# Patient Record
Sex: MALE | Race: BLACK OR AFRICAN AMERICAN | NOT HISPANIC OR LATINO | Employment: UNEMPLOYED | URBAN - METROPOLITAN AREA
[De-identification: names, ages, dates, MRNs, and addresses within clinical notes are randomized per-mention and may not be internally consistent; named-entity substitution may affect disease eponyms.]

---

## 2017-12-20 ENCOUNTER — RESOLUTE PROFESSIONAL BILLING HOSPITAL PROF FEE (OUTPATIENT)
Dept: HOSPITALIST | Facility: MEDICAL CENTER | Age: 57
End: 2017-12-20
Payer: COMMERCIAL

## 2017-12-20 ENCOUNTER — APPOINTMENT (OUTPATIENT)
Dept: RADIOLOGY | Facility: MEDICAL CENTER | Age: 57
DRG: 637 | End: 2017-12-20
Attending: EMERGENCY MEDICINE
Payer: COMMERCIAL

## 2017-12-20 ENCOUNTER — HOSPITAL ENCOUNTER (INPATIENT)
Facility: MEDICAL CENTER | Age: 57
LOS: 4 days | DRG: 637 | End: 2017-12-24
Attending: EMERGENCY MEDICINE | Admitting: HOSPITALIST
Payer: COMMERCIAL

## 2017-12-20 DIAGNOSIS — G54.6 PHANTOM LIMB PAIN (HCC): ICD-10-CM

## 2017-12-20 DIAGNOSIS — N28.9 RENAL INSUFFICIENCY: ICD-10-CM

## 2017-12-20 DIAGNOSIS — E11.40 TYPE 2 DIABETES MELLITUS WITH DIABETIC NEUROPATHY, WITH LONG-TERM CURRENT USE OF INSULIN (HCC): ICD-10-CM

## 2017-12-20 DIAGNOSIS — R73.9 HYPERGLYCEMIA: ICD-10-CM

## 2017-12-20 DIAGNOSIS — J18.9 PNEUMONIA OF RIGHT LUNG DUE TO INFECTIOUS ORGANISM, UNSPECIFIED PART OF LUNG: ICD-10-CM

## 2017-12-20 DIAGNOSIS — E87.1 HYPONATREMIA: ICD-10-CM

## 2017-12-20 DIAGNOSIS — J10.1 INFLUENZA A: ICD-10-CM

## 2017-12-20 DIAGNOSIS — Z79.4 TYPE 2 DIABETES MELLITUS WITH DIABETIC NEUROPATHY, WITH LONG-TERM CURRENT USE OF INSULIN (HCC): ICD-10-CM

## 2017-12-20 DIAGNOSIS — Z86.39 HISTORY OF DIABETES MELLITUS, TYPE II: ICD-10-CM

## 2017-12-20 DIAGNOSIS — E11.10 DIABETIC KETOACIDOSIS WITHOUT COMA ASSOCIATED WITH TYPE 2 DIABETES MELLITUS (HCC): ICD-10-CM

## 2017-12-20 LAB
ALBUMIN SERPL BCP-MCNC: 3.6 G/DL (ref 3.2–4.9)
ALBUMIN/GLOB SERPL: 0.9 G/DL
ALP SERPL-CCNC: 91 U/L (ref 30–99)
ALT SERPL-CCNC: 10 U/L (ref 2–50)
ANION GAP SERPL CALC-SCNC: 11 MMOL/L (ref 0–11.9)
ANION GAP SERPL CALC-SCNC: 18 MMOL/L (ref 0–11.9)
ANION GAP SERPL CALC-SCNC: 30 MMOL/L (ref 0–11.9)
ANION GAP SERPL CALC-SCNC: 8 MMOL/L (ref 0–11.9)
APPEARANCE UR: CLEAR
APTT PPP: 26.4 SEC (ref 24.7–36)
AST SERPL-CCNC: 13 U/L (ref 12–45)
B-OH-BUTYR SERPL-MCNC: >8 MMOL/L (ref 0.02–0.27)
BACTERIA #/AREA URNS HPF: NEGATIVE /HPF
BASOPHILS # BLD AUTO: 0.2 % (ref 0–1.8)
BASOPHILS # BLD: 0.02 K/UL (ref 0–0.12)
BILIRUB SERPL-MCNC: 0.5 MG/DL (ref 0.1–1.5)
BILIRUB UR QL STRIP.AUTO: NEGATIVE
BNP SERPL-MCNC: 23 PG/ML (ref 0–100)
BUN SERPL-MCNC: 31 MG/DL (ref 8–22)
BUN SERPL-MCNC: 33 MG/DL (ref 8–22)
BUN SERPL-MCNC: 39 MG/DL (ref 8–22)
BUN SERPL-MCNC: 46 MG/DL (ref 8–22)
CALCIUM SERPL-MCNC: 7.6 MG/DL (ref 8.5–10.5)
CALCIUM SERPL-MCNC: 8 MG/DL (ref 8.5–10.5)
CALCIUM SERPL-MCNC: 8.1 MG/DL (ref 8.5–10.5)
CALCIUM SERPL-MCNC: 9 MG/DL (ref 8.5–10.5)
CHLORIDE SERPL-SCNC: 100 MMOL/L (ref 96–112)
CHLORIDE SERPL-SCNC: 83 MMOL/L (ref 96–112)
CHLORIDE SERPL-SCNC: 93 MMOL/L (ref 96–112)
CHLORIDE SERPL-SCNC: 98 MMOL/L (ref 96–112)
CO2 SERPL-SCNC: 11 MMOL/L (ref 20–33)
CO2 SERPL-SCNC: 15 MMOL/L (ref 20–33)
CO2 SERPL-SCNC: 20 MMOL/L (ref 20–33)
CO2 SERPL-SCNC: 20 MMOL/L (ref 20–33)
COLOR UR: YELLOW
CREAT SERPL-MCNC: 1.1 MG/DL (ref 0.5–1.4)
CREAT SERPL-MCNC: 1.26 MG/DL (ref 0.5–1.4)
CREAT SERPL-MCNC: 1.45 MG/DL (ref 0.5–1.4)
CREAT SERPL-MCNC: 1.7 MG/DL (ref 0.5–1.4)
EOSINOPHIL # BLD AUTO: 0 K/UL (ref 0–0.51)
EOSINOPHIL NFR BLD: 0 % (ref 0–6.9)
EPI CELLS #/AREA URNS HPF: NEGATIVE /HPF
ERYTHROCYTE [DISTWIDTH] IN BLOOD BY AUTOMATED COUNT: 40.4 FL (ref 35.9–50)
FLUAV RNA SPEC QL NAA+PROBE: POSITIVE
FLUBV RNA SPEC QL NAA+PROBE: NEGATIVE
GFR SERPL CREATININE-BSD FRML MDRD: 42 ML/MIN/1.73 M 2
GFR SERPL CREATININE-BSD FRML MDRD: 50 ML/MIN/1.73 M 2
GFR SERPL CREATININE-BSD FRML MDRD: 59 ML/MIN/1.73 M 2
GFR SERPL CREATININE-BSD FRML MDRD: >60 ML/MIN/1.73 M 2
GLOBULIN SER CALC-MCNC: 3.9 G/DL (ref 1.9–3.5)
GLUCOSE BLD-MCNC: 102 MG/DL (ref 65–99)
GLUCOSE BLD-MCNC: 107 MG/DL (ref 65–99)
GLUCOSE BLD-MCNC: 141 MG/DL (ref 65–99)
GLUCOSE BLD-MCNC: 176 MG/DL (ref 65–99)
GLUCOSE BLD-MCNC: 273 MG/DL (ref 65–99)
GLUCOSE BLD-MCNC: 298 MG/DL (ref 65–99)
GLUCOSE BLD-MCNC: 351 MG/DL (ref 65–99)
GLUCOSE BLD-MCNC: 419 MG/DL (ref 65–99)
GLUCOSE BLD-MCNC: 458 MG/DL (ref 65–99)
GLUCOSE BLD-MCNC: 473 MG/DL (ref 65–99)
GLUCOSE BLD-MCNC: 557 MG/DL (ref 65–99)
GLUCOSE BLD-MCNC: 86 MG/DL (ref 65–99)
GLUCOSE BLD-MCNC: 95 MG/DL (ref 65–99)
GLUCOSE SERPL-MCNC: 107 MG/DL (ref 65–99)
GLUCOSE SERPL-MCNC: 153 MG/DL (ref 65–99)
GLUCOSE SERPL-MCNC: 261 MG/DL (ref 65–99)
GLUCOSE SERPL-MCNC: 540 MG/DL (ref 65–99)
GLUCOSE UR STRIP.AUTO-MCNC: >=1000 MG/DL
HCT VFR BLD AUTO: 44.7 % (ref 42–52)
HGB BLD-MCNC: 14.4 G/DL (ref 14–18)
HYALINE CASTS #/AREA URNS LPF: ABNORMAL /LPF
IMM GRANULOCYTES # BLD AUTO: 0.07 K/UL (ref 0–0.11)
IMM GRANULOCYTES NFR BLD AUTO: 0.6 % (ref 0–0.9)
INR PPP: 1.07 (ref 0.87–1.13)
KETONES UR STRIP.AUTO-MCNC: 80 MG/DL
LACTATE BLD-SCNC: 2.4 MMOL/L (ref 0.5–2)
LEUKOCYTE ESTERASE UR QL STRIP.AUTO: NEGATIVE
LIPASE SERPL-CCNC: 7 U/L (ref 11–82)
LYMPHOCYTES # BLD AUTO: 0.87 K/UL (ref 1–4.8)
LYMPHOCYTES NFR BLD: 7.3 % (ref 22–41)
MAGNESIUM SERPL-MCNC: 2.1 MG/DL (ref 1.5–2.5)
MCH RBC QN AUTO: 26.5 PG (ref 27–33)
MCHC RBC AUTO-ENTMCNC: 32.2 G/DL (ref 33.7–35.3)
MCV RBC AUTO: 82.2 FL (ref 81.4–97.8)
MICRO URNS: ABNORMAL
MONOCYTES # BLD AUTO: 1.2 K/UL (ref 0–0.85)
MONOCYTES NFR BLD AUTO: 10.1 % (ref 0–13.4)
NEUTROPHILS # BLD AUTO: 9.68 K/UL (ref 1.82–7.42)
NEUTROPHILS NFR BLD: 81.8 % (ref 44–72)
NITRITE UR QL STRIP.AUTO: NEGATIVE
NRBC # BLD AUTO: 0 K/UL
NRBC BLD-RTO: 0 /100 WBC
PH UR STRIP.AUTO: 5 [PH]
PHOSPHATE SERPL-MCNC: 5 MG/DL (ref 2.5–4.5)
PLATELET # BLD AUTO: 382 K/UL (ref 164–446)
PMV BLD AUTO: 10.5 FL (ref 9–12.9)
POTASSIUM SERPL-SCNC: 3.7 MMOL/L (ref 3.6–5.5)
POTASSIUM SERPL-SCNC: 4.5 MMOL/L (ref 3.6–5.5)
POTASSIUM SERPL-SCNC: 4.7 MMOL/L (ref 3.6–5.5)
POTASSIUM SERPL-SCNC: 5.6 MMOL/L (ref 3.6–5.5)
PROCALCITONIN SERPL-MCNC: 4.56 NG/ML
PROT SERPL-MCNC: 7.5 G/DL (ref 6–8.2)
PROT UR QL STRIP: 30 MG/DL
PROTHROMBIN TIME: 13.6 SEC (ref 12–14.6)
RBC # BLD AUTO: 5.44 M/UL (ref 4.7–6.1)
RBC # URNS HPF: ABNORMAL /HPF
RBC UR QL AUTO: ABNORMAL
SODIUM SERPL-SCNC: 124 MMOL/L (ref 135–145)
SODIUM SERPL-SCNC: 126 MMOL/L (ref 135–145)
SODIUM SERPL-SCNC: 128 MMOL/L (ref 135–145)
SODIUM SERPL-SCNC: 129 MMOL/L (ref 135–145)
SP GR UR STRIP.AUTO: 1.02
TROPONIN I SERPL-MCNC: <0.01 NG/ML (ref 0–0.04)
UROBILINOGEN UR STRIP.AUTO-MCNC: 0.2 MG/DL
WBC # BLD AUTO: 11.8 K/UL (ref 4.8–10.8)
WBC #/AREA URNS HPF: ABNORMAL /HPF

## 2017-12-20 PROCEDURE — 82010 KETONE BODYS QUAN: CPT

## 2017-12-20 PROCEDURE — A9270 NON-COVERED ITEM OR SERVICE: HCPCS | Performed by: HOSPITALIST

## 2017-12-20 PROCEDURE — 71010 DX-CHEST-LIMITED (1 VIEW): CPT

## 2017-12-20 PROCEDURE — 83880 ASSAY OF NATRIURETIC PEPTIDE: CPT

## 2017-12-20 PROCEDURE — 80048 BASIC METABOLIC PNL TOTAL CA: CPT

## 2017-12-20 PROCEDURE — 93005 ELECTROCARDIOGRAM TRACING: CPT | Performed by: EMERGENCY MEDICINE

## 2017-12-20 PROCEDURE — 83690 ASSAY OF LIPASE: CPT

## 2017-12-20 PROCEDURE — 84100 ASSAY OF PHOSPHORUS: CPT

## 2017-12-20 PROCEDURE — 96361 HYDRATE IV INFUSION ADD-ON: CPT

## 2017-12-20 PROCEDURE — 83735 ASSAY OF MAGNESIUM: CPT

## 2017-12-20 PROCEDURE — 99291 CRITICAL CARE FIRST HOUR: CPT

## 2017-12-20 PROCEDURE — 93005 ELECTROCARDIOGRAM TRACING: CPT

## 2017-12-20 PROCEDURE — 700111 HCHG RX REV CODE 636 W/ 250 OVERRIDE (IP): Performed by: EMERGENCY MEDICINE

## 2017-12-20 PROCEDURE — 96365 THER/PROPH/DIAG IV INF INIT: CPT

## 2017-12-20 PROCEDURE — 700105 HCHG RX REV CODE 258: Performed by: EMERGENCY MEDICINE

## 2017-12-20 PROCEDURE — 770022 HCHG ROOM/CARE - ICU (200)

## 2017-12-20 PROCEDURE — 700111 HCHG RX REV CODE 636 W/ 250 OVERRIDE (IP): Performed by: HOSPITALIST

## 2017-12-20 PROCEDURE — 700102 HCHG RX REV CODE 250 W/ 637 OVERRIDE(OP): Performed by: HOSPITALIST

## 2017-12-20 PROCEDURE — 700105 HCHG RX REV CODE 258: Performed by: HOSPITALIST

## 2017-12-20 PROCEDURE — 96366 THER/PROPH/DIAG IV INF ADDON: CPT

## 2017-12-20 PROCEDURE — 82962 GLUCOSE BLOOD TEST: CPT | Mod: 91

## 2017-12-20 PROCEDURE — 85730 THROMBOPLASTIN TIME PARTIAL: CPT

## 2017-12-20 PROCEDURE — 80053 COMPREHEN METABOLIC PANEL: CPT

## 2017-12-20 PROCEDURE — 85025 COMPLETE CBC W/AUTO DIFF WBC: CPT

## 2017-12-20 PROCEDURE — 85610 PROTHROMBIN TIME: CPT

## 2017-12-20 PROCEDURE — 99291 CRITICAL CARE FIRST HOUR: CPT | Performed by: HOSPITALIST

## 2017-12-20 PROCEDURE — 81001 URINALYSIS AUTO W/SCOPE: CPT

## 2017-12-20 PROCEDURE — 700111 HCHG RX REV CODE 636 W/ 250 OVERRIDE (IP)

## 2017-12-20 PROCEDURE — 84484 ASSAY OF TROPONIN QUANT: CPT

## 2017-12-20 PROCEDURE — 36415 COLL VENOUS BLD VENIPUNCTURE: CPT

## 2017-12-20 PROCEDURE — 96375 TX/PRO/DX INJ NEW DRUG ADDON: CPT

## 2017-12-20 PROCEDURE — 84145 PROCALCITONIN (PCT): CPT

## 2017-12-20 PROCEDURE — 87502 INFLUENZA DNA AMP PROBE: CPT

## 2017-12-20 PROCEDURE — 87040 BLOOD CULTURE FOR BACTERIA: CPT

## 2017-12-20 PROCEDURE — 83605 ASSAY OF LACTIC ACID: CPT

## 2017-12-20 RX ORDER — MAGNESIUM SULFATE HEPTAHYDRATE 40 MG/ML
4 INJECTION, SOLUTION INTRAVENOUS EVERY 4 HOURS PRN
Status: DISCONTINUED | OUTPATIENT
Start: 2017-12-20 | End: 2017-12-20

## 2017-12-20 RX ORDER — ACETAMINOPHEN 325 MG/1
650 TABLET ORAL EVERY 6 HOURS PRN
Status: DISCONTINUED | OUTPATIENT
Start: 2017-12-20 | End: 2017-12-24 | Stop reason: HOSPADM

## 2017-12-20 RX ORDER — SODIUM CHLORIDE 9 MG/ML
INJECTION, SOLUTION INTRAVENOUS CONTINUOUS
Status: DISCONTINUED | OUTPATIENT
Start: 2017-12-20 | End: 2017-12-21

## 2017-12-20 RX ORDER — SODIUM CHLORIDE 9 MG/ML
30 INJECTION, SOLUTION INTRAVENOUS
Status: DISCONTINUED | OUTPATIENT
Start: 2017-12-20 | End: 2017-12-20

## 2017-12-20 RX ORDER — BISACODYL 10 MG
10 SUPPOSITORY, RECTAL RECTAL
Status: DISCONTINUED | OUTPATIENT
Start: 2017-12-20 | End: 2017-12-24 | Stop reason: HOSPADM

## 2017-12-20 RX ORDER — ONDANSETRON 4 MG/1
4 TABLET, ORALLY DISINTEGRATING ORAL EVERY 4 HOURS PRN
Status: DISCONTINUED | OUTPATIENT
Start: 2017-12-20 | End: 2017-12-24 | Stop reason: HOSPADM

## 2017-12-20 RX ORDER — POLYETHYLENE GLYCOL 3350 17 G/17G
1 POWDER, FOR SOLUTION ORAL
Status: DISCONTINUED | OUTPATIENT
Start: 2017-12-20 | End: 2017-12-24 | Stop reason: HOSPADM

## 2017-12-20 RX ORDER — SODIUM CHLORIDE 9 MG/ML
2000 INJECTION, SOLUTION INTRAVENOUS ONCE
Status: DISCONTINUED | OUTPATIENT
Start: 2017-12-20 | End: 2017-12-20

## 2017-12-20 RX ORDER — POTASSIUM CHLORIDE 7.45 MG/ML
10 INJECTION INTRAVENOUS ONCE
Status: COMPLETED | OUTPATIENT
Start: 2017-12-20 | End: 2017-12-20

## 2017-12-20 RX ORDER — FAMOTIDINE 20 MG/1
20 TABLET, FILM COATED ORAL 2 TIMES DAILY
Status: DISCONTINUED | OUTPATIENT
Start: 2017-12-20 | End: 2017-12-24 | Stop reason: HOSPADM

## 2017-12-20 RX ORDER — MORPHINE SULFATE 4 MG/ML
2-4 INJECTION, SOLUTION INTRAMUSCULAR; INTRAVENOUS EVERY 4 HOURS PRN
Status: DISCONTINUED | OUTPATIENT
Start: 2017-12-20 | End: 2017-12-24 | Stop reason: HOSPADM

## 2017-12-20 RX ORDER — MAGNESIUM SULFATE HEPTAHYDRATE 40 MG/ML
2 INJECTION, SOLUTION INTRAVENOUS
Status: DISCONTINUED | OUTPATIENT
Start: 2017-12-20 | End: 2017-12-20

## 2017-12-20 RX ORDER — SODIUM CHLORIDE 9 MG/ML
INJECTION, SOLUTION INTRAVENOUS CONTINUOUS
Status: DISCONTINUED | OUTPATIENT
Start: 2017-12-20 | End: 2017-12-20

## 2017-12-20 RX ORDER — OXYCODONE HYDROCHLORIDE 5 MG/1
5 TABLET ORAL EVERY 4 HOURS PRN
Status: DISCONTINUED | OUTPATIENT
Start: 2017-12-20 | End: 2017-12-24 | Stop reason: HOSPADM

## 2017-12-20 RX ORDER — PROMETHAZINE HYDROCHLORIDE 25 MG/1
12.5-25 SUPPOSITORY RECTAL EVERY 4 HOURS PRN
Status: DISCONTINUED | OUTPATIENT
Start: 2017-12-20 | End: 2017-12-24 | Stop reason: HOSPADM

## 2017-12-20 RX ORDER — DEXTROSE MONOHYDRATE 25 G/50ML
25 INJECTION, SOLUTION INTRAVENOUS
Status: DISCONTINUED | OUTPATIENT
Start: 2017-12-20 | End: 2017-12-21

## 2017-12-20 RX ORDER — OSELTAMIVIR PHOSPHATE 75 MG/1
75 CAPSULE ORAL EVERY 12 HOURS
Status: DISCONTINUED | OUTPATIENT
Start: 2017-12-20 | End: 2017-12-24 | Stop reason: HOSPADM

## 2017-12-20 RX ORDER — HYDRALAZINE HYDROCHLORIDE 25 MG/1
25 TABLET, FILM COATED ORAL EVERY 8 HOURS
Status: DISCONTINUED | OUTPATIENT
Start: 2017-12-20 | End: 2017-12-24 | Stop reason: HOSPADM

## 2017-12-20 RX ORDER — ONDANSETRON 2 MG/ML
4 INJECTION INTRAMUSCULAR; INTRAVENOUS EVERY 4 HOURS PRN
Status: DISCONTINUED | OUTPATIENT
Start: 2017-12-20 | End: 2017-12-24 | Stop reason: HOSPADM

## 2017-12-20 RX ORDER — CEFTRIAXONE 2 G/1
2 INJECTION, POWDER, FOR SOLUTION INTRAMUSCULAR; INTRAVENOUS ONCE
Status: DISCONTINUED | OUTPATIENT
Start: 2017-12-20 | End: 2017-12-20

## 2017-12-20 RX ORDER — LABETALOL HYDROCHLORIDE 5 MG/ML
10 INJECTION, SOLUTION INTRAVENOUS EVERY 4 HOURS PRN
Status: DISCONTINUED | OUTPATIENT
Start: 2017-12-20 | End: 2017-12-24 | Stop reason: HOSPADM

## 2017-12-20 RX ORDER — HYDRALAZINE HYDROCHLORIDE 50 MG/1
50 TABLET, FILM COATED ORAL 2 TIMES DAILY
Status: DISCONTINUED | OUTPATIENT
Start: 2017-12-20 | End: 2017-12-20

## 2017-12-20 RX ORDER — CITALOPRAM 20 MG/1
20 TABLET ORAL DAILY
Status: DISCONTINUED | OUTPATIENT
Start: 2017-12-20 | End: 2017-12-24 | Stop reason: HOSPADM

## 2017-12-20 RX ORDER — INSULIN GLARGINE 100 [IU]/ML
10 INJECTION, SOLUTION SUBCUTANEOUS EVERY EVENING
Status: DISCONTINUED | OUTPATIENT
Start: 2017-12-20 | End: 2017-12-21

## 2017-12-20 RX ORDER — TEMAZEPAM 15 MG/1
15 CAPSULE ORAL
Status: DISCONTINUED | OUTPATIENT
Start: 2017-12-20 | End: 2017-12-24 | Stop reason: HOSPADM

## 2017-12-20 RX ORDER — SODIUM CHLORIDE 9 MG/ML
500 INJECTION, SOLUTION INTRAVENOUS
Status: DISCONTINUED | OUTPATIENT
Start: 2017-12-20 | End: 2017-12-21

## 2017-12-20 RX ORDER — DEXTROSE AND SODIUM CHLORIDE 5; .9 G/100ML; G/100ML
INJECTION, SOLUTION INTRAVENOUS CONTINUOUS
Status: DISCONTINUED | OUTPATIENT
Start: 2017-12-20 | End: 2017-12-20

## 2017-12-20 RX ORDER — SODIUM CHLORIDE 9 MG/ML
1000 INJECTION, SOLUTION INTRAVENOUS ONCE
Status: COMPLETED | OUTPATIENT
Start: 2017-12-20 | End: 2017-12-20

## 2017-12-20 RX ORDER — DEXTROSE AND SODIUM CHLORIDE 10; .45 G/100ML; G/100ML
INJECTION, SOLUTION INTRAVENOUS CONTINUOUS
Status: DISPENSED | OUTPATIENT
Start: 2017-12-20 | End: 2017-12-20

## 2017-12-20 RX ORDER — MIRTAZAPINE 30 MG/1
45 TABLET, FILM COATED ORAL NIGHTLY
Status: DISCONTINUED | OUTPATIENT
Start: 2017-12-20 | End: 2017-12-24 | Stop reason: HOSPADM

## 2017-12-20 RX ORDER — AMLODIPINE BESYLATE 5 MG/1
10 TABLET ORAL DAILY
Status: DISCONTINUED | OUTPATIENT
Start: 2017-12-20 | End: 2017-12-24 | Stop reason: HOSPADM

## 2017-12-20 RX ORDER — AMITRIPTYLINE HYDROCHLORIDE 50 MG/1
50 TABLET, FILM COATED ORAL NIGHTLY
Status: DISCONTINUED | OUTPATIENT
Start: 2017-12-20 | End: 2017-12-24 | Stop reason: HOSPADM

## 2017-12-20 RX ORDER — DEXTROSE AND SODIUM CHLORIDE 5; .9 G/100ML; G/100ML
INJECTION, SOLUTION INTRAVENOUS
Status: DISCONTINUED
Start: 2017-12-20 | End: 2017-12-20

## 2017-12-20 RX ORDER — FINASTERIDE 5 MG/1
5 TABLET, FILM COATED ORAL DAILY
Status: DISCONTINUED | OUTPATIENT
Start: 2017-12-20 | End: 2017-12-24 | Stop reason: HOSPADM

## 2017-12-20 RX ORDER — AMOXICILLIN 250 MG
2 CAPSULE ORAL 2 TIMES DAILY
Status: DISCONTINUED | OUTPATIENT
Start: 2017-12-20 | End: 2017-12-24 | Stop reason: HOSPADM

## 2017-12-20 RX ORDER — OXYCODONE HYDROCHLORIDE 10 MG/1
10 TABLET ORAL EVERY 4 HOURS PRN
Status: DISCONTINUED | OUTPATIENT
Start: 2017-12-20 | End: 2017-12-24 | Stop reason: HOSPADM

## 2017-12-20 RX ORDER — PROMETHAZINE HYDROCHLORIDE 25 MG/1
12.5-25 TABLET ORAL EVERY 4 HOURS PRN
Status: DISCONTINUED | OUTPATIENT
Start: 2017-12-20 | End: 2017-12-24 | Stop reason: HOSPADM

## 2017-12-20 RX ORDER — TAMSULOSIN HYDROCHLORIDE 0.4 MG/1
0.4 CAPSULE ORAL
Status: DISCONTINUED | OUTPATIENT
Start: 2017-12-20 | End: 2017-12-24 | Stop reason: HOSPADM

## 2017-12-20 RX ORDER — LABETALOL HYDROCHLORIDE 5 MG/ML
10 INJECTION, SOLUTION INTRAVENOUS EVERY 4 HOURS PRN
Status: DISCONTINUED | OUTPATIENT
Start: 2017-12-20 | End: 2017-12-20

## 2017-12-20 RX ADMIN — AMITRIPTYLINE HYDROCHLORIDE 50 MG: 50 TABLET, FILM COATED ORAL at 20:20

## 2017-12-20 RX ADMIN — MIRTAZAPINE 45 MG: 30 TABLET, FILM COATED ORAL at 20:20

## 2017-12-20 RX ADMIN — MORPHINE SULFATE 2 MG: 4 INJECTION INTRAVENOUS at 20:24

## 2017-12-20 RX ADMIN — INSULIN HUMAN 5 UNITS: 100 INJECTION, SOLUTION PARENTERAL at 09:43

## 2017-12-20 RX ADMIN — FAMOTIDINE 20 MG: 20 TABLET, FILM COATED ORAL at 20:20

## 2017-12-20 RX ADMIN — POTASSIUM CHLORIDE 10 MEQ: 7.46 INJECTION, SOLUTION INTRAVENOUS at 16:47

## 2017-12-20 RX ADMIN — HYDRALAZINE HYDROCHLORIDE 25 MG: 25 TABLET, FILM COATED ORAL at 14:45

## 2017-12-20 RX ADMIN — OSELTAMIVIR PHOSPHATE 75 MG: 75 CAPSULE ORAL at 09:42

## 2017-12-20 RX ADMIN — OXYCODONE HYDROCHLORIDE 10 MG: 10 TABLET ORAL at 18:29

## 2017-12-20 RX ADMIN — SODIUM CHLORIDE 1000 ML: 9 INJECTION, SOLUTION INTRAVENOUS at 09:00

## 2017-12-20 RX ADMIN — DEXTROSE AND SODIUM CHLORIDE: 10; .45 INJECTION, SOLUTION INTRAVENOUS at 19:34

## 2017-12-20 RX ADMIN — SODIUM CHLORIDE 100 ML: 9 INJECTION, SOLUTION INTRAVENOUS at 09:47

## 2017-12-20 RX ADMIN — INSULIN GLARGINE 10 UNITS: 100 INJECTION, SOLUTION SUBCUTANEOUS at 21:53

## 2017-12-20 RX ADMIN — TEMAZEPAM 15 MG: 15 CAPSULE ORAL at 22:35

## 2017-12-20 RX ADMIN — SODIUM CHLORIDE 11 UNITS/HR: 9 INJECTION, SOLUTION INTRAVENOUS at 17:04

## 2017-12-20 RX ADMIN — DEXTROSE AND SODIUM CHLORIDE: 5; 900 INJECTION, SOLUTION INTRAVENOUS at 16:18

## 2017-12-20 RX ADMIN — CEFTRIAXONE 2 G: 2 INJECTION, POWDER, FOR SOLUTION INTRAMUSCULAR; INTRAVENOUS at 08:03

## 2017-12-20 RX ADMIN — SODIUM CHLORIDE 1000 ML: 9 INJECTION, SOLUTION INTRAVENOUS at 07:37

## 2017-12-20 RX ADMIN — HYDRALAZINE HYDROCHLORIDE 25 MG: 25 TABLET, FILM COATED ORAL at 20:20

## 2017-12-20 RX ADMIN — SODIUM CHLORIDE 9 UNITS/HR: 9 INJECTION, SOLUTION INTRAVENOUS at 09:43

## 2017-12-20 RX ADMIN — OSELTAMIVIR PHOSPHATE 75 MG: 75 CAPSULE ORAL at 20:20

## 2017-12-20 RX ADMIN — POTASSIUM CHLORIDE 10 MEQ: 7.46 INJECTION, SOLUTION INTRAVENOUS at 20:04

## 2017-12-20 RX ADMIN — SODIUM CHLORIDE: 9 INJECTION, SOLUTION INTRAVENOUS at 13:06

## 2017-12-20 RX ADMIN — SODIUM CHLORIDE: 9 INJECTION, SOLUTION INTRAVENOUS at 23:52

## 2017-12-20 ASSESSMENT — PATIENT HEALTH QUESTIONNAIRE - PHQ9
5. POOR APPETITE OR OVEREATING: NEARLY EVERY DAY
2. FEELING DOWN, DEPRESSED, IRRITABLE, OR HOPELESS: NEARLY EVERY DAY
9. THOUGHTS THAT YOU WOULD BE BETTER OFF DEAD, OR OF HURTING YOURSELF: NOT AT ALL
6. FEELING BAD ABOUT YOURSELF - OR THAT YOU ARE A FAILURE OR HAVE LET YOURSELF OR YOUR FAMILY DOWN: NEARLY EVERY DAY
4. FEELING TIRED OR HAVING LITTLE ENERGY: NEARLY EVERY DAY
3. TROUBLE FALLING OR STAYING ASLEEP OR SLEEPING TOO MUCH: NEARLY EVERY DAY
SUM OF ALL RESPONSES TO PHQ9 QUESTIONS 1 AND 2: 6
7. TROUBLE CONCENTRATING ON THINGS, SUCH AS READING THE NEWSPAPER OR WATCHING TELEVISION: NEARLY EVERY DAY
8. MOVING OR SPEAKING SO SLOWLY THAT OTHER PEOPLE COULD HAVE NOTICED. OR THE OPPOSITE, BEING SO FIGETY OR RESTLESS THAT YOU HAVE BEEN MOVING AROUND A LOT MORE THAN USUAL: MORE THAN HALF THE DAYS
SUM OF ALL RESPONSES TO PHQ QUESTIONS 1-9: 23
1. LITTLE INTEREST OR PLEASURE IN DOING THINGS: NEARLY EVERY DAY

## 2017-12-20 ASSESSMENT — PAIN SCALES - GENERAL
PAINLEVEL_OUTOF10: 8
PAINLEVEL_OUTOF10: 6
PAINLEVEL_OUTOF10: 6
PAINLEVEL_OUTOF10: 8
PAINLEVEL_OUTOF10: 5
PAINLEVEL_OUTOF10: 8
PAINLEVEL_OUTOF10: 9
PAINLEVEL_OUTOF10: 8
PAINLEVEL_OUTOF10: 5

## 2017-12-20 ASSESSMENT — ENCOUNTER SYMPTOMS
COUGH: 1
VOMITING: 0
DIZZINESS: 0
MYALGIAS: 1
NAUSEA: 1
SHORTNESS OF BREATH: 1
FEVER: 1
DIARRHEA: 0

## 2017-12-20 ASSESSMENT — LIFESTYLE VARIABLES
EVER_SMOKED: YES
PACK_YEARS: 20
DO YOU DRINK ALCOHOL: NO

## 2017-12-20 NOTE — PROGRESS NOTES
Pt arrived to unit at 1230 with 1 RN, 1 Nurse Apprentice transporting. NK transport monitor in use.    Gtt concentrations verified by RN at bedside.
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
## 2017-12-20 NOTE — ED NOTES
Chief Complaint   Patient presents with   • Hyperglycemia   • Chest Pain       Pt bib ems for c/o hyperglycemia and chest pain with generalized pain. Pt aox4, breathing even and unlabored on arrival. Pt gowned, placed on monitors, bed made safe.     Blood Pressure: 152/82, NIBP: 152/82, NIBP MAP (Calculated): 94, Heart Rate (Monitored): (!) 106, Pulse: (!) 105, Respiration: 20, Temperature: 37.1 °C (98.8 °F), Height:  (bka and aka), Weight: 96.2 kg (212 lb), Pulse Oximetry: 99 %

## 2017-12-20 NOTE — DISCHARGE PLANNING
"Met with this very pleasant gentleman.  He was recently released from [facility] 12/11.  He has been homeless [facility].       DKA admit.  Patient has been diabetic for 30 years.  Patient was incarcerated for 33 years.  Being released into society is a \"Shock.\"     ? JAVAD called [employer] and probation.  JAVAD left message with [facility] [place] [phone] notifying of patient's current location.   JAVAD also called [facility] [phone] and notified them as patient has appt  Today.  JAVAD left his name and contact information.         "
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
## 2017-12-20 NOTE — ED NOTES
Received report from AUR[facility] [place], taking over pt care. Pt sitting up comfortably, bed in lowered position, side rails up, call light in reach

## 2017-12-21 LAB
ALBUMIN SERPL BCP-MCNC: 2.8 G/DL (ref 3.2–4.9)
ALBUMIN/GLOB SERPL: 1 G/DL
ALP SERPL-CCNC: 65 U/L (ref 30–99)
ALT SERPL-CCNC: 5 U/L (ref 2–50)
ANION GAP SERPL CALC-SCNC: 10 MMOL/L (ref 0–11.9)
ANION GAP SERPL CALC-SCNC: 10 MMOL/L (ref 0–11.9)
AST SERPL-CCNC: 12 U/L (ref 12–45)
BASOPHILS # BLD AUTO: 0.3 % (ref 0–1.8)
BASOPHILS # BLD: 0.03 K/UL (ref 0–0.12)
BILIRUB SERPL-MCNC: 0.5 MG/DL (ref 0.1–1.5)
BUN SERPL-MCNC: 29 MG/DL (ref 8–22)
BUN SERPL-MCNC: 30 MG/DL (ref 8–22)
CALCIUM SERPL-MCNC: 7.9 MG/DL (ref 8.5–10.5)
CALCIUM SERPL-MCNC: 8 MG/DL (ref 8.5–10.5)
CHLORIDE SERPL-SCNC: 100 MMOL/L (ref 96–112)
CHLORIDE SERPL-SCNC: 99 MMOL/L (ref 96–112)
CO2 SERPL-SCNC: 16 MMOL/L (ref 20–33)
CO2 SERPL-SCNC: 17 MMOL/L (ref 20–33)
CREAT SERPL-MCNC: 1.14 MG/DL (ref 0.5–1.4)
CREAT SERPL-MCNC: 1.18 MG/DL (ref 0.5–1.4)
EOSINOPHIL # BLD AUTO: 0 K/UL (ref 0–0.51)
EOSINOPHIL NFR BLD: 0 % (ref 0–6.9)
ERYTHROCYTE [DISTWIDTH] IN BLOOD BY AUTOMATED COUNT: 39.5 FL (ref 35.9–50)
GFR SERPL CREATININE-BSD FRML MDRD: >60 ML/MIN/1.73 M 2
GFR SERPL CREATININE-BSD FRML MDRD: >60 ML/MIN/1.73 M 2
GLOBULIN SER CALC-MCNC: 2.8 G/DL (ref 1.9–3.5)
GLUCOSE BLD-MCNC: 165 MG/DL (ref 65–99)
GLUCOSE BLD-MCNC: 281 MG/DL (ref 65–99)
GLUCOSE BLD-MCNC: 295 MG/DL (ref 65–99)
GLUCOSE BLD-MCNC: 354 MG/DL (ref 65–99)
GLUCOSE BLD-MCNC: 375 MG/DL (ref 65–99)
GLUCOSE SERPL-MCNC: 201 MG/DL (ref 65–99)
GLUCOSE SERPL-MCNC: 339 MG/DL (ref 65–99)
HCT VFR BLD AUTO: 37.2 % (ref 42–52)
HGB BLD-MCNC: 12.3 G/DL (ref 14–18)
IMM GRANULOCYTES # BLD AUTO: 0.08 K/UL (ref 0–0.11)
IMM GRANULOCYTES NFR BLD AUTO: 0.7 % (ref 0–0.9)
LYMPHOCYTES # BLD AUTO: 1.29 K/UL (ref 1–4.8)
LYMPHOCYTES NFR BLD: 10.8 % (ref 22–41)
MCH RBC QN AUTO: 26.6 PG (ref 27–33)
MCHC RBC AUTO-ENTMCNC: 33.1 G/DL (ref 33.7–35.3)
MCV RBC AUTO: 80.5 FL (ref 81.4–97.8)
MONOCYTES # BLD AUTO: 1.66 K/UL (ref 0–0.85)
MONOCYTES NFR BLD AUTO: 13.9 % (ref 0–13.4)
NEUTROPHILS # BLD AUTO: 8.85 K/UL (ref 1.82–7.42)
NEUTROPHILS NFR BLD: 74.3 % (ref 44–72)
NRBC # BLD AUTO: 0 K/UL
NRBC BLD-RTO: 0 /100 WBC
PLATELET # BLD AUTO: 328 K/UL (ref 164–446)
PMV BLD AUTO: 10.2 FL (ref 9–12.9)
POTASSIUM SERPL-SCNC: 4.9 MMOL/L (ref 3.6–5.5)
POTASSIUM SERPL-SCNC: 4.9 MMOL/L (ref 3.6–5.5)
PROT SERPL-MCNC: 5.6 G/DL (ref 6–8.2)
RBC # BLD AUTO: 4.62 M/UL (ref 4.7–6.1)
SODIUM SERPL-SCNC: 126 MMOL/L (ref 135–145)
SODIUM SERPL-SCNC: 126 MMOL/L (ref 135–145)
WBC # BLD AUTO: 11.9 K/UL (ref 4.8–10.8)

## 2017-12-21 PROCEDURE — 770021 HCHG ROOM/CARE - ISO PRIVATE

## 2017-12-21 PROCEDURE — 99233 SBSQ HOSP IP/OBS HIGH 50: CPT | Performed by: HOSPITALIST

## 2017-12-21 PROCEDURE — 700102 HCHG RX REV CODE 250 W/ 637 OVERRIDE(OP): Performed by: HOSPITALIST

## 2017-12-21 PROCEDURE — 700111 HCHG RX REV CODE 636 W/ 250 OVERRIDE (IP): Performed by: HOSPITALIST

## 2017-12-21 PROCEDURE — A9270 NON-COVERED ITEM OR SERVICE: HCPCS | Performed by: HOSPITALIST

## 2017-12-21 PROCEDURE — 700105 HCHG RX REV CODE 258: Performed by: HOSPITALIST

## 2017-12-21 PROCEDURE — 82962 GLUCOSE BLOOD TEST: CPT

## 2017-12-21 PROCEDURE — 80048 BASIC METABOLIC PNL TOTAL CA: CPT

## 2017-12-21 PROCEDURE — 80053 COMPREHEN METABOLIC PANEL: CPT

## 2017-12-21 PROCEDURE — 85025 COMPLETE CBC W/AUTO DIFF WBC: CPT

## 2017-12-21 RX ORDER — INSULIN GLARGINE 100 [IU]/ML
60 INJECTION, SOLUTION SUBCUTANEOUS ONCE
Status: COMPLETED | OUTPATIENT
Start: 2017-12-21 | End: 2017-12-21

## 2017-12-21 RX ORDER — TRIAMTERENE AND HYDROCHLOROTHIAZIDE 37.5; 25 MG/1; MG/1
1 CAPSULE ORAL
Status: DISCONTINUED | OUTPATIENT
Start: 2017-12-21 | End: 2017-12-24 | Stop reason: HOSPADM

## 2017-12-21 RX ORDER — INSULIN GLARGINE 100 [IU]/ML
60 INJECTION, SOLUTION SUBCUTANEOUS 2 TIMES DAILY
Status: DISCONTINUED | OUTPATIENT
Start: 2017-12-21 | End: 2017-12-22

## 2017-12-21 RX ORDER — HYDRALAZINE HYDROCHLORIDE 25 MG/1
25 TABLET, FILM COATED ORAL EVERY 8 HOURS
Status: DISCONTINUED | OUTPATIENT
Start: 2017-12-21 | End: 2017-12-21

## 2017-12-21 RX ORDER — AMLODIPINE BESYLATE 10 MG/1
10 TABLET ORAL
Status: DISCONTINUED | OUTPATIENT
Start: 2017-12-21 | End: 2017-12-21

## 2017-12-21 RX ORDER — DEXTROSE MONOHYDRATE 25 G/50ML
25 INJECTION, SOLUTION INTRAVENOUS
Status: DISCONTINUED | OUTPATIENT
Start: 2017-12-21 | End: 2017-12-24 | Stop reason: HOSPADM

## 2017-12-21 RX ADMIN — TEMAZEPAM 15 MG: 15 CAPSULE ORAL at 21:18

## 2017-12-21 RX ADMIN — AMLODIPINE BESYLATE 10 MG: 10 TABLET ORAL at 08:00

## 2017-12-21 RX ADMIN — INSULIN HUMAN 5 UNITS: 100 INJECTION, SOLUTION PARENTERAL at 08:05

## 2017-12-21 RX ADMIN — ENOXAPARIN SODIUM 40 MG: 100 INJECTION SUBCUTANEOUS at 07:59

## 2017-12-21 RX ADMIN — INSULIN HUMAN 16 UNITS: 100 INJECTION, SOLUTION PARENTERAL at 20:00

## 2017-12-21 RX ADMIN — CITALOPRAM HYDROBROMIDE 20 MG: 20 TABLET ORAL at 08:00

## 2017-12-21 RX ADMIN — OXYCODONE HYDROCHLORIDE 10 MG: 10 TABLET ORAL at 16:19

## 2017-12-21 RX ADMIN — INSULIN HUMAN 8 UNITS: 100 INJECTION, SOLUTION PARENTERAL at 11:13

## 2017-12-21 RX ADMIN — AMITRIPTYLINE HYDROCHLORIDE 50 MG: 50 TABLET, FILM COATED ORAL at 19:58

## 2017-12-21 RX ADMIN — TAMSULOSIN HYDROCHLORIDE 0.4 MG: 0.4 CAPSULE ORAL at 08:00

## 2017-12-21 RX ADMIN — MIRTAZAPINE 45 MG: 30 TABLET, FILM COATED ORAL at 19:58

## 2017-12-21 RX ADMIN — OXYCODONE HYDROCHLORIDE 10 MG: 10 TABLET ORAL at 21:20

## 2017-12-21 RX ADMIN — HYDRALAZINE HYDROCHLORIDE 25 MG: 25 TABLET, FILM COATED ORAL at 04:58

## 2017-12-21 RX ADMIN — OSELTAMIVIR PHOSPHATE 75 MG: 75 CAPSULE ORAL at 19:57

## 2017-12-21 RX ADMIN — FAMOTIDINE 20 MG: 20 TABLET, FILM COATED ORAL at 08:00

## 2017-12-21 RX ADMIN — OSELTAMIVIR PHOSPHATE 75 MG: 75 CAPSULE ORAL at 08:23

## 2017-12-21 RX ADMIN — MORPHINE SULFATE 4 MG: 4 INJECTION INTRAVENOUS at 20:04

## 2017-12-21 RX ADMIN — TRIAMTERENE AND HYDROCHLOROTHIAZIDE 1 CAPSULE: 25; 37.5 CAPSULE ORAL at 16:13

## 2017-12-21 RX ADMIN — OXYCODONE HYDROCHLORIDE 10 MG: 10 TABLET ORAL at 11:06

## 2017-12-21 RX ADMIN — FAMOTIDINE 20 MG: 20 TABLET, FILM COATED ORAL at 19:58

## 2017-12-21 RX ADMIN — HYDRALAZINE HYDROCHLORIDE 25 MG: 25 TABLET, FILM COATED ORAL at 14:07

## 2017-12-21 RX ADMIN — MORPHINE SULFATE 4 MG: 4 INJECTION INTRAVENOUS at 07:55

## 2017-12-21 RX ADMIN — HYDRALAZINE HYDROCHLORIDE 25 MG: 25 TABLET, FILM COATED ORAL at 20:00

## 2017-12-21 RX ADMIN — INSULIN GLARGINE 60 UNITS: 100 INJECTION, SOLUTION SUBCUTANEOUS at 20:01

## 2017-12-21 RX ADMIN — SODIUM CHLORIDE: 9 INJECTION, SOLUTION INTRAVENOUS at 08:10

## 2017-12-21 RX ADMIN — INSULIN HUMAN 16 UNITS: 100 INJECTION, SOLUTION PARENTERAL at 16:15

## 2017-12-21 RX ADMIN — MORPHINE SULFATE 2 MG: 4 INJECTION INTRAVENOUS at 14:11

## 2017-12-21 RX ADMIN — STANDARDIZED SENNA CONCENTRATE AND DOCUSATE SODIUM 2 TABLET: 8.6; 5 TABLET, FILM COATED ORAL at 08:00

## 2017-12-21 RX ADMIN — FINASTERIDE 5 MG: 5 TABLET, FILM COATED ORAL at 08:00

## 2017-12-21 RX ADMIN — INSULIN GLARGINE 60 UNITS: 100 INJECTION, SOLUTION SUBCUTANEOUS at 08:04

## 2017-12-21 ASSESSMENT — PAIN SCALES - GENERAL
PAINLEVEL_OUTOF10: 7
PAINLEVEL_OUTOF10: 8
PAINLEVEL_OUTOF10: 5
PAINLEVEL_OUTOF10: 7
PAINLEVEL_OUTOF10: 5
PAINLEVEL_OUTOF10: 8
PAINLEVEL_OUTOF10: 9
PAINLEVEL_OUTOF10: 4
PAINLEVEL_OUTOF10: 7
PAINLEVEL_OUTOF10: 6
PAINLEVEL_OUTOF10: 5
PAINLEVEL_OUTOF10: 4

## 2017-12-21 ASSESSMENT — COPD QUESTIONNAIRES
HAVE YOU SMOKED AT LEAST 100 CIGARETTES IN YOUR ENTIRE LIFE: YES
DO YOU EVER COUGH UP ANY MUCUS OR PHLEGM?: YES, A FEW DAYS A WEEK OR MONTH
DURING THE PAST 4 WEEKS HOW MUCH DID YOU FEEL SHORT OF BREATH: NONE/LITTLE OF THE TIME
COPD SCREENING SCORE: 4

## 2017-12-21 ASSESSMENT — LIFESTYLE VARIABLES: EVER_SMOKED: YES

## 2017-12-21 NOTE — CARE PLAN
Problem: Safety  Goal: Will remain free from injury  Outcome: PROGRESSING AS EXPECTED  Bed in low, locked position; call light & personal belongings within reach; bed alarm on; safety education provided    Problem: Infection  Goal: Will remain free from infection  Outcome: PROGRESSING AS EXPECTED  No s/s of infection; infection prevention education provided
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
## 2017-12-21 NOTE — CARE PLAN
Problem: Safety  Goal: Will remain free from falls  Outcome: PROGRESSING AS EXPECTED  Safety education provided to patient.    Problem: Pain Management  Goal: Pain level will decrease to patient's comfort goal  Outcome: PROGRESSING AS EXPECTED  Performed admission pain assessment. Pain management orders obtained and implemented.

## 2017-12-21 NOTE — ASSESSMENT & PLAN NOTE
Influenza A positive. Did receive his flu shot this year.  - oseltamivir started 12/20  - supportive care

## 2017-12-21 NOTE — PROGRESS NOTES
Bedside report received from day shift RN. Patient laying in bed, connected to all monitors, alarms set properly. Patient recently medicated with pain medication, states he has helped some. Denies any other discomfort. Discussed plan of care, answered any questions. All drips verified. Bed in low position, call light within reach.

## 2017-12-21 NOTE — CARE PLAN
Problem: Infection  Goal: Will remain free from infection  Outcome: PROGRESSING AS EXPECTED  Pt remains afebrile. No signs or symptoms of new infection noted.    Problem: Fluid Volume:  Goal: Will maintain balanced intake and output  Outcome: PROGRESSING AS EXPECTED  Pt voiding to urinal, tolerating PO intake. No current complains of nausea.

## 2017-12-21 NOTE — PROGRESS NOTES
Patient blood sugar 95, yvette PARMAR. Notified Dr Burgess of patients blood sugar, AG of 11, and CO2 of 20, meeting parameters to transition to SubQ insulin. Orders received for transition and to start patient on 0.9% NS at 125 mL/hr once transitioned.
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
## 2017-12-21 NOTE — PROGRESS NOTES
[facility] Hospitalist Progress Note    Date of Service: 2017    Chief Complaint  56 y.o. male admitted 2017 with ***    Interval Problem Update  ***    Consultants/Specialty  ***    Disposition  ***        ROS   Physical Exam  Laboratory/Imaging   Hemodynamics  Temp (24hrs), Av.8 °C (98.2 °F), Min:36.6 °C (97.9 °F), Max:37.1 °C (98.8 °F)   Temperature: 36.6 °C (97.9 °F)  Pulse  Av.3  Min: 89  Max: 124 Heart Rate (Monitored): 94  Blood Pressure: 152/82, NIBP: 125/74      Respiratory      Respiration: (!) 22, Pulse Oximetry: 98 %, O2 Daily Delivery Respiratory : Nasal Cannula             Fluids    Intake/Output Summary (Last 24 hours) at 17 0540  Last data filed at 17 0400   Gross per 24 hour   Intake           3787.6 ml   Output             3000 ml   Net            787.6 ml       Nutrition  Orders Placed This Encounter   Procedures   • DIET ORDER     Standing Status:   Standing     Number of Occurrences:   1     Order Specific Question:   Diet:     Answer:   Diabetic [3]     Physical Exam    Recent Labs      17   0703  17   0445   WBC  11.8*  11.9*   RBC  5.44  4.62*   HEMOGLOBIN  14.4  12.3*   HEMATOCRIT  44.7  37.2*   MCV  82.2  80.5*   MCH  26.5*  26.6*   MCHC  32.2*  33.1*   RDW  40.4  39.5   PLATELETCT  382  328   MPV  10.5  10.2     Recent Labs      17   1440  17   1835  17   2230   SODIUM  126*  129*  128*   POTASSIUM  4.7  4.5  3.7   CHLORIDE  93*  98  100   CO2  15*  20  20   GLUCOSE  261*  153*  107*   BUN  39*  33*  31*   CREATININE  1.45*  1.26  1.10   CALCIUM  8.1*  8.0*  7.6*     Recent Labs      17   0703   APTT  26.4   INR  1.07     Recent Labs      17   0703   BNPBTYPENAT  23              Assessment/Plan     No new Assessment & Plan notes have been filed under this hospital service since the last note was generated.  Service: Hospital Medicine    Quality-Core Measures

## 2017-12-22 PROBLEM — G54.6 PHANTOM LIMB PAIN (HCC): Status: ACTIVE | Noted: 2017-12-22

## 2017-12-22 LAB
ANION GAP SERPL CALC-SCNC: 8 MMOL/L (ref 0–11.9)
BUN SERPL-MCNC: 23 MG/DL (ref 8–22)
CALCIUM SERPL-MCNC: 8.2 MG/DL (ref 8.5–10.5)
CHLORIDE SERPL-SCNC: 97 MMOL/L (ref 96–112)
CO2 SERPL-SCNC: 21 MMOL/L (ref 20–33)
CREAT SERPL-MCNC: 1.04 MG/DL (ref 0.5–1.4)
GFR SERPL CREATININE-BSD FRML MDRD: >60 ML/MIN/1.73 M 2
GLUCOSE BLD-MCNC: 132 MG/DL (ref 65–99)
GLUCOSE BLD-MCNC: 142 MG/DL (ref 65–99)
GLUCOSE BLD-MCNC: 156 MG/DL (ref 65–99)
GLUCOSE BLD-MCNC: 203 MG/DL (ref 65–99)
GLUCOSE BLD-MCNC: 284 MG/DL (ref 65–99)
GLUCOSE SERPL-MCNC: 221 MG/DL (ref 65–99)
POTASSIUM SERPL-SCNC: 4 MMOL/L (ref 3.6–5.5)
SODIUM SERPL-SCNC: 126 MMOL/L (ref 135–145)

## 2017-12-22 PROCEDURE — 700102 HCHG RX REV CODE 250 W/ 637 OVERRIDE(OP): Performed by: HOSPITALIST

## 2017-12-22 PROCEDURE — 770021 HCHG ROOM/CARE - ISO PRIVATE

## 2017-12-22 PROCEDURE — A9270 NON-COVERED ITEM OR SERVICE: HCPCS | Performed by: HOSPITALIST

## 2017-12-22 PROCEDURE — 99232 SBSQ HOSP IP/OBS MODERATE 35: CPT | Performed by: HOSPITALIST

## 2017-12-22 PROCEDURE — 80048 BASIC METABOLIC PNL TOTAL CA: CPT

## 2017-12-22 PROCEDURE — 700111 HCHG RX REV CODE 636 W/ 250 OVERRIDE (IP): Performed by: HOSPITALIST

## 2017-12-22 PROCEDURE — 82962 GLUCOSE BLOOD TEST: CPT | Mod: 91

## 2017-12-22 RX ORDER — PREGABALIN 25 MG/1
25 CAPSULE ORAL 3 TIMES DAILY
Status: DISCONTINUED | OUTPATIENT
Start: 2017-12-22 | End: 2017-12-24 | Stop reason: HOSPADM

## 2017-12-22 RX ORDER — INSULIN GLARGINE 100 [IU]/ML
65 INJECTION, SOLUTION SUBCUTANEOUS 2 TIMES DAILY
Status: DISCONTINUED | OUTPATIENT
Start: 2017-12-22 | End: 2017-12-24 | Stop reason: HOSPADM

## 2017-12-22 RX ADMIN — MIRTAZAPINE 45 MG: 30 TABLET, FILM COATED ORAL at 22:39

## 2017-12-22 RX ADMIN — OXYCODONE HYDROCHLORIDE 10 MG: 10 TABLET ORAL at 21:32

## 2017-12-22 RX ADMIN — FAMOTIDINE 20 MG: 20 TABLET, FILM COATED ORAL at 08:15

## 2017-12-22 RX ADMIN — HYDRALAZINE HYDROCHLORIDE 25 MG: 25 TABLET, FILM COATED ORAL at 21:32

## 2017-12-22 RX ADMIN — OXYCODONE HYDROCHLORIDE 10 MG: 10 TABLET ORAL at 14:29

## 2017-12-22 RX ADMIN — MORPHINE SULFATE 4 MG: 4 INJECTION INTRAVENOUS at 08:15

## 2017-12-22 RX ADMIN — AMLODIPINE BESYLATE 10 MG: 10 TABLET ORAL at 08:15

## 2017-12-22 RX ADMIN — INSULIN HUMAN 5 UNITS: 100 INJECTION, SOLUTION PARENTERAL at 18:04

## 2017-12-22 RX ADMIN — TAMSULOSIN HYDROCHLORIDE 0.4 MG: 0.4 CAPSULE ORAL at 08:16

## 2017-12-22 RX ADMIN — HYDRALAZINE HYDROCHLORIDE 25 MG: 25 TABLET, FILM COATED ORAL at 14:29

## 2017-12-22 RX ADMIN — INSULIN GLARGINE 60 UNITS: 100 INJECTION, SOLUTION SUBCUTANEOUS at 08:13

## 2017-12-22 RX ADMIN — AMITRIPTYLINE HYDROCHLORIDE 50 MG: 50 TABLET, FILM COATED ORAL at 22:39

## 2017-12-22 RX ADMIN — OSELTAMIVIR PHOSPHATE 75 MG: 75 CAPSULE ORAL at 08:16

## 2017-12-22 RX ADMIN — INSULIN HUMAN 3 UNITS: 100 INJECTION, SOLUTION PARENTERAL at 10:42

## 2017-12-22 RX ADMIN — TRIAMTERENE AND HYDROCHLOROTHIAZIDE 1 CAPSULE: 25; 37.5 CAPSULE ORAL at 08:16

## 2017-12-22 RX ADMIN — INSULIN GLARGINE 65 UNITS: 100 INJECTION, SOLUTION SUBCUTANEOUS at 21:34

## 2017-12-22 RX ADMIN — OSELTAMIVIR PHOSPHATE 75 MG: 75 CAPSULE ORAL at 22:39

## 2017-12-22 RX ADMIN — PREGABALIN 25 MG: 25 CAPSULE ORAL at 21:32

## 2017-12-22 RX ADMIN — ENOXAPARIN SODIUM 40 MG: 100 INJECTION SUBCUTANEOUS at 08:16

## 2017-12-22 RX ADMIN — CITALOPRAM HYDROBROMIDE 20 MG: 20 TABLET ORAL at 08:15

## 2017-12-22 RX ADMIN — PREGABALIN 25 MG: 25 CAPSULE ORAL at 16:29

## 2017-12-22 RX ADMIN — INSULIN HUMAN 8 UNITS: 100 INJECTION, SOLUTION PARENTERAL at 06:21

## 2017-12-22 RX ADMIN — HYDRALAZINE HYDROCHLORIDE 25 MG: 25 TABLET, FILM COATED ORAL at 06:21

## 2017-12-22 RX ADMIN — FAMOTIDINE 20 MG: 20 TABLET, FILM COATED ORAL at 21:32

## 2017-12-22 RX ADMIN — OXYCODONE HYDROCHLORIDE 10 MG: 10 TABLET ORAL at 06:25

## 2017-12-22 RX ADMIN — MORPHINE SULFATE 4 MG: 4 INJECTION INTRAVENOUS at 03:36

## 2017-12-22 RX ADMIN — FINASTERIDE 5 MG: 5 TABLET, FILM COATED ORAL at 08:15

## 2017-12-22 ASSESSMENT — PAIN SCALES - GENERAL
PAINLEVEL_OUTOF10: 9
PAINLEVEL_OUTOF10: 8
PAINLEVEL_OUTOF10: 8
PAINLEVEL_OUTOF10: 0
PAINLEVEL_OUTOF10: 5
PAINLEVEL_OUTOF10: 5
PAINLEVEL_OUTOF10: 8
PAINLEVEL_OUTOF10: 9

## 2017-12-22 ASSESSMENT — ENCOUNTER SYMPTOMS
CHILLS: 0
FEVER: 0
LOSS OF CONSCIOUSNESS: 0
DIZZINESS: 0
VOMITING: 0
COUGH: 0
HEADACHES: 0
DIARRHEA: 0
ABDOMINAL PAIN: 0
NAUSEA: 0
SHORTNESS OF BREATH: 0
BACK PAIN: 0

## 2017-12-22 NOTE — CARE PLAN
Problem: Safety  Goal: Will remain free from injury    Intervention: Collaborate with Interdisciplinary Team for safe transfer and mobilization techniques  Discussed with pt how he mobilizes at home. Discussed safe mobilization while in hospital. Pt verbalizes understanding.       Problem: Bowel/Gastric:  Goal: Normal bowel function is maintained or improved    Intervention: Educate patient and significant other/support system about diet, fluid intake, medications and activity to promote bowel function  Progressing as expected.
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
## 2017-12-22 NOTE — PROGRESS NOTES
Report called to Cathy CHAVARRIA on Neurosurgery. Awaiting bed to be placed in Rm S144 prior to transfer.

## 2017-12-22 NOTE — CARE PLAN
Problem: Communication  Goal: The ability to communicate needs accurately and effectively will improve  Outcome: PROGRESSING AS EXPECTED  Educated patient on the importance of communicating needs and wants to RN. Patient expressed understanding.    Problem: Pain Management  Goal: Pain level will decrease to patient's comfort goal  Outcome: PROGRESSING AS EXPECTED  Assessed patient frequently for signs and symptoms of pain. Administered medication when necessary.

## 2017-12-22 NOTE — ASSESSMENT & PLAN NOTE
Has been tried on gabapentin (2800mg daily) and oxycontin.  Neither were very effective per pt.  - Cont prn support  - started on low dose lyrica
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
## 2017-12-22 NOTE — PROGRESS NOTES
Bedside report received from day shift RN. Patient sitting up in bed, patient is medical status so not connected to monitors. Patient states he has 8/10 pain in his legs, will bring medication. Denies any other discomfort. Discussed plan of care, answered all questions. Call light within reach, bed in low position.

## 2017-12-23 ENCOUNTER — PATIENT OUTREACH (OUTPATIENT)
Dept: HEALTH INFORMATION MANAGEMENT | Facility: OTHER | Age: 57
End: 2017-12-23

## 2017-12-23 LAB
ANION GAP SERPL CALC-SCNC: 8 MMOL/L (ref 0–11.9)
BUN SERPL-MCNC: 16 MG/DL (ref 8–22)
CALCIUM SERPL-MCNC: 8.8 MG/DL (ref 8.5–10.5)
CHLORIDE SERPL-SCNC: 97 MMOL/L (ref 96–112)
CO2 SERPL-SCNC: 23 MMOL/L (ref 20–33)
CREAT SERPL-MCNC: 1.1 MG/DL (ref 0.5–1.4)
CREAT UR-MCNC: 67.6 MG/DL
ERYTHROCYTE [DISTWIDTH] IN BLOOD BY AUTOMATED COUNT: 38.5 FL (ref 35.9–50)
GFR SERPL CREATININE-BSD FRML MDRD: >60 ML/MIN/1.73 M 2
GLUCOSE BLD-MCNC: 118 MG/DL (ref 65–99)
GLUCOSE BLD-MCNC: 124 MG/DL (ref 65–99)
GLUCOSE BLD-MCNC: 154 MG/DL (ref 65–99)
GLUCOSE BLD-MCNC: 84 MG/DL (ref 65–99)
GLUCOSE SERPL-MCNC: 80 MG/DL (ref 65–99)
HCT VFR BLD AUTO: 36.6 % (ref 42–52)
HGB BLD-MCNC: 12 G/DL (ref 14–18)
MCH RBC QN AUTO: 26 PG (ref 27–33)
MCHC RBC AUTO-ENTMCNC: 32.8 G/DL (ref 33.7–35.3)
MCV RBC AUTO: 79.4 FL (ref 81.4–97.8)
OSMOLALITY SERPL: 274 MOSM/KG H2O (ref 278–298)
OSMOLALITY UR: 324 MOSM/KG H2O (ref 300–900)
PLATELET # BLD AUTO: 319 K/UL (ref 164–446)
PMV BLD AUTO: 9.9 FL (ref 9–12.9)
POTASSIUM SERPL-SCNC: 4.1 MMOL/L (ref 3.6–5.5)
RBC # BLD AUTO: 4.61 M/UL (ref 4.7–6.1)
SODIUM SERPL-SCNC: 128 MMOL/L (ref 135–145)
SODIUM UR-SCNC: 64 MMOL/L
WBC # BLD AUTO: 8.7 K/UL (ref 4.8–10.8)

## 2017-12-23 PROCEDURE — A9270 NON-COVERED ITEM OR SERVICE: HCPCS | Performed by: HOSPITALIST

## 2017-12-23 PROCEDURE — 99232 SBSQ HOSP IP/OBS MODERATE 35: CPT | Performed by: HOSPITALIST

## 2017-12-23 PROCEDURE — 82962 GLUCOSE BLOOD TEST: CPT

## 2017-12-23 PROCEDURE — 83930 ASSAY OF BLOOD OSMOLALITY: CPT

## 2017-12-23 PROCEDURE — 700102 HCHG RX REV CODE 250 W/ 637 OVERRIDE(OP): Performed by: HOSPITALIST

## 2017-12-23 PROCEDURE — 36415 COLL VENOUS BLD VENIPUNCTURE: CPT

## 2017-12-23 PROCEDURE — 85027 COMPLETE CBC AUTOMATED: CPT

## 2017-12-23 PROCEDURE — 770021 HCHG ROOM/CARE - ISO PRIVATE

## 2017-12-23 PROCEDURE — 83935 ASSAY OF URINE OSMOLALITY: CPT

## 2017-12-23 PROCEDURE — 700111 HCHG RX REV CODE 636 W/ 250 OVERRIDE (IP): Performed by: HOSPITALIST

## 2017-12-23 PROCEDURE — 82570 ASSAY OF URINE CREATININE: CPT

## 2017-12-23 PROCEDURE — 80048 BASIC METABOLIC PNL TOTAL CA: CPT

## 2017-12-23 PROCEDURE — 84300 ASSAY OF URINE SODIUM: CPT

## 2017-12-23 RX ADMIN — PREGABALIN 25 MG: 25 CAPSULE ORAL at 21:18

## 2017-12-23 RX ADMIN — INSULIN HUMAN 3 UNITS: 100 INJECTION, SOLUTION PARENTERAL at 11:55

## 2017-12-23 RX ADMIN — AMITRIPTYLINE HYDROCHLORIDE 50 MG: 50 TABLET, FILM COATED ORAL at 21:21

## 2017-12-23 RX ADMIN — OSELTAMIVIR PHOSPHATE 75 MG: 75 CAPSULE ORAL at 21:18

## 2017-12-23 RX ADMIN — PREGABALIN 25 MG: 25 CAPSULE ORAL at 15:16

## 2017-12-23 RX ADMIN — TRIAMTERENE AND HYDROCHLOROTHIAZIDE 1 CAPSULE: 25; 37.5 CAPSULE ORAL at 08:19

## 2017-12-23 RX ADMIN — HYDRALAZINE HYDROCHLORIDE 25 MG: 25 TABLET, FILM COATED ORAL at 06:29

## 2017-12-23 RX ADMIN — AMLODIPINE BESYLATE 10 MG: 10 TABLET ORAL at 08:19

## 2017-12-23 RX ADMIN — PREGABALIN 25 MG: 25 CAPSULE ORAL at 08:19

## 2017-12-23 RX ADMIN — MORPHINE SULFATE 2 MG: 4 INJECTION INTRAVENOUS at 21:19

## 2017-12-23 RX ADMIN — OXYCODONE HYDROCHLORIDE 10 MG: 10 TABLET ORAL at 06:29

## 2017-12-23 RX ADMIN — FINASTERIDE 5 MG: 5 TABLET, FILM COATED ORAL at 08:19

## 2017-12-23 RX ADMIN — CITALOPRAM HYDROBROMIDE 20 MG: 20 TABLET ORAL at 08:19

## 2017-12-23 RX ADMIN — FAMOTIDINE 20 MG: 20 TABLET, FILM COATED ORAL at 21:18

## 2017-12-23 RX ADMIN — FAMOTIDINE 20 MG: 20 TABLET, FILM COATED ORAL at 08:19

## 2017-12-23 RX ADMIN — INSULIN GLARGINE 65 UNITS: 100 INJECTION, SOLUTION SUBCUTANEOUS at 21:16

## 2017-12-23 RX ADMIN — TAMSULOSIN HYDROCHLORIDE 0.4 MG: 0.4 CAPSULE ORAL at 08:19

## 2017-12-23 RX ADMIN — OXYCODONE HYDROCHLORIDE 10 MG: 10 TABLET ORAL at 11:55

## 2017-12-23 RX ADMIN — INSULIN GLARGINE 65 UNITS: 100 INJECTION, SOLUTION SUBCUTANEOUS at 08:19

## 2017-12-23 RX ADMIN — HYDRALAZINE HYDROCHLORIDE 25 MG: 25 TABLET, FILM COATED ORAL at 15:16

## 2017-12-23 RX ADMIN — OXYCODONE HYDROCHLORIDE 10 MG: 10 TABLET ORAL at 22:08

## 2017-12-23 RX ADMIN — HYDRALAZINE HYDROCHLORIDE 25 MG: 25 TABLET, FILM COATED ORAL at 21:18

## 2017-12-23 RX ADMIN — MIRTAZAPINE 45 MG: 30 TABLET, FILM COATED ORAL at 21:18

## 2017-12-23 RX ADMIN — OSELTAMIVIR PHOSPHATE 75 MG: 75 CAPSULE ORAL at 08:34

## 2017-12-23 RX ADMIN — ENOXAPARIN SODIUM 40 MG: 100 INJECTION SUBCUTANEOUS at 08:18

## 2017-12-23 ASSESSMENT — ENCOUNTER SYMPTOMS
DIZZINESS: 0
MYALGIAS: 1
COUGH: 0
PSYCHIATRIC NEGATIVE: 1
FEVER: 0
HEADACHES: 0
FALLS: 0
NAUSEA: 0
BACK PAIN: 0
SHORTNESS OF BREATH: 0
VOMITING: 0
ABDOMINAL PAIN: 0
LOSS OF CONSCIOUSNESS: 0
CHILLS: 0

## 2017-12-23 ASSESSMENT — PAIN SCALES - GENERAL
PAINLEVEL_OUTOF10: 0
PAINLEVEL_OUTOF10: 9
PAINLEVEL_OUTOF10: 5
PAINLEVEL_OUTOF10: 10
PAINLEVEL_OUTOF10: 8
PAINLEVEL_OUTOF10: 0

## 2017-12-23 NOTE — PROGRESS NOTES
Received report from day shift RN. Assumed care of patient. Pt shows no s/sx of distress. Discussed plan of care for day with patient and received verbal understanding. Call light within reach, bed in low position. Pt refusing bed alarm at this time.

## 2017-12-23 NOTE — CARE PLAN
Problem: Discharge Barriers/Planning  Goal: Patient's continuum of care needs will be met    Intervention: Collaborate with Transitional Care Team and Interdisciplinary Team to meet discharge needs  Working with CM for placement.       Problem: Pain Management  Goal: Pain level will decrease to patient's comfort goal    Intervention: Follow pain managment plan developed in collaboration with patient and Interdisciplinary Team  Pt receiving PRN pain meds to keep pain at a tolerable level per doctor's orders

## 2017-12-24 VITALS
HEART RATE: 94 BPM | SYSTOLIC BLOOD PRESSURE: 126 MMHG | WEIGHT: 186.07 LBS | RESPIRATION RATE: 17 BRPM | DIASTOLIC BLOOD PRESSURE: 60 MMHG | TEMPERATURE: 98.8 F | OXYGEN SATURATION: 94 % | BODY MASS INDEX: 25.23 KG/M2

## 2017-12-24 PROBLEM — E11.10 DKA (DIABETIC KETOACIDOSES): Status: RESOLVED | Noted: 2017-12-20 | Resolved: 2017-12-24

## 2017-12-24 LAB
ANION GAP SERPL CALC-SCNC: 8 MMOL/L (ref 0–11.9)
BUN SERPL-MCNC: 14 MG/DL (ref 8–22)
CALCIUM SERPL-MCNC: 9 MG/DL (ref 8.5–10.5)
CHLORIDE SERPL-SCNC: 99 MMOL/L (ref 96–112)
CO2 SERPL-SCNC: 23 MMOL/L (ref 20–33)
CREAT SERPL-MCNC: 1.09 MG/DL (ref 0.5–1.4)
GFR SERPL CREATININE-BSD FRML MDRD: >60 ML/MIN/1.73 M 2
GLUCOSE BLD-MCNC: 101 MG/DL (ref 65–99)
GLUCOSE BLD-MCNC: 113 MG/DL (ref 65–99)
GLUCOSE SERPL-MCNC: 112 MG/DL (ref 65–99)
POTASSIUM SERPL-SCNC: 4.5 MMOL/L (ref 3.6–5.5)
SODIUM SERPL-SCNC: 130 MMOL/L (ref 135–145)

## 2017-12-24 PROCEDURE — 700111 HCHG RX REV CODE 636 W/ 250 OVERRIDE (IP): Performed by: HOSPITALIST

## 2017-12-24 PROCEDURE — A9270 NON-COVERED ITEM OR SERVICE: HCPCS | Performed by: HOSPITALIST

## 2017-12-24 PROCEDURE — 82962 GLUCOSE BLOOD TEST: CPT | Mod: 91

## 2017-12-24 PROCEDURE — 99239 HOSP IP/OBS DSCHRG MGMT >30: CPT | Performed by: HOSPITALIST

## 2017-12-24 PROCEDURE — 80048 BASIC METABOLIC PNL TOTAL CA: CPT

## 2017-12-24 PROCEDURE — 93971 EXTREMITY STUDY: CPT | Mod: 26 | Performed by: SURGERY

## 2017-12-24 PROCEDURE — 36415 COLL VENOUS BLD VENIPUNCTURE: CPT

## 2017-12-24 PROCEDURE — 700102 HCHG RX REV CODE 250 W/ 637 OVERRIDE(OP): Performed by: HOSPITALIST

## 2017-12-24 PROCEDURE — 93971 EXTREMITY STUDY: CPT

## 2017-12-24 RX ORDER — GLIPIZIDE 10 MG/1
10 TABLET ORAL 2 TIMES DAILY
Qty: 60 TAB | Refills: 0 | Status: SHIPPED | OUTPATIENT
Start: 2017-12-24 | End: 2018-05-26

## 2017-12-24 RX ORDER — HYDRALAZINE HYDROCHLORIDE 25 MG/1
25 TABLET, FILM COATED ORAL EVERY 8 HOURS
Qty: 90 TAB | Refills: 0 | Status: SHIPPED | OUTPATIENT
Start: 2017-12-24 | End: 2017-12-24

## 2017-12-24 RX ORDER — AMITRIPTYLINE HYDROCHLORIDE 50 MG/1
50 TABLET, FILM COATED ORAL NIGHTLY PRN
Qty: 30 TAB | Refills: 0 | Status: SHIPPED | OUTPATIENT
Start: 2017-12-24 | End: 2017-12-24

## 2017-12-24 RX ORDER — OSELTAMIVIR PHOSPHATE 75 MG/1
75 CAPSULE ORAL EVERY 12 HOURS
Qty: 2 CAP | Refills: 0 | Status: SHIPPED | OUTPATIENT
Start: 2017-12-24 | End: 2017-12-25

## 2017-12-24 RX ORDER — MIRTAZAPINE 45 MG/1
45 TABLET, FILM COATED ORAL
Qty: 30 TAB | Refills: 0 | Status: SHIPPED | OUTPATIENT
Start: 2017-12-24 | End: 2017-12-24

## 2017-12-24 RX ORDER — TRIAMTERENE AND HYDROCHLOROTHIAZIDE 37.5; 25 MG/1; MG/1
1 CAPSULE ORAL EVERY MORNING
Qty: 30 CAP | Refills: 3 | Status: SHIPPED | OUTPATIENT
Start: 2017-12-24 | End: 2017-12-24

## 2017-12-24 RX ORDER — OXYCODONE HYDROCHLORIDE 10 MG/1
10 TABLET ORAL EVERY 6 HOURS PRN
Qty: 30 TAB | Refills: 0 | Status: SHIPPED | OUTPATIENT
Start: 2017-12-24 | End: 2017-12-24

## 2017-12-24 RX ORDER — GLIPIZIDE 10 MG/1
10 TABLET ORAL 2 TIMES DAILY
Qty: 60 TAB | Refills: 0 | Status: SHIPPED | OUTPATIENT
Start: 2017-12-24 | End: 2017-12-24

## 2017-12-24 RX ORDER — HYDRALAZINE HYDROCHLORIDE 25 MG/1
25 TABLET, FILM COATED ORAL EVERY 8 HOURS
Qty: 90 TAB | Refills: 0 | Status: ON HOLD | OUTPATIENT
Start: 2017-12-24 | End: 2018-03-23

## 2017-12-24 RX ORDER — OSELTAMIVIR PHOSPHATE 75 MG/1
75 CAPSULE ORAL EVERY 12 HOURS
Qty: 2 CAP | Refills: 0 | Status: SHIPPED | OUTPATIENT
Start: 2017-12-24 | End: 2017-12-24

## 2017-12-24 RX ORDER — FINASTERIDE 5 MG/1
5 TABLET, FILM COATED ORAL DAILY
Qty: 30 TAB | Refills: 0 | Status: SHIPPED | OUTPATIENT
Start: 2017-12-24 | End: 2018-05-26

## 2017-12-24 RX ORDER — AMITRIPTYLINE HYDROCHLORIDE 50 MG/1
50 TABLET, FILM COATED ORAL NIGHTLY PRN
Qty: 30 TAB | Refills: 0 | Status: SHIPPED | OUTPATIENT
Start: 2017-12-24 | End: 2018-02-01

## 2017-12-24 RX ORDER — TRIAMTERENE AND HYDROCHLOROTHIAZIDE 37.5; 25 MG/1; MG/1
1 CAPSULE ORAL EVERY MORNING
Qty: 30 CAP | Refills: 3 | Status: ON HOLD | OUTPATIENT
Start: 2017-12-24 | End: 2018-03-23

## 2017-12-24 RX ORDER — AMLODIPINE BESYLATE 10 MG/1
10 TABLET ORAL DAILY
Qty: 30 TAB | Refills: 0 | Status: SHIPPED | OUTPATIENT
Start: 2017-12-24 | End: 2018-05-26

## 2017-12-24 RX ORDER — PREGABALIN 25 MG/1
25 CAPSULE ORAL 3 TIMES DAILY
Qty: 90 CAP | Refills: 0 | Status: SHIPPED | OUTPATIENT
Start: 2017-12-24 | End: 2018-01-23

## 2017-12-24 RX ORDER — PREGABALIN 25 MG/1
25 CAPSULE ORAL 3 TIMES DAILY
Qty: 90 CAP | Refills: 0 | Status: SHIPPED | OUTPATIENT
Start: 2017-12-24 | End: 2017-12-24

## 2017-12-24 RX ORDER — MIRTAZAPINE 45 MG/1
45 TABLET, FILM COATED ORAL
Qty: 30 TAB | Refills: 0 | Status: SHIPPED | OUTPATIENT
Start: 2017-12-24 | End: 2018-05-26

## 2017-12-24 RX ORDER — FINASTERIDE 5 MG/1
5 TABLET, FILM COATED ORAL DAILY
Qty: 30 TAB | Refills: 0 | Status: SHIPPED | OUTPATIENT
Start: 2017-12-24 | End: 2017-12-24

## 2017-12-24 RX ORDER — RANITIDINE 150 MG/1
150 TABLET ORAL 2 TIMES DAILY
Qty: 60 TAB | Refills: 11 | Status: SHIPPED | OUTPATIENT
Start: 2017-12-24 | End: 2017-12-24

## 2017-12-24 RX ORDER — TAMSULOSIN HYDROCHLORIDE 0.4 MG/1
0.4 CAPSULE ORAL
Qty: 30 CAP | Refills: 0 | Status: SHIPPED | OUTPATIENT
Start: 2017-12-24 | End: 2018-05-26

## 2017-12-24 RX ORDER — RANITIDINE 150 MG/1
150 TABLET ORAL 2 TIMES DAILY
Qty: 60 TAB | Refills: 11 | Status: SHIPPED | OUTPATIENT
Start: 2017-12-24 | End: 2018-03-14

## 2017-12-24 RX ORDER — TAMSULOSIN HYDROCHLORIDE 0.4 MG/1
0.4 CAPSULE ORAL
Qty: 30 CAP | Refills: 0 | Status: SHIPPED | OUTPATIENT
Start: 2017-12-24 | End: 2017-12-24

## 2017-12-24 RX ORDER — AMLODIPINE BESYLATE 10 MG/1
10 TABLET ORAL DAILY
Qty: 30 TAB | Refills: 0 | Status: SHIPPED | OUTPATIENT
Start: 2017-12-24 | End: 2017-12-24

## 2017-12-24 RX ORDER — CITALOPRAM 20 MG/1
20 TABLET ORAL DAILY
Qty: 30 TAB | Refills: 0 | Status: SHIPPED | OUTPATIENT
Start: 2017-12-24 | End: 2018-05-26

## 2017-12-24 RX ORDER — CITALOPRAM 20 MG/1
20 TABLET ORAL DAILY
Qty: 30 TAB | Refills: 0 | Status: SHIPPED | OUTPATIENT
Start: 2017-12-24 | End: 2017-12-24

## 2017-12-24 RX ORDER — OXYCODONE HYDROCHLORIDE 10 MG/1
10 TABLET ORAL EVERY 6 HOURS PRN
Qty: 30 TAB | Refills: 0 | Status: SHIPPED | OUTPATIENT
Start: 2017-12-24 | End: 2018-01-03

## 2017-12-24 RX ADMIN — ENOXAPARIN SODIUM 40 MG: 100 INJECTION SUBCUTANEOUS at 08:57

## 2017-12-24 RX ADMIN — OXYCODONE HYDROCHLORIDE 10 MG: 10 TABLET ORAL at 15:20

## 2017-12-24 RX ADMIN — MORPHINE SULFATE 2 MG: 4 INJECTION INTRAVENOUS at 06:25

## 2017-12-24 RX ADMIN — TRIAMTERENE AND HYDROCHLOROTHIAZIDE 1 CAPSULE: 25; 37.5 CAPSULE ORAL at 08:57

## 2017-12-24 RX ADMIN — FINASTERIDE 5 MG: 5 TABLET, FILM COATED ORAL at 08:57

## 2017-12-24 RX ADMIN — PREGABALIN 25 MG: 25 CAPSULE ORAL at 15:20

## 2017-12-24 RX ADMIN — AMLODIPINE BESYLATE 10 MG: 10 TABLET ORAL at 08:57

## 2017-12-24 RX ADMIN — HYDRALAZINE HYDROCHLORIDE 25 MG: 25 TABLET, FILM COATED ORAL at 06:17

## 2017-12-24 RX ADMIN — PREGABALIN 25 MG: 25 CAPSULE ORAL at 10:54

## 2017-12-24 RX ADMIN — FAMOTIDINE 20 MG: 20 TABLET, FILM COATED ORAL at 08:57

## 2017-12-24 RX ADMIN — CITALOPRAM HYDROBROMIDE 20 MG: 20 TABLET ORAL at 08:57

## 2017-12-24 RX ADMIN — OSELTAMIVIR PHOSPHATE 75 MG: 75 CAPSULE ORAL at 08:57

## 2017-12-24 RX ADMIN — HYDRALAZINE HYDROCHLORIDE 25 MG: 25 TABLET, FILM COATED ORAL at 15:20

## 2017-12-24 RX ADMIN — OXYCODONE HYDROCHLORIDE 10 MG: 10 TABLET ORAL at 06:17

## 2017-12-24 RX ADMIN — INSULIN GLARGINE 65 UNITS: 100 INJECTION, SOLUTION SUBCUTANEOUS at 08:58

## 2017-12-24 RX ADMIN — TAMSULOSIN HYDROCHLORIDE 0.4 MG: 0.4 CAPSULE ORAL at 08:57

## 2017-12-24 RX ADMIN — OXYCODONE HYDROCHLORIDE 10 MG: 10 TABLET ORAL at 10:54

## 2017-12-24 ASSESSMENT — PAIN SCALES - GENERAL: PAINLEVEL_OUTOF10: 8

## 2017-12-24 ASSESSMENT — LIFESTYLE VARIABLES: EVER_SMOKED: NEVER

## 2017-12-24 NOTE — DISCHARGE PLANNING
Received call and transport form from [facility] to [facility] at [address] then, [address] Contacted MTM spoke to Gena patient is not eligible for transport. Notified [facility] via voicemail.

## 2017-12-24 NOTE — ASSESSMENT & PLAN NOTE
Appears to have had low Na starting around 2016 but was in the low 130's. Now 128, initially thought to be 2/2 hyperglycemia but now glucose is corrected and still low. Fortunately, mentation is clear.  - serum and urine studies pending  - start salt tabs in AM if downtrending

## 2017-12-24 NOTE — DISCHARGE INSTRUCTIONS
Discharge Instructions    Discharged to group home by medical transportation with self. Discharged via wheelchair, hospital escort: Yes.  Special equipment needed: Not Applicable    Be sure to schedule a follow-up appointment with your primary care doctor or any specialists as instructed.     Discharge Plan:   Diet Plan: Discussed  Activity Level: Discussed  Smoking Cessation Offered: Patient Counseled  Confirmed Follow up Appointment: Patient to Call and Schedule Appointment  Confirmed Symptoms Management: Discussed  Medication Reconciliation Updated: Yes  Influenza Vaccine Indication: Not indicated: Previously immunized this influenza season and > 8 years of age    I understand that a diet low in cholesterol, fat, and sodium is recommended for good health. Unless I have been given specific instructions below for another diet, I accept this instruction as my diet prescription.   Other diet:     Special Instructions: None    · Is patient discharged on Warfarin / Coumadin?   No     · Is patient Post Blood Transfusion?  No    Depression / Suicide Risk    As you are discharged from this [facility] facility, it is important to learn how to keep safe from harming yourself.    Recognize the warning signs:  · Abrupt changes in personality, positive or negative- including increase in energy   · Giving away possessions  · Change in eating patterns- significant weight changes-  positive or negative  · Change in sleeping patterns- unable to sleep or sleeping all the time   · Unwillingness or inability to communicate  · Depression  · Unusual sadness, discouragement and loneliness  · Talk of wanting to die  · Neglect of personal appearance   · Rebelliousness- reckless behavior  · Withdrawal from people/activities they love  · Confusion- inability to concentrate     If you or a loved one observes any of these behaviors or has concerns about self-harm, here's what you can do:  · Talk about it- your feelings and reasons for  harming yourself  · Remove any means that you might use to hurt yourself (examples: pills, rope, extension cords, firearm)  · Get professional help from the community (Mental Health, Substance Abuse, psychological counseling)  · Do not be alone:Call your Safe Contact- someone whom you trust who will be there for you.  · Call your local CRISIS HOTLINE 598-0494 or 732-264-5170  · Call your local Children's Mobile Crisis Response Team [place] (097) 689-9670 or www.Tapatalk  · Call the toll free National Suicide Prevention Hotlines   · National Suicide Prevention Lifeline 929-745-VGFV (3643)  · National Hope Line Network 800-SUICIDE (148-3016)

## 2017-12-24 NOTE — DISCHARGE PLANNING
Medical Social Work    Pt does not have an MTM benefit.   set up taxi through [employer] to take pt to [facility] pharmacy at [address] to  prescriptions then to shelter at [address] [facility].
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
## 2017-12-24 NOTE — DISCHARGE PLANNING
Medical Social Work    SW called [facility] at [phone] to inquire about availability for pt - overflow has room for pt.
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
## 2017-12-24 NOTE — DISCHARGE PLANNING
Medical Social Work    SW faxed [facility] transport form to take pt to pharmacy then to shelter by MTM.      SW will continue to follow DC needs.

## 2017-12-24 NOTE — CARE PLAN
Problem: Safety  Goal: Will remain free from falls  Outcome: PROGRESSING AS EXPECTED  Bed alarm armed, call light within reach and used appropriately, pt has adequate knowledge of transferring to and from wheelchair.     Problem: Pain Management  Goal: Pain level will decrease to patient's comfort goal  Outcome: PROGRESSING AS EXPECTED  Oxy 10mg q4hr and IV moprhine for breakthrough pain managing pain. Pain is dull/throbbing in BKAs.

## 2017-12-24 NOTE — PROGRESS NOTES
Pt A&OX4, reporting 10/10 pain, medicated appropriately and educated on PRNs available.   Neuro checks intact, BKA, n/t in bilat hands and bilat stumps, no new onset n/t reported.   + BS, + flatus per pt, refused stool softeners.   POC discussed, droplet precautions in place, no further needs at this time, call light within reach, bed alarm armed.

## 2017-12-25 LAB
BACTERIA BLD CULT: NORMAL
BACTERIA BLD CULT: NORMAL
SIGNIFICANT IND 70042: NORMAL
SIGNIFICANT IND 70042: NORMAL
SITE SITE: NORMAL
SITE SITE: NORMAL
SOURCE SOURCE: NORMAL
SOURCE SOURCE: NORMAL

## 2018-01-02 NOTE — DOCUMENTATION QUERY
DOCUMENTATION QUERY    PROVIDERS: Please select “Cosign w/ note”to reply to query.    To better represent the severity of illness of your patient, please review the following information and exercise your independent professional judgment in responding to this query.     Per History and Physical, patient was admitted with Influenza A with viral pneumonia secondary to Influenza A, Sepsis and Acute kidney injury.   Sepsis is documented in the H&P only and not documented elsewhere in the medical record.  Based upon the clinical findings, risk factors, and treatment, can Sepsis be further specified?    Please select which applies:   • Sepsis was ruled in (specify causative organism if known and any associated organ dysfunction if known)  • Sepsis was ruled out    • Other explanation of clinical findings (please document)  • Unable to determine      The medical record reflects the following:   Clinical Findings   H&P 12/20/17, Tung Cuello MD  VITAL SIGNS:  Temperature is 37.1, pulse is 106, respiratory rate is 25, blood pressure 152/82, and pulse oximetry is 99%.    LABS:  WBC 11.8, lactic acid 2.4.  Chest x-ray revealed mild right mid and lower lung zone patchy opacity, could indicate bronchopneumonia. Influenza A was positive.  EKG reveals sinus tachycardia with no acute ischemic changes.    ASSESSMENT:  1.  Diabetic ketoacidosis likely secondary to noncompliance with insulin after losing his medications and to his acute influenza A infection.  2.  Influenza A infection with viral pneumonia secondary to influenza A.  3.  Sepsis.  6.  Acute kidney injury likely secondary to dehydration and his diabetic   ketoacidosis.     Treatment IV fluids, Tamiflu     Risk Factors Influenza A infection with viral pneumonia secondary to influenza A.  Sepsis, DKA   Location within  medical record H&P     Thank you,   [email or web address]

## 2018-01-18 ENCOUNTER — HOSPITAL ENCOUNTER (EMERGENCY)
Facility: MEDICAL CENTER | Age: 58
End: 2018-01-18
Attending: EMERGENCY MEDICINE
Payer: COMMERCIAL

## 2018-01-18 VITALS
TEMPERATURE: 99.1 F | BODY MASS INDEX: 25.9 KG/M2 | SYSTOLIC BLOOD PRESSURE: 169 MMHG | WEIGHT: 191 LBS | DIASTOLIC BLOOD PRESSURE: 93 MMHG | HEART RATE: 97 BPM | RESPIRATION RATE: 14 BRPM | OXYGEN SATURATION: 99 %

## 2018-01-18 DIAGNOSIS — M79.605 PAIN OF LEFT LOWER EXTREMITY: ICD-10-CM

## 2018-01-18 PROCEDURE — A9270 NON-COVERED ITEM OR SERVICE: HCPCS | Performed by: EMERGENCY MEDICINE

## 2018-01-18 PROCEDURE — 93926 LOWER EXTREMITY STUDY: CPT

## 2018-01-18 PROCEDURE — 99284 EMERGENCY DEPT VISIT MOD MDM: CPT

## 2018-01-18 PROCEDURE — 93971 EXTREMITY STUDY: CPT

## 2018-01-18 PROCEDURE — 700102 HCHG RX REV CODE 250 W/ 637 OVERRIDE(OP): Performed by: EMERGENCY MEDICINE

## 2018-01-18 RX ORDER — CEPHALEXIN 500 MG/1
500 CAPSULE ORAL 4 TIMES DAILY
Qty: 40 CAP | Refills: 0 | Status: ON HOLD
Start: 2018-01-18 | End: 2018-02-08

## 2018-01-18 RX ORDER — OXYCODONE HYDROCHLORIDE AND ACETAMINOPHEN 5; 325 MG/1; MG/1
2 TABLET ORAL ONCE
Status: COMPLETED | OUTPATIENT
Start: 2018-01-18 | End: 2018-01-18

## 2018-01-18 RX ADMIN — OXYCODONE HYDROCHLORIDE AND ACETAMINOPHEN 2 TABLET: 5; 325 TABLET ORAL at 20:24

## 2018-01-18 ASSESSMENT — PAIN SCALES - GENERAL
PAINLEVEL_OUTOF10: 6
PAINLEVEL_OUTOF10: 10

## 2018-01-19 NOTE — ED NOTES
GRACIELA Melendez, transfer from the [facility] to G24.  Pt c/o LLE pain & swelling x 2 days.  Pt has a L BKA, R AKA.  Hx of DVT's.  Was taken off of his coumadin in December.  Leg cool to touch.  Pt in gown, on monitor, chart up for ERP.

## 2018-01-19 NOTE — ED PROVIDER NOTES
ED Provider Note    CHIEF COMPLAINT  Chief Complaint   Patient presents with   • Leg Pain   • Leg Swelling       HPI  Jv Angelo is a 57 y.o. male here for evaluation of left lower stump pain.  The pt states that he has been having the pain over the last 1-2 days.  He denies any trauma.  No fever, no vomiting.  He states that he always has some pain in the legs since the amputations, but wants to be sure he does not have a 'blood clot ' in it.  No sob, no cp, no abdominal pain.     PAST MEDICAL HISTORY   has a past medical history of BPH (benign prostatic hyperplasia) (12/20/2017); Diabetes (CMS-HCC); Hypertension; and Renal disorder.    SOCIAL HISTORY  Social History     Social History Main Topics   • Smoking status: Current Some Day Smoker     Packs/day: 1.00     Years: 20.00     Types: Cigarettes     Last attempt to quit: 12/26/2005   • Smokeless tobacco: Never Used   • Alcohol use No   • Drug use: No   • Sexual activity: Not on file       SURGICAL HISTORY   has a past surgical history that includes irrigation & debridement ortho (Left, 12/26/2015); other orthopedic surgery (Left, 2015); other orthopedic surgery (Right, 2013); and other orthopedic surgery.    CURRENT MEDICATIONS  Home Medications    **Home medications have not yet been reviewed for this encounter**         ALLERGIES  Allergies   Allergen Reactions   • Fish Anaphylaxis and Shortness of Breath       REVIEW OF SYSTEMS  See HPI for further details. Review of systems as above, otherwise all other systems are negative.     PHYSICAL EXAM  VITAL SIGNS: BP (!) 169/93   Pulse (!) 101   Temp 37.3 °C (99.1 °F)   Resp 19   Wt 86.6 kg (191 lb)   SpO2 100%   BMI 25.90 kg/m²     Constitutional: Well developed, well nourished. No acute distress.  HEENT: Normocephalic, atraumatic. MMM  Neck: Supple, Full range of motion   Chest/Pulmonary:  No respiratory distress.  Equal expansion   Musculoskeletal:  Bilateral amputations. Left stump with tenderness to  the distal pad and medial aspect. No erythema.   No edema.   Neuro: Clear speech, appropriate, cooperative, cranial nerves II-XII grossly intact.  Psych: Normal mood and affect      PROCEDURES     MEDICAL RECORD  I have reviewed patient's medical record and pertinent results are listed above.    COURSE & MEDICAL DECISION MAKING  I have reviewed any medical record information, laboratory studies and radiographic results as noted above.    8:08 PM  I spoke to the vascular tech, who states that she does not see any evidence of DVT or arterial occlusion.  I will have the pt follow up.    I you have had any blood pressure issues while here in the emergency department, please see your doctor for a further evaluation or work up.    Differential diagnoses include but not limited to: leg pain. Dvt, arterial occlusion.     This patient presents with leg pain.  At this time, I have counseled the patient/family regarding their medications, pain control, and follow up.  They will continue their medications, if any, as prescribed.    They will return immediately for any worsening symptoms and/or any other medical concerns.  They will see their doctor, or contact the doctor provided, in 1-2 days for follow up.       FINAL IMPRESSION  1. Pain of left lower extremity        Electronically signed by: Aguila Cardona, 1/18/2018 7:49 PM

## 2018-01-19 NOTE — ED NOTES
Discharge orders received from ERP. New prescriptions received x 1. Provided education and patient demonstrated understanding. Pt ambulatory off unit by personal wheelchair, no assistance needed. Pt educated not to drive r/t pain medication administration, pt verbalizes understanding. All personal belonging with patient.

## 2018-01-19 NOTE — DISCHARGE INSTRUCTIONS
Musculoskeletal Pain  Musculoskeletal pain is muscle and luzmaria aches and pains. These pains can occur in any part of the body. Your caregiver may treat you without knowing the cause of the pain. They may treat you if blood or urine tests, X-rays, and other tests were normal.   CAUSES  There is often not a definite cause or reason for these pains. These pains may be caused by a type of germ (virus). The discomfort may also come from overuse. Overuse includes working out too hard when your body is not fit. Luzmaria aches also come from weather changes. Bone is sensitive to atmospheric pressure changes.  HOME CARE INSTRUCTIONS   · Ask when your test results will be ready. Make sure you get your test results.  · Only take over-the-counter or prescription medicines for pain, discomfort, or fever as directed by your caregiver. If you were given medications for your condition, do not drive, operate machinery or power tools, or sign legal documents for 24 hours. Do not drink alcohol. Do not take sleeping pills or other medications that may interfere with treatment.  · Continue all activities unless the activities cause more pain. When the pain lessens, slowly resume normal activities. Gradually increase the intensity and duration of the activities or exercise.  · During periods of severe pain, bed rest may be helpful. Lay or sit in any position that is comfortable.  · Putting ice on the injured area.  ¨ Put ice in a bag.  ¨ Place a towel between your skin and the bag.  ¨ Leave the ice on for 15 to 20 minutes, 3 to 4 times a day.  · Follow up with your caregiver for continued problems and no reason can be found for the pain. If the pain becomes worse or does not go away, it may be necessary to repeat tests or do additional testing. Your caregiver may need to look further for a possible cause.  SEEK IMMEDIATE MEDICAL CARE IF:  · You have pain that is getting worse and is not relieved by medications.  · You develop chest pain  that is associated with shortness or breath, sweating, feeling sick to your stomach (nauseous), or throw up (vomit).  · Your pain becomes localized to the abdomen.  · You develop any new symptoms that seem different or that concern you.  MAKE SURE YOU:   · Understand these instructions.  · Will watch your condition.  · Will get help right away if you are not doing well or get worse.     This information is not intended to replace advice given to you by your health care provider. Make sure you discuss any questions you have with your health care provider.     Document Released: 12/18/2006 Document Revised: 03/11/2013 Document Reviewed: 08/22/2014  [employer] Patient Education ©2016 [employer]

## 2018-02-01 ENCOUNTER — APPOINTMENT (OUTPATIENT)
Dept: RADIOLOGY | Facility: MEDICAL CENTER | Age: 58
DRG: 565 | End: 2018-02-01
Attending: EMERGENCY MEDICINE
Payer: COMMERCIAL

## 2018-02-01 ENCOUNTER — RESOLUTE PROFESSIONAL BILLING HOSPITAL PROF FEE (OUTPATIENT)
Dept: HOSPITALIST | Facility: MEDICAL CENTER | Age: 58
End: 2018-02-01
Payer: COMMERCIAL

## 2018-02-01 ENCOUNTER — HOSPITAL ENCOUNTER (INPATIENT)
Facility: MEDICAL CENTER | Age: 58
LOS: 8 days | DRG: 565 | End: 2018-02-09
Attending: EMERGENCY MEDICINE | Admitting: INTERNAL MEDICINE
Payer: COMMERCIAL

## 2018-02-01 DIAGNOSIS — M86.9 OSTEOMYELITIS OF LEFT LOWER EXTREMITY (HCC): ICD-10-CM

## 2018-02-01 DIAGNOSIS — L03.116 CELLULITIS OF LEFT LOWER EXTREMITY: ICD-10-CM

## 2018-02-01 PROBLEM — L03.90 CELLULITIS: Status: ACTIVE | Noted: 2018-02-01

## 2018-02-01 LAB
ALBUMIN SERPL BCP-MCNC: 3.4 G/DL (ref 3.2–4.9)
ALBUMIN/GLOB SERPL: 0.9 G/DL
ALP SERPL-CCNC: 109 U/L (ref 30–99)
ALT SERPL-CCNC: 12 U/L (ref 2–50)
ANION GAP SERPL CALC-SCNC: 8 MMOL/L (ref 0–11.9)
AST SERPL-CCNC: 18 U/L (ref 12–45)
BASOPHILS # BLD AUTO: 0.2 % (ref 0–1.8)
BASOPHILS # BLD: 0.02 K/UL (ref 0–0.12)
BILIRUB SERPL-MCNC: 0.3 MG/DL (ref 0.1–1.5)
BUN SERPL-MCNC: 13 MG/DL (ref 8–22)
CALCIUM SERPL-MCNC: 8.5 MG/DL (ref 8.5–10.5)
CHLORIDE SERPL-SCNC: 95 MMOL/L (ref 96–112)
CO2 SERPL-SCNC: 25 MMOL/L (ref 20–33)
CREAT SERPL-MCNC: 1.07 MG/DL (ref 0.5–1.4)
EOSINOPHIL # BLD AUTO: 0.03 K/UL (ref 0–0.51)
EOSINOPHIL NFR BLD: 0.3 % (ref 0–6.9)
ERYTHROCYTE [DISTWIDTH] IN BLOOD BY AUTOMATED COUNT: 44.7 FL (ref 35.9–50)
GLOBULIN SER CALC-MCNC: 3.6 G/DL (ref 1.9–3.5)
GLUCOSE SERPL-MCNC: 274 MG/DL (ref 65–99)
HCT VFR BLD AUTO: 38.7 % (ref 42–52)
HGB BLD-MCNC: 12.6 G/DL (ref 14–18)
IMM GRANULOCYTES # BLD AUTO: 0.02 K/UL (ref 0–0.11)
IMM GRANULOCYTES NFR BLD AUTO: 0.2 % (ref 0–0.9)
LACTATE BLD-SCNC: 1.1 MMOL/L (ref 0.5–2)
LACTATE BLD-SCNC: 1.7 MMOL/L (ref 0.5–2)
LYMPHOCYTES # BLD AUTO: 1.87 K/UL (ref 1–4.8)
LYMPHOCYTES NFR BLD: 20.7 % (ref 22–41)
MAGNESIUM SERPL-MCNC: 1.4 MG/DL (ref 1.5–2.5)
MCH RBC QN AUTO: 27 PG (ref 27–33)
MCHC RBC AUTO-ENTMCNC: 32.6 G/DL (ref 33.7–35.3)
MCV RBC AUTO: 83 FL (ref 81.4–97.8)
MONOCYTES # BLD AUTO: 1.24 K/UL (ref 0–0.85)
MONOCYTES NFR BLD AUTO: 13.7 % (ref 0–13.4)
NEUTROPHILS # BLD AUTO: 5.86 K/UL (ref 1.82–7.42)
NEUTROPHILS NFR BLD: 64.9 % (ref 44–72)
NRBC # BLD AUTO: 0 K/UL
NRBC BLD-RTO: 0 /100 WBC
PLATELET # BLD AUTO: 368 K/UL (ref 164–446)
PMV BLD AUTO: 9.4 FL (ref 9–12.9)
POTASSIUM SERPL-SCNC: 4.1 MMOL/L (ref 3.6–5.5)
PROT SERPL-MCNC: 7 G/DL (ref 6–8.2)
RBC # BLD AUTO: 4.66 M/UL (ref 4.7–6.1)
SODIUM SERPL-SCNC: 128 MMOL/L (ref 135–145)
WBC # BLD AUTO: 9 K/UL (ref 4.8–10.8)

## 2018-02-01 PROCEDURE — 99407 BEHAV CHNG SMOKING > 10 MIN: CPT | Performed by: INTERNAL MEDICINE

## 2018-02-01 PROCEDURE — 96367 TX/PROPH/DG ADDL SEQ IV INF: CPT

## 2018-02-01 PROCEDURE — 83735 ASSAY OF MAGNESIUM: CPT

## 2018-02-01 PROCEDURE — 302128 INFUSION PUMP: Performed by: EMERGENCY MEDICINE

## 2018-02-01 PROCEDURE — 700102 HCHG RX REV CODE 250 W/ 637 OVERRIDE(OP): Performed by: EMERGENCY MEDICINE

## 2018-02-01 PROCEDURE — 96376 TX/PRO/DX INJ SAME DRUG ADON: CPT

## 2018-02-01 PROCEDURE — 700111 HCHG RX REV CODE 636 W/ 250 OVERRIDE (IP): Performed by: INTERNAL MEDICINE

## 2018-02-01 PROCEDURE — 96365 THER/PROPH/DIAG IV INF INIT: CPT

## 2018-02-01 PROCEDURE — 71045 X-RAY EXAM CHEST 1 VIEW: CPT

## 2018-02-01 PROCEDURE — 80053 COMPREHEN METABOLIC PANEL: CPT

## 2018-02-01 PROCEDURE — A9270 NON-COVERED ITEM OR SERVICE: HCPCS | Performed by: EMERGENCY MEDICINE

## 2018-02-01 PROCEDURE — 96366 THER/PROPH/DIAG IV INF ADDON: CPT

## 2018-02-01 PROCEDURE — 99285 EMERGENCY DEPT VISIT HI MDM: CPT

## 2018-02-01 PROCEDURE — A9270 NON-COVERED ITEM OR SERVICE: HCPCS | Performed by: INTERNAL MEDICINE

## 2018-02-01 PROCEDURE — 96375 TX/PRO/DX INJ NEW DRUG ADDON: CPT

## 2018-02-01 PROCEDURE — 87040 BLOOD CULTURE FOR BACTERIA: CPT | Mod: 91

## 2018-02-01 PROCEDURE — 700117 HCHG RX CONTRAST REV CODE 255: Performed by: EMERGENCY MEDICINE

## 2018-02-01 PROCEDURE — 83605 ASSAY OF LACTIC ACID: CPT

## 2018-02-01 PROCEDURE — 85025 COMPLETE CBC W/AUTO DIFF WBC: CPT

## 2018-02-01 PROCEDURE — 99223 1ST HOSP IP/OBS HIGH 75: CPT | Mod: 25 | Performed by: INTERNAL MEDICINE

## 2018-02-01 PROCEDURE — 700111 HCHG RX REV CODE 636 W/ 250 OVERRIDE (IP): Performed by: EMERGENCY MEDICINE

## 2018-02-01 PROCEDURE — 73701 CT LOWER EXTREMITY W/DYE: CPT | Mod: LT

## 2018-02-01 PROCEDURE — 770006 HCHG ROOM/CARE - MED/SURG/GYN SEMI*

## 2018-02-01 PROCEDURE — 700102 HCHG RX REV CODE 250 W/ 637 OVERRIDE(OP): Performed by: INTERNAL MEDICINE

## 2018-02-01 PROCEDURE — 700105 HCHG RX REV CODE 258: Performed by: EMERGENCY MEDICINE

## 2018-02-01 RX ORDER — OXYCODONE HYDROCHLORIDE 5 MG/1
5 TABLET ORAL
Status: DISCONTINUED | OUTPATIENT
Start: 2018-02-01 | End: 2018-02-07

## 2018-02-01 RX ORDER — ACETAMINOPHEN 325 MG/1
650 TABLET ORAL ONCE
Status: COMPLETED | OUTPATIENT
Start: 2018-02-01 | End: 2018-02-01

## 2018-02-01 RX ORDER — INSULIN GLARGINE 100 [IU]/ML
65 INJECTION, SOLUTION SUBCUTANEOUS 2 TIMES DAILY
Status: DISCONTINUED | OUTPATIENT
Start: 2018-02-01 | End: 2018-02-05

## 2018-02-01 RX ORDER — NICOTINE 21 MG/24HR
21 PATCH, TRANSDERMAL 24 HOURS TRANSDERMAL
Status: DISCONTINUED | OUTPATIENT
Start: 2018-02-02 | End: 2018-02-09 | Stop reason: HOSPADM

## 2018-02-01 RX ORDER — PROMETHAZINE HYDROCHLORIDE 25 MG/1
12.5-25 TABLET ORAL EVERY 4 HOURS PRN
Status: DISCONTINUED | OUTPATIENT
Start: 2018-02-01 | End: 2018-02-09 | Stop reason: HOSPADM

## 2018-02-01 RX ORDER — FAMOTIDINE 20 MG/1
20 TABLET, FILM COATED ORAL 2 TIMES DAILY
Status: DISCONTINUED | OUTPATIENT
Start: 2018-02-01 | End: 2018-02-09 | Stop reason: HOSPADM

## 2018-02-01 RX ORDER — HYDROMORPHONE HYDROCHLORIDE 2 MG/ML
1 INJECTION, SOLUTION INTRAMUSCULAR; INTRAVENOUS; SUBCUTANEOUS ONCE
Status: COMPLETED | OUTPATIENT
Start: 2018-02-01 | End: 2018-02-01

## 2018-02-01 RX ORDER — DEXTROSE MONOHYDRATE 25 G/50ML
25 INJECTION, SOLUTION INTRAVENOUS
Status: DISCONTINUED | OUTPATIENT
Start: 2018-02-01 | End: 2018-02-09 | Stop reason: HOSPADM

## 2018-02-01 RX ORDER — OXYCODONE HYDROCHLORIDE 10 MG/1
10 TABLET ORAL EVERY 6 HOURS PRN
Status: ON HOLD | COMMUNITY
End: 2018-02-08

## 2018-02-01 RX ORDER — SODIUM CHLORIDE 9 MG/ML
30 INJECTION, SOLUTION INTRAVENOUS
Status: DISCONTINUED | OUTPATIENT
Start: 2018-02-01 | End: 2018-02-09 | Stop reason: HOSPADM

## 2018-02-01 RX ORDER — CLINDAMYCIN HYDROCHLORIDE 150 MG/1
150 CAPSULE ORAL 3 TIMES DAILY
Status: ON HOLD | COMMUNITY
Start: 2018-01-26 | End: 2018-02-08

## 2018-02-01 RX ORDER — POLYETHYLENE GLYCOL 3350 17 G/17G
1 POWDER, FOR SOLUTION ORAL
Status: DISCONTINUED | OUTPATIENT
Start: 2018-02-01 | End: 2018-02-09 | Stop reason: HOSPADM

## 2018-02-01 RX ORDER — MORPHINE SULFATE 4 MG/ML
2 INJECTION, SOLUTION INTRAMUSCULAR; INTRAVENOUS
Status: DISCONTINUED | OUTPATIENT
Start: 2018-02-01 | End: 2018-02-07

## 2018-02-01 RX ORDER — AMOXICILLIN 250 MG
2 CAPSULE ORAL 2 TIMES DAILY
Status: DISCONTINUED | OUTPATIENT
Start: 2018-02-01 | End: 2018-02-09 | Stop reason: HOSPADM

## 2018-02-01 RX ORDER — OXYCODONE HYDROCHLORIDE 5 MG/1
2.5 TABLET ORAL
Status: DISCONTINUED | OUTPATIENT
Start: 2018-02-01 | End: 2018-02-07

## 2018-02-01 RX ORDER — FINASTERIDE 5 MG/1
5 TABLET, FILM COATED ORAL DAILY
Status: DISCONTINUED | OUTPATIENT
Start: 2018-02-02 | End: 2018-02-09 | Stop reason: HOSPADM

## 2018-02-01 RX ORDER — LABETALOL HYDROCHLORIDE 5 MG/ML
10 INJECTION, SOLUTION INTRAVENOUS EVERY 4 HOURS PRN
Status: DISCONTINUED | OUTPATIENT
Start: 2018-02-01 | End: 2018-02-09 | Stop reason: HOSPADM

## 2018-02-01 RX ORDER — PREGABALIN 25 MG/1
25 CAPSULE ORAL 3 TIMES DAILY
Status: DISCONTINUED | OUTPATIENT
Start: 2018-02-01 | End: 2018-02-09 | Stop reason: HOSPADM

## 2018-02-01 RX ORDER — SULFAMETHOXAZOLE AND TRIMETHOPRIM 800; 160 MG/1; MG/1
1 TABLET ORAL 2 TIMES DAILY
Status: ON HOLD | COMMUNITY
Start: 2018-01-26 | End: 2018-02-08

## 2018-02-01 RX ORDER — CITALOPRAM 20 MG/1
20 TABLET ORAL DAILY
Status: DISCONTINUED | OUTPATIENT
Start: 2018-02-02 | End: 2018-02-05

## 2018-02-01 RX ORDER — GLIPIZIDE 5 MG/1
10 TABLET ORAL 2 TIMES DAILY
Status: DISCONTINUED | OUTPATIENT
Start: 2018-02-02 | End: 2018-02-09 | Stop reason: HOSPADM

## 2018-02-01 RX ORDER — TAMSULOSIN HYDROCHLORIDE 0.4 MG/1
0.4 CAPSULE ORAL
Status: DISCONTINUED | OUTPATIENT
Start: 2018-02-02 | End: 2018-02-09 | Stop reason: HOSPADM

## 2018-02-01 RX ORDER — ONDANSETRON 4 MG/1
4 TABLET, ORALLY DISINTEGRATING ORAL EVERY 4 HOURS PRN
Status: DISCONTINUED | OUTPATIENT
Start: 2018-02-01 | End: 2018-02-09 | Stop reason: HOSPADM

## 2018-02-01 RX ORDER — PREGABALIN 25 MG/1
25 CAPSULE ORAL 3 TIMES DAILY
COMMUNITY
End: 2018-03-14

## 2018-02-01 RX ORDER — AMLODIPINE BESYLATE 10 MG/1
10 TABLET ORAL DAILY
Status: DISCONTINUED | OUTPATIENT
Start: 2018-02-02 | End: 2018-02-09 | Stop reason: HOSPADM

## 2018-02-01 RX ORDER — ONDANSETRON 2 MG/ML
4 INJECTION INTRAMUSCULAR; INTRAVENOUS EVERY 4 HOURS PRN
Status: DISCONTINUED | OUTPATIENT
Start: 2018-02-01 | End: 2018-02-09 | Stop reason: HOSPADM

## 2018-02-01 RX ORDER — BISACODYL 10 MG
10 SUPPOSITORY, RECTAL RECTAL
Status: DISCONTINUED | OUTPATIENT
Start: 2018-02-01 | End: 2018-02-09 | Stop reason: HOSPADM

## 2018-02-01 RX ORDER — MIRTAZAPINE 15 MG/1
45 TABLET, FILM COATED ORAL
Status: DISCONTINUED | OUTPATIENT
Start: 2018-02-01 | End: 2018-02-09 | Stop reason: HOSPADM

## 2018-02-01 RX ORDER — SODIUM CHLORIDE 9 MG/ML
1000 INJECTION, SOLUTION INTRAVENOUS ONCE
Status: COMPLETED | OUTPATIENT
Start: 2018-02-01 | End: 2018-02-01

## 2018-02-01 RX ORDER — HYDRALAZINE HYDROCHLORIDE 50 MG/1
25 TABLET, FILM COATED ORAL EVERY 8 HOURS
Status: DISCONTINUED | OUTPATIENT
Start: 2018-02-01 | End: 2018-02-09 | Stop reason: HOSPADM

## 2018-02-01 RX ORDER — SODIUM CHLORIDE 9 MG/ML
500 INJECTION, SOLUTION INTRAVENOUS
Status: DISCONTINUED | OUTPATIENT
Start: 2018-02-01 | End: 2018-02-09 | Stop reason: HOSPADM

## 2018-02-01 RX ORDER — PROMETHAZINE HYDROCHLORIDE 25 MG/1
12.5-25 SUPPOSITORY RECTAL EVERY 4 HOURS PRN
Status: DISCONTINUED | OUTPATIENT
Start: 2018-02-01 | End: 2018-02-09 | Stop reason: HOSPADM

## 2018-02-01 RX ORDER — TRIAMTERENE AND HYDROCHLOROTHIAZIDE 37.5; 25 MG/1; MG/1
1 CAPSULE ORAL EVERY MORNING
Status: DISCONTINUED | OUTPATIENT
Start: 2018-02-02 | End: 2018-02-09 | Stop reason: HOSPADM

## 2018-02-01 RX ORDER — ACETAMINOPHEN 325 MG/1
650 TABLET ORAL EVERY 6 HOURS PRN
Status: DISCONTINUED | OUTPATIENT
Start: 2018-02-01 | End: 2018-02-09 | Stop reason: HOSPADM

## 2018-02-01 RX ADMIN — HYDROMORPHONE HYDROCHLORIDE 1 MG: 2 INJECTION, SOLUTION INTRAMUSCULAR; INTRAVENOUS; SUBCUTANEOUS at 18:12

## 2018-02-01 RX ADMIN — PREGABALIN 25 MG: 25 CAPSULE ORAL at 23:56

## 2018-02-01 RX ADMIN — INSULIN GLARGINE 65 UNITS: 100 INJECTION, SOLUTION SUBCUTANEOUS at 23:58

## 2018-02-01 RX ADMIN — HYDRALAZINE HYDROCHLORIDE 25 MG: 50 TABLET ORAL at 23:56

## 2018-02-01 RX ADMIN — FAMOTIDINE 20 MG: 20 TABLET, FILM COATED ORAL at 23:56

## 2018-02-01 RX ADMIN — AMPICILLIN SODIUM AND SULBACTAM SODIUM 3 G: 2; 1 INJECTION, POWDER, FOR SOLUTION INTRAMUSCULAR; INTRAVENOUS at 18:36

## 2018-02-01 RX ADMIN — HYDROMORPHONE HYDROCHLORIDE 1 MG: 2 INJECTION, SOLUTION INTRAMUSCULAR; INTRAVENOUS; SUBCUTANEOUS at 20:50

## 2018-02-01 RX ADMIN — IOHEXOL 100 ML: 350 INJECTION, SOLUTION INTRAVENOUS at 19:56

## 2018-02-01 RX ADMIN — VANCOMYCIN HYDROCHLORIDE 2200 MG: 100 INJECTION, POWDER, LYOPHILIZED, FOR SOLUTION INTRAVENOUS at 19:48

## 2018-02-01 RX ADMIN — ONDANSETRON 4 MG: 4 TABLET, ORALLY DISINTEGRATING ORAL at 23:04

## 2018-02-01 RX ADMIN — ACETAMINOPHEN 650 MG: 325 TABLET, FILM COATED ORAL at 18:12

## 2018-02-01 RX ADMIN — STANDARDIZED SENNA CONCENTRATE AND DOCUSATE SODIUM 2 TABLET: 8.6; 5 TABLET, FILM COATED ORAL at 23:56

## 2018-02-01 RX ADMIN — MIRTAZAPINE 45 MG: 15 TABLET, FILM COATED ORAL at 23:56

## 2018-02-01 RX ADMIN — SODIUM CHLORIDE 1000 ML: 9 INJECTION, SOLUTION INTRAVENOUS at 18:10

## 2018-02-01 ASSESSMENT — PATIENT HEALTH QUESTIONNAIRE - PHQ9
1. LITTLE INTEREST OR PLEASURE IN DOING THINGS: NOT AT ALL
4. FEELING TIRED OR HAVING LITTLE ENERGY: MORE THAN HALF THE DAYS
3. TROUBLE FALLING OR STAYING ASLEEP OR SLEEPING TOO MUCH: MORE THAN HALF THE DAYS
SUM OF ALL RESPONSES TO PHQ QUESTIONS 1-9: 11
5. POOR APPETITE OR OVEREATING: SEVERAL DAYS
2. FEELING DOWN, DEPRESSED, IRRITABLE, OR HOPELESS: NEARLY EVERY DAY
6. FEELING BAD ABOUT YOURSELF - OR THAT YOU ARE A FAILURE OR HAVE LET YOURSELF OR YOUR FAMILY DOWN: NEARLY EVERY DAY
9. THOUGHTS THAT YOU WOULD BE BETTER OFF DEAD, OR OF HURTING YOURSELF: NOT AT ALL
7. TROUBLE CONCENTRATING ON THINGS, SUCH AS READING THE NEWSPAPER OR WATCHING TELEVISION: NOT AT ALL
SUM OF ALL RESPONSES TO PHQ9 QUESTIONS 1 AND 2: 3
8. MOVING OR SPEAKING SO SLOWLY THAT OTHER PEOPLE COULD HAVE NOTICED. OR THE OPPOSITE, BEING SO FIGETY OR RESTLESS THAT YOU HAVE BEEN MOVING AROUND A LOT MORE THAN USUAL: NOT AT ALL

## 2018-02-01 ASSESSMENT — PAIN SCALES - GENERAL
PAINLEVEL_OUTOF10: 8
PAINLEVEL_OUTOF10: 5

## 2018-02-01 ASSESSMENT — LIFESTYLE VARIABLES
ALCOHOL_USE: NO
EVER_SMOKED: YES

## 2018-02-02 PROBLEM — A41.9 SEPSIS (HCC): Status: ACTIVE | Noted: 2018-02-02

## 2018-02-02 PROBLEM — F32.A DEPRESSION WITH SUICIDAL IDEATION: Status: ACTIVE | Noted: 2018-02-02

## 2018-02-02 PROBLEM — R45.851 DEPRESSION WITH SUICIDAL IDEATION: Status: ACTIVE | Noted: 2018-02-02

## 2018-02-02 LAB
ANION GAP SERPL CALC-SCNC: 8 MMOL/L (ref 0–11.9)
APPEARANCE UR: CLEAR
BACTERIA #/AREA URNS HPF: NEGATIVE /HPF
BILIRUB UR QL STRIP.AUTO: NEGATIVE
BUN SERPL-MCNC: 12 MG/DL (ref 8–22)
CALCIUM SERPL-MCNC: 8.2 MG/DL (ref 8.5–10.5)
CHLORIDE SERPL-SCNC: 98 MMOL/L (ref 96–112)
CO2 SERPL-SCNC: 25 MMOL/L (ref 20–33)
COLOR UR: YELLOW
CREAT SERPL-MCNC: 0.84 MG/DL (ref 0.5–1.4)
CRP SERPL HS-MCNC: 4.94 MG/DL (ref 0–0.75)
EPI CELLS #/AREA URNS HPF: NEGATIVE /HPF
ERYTHROCYTE [DISTWIDTH] IN BLOOD BY AUTOMATED COUNT: 45.9 FL (ref 35.9–50)
ERYTHROCYTE [SEDIMENTATION RATE] IN BLOOD BY WESTERGREN METHOD: 41 MM/HOUR (ref 0–20)
GLUCOSE BLD-MCNC: 142 MG/DL (ref 65–99)
GLUCOSE BLD-MCNC: 164 MG/DL (ref 65–99)
GLUCOSE BLD-MCNC: 284 MG/DL (ref 65–99)
GLUCOSE BLD-MCNC: 310 MG/DL (ref 65–99)
GLUCOSE BLD-MCNC: 360 MG/DL (ref 65–99)
GLUCOSE SERPL-MCNC: 266 MG/DL (ref 65–99)
GLUCOSE UR STRIP.AUTO-MCNC: >=1000 MG/DL
HCT VFR BLD AUTO: 39.8 % (ref 42–52)
HGB BLD-MCNC: 12.7 G/DL (ref 14–18)
HYALINE CASTS #/AREA URNS LPF: ABNORMAL /LPF
KETONES UR STRIP.AUTO-MCNC: NEGATIVE MG/DL
LACTATE BLD-SCNC: 1.9 MMOL/L (ref 0.5–2)
LEUKOCYTE ESTERASE UR QL STRIP.AUTO: NEGATIVE
MCH RBC QN AUTO: 26.8 PG (ref 27–33)
MCHC RBC AUTO-ENTMCNC: 31.9 G/DL (ref 33.7–35.3)
MCV RBC AUTO: 84 FL (ref 81.4–97.8)
MICRO URNS: ABNORMAL
NITRITE UR QL STRIP.AUTO: NEGATIVE
PH UR STRIP.AUTO: 7 [PH]
PLATELET # BLD AUTO: 332 K/UL (ref 164–446)
PMV BLD AUTO: 9.2 FL (ref 9–12.9)
POTASSIUM SERPL-SCNC: 4.8 MMOL/L (ref 3.6–5.5)
PROT UR QL STRIP: 100 MG/DL
RBC # BLD AUTO: 4.74 M/UL (ref 4.7–6.1)
RBC # URNS HPF: ABNORMAL /HPF
RBC UR QL AUTO: NEGATIVE
SODIUM SERPL-SCNC: 131 MMOL/L (ref 135–145)
SP GR UR STRIP.AUTO: 1.02
UROBILINOGEN UR STRIP.AUTO-MCNC: 1 MG/DL
WBC # BLD AUTO: 8.8 K/UL (ref 4.8–10.8)
WBC #/AREA URNS HPF: ABNORMAL /HPF

## 2018-02-02 PROCEDURE — 85027 COMPLETE CBC AUTOMATED: CPT

## 2018-02-02 PROCEDURE — 81001 URINALYSIS AUTO W/SCOPE: CPT

## 2018-02-02 PROCEDURE — 87086 URINE CULTURE/COLONY COUNT: CPT

## 2018-02-02 PROCEDURE — 86140 C-REACTIVE PROTEIN: CPT

## 2018-02-02 PROCEDURE — 94760 N-INVAS EAR/PLS OXIMETRY 1: CPT

## 2018-02-02 PROCEDURE — 700111 HCHG RX REV CODE 636 W/ 250 OVERRIDE (IP): Performed by: INTERNAL MEDICINE

## 2018-02-02 PROCEDURE — 700111 HCHG RX REV CODE 636 W/ 250 OVERRIDE (IP): Performed by: PHARMACIST

## 2018-02-02 PROCEDURE — 700105 HCHG RX REV CODE 258: Performed by: PHARMACIST

## 2018-02-02 PROCEDURE — 80048 BASIC METABOLIC PNL TOTAL CA: CPT

## 2018-02-02 PROCEDURE — 82962 GLUCOSE BLOOD TEST: CPT | Mod: 91

## 2018-02-02 PROCEDURE — 700111 HCHG RX REV CODE 636 W/ 250 OVERRIDE (IP): Performed by: HOSPITALIST

## 2018-02-02 PROCEDURE — 36415 COLL VENOUS BLD VENIPUNCTURE: CPT

## 2018-02-02 PROCEDURE — 700105 HCHG RX REV CODE 258: Performed by: HOSPITALIST

## 2018-02-02 PROCEDURE — 85652 RBC SED RATE AUTOMATED: CPT

## 2018-02-02 PROCEDURE — 83605 ASSAY OF LACTIC ACID: CPT

## 2018-02-02 PROCEDURE — 770001 HCHG ROOM/CARE - MED/SURG/GYN PRIV*

## 2018-02-02 PROCEDURE — 99233 SBSQ HOSP IP/OBS HIGH 50: CPT | Performed by: HOSPITALIST

## 2018-02-02 PROCEDURE — A9270 NON-COVERED ITEM OR SERVICE: HCPCS | Performed by: HOSPITALIST

## 2018-02-02 PROCEDURE — 700102 HCHG RX REV CODE 250 W/ 637 OVERRIDE(OP): Performed by: HOSPITALIST

## 2018-02-02 PROCEDURE — A9270 NON-COVERED ITEM OR SERVICE: HCPCS | Performed by: INTERNAL MEDICINE

## 2018-02-02 PROCEDURE — 700102 HCHG RX REV CODE 250 W/ 637 OVERRIDE(OP): Performed by: INTERNAL MEDICINE

## 2018-02-02 RX ORDER — DOXYCYCLINE 100 MG/1
100 TABLET ORAL EVERY 12 HOURS
Status: COMPLETED | OUTPATIENT
Start: 2018-02-02 | End: 2018-02-09

## 2018-02-02 RX ORDER — MAGNESIUM SULFATE HEPTAHYDRATE 40 MG/ML
4 INJECTION, SOLUTION INTRAVENOUS ONCE
Status: COMPLETED | OUTPATIENT
Start: 2018-02-02 | End: 2018-02-02

## 2018-02-02 RX ORDER — ZOLPIDEM TARTRATE 5 MG/1
5 TABLET ORAL
Status: DISCONTINUED | OUTPATIENT
Start: 2018-02-02 | End: 2018-02-09 | Stop reason: HOSPADM

## 2018-02-02 RX ADMIN — FAMOTIDINE 20 MG: 20 TABLET, FILM COATED ORAL at 08:36

## 2018-02-02 RX ADMIN — FAMOTIDINE 20 MG: 20 TABLET, FILM COATED ORAL at 20:01

## 2018-02-02 RX ADMIN — MAGNESIUM SULFATE IN WATER 4 G: 40 INJECTION, SOLUTION INTRAVENOUS at 05:13

## 2018-02-02 RX ADMIN — NICOTINE TRANSDERMAL SYSTEM 21 MG: 21 PATCH, EXTENDED RELEASE TRANSDERMAL at 05:12

## 2018-02-02 RX ADMIN — FINASTERIDE 5 MG: 5 TABLET, FILM COATED ORAL at 08:37

## 2018-02-02 RX ADMIN — GLIPIZIDE 10 MG: 5 TABLET ORAL at 08:36

## 2018-02-02 RX ADMIN — HYDRALAZINE HYDROCHLORIDE 25 MG: 50 TABLET ORAL at 05:13

## 2018-02-02 RX ADMIN — TAMSULOSIN HYDROCHLORIDE 0.4 MG: 0.4 CAPSULE ORAL at 08:36

## 2018-02-02 RX ADMIN — MIRTAZAPINE 45 MG: 15 TABLET, FILM COATED ORAL at 20:01

## 2018-02-02 RX ADMIN — AMPICILLIN SODIUM AND SULBACTAM SODIUM 3 G: 2; 1 INJECTION, POWDER, FOR SOLUTION INTRAMUSCULAR; INTRAVENOUS at 17:43

## 2018-02-02 RX ADMIN — TRIAMTERENE AND HYDROCHLOROTHIAZIDE 1 CAPSULE: 25; 37.5 CAPSULE ORAL at 08:39

## 2018-02-02 RX ADMIN — GLIPIZIDE 10 MG: 5 TABLET ORAL at 16:41

## 2018-02-02 RX ADMIN — THERA TABS 1 TABLET: TAB at 08:37

## 2018-02-02 RX ADMIN — ENOXAPARIN SODIUM 40 MG: 100 INJECTION SUBCUTANEOUS at 08:35

## 2018-02-02 RX ADMIN — HYDRALAZINE HYDROCHLORIDE 25 MG: 50 TABLET ORAL at 14:08

## 2018-02-02 RX ADMIN — MORPHINE SULFATE 2 MG: 4 INJECTION INTRAVENOUS at 08:46

## 2018-02-02 RX ADMIN — ZOLPIDEM TARTRATE 5 MG: 5 TABLET, FILM COATED ORAL at 20:51

## 2018-02-02 RX ADMIN — AMLODIPINE BESYLATE 10 MG: 10 TABLET ORAL at 08:37

## 2018-02-02 RX ADMIN — MORPHINE SULFATE 2 MG: 4 INJECTION INTRAVENOUS at 23:40

## 2018-02-02 RX ADMIN — INSULIN HUMAN 4 UNITS: 100 INJECTION, SOLUTION PARENTERAL at 12:07

## 2018-02-02 RX ADMIN — MORPHINE SULFATE 2 MG: 4 INJECTION INTRAVENOUS at 20:10

## 2018-02-02 RX ADMIN — OXYCODONE HYDROCHLORIDE 5 MG: 5 TABLET ORAL at 16:27

## 2018-02-02 RX ADMIN — PREGABALIN 25 MG: 25 CAPSULE ORAL at 20:01

## 2018-02-02 RX ADMIN — DOXYCYCLINE 100 MG: 100 TABLET ORAL at 20:01

## 2018-02-02 RX ADMIN — PREGABALIN 25 MG: 25 CAPSULE ORAL at 14:07

## 2018-02-02 RX ADMIN — OXYCODONE HYDROCHLORIDE 5 MG: 5 TABLET ORAL at 11:16

## 2018-02-02 RX ADMIN — INSULIN HUMAN 5 UNITS: 100 INJECTION, SOLUTION PARENTERAL at 16:41

## 2018-02-02 RX ADMIN — HYDRALAZINE HYDROCHLORIDE 25 MG: 50 TABLET ORAL at 20:01

## 2018-02-02 RX ADMIN — INSULIN GLARGINE 65 UNITS: 100 INJECTION, SOLUTION SUBCUTANEOUS at 20:04

## 2018-02-02 RX ADMIN — PREGABALIN 25 MG: 25 CAPSULE ORAL at 08:37

## 2018-02-02 RX ADMIN — INSULIN GLARGINE 65 UNITS: 100 INJECTION, SOLUTION SUBCUTANEOUS at 08:40

## 2018-02-02 RX ADMIN — MORPHINE SULFATE 2 MG: 4 INJECTION INTRAVENOUS at 14:08

## 2018-02-02 RX ADMIN — INSULIN HUMAN 3 UNITS: 100 INJECTION, SOLUTION PARENTERAL at 08:40

## 2018-02-02 RX ADMIN — CITALOPRAM HYDROBROMIDE 20 MG: 20 TABLET ORAL at 08:37

## 2018-02-02 RX ADMIN — AMPICILLIN SODIUM AND SULBACTAM SODIUM 3 G: 2; 1 INJECTION, POWDER, FOR SOLUTION INTRAMUSCULAR; INTRAVENOUS at 23:40

## 2018-02-02 RX ADMIN — VANCOMYCIN HYDROCHLORIDE 1300 MG: 100 INJECTION, POWDER, LYOPHILIZED, FOR SOLUTION INTRAVENOUS at 08:46

## 2018-02-02 RX ADMIN — STANDARDIZED SENNA CONCENTRATE AND DOCUSATE SODIUM 2 TABLET: 8.6; 5 TABLET, FILM COATED ORAL at 20:01

## 2018-02-02 RX ADMIN — ASPIRIN 81 MG: 81 TABLET, COATED ORAL at 08:37

## 2018-02-02 ASSESSMENT — ENCOUNTER SYMPTOMS
SPEECH CHANGE: 0
WHEEZING: 0
BRUISES/BLEEDS EASILY: 0
FEVER: 0
ABDOMINAL PAIN: 0
HEADACHES: 0
DEPRESSION: 1
NECK PAIN: 0
WEAKNESS: 0
PALPITATIONS: 0
SENSORY CHANGE: 0
VOMITING: 0
EYE PAIN: 0
DIZZINESS: 0
CHILLS: 0
CONSTIPATION: 0
NAUSEA: 0
DIARRHEA: 0
DIAPHORESIS: 0
SPUTUM PRODUCTION: 0
FOCAL WEAKNESS: 0
EYE DISCHARGE: 0
CLAUDICATION: 0
SHORTNESS OF BREATH: 0
MYALGIAS: 0
LOSS OF CONSCIOUSNESS: 0
BACK PAIN: 0
COUGH: 0
HEMOPTYSIS: 0
SORE THROAT: 0

## 2018-02-02 ASSESSMENT — PAIN SCALES - GENERAL
PAINLEVEL_OUTOF10: 4
PAINLEVEL_OUTOF10: 4
PAINLEVEL_OUTOF10: 7
PAINLEVEL_OUTOF10: 4
PAINLEVEL_OUTOF10: 10
PAINLEVEL_OUTOF10: 4
PAINLEVEL_OUTOF10: 8
PAINLEVEL_OUTOF10: 8
PAINLEVEL_OUTOF10: 3
PAINLEVEL_OUTOF10: 4
PAINLEVEL_OUTOF10: 7

## 2018-02-02 ASSESSMENT — LIFESTYLE VARIABLES
EVER_SMOKED: YES
DO YOU DRINK ALCOHOL: NO
SUBSTANCE_ABUSE: 0

## 2018-02-02 ASSESSMENT — COPD QUESTIONNAIRES
DO YOU EVER COUGH UP ANY MUCUS OR PHLEGM?: NO/ONLY WITH OCCASIONAL COLDS OR INFECTIONS
COPD SCREENING SCORE: 3
DO YOU EVER COUGH UP ANY MUCUS OR PHLEGM?: NO/ONLY WITH OCCASIONAL COLDS OR INFECTIONS
DURING THE PAST 4 WEEKS HOW MUCH DID YOU FEEL SHORT OF BREATH: NONE/LITTLE OF THE TIME
DURING THE PAST 4 WEEKS HOW MUCH DID YOU FEEL SHORT OF BREATH: NONE/LITTLE OF THE TIME
HAVE YOU SMOKED AT LEAST 100 CIGARETTES IN YOUR ENTIRE LIFE: YES
HAVE YOU SMOKED AT LEAST 100 CIGARETTES IN YOUR ENTIRE LIFE: YES
COPD SCREENING SCORE: 3

## 2018-02-02 NOTE — PROGRESS NOTES
RN attempted to sent culture of pt wound, there was not drainage present. Wound is dry/scabbed over. Notified Dr. Shannon.

## 2018-02-02 NOTE — PROGRESS NOTES
[facility] Hospitalist Progress Note    Date of Service: 2/2/2018    Chief Complaint  57 y.o. male admitted 2/1/2018 with b/l amputee, IDDM, COPD, chronic left BKA stump infection with multiple courses of abx presented with purulent dc from left BKA stump sent by [facility] clinic MD to ER.   CT negative for abscess, OM, or gas formation.    Interval Problem Update  2/2:  Review of chart with LPS:  Does not need surgical consultation, but wound care at this time.  Patient states has never been fitted for a prosthetic, states told insurance would not cover.  He is to discuss this with LPS.   ambien for sleep, down graded his abx to unasyn IV and doxy po.  dc'd vanco IV.  Dry on exam medial aspect of prior surgical incision, no significant edema or erythema seen.  Unable to do wound culture, ordered today.  States no wound culture done at [facility] clinic or ER on admission.  Blood cultures x2 negative.    Consultants/Specialty  Dr. Tierney did last left I&D 12/26/15.  LPS aware of consult  Psychiatry-SI    Disposition  Likely at baseline.  TBD with legal hold for SI.  Has WC with him.        Review of Systems   Constitutional: Negative for chills, diaphoresis, fever and malaise/fatigue.   HENT: Negative for congestion and sore throat.    Eyes: Negative for pain and discharge.   Respiratory: Negative for cough, hemoptysis, sputum production, shortness of breath and wheezing.    Cardiovascular: Negative for chest pain, palpitations, claudication and leg swelling.   Gastrointestinal: Negative for abdominal pain, constipation, diarrhea, melena, nausea and vomiting.   Genitourinary: Negative for dysuria, frequency and urgency.   Musculoskeletal: Negative for back pain, joint pain, myalgias and neck pain.   Skin: Negative for itching and rash.   Neurological: Negative for dizziness, sensory change, speech change, focal weakness, loss of consciousness, weakness and headaches.   Endo/Heme/Allergies: Does not bruise/bleed easily.    Psychiatric/Behavioral: Positive for depression and suicidal ideas. Negative for substance abuse.      Physical Exam  Laboratory/Imaging   Hemodynamics  Temp (24hrs), Av.8 °C (98.2 °F), Min:35.9 °C (96.6 °F), Max:38.2 °C (100.8 °F)   Temperature: 36.8 °C (98.3 °F)  Pulse  Av  Min: 75  Max: 109    Blood Pressure: 145/78, NIBP: 147/91      Respiratory      Respiration: 16, Pulse Oximetry: 98 %, O2 Daily Delivery Respiratory : Room Air with O2 Available             Fluids  No intake or output data in the 24 hours ending 18 0853    Nutrition  Orders Placed This Encounter   Procedures   • Diet Order     Standing Status:   Standing     Number of Occurrences:   1     Order Specific Question:   Diet:     Answer:   Regular [1]     Comments:   fish free     Physical Exam   Constitutional: He is oriented to person, place, and time. He appears well-developed and well-nourished. No distress.   HENT:   Head: Normocephalic and atraumatic.   Mouth/Throat: Oropharynx is clear and moist. No oropharyngeal exudate.   Eyes: Conjunctivae and EOM are normal. Pupils are equal, round, and reactive to light. Right eye exhibits no discharge. Left eye exhibits no discharge. No scleral icterus.   Neck: Normal range of motion. Neck supple. No JVD present. No tracheal deviation present. No thyromegaly present.   Cardiovascular: Normal rate, regular rhythm and normal heart sounds.  Exam reveals no gallop and no friction rub.    No murmur heard.  Pulmonary/Chest: Effort normal and breath sounds normal. No respiratory distress. He has no wheezes. He has no rales. He exhibits no tenderness.   Abdominal: Soft. Bowel sounds are normal. He exhibits no distension and no mass. There is no tenderness. There is no rebound and no guarding.   Musculoskeletal: Normal range of motion. He exhibits no edema or tenderness.   Rt AKA well healed.   Left BKA medial aspect of incision with minimal edema seen, no drainage, no palpable abscess, no  dehiscence seen.   Lymphadenopathy:     He has no cervical adenopathy.   Neurological: He is alert and oriented to person, place, and time. No cranial nerve deficit. He exhibits normal muscle tone.   Skin: Skin is warm and dry. No rash noted. He is not diaphoretic. No erythema.   Psychiatric: He has a normal mood and affect. His behavior is normal. Judgment and thought content normal.   States he felt down yesterday about all his medical problems, b/l amputations etc.   Nursing note and vitals reviewed.      Recent Labs      02/01/18   1754  02/02/18   0238   WBC  9.0  8.8   RBC  4.66*  4.74   HEMOGLOBIN  12.6*  12.7*   HEMATOCRIT  38.7*  39.8*   MCV  83.0  84.0   MCH  27.0  26.8*   MCHC  32.6*  31.9*   RDW  44.7  45.9   PLATELETCT  368  332   MPV  9.4  9.2     Recent Labs      02/01/18   1754  02/02/18   0238   SODIUM  128*  131*   POTASSIUM  4.1  4.8   CHLORIDE  95*  98   CO2  25  25   GLUCOSE  274*  266*   BUN  13  12   CREATININE  1.07  0.84   CALCIUM  8.5  8.2*                      Assessment/Plan     Sepsis (CMS-HCC)   Overview    Presumably secondary to cellulitis and infected wound to the left BKA stump  Simple sepsis without)organ  Damage  Will put the patient on full sepsis protocol including lactic acid checks  Follow blood culture     Assessment & Plan    This is NOT SEPSIS.  Does not meet criteria for sepsis.        Cellulitis   Assessment & Plan    acute on chronic, complication of diabetes  Minimal area of cellulitis seen, CT negative, no further drainage, unable to culture wound on 2/2.  dc vancomycin IV for unasyn iV and doxy.  BC pending  limb preservation service consult:  No ID or surgical consult need, likely localized wound care, check arterial pulses to ensure good vascular flow to area.  Sed rate and CRP ordered.        Diabetes mellitus with neuropathy (HCC)- (present on admission)   Assessment & Plan    Continue glipizide,  home insulin and sliding scale  Hypoglycemia protocol         Depression with suicidal ideation- (present on admission)   Overview    Patient was found to have low SAD score and had plans for suicide recently  He is placed on suicidal precautions, psychiatry consult will be requested     Assessment & Plan    2/1:  Admission stated he had plans for suicide.   1:1 sitter in room  Psychiatry consulted, legal hold initiated.        BPH (benign prostatic hyperplasia)- (present on admission)   Assessment & Plan    Continue tamsulosin and finasteride          Quality-Core Measures

## 2018-02-02 NOTE — PROGRESS NOTES
Pt AOx4, flat affect, short answers and does not look at RN when talking. Medicated for pain, see MAR. 1:1 sitter at bedside. Pt brushed off nurse when RN asks about thoughts of hurting himself. Pt rolls eyes and does not appear to want to talk. Safety protocols in place.

## 2018-02-02 NOTE — CARE PLAN
Problem: Safety  Goal: Will remain free from injury  Outcome: PROGRESSING AS EXPECTED  Suicide risk. Sitter present in room .Safety education provided to patient.     Problem: Skin Integrity  Goal: Risk for impaired skin integrity will decrease  Outcome: PROGRESSING AS EXPECTED  Pillows in use for under stumps.

## 2018-02-02 NOTE — PROGRESS NOTES
Patient arrived to room T308-2 from ED via patient-provided wheelchair. Patient is A&O4. He is on room air. At this time he reports no nausea or pain. Patient is a bilateral lower leg amputee. Left BKA stump is tender/red/swollen. Photo of wound to stump taken. Plan of care and orders were reviewed. Upon completing patient's admit profile, the SADD score was 5 and the PHQ was 11. Patient also verbalizes a recent plan to commit suicide. Charge RN notified. NAM was notified as well. Patient will be placed on legal hold with a sitter per protocol. MD to be notified. Orders reviewed. Patient is calm and cooperative and in no acute distress. It was explained to patient the reason for legal hold and sitter. He verbalizes understanding. Will continue to monitor.
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
## 2018-02-02 NOTE — ED NOTES
IV inflitrated with vano infusing.   Pt also report vomiting x2 and being very diaphoretic. Temp check. Normal temp.   Zofran given.  Pt thinks boxed meal did not settle in stomache well.

## 2018-02-02 NOTE — PROGRESS NOTES
Patient was moved to T301 for legal hold observation and privacy per protocol. Security arrived to collect patient belongings. Sitter is present in room.

## 2018-02-02 NOTE — ASSESSMENT & PLAN NOTE
acute on chronic, complication of diabetes  Minimal area of cellulitis seen, CT negative, no further drainage, unable to culture wound on 2/2.  2/1 BCs negative x2  limb preservation service consult:  No ID or surgical consult need, likely localized wound care, check arterial pulses to ensure good vascular flow to area.  Sed rate and CRP elevated  Change unasyn IV to augmentin.  Wound care rec dressing changes qod.  2/5:  abx x 7days total. Dressing sleeve in place to prevent continued irritation.  2/6: still on iv pain rx, taper IV pain rx, cont oral  If off IV pain rx, medically cleared in am  2/8: Patient demonstrating self transfer to wheelchair  Reports black lower extremity stump pain, improved

## 2018-02-02 NOTE — DISCHARGE PLANNING
Legal Hold SW obtained a copy of the first page of pts legal hold. Per bedside RN, a psychiatry consult has taken place. JAVAD to monitor pts chart and request that a legal hold certification be completed upon ana eval.

## 2018-02-02 NOTE — PROGRESS NOTES
Pharmacy Kinetics 57 y.o. male on vancomycin day # 1 2018    Currently on Vancomycin 2200 mg iv LD given at 1948    Indication for Treatment: cellulitis    Pertinent history per medical record: Admitted on 2018 for cellulitis. Pt bib EMS with c/o infected wound. 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 Pt failed outpt abx. Febrile on arrival.     Other antibiotics: None    Allergies: Fish     List concerns for renal function: DM2, Alk Phos at 109 units/L, SIRS 1/4 -- elevated temp.    Pertinent cultures to date:   None at this time    Recent Labs      18   1754   WBC  9.0   NEUTSPOLYS  64.90     Recent Labs      18   1754   BUN  13   CREATININE  1.07   ALBUMIN  3.4      Blood pressure (!) 173/95, pulse 89, temperature (!) 38.2 °C (100.8 °F), resp. rate 16, weight 86.2 kg (190 lb), SpO2 97 %. Temp (24hrs), Av.2 °C (100.8 °F), Min:38.2 °C (100.8 °F), Max:38.2 °C (100.8 °F)      A/P   1. Vancomycin dose change: 1500 mg (17 mg/kg x 86.2 kg) iv q12h (, 20)  2. Next vancomycin level: VT on 2/3 at 0730  3. Goal trough: 12-16 mcg/ml  4. Comments: Some concern for renal function. Loading dose 2200 mg ( 25 mg/kg x 86.2 kg) iv once given at 1948. Will start scheduled doses. VT ordered. Pharmacy will monitor and make adjustments when appropriate.     Dolores Singleton, ElianD

## 2018-02-02 NOTE — PROGRESS NOTES
Melissa Meneses, , from [facility] stopped by, she states they may have a bed for pt upon discharge. She can be reached at [phone] ext [phone]. Called to notify social work, left message.

## 2018-02-02 NOTE — PROGRESS NOTES
2 RN Skin Check:     Patient skin was assessed. Blisters and open wound to left stump site. Photos taken. Scar to right leg. No other signs of breakdown. Sacrum and bony prominences intact.

## 2018-02-02 NOTE — CARE PLAN
Problem: Safety  Goal: Will remain free from injury  Outcome: PROGRESSING AS EXPECTED  Protocols in place, 1:1 sitter at bedside, psych eval pending.     Problem: Infection  Goal: Will remain free from infection  Outcome: PROGRESSING AS EXPECTED  On IV antibiotics, monitoring vitals and labs. Follow up with wound and blood cultures.

## 2018-02-02 NOTE — ASSESSMENT & PLAN NOTE
With hyperglycemia, labile blood sugars, improving  Continue glipizide,  home insulin and sliding scale  Hypoglycemia protocol    on evening lantus 45 u nightly, and 65 in am

## 2018-02-02 NOTE — ED TRIAGE NOTES
.  Chief Complaint   Patient presents with   • Wound Infection     BIB [facility] from [facility]. Sent by clinitian per infected wound L amputation stump. L stump swollen to knee.  Pt is double amputee, IIDM. pt seen previously for wound, on oral antibiotics.  Has had fevers.   Charge RN notified of pt will start sepsis protocol.

## 2018-02-02 NOTE — H&P
Hospital Medicine History and Physical    Date of Service  2/1/2018    Chief Complaint  Chief Complaint   Patient presents with   • Wound Infection     BIB [facility] from [facility]. Sent by clinitian per infected wound L amputation stump. L stump swollen to knee.  Pt is double amputee, IIDM. pt seen previously for wound, on oral antibiotics.  Has had fevers.       History of Presenting Illness  57 y.o. Male with history of diabetes, insulin-dependent, hypertension, COPD, BPH, right and left BKA, who presented 2/1/2018 with complaints of tenderness, redness, swelling and an purulent discharge from the wound on the left BKA stump.   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 He was on multiple antibiotic as outpatient, including clindamycin, Bactrim recently for PCP.   Patient has subjective chills and fever. Patient was found to have fever and tachycardia.   CAT scan in the emergency department was negative for abscess, air or signs of necrotizing fasciitis. Positive soft tissue swelling.     Primary Care Physician  Pcp Pt States None    Consultants  None    Code Status  Full code    Review of Systems  ROS  Please see HPI, all other systems were reviewed and are negative (AMA/CMS criteria)       Past Medical History  Past Medical History:   Diagnosis Date   • BPH (benign prostatic hyperplasia) 12/20/2017   • Diabetes (CMS-HCC)    • Has received first dose of hepatitis B vaccine    • Hypertension    • Renal disorder    • Renal failure        Surgical History  Past Surgical History:   Procedure Laterality Date   • IRRIGATION & DEBRIDEMENT ORTHO Left 12/26/2015    Procedure: IRRIGATION & DEBRIDEMENT ORTHO and wound closure of BKA;  Surgeon: William Welsh M.D.;  Location: SURGERY [facility];  Service:    • OTHER ORTHOPEDIC SURGERY Left 2015    bka   • OTHER ORTHOPEDIC SURGERY Right 2013    bka   • OTHER ORTHOPEDIC SURGERY      R forearm deep lac repair       Medications  No current  facility-administered medications on file prior to encounter.      Current Outpatient Prescriptions on File Prior to Encounter   Medication Sig Dispense Refill   • cephALEXin (KEFLEX) 500 MG Cap Take 1 Cap by mouth 4 times a day. 40 Cap 0   • citalopram (CELEXA) 20 MG Tab Take 1 Tab by mouth every day. 30 Tab 0   • mirtazapine (REMERON) 45 MG tablet Take 1 Tab by mouth every bedtime. 30 Tab 0   • glipiZIDE (GLUCOTROL) 10 MG Tab Take 1 Tab by mouth 2 times a day. 60 Tab 0   • insulin detemir (LEVEMIR) 100 UNIT/ML Solution Inject 65 Units as instructed 2 times a day. 65 U BID 10 mL 0   • insulin regular human (HUMULIN/NOVOLIN R) Inject 0-15 Units as instructed 2 Times a Day. 150-200=4 units  201-250=6 units  251-300=10 units  301-350=12 units  351-400=15 units 10 mL 0   • hydrALAZINE (APRESOLINE) 25 MG Tab Take 1 Tab by mouth every 8 hours. 90 Tab 0   • amlodipine (NORVASC) 10 MG Tab Take 1 Tab by mouth every day. 30 Tab 0   • triamterene/hctz (MAXZIDE-25/DYAZIDE) 37.5-25 MG Cap Take 1 Cap by mouth every morning. 30 Cap 3   • finasteride (PROSCAR) 5 MG Tab Take 1 Tab by mouth every day. 30 Tab 0   • tamsulosin (FLOMAX) 0.4 MG capsule Take 1 Cap by mouth ONE-HALF HOUR AFTER BREAKFAST. 30 Cap 0   • ranitidine (ZANTAC) 150 MG Tab Take 1 Tab by mouth 2 times a day. 60 Tab 11   • multivitamin (THERAGRAN) Tab Take 1 Tab by mouth every day.         Family History  History reviewed. No pertinent family history.  Patient denies family history of heart disease, diabetes or cancer.  Social History  Social History   Substance Use Topics   • Smoking status: Current Every Day Smoker     Packs/day: 1.00     Years: 20.00     Types: Cigarettes     Last attempt to quit: 2005   • Smokeless tobacco: Never Used   • Alcohol use No       Allergies  Allergies   Allergen Reactions   • Fish Anaphylaxis and Shortness of Breath        Physical Exam  Laboratory   Hemodynamics  Temp (24hrs), Av.9 °C (98.4 °F), Min:35.9 °C (96.6 °F),  Max:38.2 °C (100.8 °F)   Temperature: 36.6 °C (97.9 °F)  Pulse  Av.1  Min: 75  Max: 109    Blood Pressure: (!) 161/96, NIBP: 147/91      Respiratory      Respiration: 16, Pulse Oximetry: 94 %, O2 Daily Delivery Respiratory : Room Air with O2 Available             Physical Exam  Vitals/ General Appearance:   Weight/BMI: Body mass index is 25.76 kg/m².  Blood pressure (!) 161/96, pulse 83, temperature 36.6 °C (97.9 °F), resp. rate 16, weight 86.2 kg (190 lb), SpO2 94 %. Vitals:    18 2301 18 2307 18 2352 18 0005   BP:   (!) 161/96    Pulse: 95  84 83   Resp:   20 16   Temp:  35.9 °C (96.6 °F) 36.6 °C (97.9 °F)    SpO2: 95%  95% 94%   Weight:        Oxygen Therapy:  Pulse Oximetry: 94 %, O2 (LPM): 0, O2 Delivery: None (Room Air)    Constitutional:  well developed, well nourished, non-toxic, no acute distress  HENMT: Normocephalic, atraumatic, b/l ears normal, nose normal  Eyes:  EOMI, conjunctiva normal, no discharge  Neck: no tracheal deviation, supple  Cardiovascular: tachycardic,  Rhythm regular, no murmurs, no rubs or gallops; no cyanosis, clubbing or edema  Lungs: Respiratory effort is normal, normal breath sounds, breath sounds clear to auscultation b/l, no rales, rhonchi or wheezing  Abdomen: soft, non-tender, no guarding or rebound  Skin: warm, dry, no erythema, no rash  Neurologic: Alert and oriented, no focal deficits, CN II-XII normal  Psychiatric: Some anxiety or depression  MSK: Bilateral BKA. Swelling, erythema, drainage of seropurulent discharge in the wound at the left BKA stump   Recent Labs      18   1754  18   0238   WBC  9.0  8.8   RBC  4.66*  4.74   HEMOGLOBIN  12.6*  12.7*   HEMATOCRIT  38.7*  39.8*   MCV  83.0  84.0   MCH  27.0  26.8*   MCHC  32.6*  31.9*   RDW  44.7  45.9   PLATELETCT  368  332   MPV  9.4  9.2     Recent Labs      18   1754  18   0238   SODIUM  128*  131*   POTASSIUM  4.1  4.8   CHLORIDE  95*  98   CO2  25  25   GLUCOSE  274*   266*   BUN  13  12   CREATININE  1.07  0.84   CALCIUM  8.5  8.2*     Recent Labs      02/01/18   1754  02/02/18   0238   ALTSGPT  12   --    ASTSGOT  18   --    ALKPHOSPHAT  109*   --    TBILIRUBIN  0.3   --    GLUCOSE  274*  266*                 Lab Results   Component Value Date    TROPONINI <0.01 12/20/2017     Urinalysis:    Lab Results  Component Value Date/Time   SPECGRAVITY 1.023 12/20/2017 0920   GLUCOSEUR >=1000 (A) 12/20/2017 0920   KETONES 80 (A) 12/20/2017 0920   NITRITE Negative 12/20/2017 0920   WBCURINE 0-2 (A) 12/20/2017 0920   RBCURINE 0-2 (A) 12/20/2017 0920   BACTERIA Negative 12/20/2017 0920   EPITHELCELL Negative 12/20/2017 0920        Imaging  CT-EXTREMITY, LOWER WITH LEFT   Final Result      Inflammatory change involving the skin and subcutaneous fat at the distal end of the stump, mainly near the amputation site of the tibia. No evidence of abscess.      DX-CHEST-PORTABLE (1 VIEW)   Final Result      Bibasilar atelectasis.         Assessment/Plan     I anticipate this patient will require at least two midnights for appropriate medical management, necessitating inpatient admission.    Sepsis (CMS-HCC)   Overview    Presumably secondary to cellulitis and infected wound to the left BKA stump  Simple sepsis without)organ  Damage  Will put the patient on full sepsis protocol including lactic acid checks  Follow blood culture     Cellulitis   Assessment & Plan    Nonhealing, acute on chronic, complication of diabetes  This patient has some urine and drainage and multiple by mouth antibiotic failure,  Start on vancomycin IV  Consult ID and limb preservation service in a.m.        Diabetes mellitus with neuropathy (HCC)- (present on admission)   Assessment & Plan    Continue glipizide,  home insulin and sliding scale  Hypoglycemia protocol        Depression with suicidal ideation   Overview    Patient was found to have low SAD score and had plans for suicide recently  He is placed on suicidal  precautions, psychiatry consult will be requested     BPH (benign prostatic hyperplasia)- (present on admission)   Assessment & Plan    Continue tamsulosin and finasteride        Smoking  I spent 12 minutes, discussing tobacco dependence and cardiac as well as pulmonary risk. Nicotine replacement and smoking cessation instructions. Code 80417      Prophylaxis: lovenox       I spent 80 minutes evaluating Jv Angelo, reviewing the chart, vitals, labs and imaging, discussing the case with ED physician, medication reconciliation, placing orders and enacting the plan above.    Sections of this note have been dictated using Dragon Speech recognition software. While care and attention has been placed to ensure the accuracy of this note, there can be occasional typographical errors that may be missed and I apologize if this situation occurs. Please take these errors into context. If questions occur please do not hesitate to call or notify the author of this note for clarification.      CC Pcp Pt States None

## 2018-02-02 NOTE — ED PROVIDER NOTES
ED Provider Note    Scribed for Ahmet Montanez D.O. by Bailey Samuel. 2/1/2018  5:42 PM    Primary care provider: Pcp Pt States None  Means of arrival: EMS  History obtained from: patient  History limited by: none    CHIEF COMPLAINT  Chief Complaint   Patient presents with   • Wound Infection     BIB [facility] from [facility]. Sent by clinitian per infected wound L amputation stump. L stump swollen to knee.  Pt is double amputee, IIDM. pt seen previously for wound, on oral antibiotics.  Has had fevers.     HPI  Jv Angelo is a 57 y.o. Male with diabetes presents to the Emergency Department for evaluation of wound infection. Patient reports history of left BKA performed in  March 2015. Patient reports chronic infection of left BKA. He was recently put on clindamycin and was switched to bactrim per primary care this past week. Patient reports blisters to the wound site with associated pain and swelling. The patient also reports associated fevers and chills. Denies nausea or vomiting. Patient also has right AKA performed in 2013.     REVIEW OF SYSTEMS  Pertinent positives include wound infection, fevers, chills, . Pertinent negatives include no nausea, vomiting.  All other systems reviewed and negative.    PAST MEDICAL HISTORY  Past Medical History:   Diagnosis Date   • BPH (benign prostatic hyperplasia) 12/20/2017   • Diabetes (CMS-HCC)    • Has received first dose of hepatitis B vaccine    • Hypertension    • Renal disorder    • Renal failure      SURGICAL HISTORY  Past Surgical History:   Procedure Laterality Date   • IRRIGATION & DEBRIDEMENT ORTHO Left 12/26/2015    Procedure: IRRIGATION & DEBRIDEMENT ORTHO and wound closure of BKA;  Surgeon: William Welsh M.D.;  Location: SURGERY [facility];  Service:    • OTHER ORTHOPEDIC SURGERY Left 2015    bka   • OTHER ORTHOPEDIC SURGERY Right 2013    bka   • OTHER ORTHOPEDIC SURGERY      R forearm deep lac repair      SOCIAL HISTORY  Social History    Substance Use Topics   • Smoking status: Current Every Day Smoker     Packs/day: 1.00     Years: 20.00     Types: Cigarettes     Last attempt to quit: 12/26/2005   • Smokeless tobacco: Never Used   • Alcohol use No      History   Drug Use No     FAMILY HISTORY  History reviewed. No pertinent family history.    CURRENT MEDICATIONS  Home Medications     Reviewed by Jayson Curry (Pharmacy Tech) on 02/01/18 at 1823  Med List Status: Complete   Medication Last Dose Status   amlodipine (NORVASC) 10 MG Tab 2/1/2018 Active   aspirin 81 MG tablet 2/1/2018 Active   cephALEXin (KEFLEX) 500 MG Cap 1/28/2018 Active   citalopram (CELEXA) 20 MG Tab 2/1/2018 Active   clindamycin (CLEOCIN) 150 MG Cap 2/1/2018 Active   finasteride (PROSCAR) 5 MG Tab 2/1/2018 Active   glipiZIDE (GLUCOTROL) 10 MG Tab 2/1/2018 Active   hydrALAZINE (APRESOLINE) 25 MG Tab 2/1/2018 Active   insulin detemir (LEVEMIR) 100 UNIT/ML Solution 2/1/2018 Active   insulin regular human (HUMULIN/NOVOLIN R) 2/1/2018 Active   mirtazapine (REMERON) 45 MG tablet 1/31/2018 Active   multivitamin (THERAGRAN) Tab 1/31/2018 Active   oxyCODONE immediate release (ROXICODONE) 10 MG immediate release tablet >2 days Active   pregabalin (LYRICA) 25 MG Cap 2/1/2018 Active   ranitidine (ZANTAC) 150 MG Tab 2/1/2018 Active   sulfamethoxazole-trimethoprim (BACTRIM DS) 800-160 MG tablet 2/1/2018 Active   tamsulosin (FLOMAX) 0.4 MG capsule 2/1/2018 Active   triamterene/hctz (MAXZIDE-25/DYAZIDE) 37.5-25 MG Cap 2/1/2018 Active              ALLERGIES  Allergies   Allergen Reactions   • Fish Anaphylaxis and Shortness of Breath     PHYSICAL EXAM  VITAL SIGNS: BP (!) 173/95   Pulse (!) 109   Temp (!) 38.2 °C (100.8 °F)   Resp 15   Wt 86.2 kg (190 lb)   SpO2 98%   BMI 25.76 kg/m²     Nursing notes and vitals reviewed.  Constitutional: Well developed, Well nourished, No acute distress, Non-toxic appearance.   Eyes: PERRLA, EOMI, Conjunctiva normal, No discharge.    Cardiovascular: Tachycardic, Normal rhythm, No murmurs, No rubs, No gallops.   Thorax & Lungs: No respiratory distress, No rales, No rhonchi, No wheezing, No chest tenderness.   Abdomen: Bowel sounds normal, Soft, No tenderness, No guarding, No rebound, No masses, No pulsatile masses.   Skin: No rash. Left BKA with distal edema, ulceration, and fluctuantes. Skin warm to touch up to proximal left thigh, Right AKA was normal   Musculoskeletal: Left BKA with distal edema, ulceration, and fluctuantes. No edema, No cyanosis, No clubbing. Good range of motion in all major joints. No tenderness to palpation or major deformities noted, no CVA tenderness, no midline back tenderness.   Neurologic: Alert & oriented x 3, Normal motor function, Normal sensory function, No focal deficits noted.  Psychiatric: Affect normal for clinical presentation.    DIAGNOSTIC STUDIES/PROCEDURES    LABS  Results for orders placed or performed during the hospital encounter of 02/01/18   Lactic acid (lactate)   Result Value Ref Range    Lactic Acid 1.7 0.5 - 2.0 mmol/L   CBC WITH DIFFERENTIAL   Result Value Ref Range    WBC 9.0 4.8 - 10.8 K/uL    RBC 4.66 (L) 4.70 - 6.10 M/uL    Hemoglobin 12.6 (L) 14.0 - 18.0 g/dL    Hematocrit 38.7 (L) 42.0 - 52.0 %    MCV 83.0 81.4 - 97.8 fL    MCH 27.0 27.0 - 33.0 pg    MCHC 32.6 (L) 33.7 - 35.3 g/dL    RDW 44.7 35.9 - 50.0 fL    Platelet Count 368 164 - 446 K/uL    MPV 9.4 9.0 - 12.9 fL    Neutrophils-Polys 64.90 44.00 - 72.00 %    Lymphocytes 20.70 (L) 22.00 - 41.00 %    Monocytes 13.70 (H) 0.00 - 13.40 %    Eosinophils 0.30 0.00 - 6.90 %    Basophils 0.20 0.00 - 1.80 %    Immature Granulocytes 0.20 0.00 - 0.90 %    Nucleated RBC 0.00 /100 WBC    Neutrophils (Absolute) 5.86 1.82 - 7.42 K/uL    Lymphs (Absolute) 1.87 1.00 - 4.80 K/uL    Monos (Absolute) 1.24 (H) 0.00 - 0.85 K/uL    Eos (Absolute) 0.03 0.00 - 0.51 K/uL    Baso (Absolute) 0.02 0.00 - 0.12 K/uL    Immature Granulocytes (abs) 0.02 0.00 - 0.11  K/uL    NRBC (Absolute) 0.00 K/uL   COMP METABOLIC PANEL   Result Value Ref Range    Sodium 128 (L) 135 - 145 mmol/L    Potassium 4.1 3.6 - 5.5 mmol/L    Chloride 95 (L) 96 - 112 mmol/L    Co2 25 20 - 33 mmol/L    Anion Gap 8.0 0.0 - 11.9    Glucose 274 (H) 65 - 99 mg/dL    Bun 13 8 - 22 mg/dL    Creatinine 1.07 0.50 - 1.40 mg/dL    Calcium 8.5 8.5 - 10.5 mg/dL    AST(SGOT) 18 12 - 45 U/L    ALT(SGPT) 12 2 - 50 U/L    Alkaline Phosphatase 109 (H) 30 - 99 U/L    Total Bilirubin 0.3 0.1 - 1.5 mg/dL    Albumin 3.4 3.2 - 4.9 g/dL    Total Protein 7.0 6.0 - 8.2 g/dL    Globulin 3.6 (H) 1.9 - 3.5 g/dL    A-G Ratio 0.9 g/dL   ESTIMATED GFR   Result Value Ref Range    GFR If African American >60 >60 mL/min/1.73 m 2    GFR If Non African American >60 >60 mL/min/1.73 m 2     All labs reviewed by me.    RADIOLOGY  CT-EXTREMITY, LOWER WITH LEFT   Final Result      Inflammatory change involving the skin and subcutaneous fat at the distal end of the stump, mainly near the amputation site of the tibia. No evidence of abscess.      DX-CHEST-PORTABLE (1 VIEW)   Final Result      Bibasilar atelectasis.        The radiologist's interpretation of all radiological studies have been reviewed by me.    COURSE & MEDICAL DECISION MAKING  Pertinent Labs & Imaging studies reviewed. (See chart for details)    5:42 PM - Patient seen and examined at bedside. Patient will be treated with Tylenol 650 mg, Dilaudid injection 1 mg, Unasyn 3 g, Vancomycin 2,200 mg. He will also be treated with NS infusion 1,000 mL for possible sepsis. Ordered CT-Extremity, DX-Chest, Lactic acid, Estimated GFR, CBC with differential, CMP, Urinalysis, Urine Culture, Blood Culture  to evaluate his symptoms.     This is a charming 57 y.o. male that presents with cellulitis of the left lower stump. As concern for possible abscess therefore CT scan IV contrast is completed. Fortunately CT scan was negative for abscess, free air or evidence of necrotizing fasciitis. The  patient has received IV pain medication, IV antibiotics, blood cultures have been drawn. The patient is a septic shock on admission to the hospital. I discussed the patient with Dr. Nascimento  CRITICAL CARE  The very real possibilty of a deterioration of this patient's condition required the highest level of my preparedness for sudden, emergent intervention.  I provided critical care services, which included medication orders, frequent reevaluations of the patient's condition and response to treatment, ordering and reviewing test results, and discussing the case with various consultants.  The critical care time associated with the care of the patient was 35 minutes. Review chart for interventions. This time is exclusive of any other billable procedures. The patient has responded appropriately IV fluids now with a normal heart rate, he has no evidence of septic shock requiring vasopressors. In addition, patient does not have a surgical condition requiring emergent surgical consultation.    DISPOSITION:  Patient will be admitted to  guarded condition.    FINAL IMPRESSION  Cellulitis of the left lower BKA stump  Sepsis  Critical Care      IBailey (Scribe), am scribing for, and in the presence of, Ahmet Montanez D.O    Electronically signed by: Bailey Samuel (Scribe), 2/1/2018    IAhmet D.O. personally performed the services described in this documentation, as scribed by Bailey Samuel in my presence, and it is both accurate and complete.    The note accurately reflects work and decisions made by me.  Ahmet Montanez  2/1/2018  9:28 PM

## 2018-02-03 ENCOUNTER — APPOINTMENT (OUTPATIENT)
Dept: RADIOLOGY | Facility: MEDICAL CENTER | Age: 58
DRG: 565 | End: 2018-02-03
Attending: NURSE PRACTITIONER
Payer: COMMERCIAL

## 2018-02-03 PROBLEM — J98.01 COUGH DUE TO BRONCHOSPASM: Status: ACTIVE | Noted: 2018-02-03

## 2018-02-03 PROBLEM — F17.200 CURRENT SMOKER: Status: ACTIVE | Noted: 2018-02-03

## 2018-02-03 LAB
GLUCOSE BLD-MCNC: 112 MG/DL (ref 65–99)
GLUCOSE BLD-MCNC: 132 MG/DL (ref 65–99)
GLUCOSE BLD-MCNC: 62 MG/DL (ref 65–99)
GLUCOSE BLD-MCNC: 75 MG/DL (ref 65–99)

## 2018-02-03 PROCEDURE — 700105 HCHG RX REV CODE 258: Performed by: HOSPITALIST

## 2018-02-03 PROCEDURE — 73590 X-RAY EXAM OF LOWER LEG: CPT | Mod: LT

## 2018-02-03 PROCEDURE — 700102 HCHG RX REV CODE 250 W/ 637 OVERRIDE(OP): Performed by: INTERNAL MEDICINE

## 2018-02-03 PROCEDURE — 700111 HCHG RX REV CODE 636 W/ 250 OVERRIDE (IP): Performed by: HOSPITALIST

## 2018-02-03 PROCEDURE — A9270 NON-COVERED ITEM OR SERVICE: HCPCS | Performed by: INTERNAL MEDICINE

## 2018-02-03 PROCEDURE — A9270 NON-COVERED ITEM OR SERVICE: HCPCS | Performed by: HOSPITALIST

## 2018-02-03 PROCEDURE — 700111 HCHG RX REV CODE 636 W/ 250 OVERRIDE (IP): Performed by: INTERNAL MEDICINE

## 2018-02-03 PROCEDURE — 99232 SBSQ HOSP IP/OBS MODERATE 35: CPT | Performed by: HOSPITALIST

## 2018-02-03 PROCEDURE — 82962 GLUCOSE BLOOD TEST: CPT

## 2018-02-03 PROCEDURE — 700102 HCHG RX REV CODE 250 W/ 637 OVERRIDE(OP): Performed by: HOSPITALIST

## 2018-02-03 PROCEDURE — 770001 HCHG ROOM/CARE - MED/SURG/GYN PRIV*

## 2018-02-03 RX ORDER — AMOXICILLIN AND CLAVULANATE POTASSIUM 875; 125 MG/1; MG/1
1 TABLET, FILM COATED ORAL EVERY 12 HOURS
Status: COMPLETED | OUTPATIENT
Start: 2018-02-03 | End: 2018-02-09

## 2018-02-03 RX ORDER — BUDESONIDE AND FORMOTEROL FUMARATE DIHYDRATE 160; 4.5 UG/1; UG/1
2 AEROSOL RESPIRATORY (INHALATION)
Status: DISCONTINUED | OUTPATIENT
Start: 2018-02-03 | End: 2018-02-09 | Stop reason: HOSPADM

## 2018-02-03 RX ORDER — ALBUTEROL SULFATE 90 UG/1
2 AEROSOL, METERED RESPIRATORY (INHALATION)
Status: DISCONTINUED | OUTPATIENT
Start: 2018-02-03 | End: 2018-02-09 | Stop reason: HOSPADM

## 2018-02-03 RX ADMIN — NICOTINE TRANSDERMAL SYSTEM 21 MG: 21 PATCH, EXTENDED RELEASE TRANSDERMAL at 05:21

## 2018-02-03 RX ADMIN — PREGABALIN 25 MG: 25 CAPSULE ORAL at 16:35

## 2018-02-03 RX ADMIN — DOXYCYCLINE 100 MG: 100 TABLET ORAL at 10:04

## 2018-02-03 RX ADMIN — AMPICILLIN SODIUM AND SULBACTAM SODIUM 3 G: 2; 1 INJECTION, POWDER, FOR SOLUTION INTRAMUSCULAR; INTRAVENOUS at 10:12

## 2018-02-03 RX ADMIN — FAMOTIDINE 20 MG: 20 TABLET, FILM COATED ORAL at 10:04

## 2018-02-03 RX ADMIN — THERA TABS 1 TABLET: TAB at 10:04

## 2018-02-03 RX ADMIN — GLIPIZIDE 10 MG: 5 TABLET ORAL at 20:45

## 2018-02-03 RX ADMIN — MIRTAZAPINE 45 MG: 15 TABLET, FILM COATED ORAL at 20:46

## 2018-02-03 RX ADMIN — OXYCODONE HYDROCHLORIDE 5 MG: 5 TABLET ORAL at 16:35

## 2018-02-03 RX ADMIN — FAMOTIDINE 20 MG: 20 TABLET, FILM COATED ORAL at 20:46

## 2018-02-03 RX ADMIN — AMOXICILLIN AND CLAVULANATE POTASSIUM 1 TABLET: 875; 125 TABLET, FILM COATED ORAL at 20:46

## 2018-02-03 RX ADMIN — ASPIRIN 81 MG: 81 TABLET, COATED ORAL at 10:05

## 2018-02-03 RX ADMIN — HYDRALAZINE HYDROCHLORIDE 25 MG: 50 TABLET ORAL at 16:34

## 2018-02-03 RX ADMIN — INSULIN GLARGINE 65 UNITS: 100 INJECTION, SOLUTION SUBCUTANEOUS at 20:49

## 2018-02-03 RX ADMIN — GLIPIZIDE 10 MG: 5 TABLET ORAL at 10:04

## 2018-02-03 RX ADMIN — AMLODIPINE BESYLATE 10 MG: 10 TABLET ORAL at 10:04

## 2018-02-03 RX ADMIN — OXYCODONE HYDROCHLORIDE 5 MG: 5 TABLET ORAL at 05:25

## 2018-02-03 RX ADMIN — CITALOPRAM HYDROBROMIDE 20 MG: 20 TABLET ORAL at 10:04

## 2018-02-03 RX ADMIN — PREGABALIN 25 MG: 25 CAPSULE ORAL at 20:45

## 2018-02-03 RX ADMIN — PREGABALIN 25 MG: 25 CAPSULE ORAL at 10:04

## 2018-02-03 RX ADMIN — TAMSULOSIN HYDROCHLORIDE 0.4 MG: 0.4 CAPSULE ORAL at 10:04

## 2018-02-03 RX ADMIN — MORPHINE SULFATE 2 MG: 4 INJECTION INTRAVENOUS at 20:53

## 2018-02-03 RX ADMIN — HYDRALAZINE HYDROCHLORIDE 25 MG: 50 TABLET ORAL at 05:21

## 2018-02-03 RX ADMIN — TRIAMTERENE AND HYDROCHLOROTHIAZIDE 1 CAPSULE: 25; 37.5 CAPSULE ORAL at 10:06

## 2018-02-03 RX ADMIN — DOXYCYCLINE 100 MG: 100 TABLET ORAL at 20:46

## 2018-02-03 RX ADMIN — FINASTERIDE 5 MG: 5 TABLET, FILM COATED ORAL at 10:04

## 2018-02-03 RX ADMIN — AMPICILLIN SODIUM AND SULBACTAM SODIUM 3 G: 2; 1 INJECTION, POWDER, FOR SOLUTION INTRAMUSCULAR; INTRAVENOUS at 05:21

## 2018-02-03 RX ADMIN — MORPHINE SULFATE 2 MG: 4 INJECTION INTRAVENOUS at 10:06

## 2018-02-03 ASSESSMENT — ENCOUNTER SYMPTOMS
BACK PAIN: 0
EYE PAIN: 0
WHEEZING: 0
CHILLS: 0
DEPRESSION: 1
HEADACHES: 0
COUGH: 0
MYALGIAS: 0
FOCAL WEAKNESS: 0
NAUSEA: 0
VOMITING: 0
SORE THROAT: 0
CLAUDICATION: 0
BRUISES/BLEEDS EASILY: 0
EYE DISCHARGE: 0
SPUTUM PRODUCTION: 0
PALPITATIONS: 0
DIARRHEA: 0
WEAKNESS: 0
DIZZINESS: 0
HEMOPTYSIS: 0
SENSORY CHANGE: 0
DIAPHORESIS: 0
CONSTIPATION: 0
SHORTNESS OF BREATH: 0
ABDOMINAL PAIN: 0
FEVER: 0
LOSS OF CONSCIOUSNESS: 0
SPEECH CHANGE: 0
NECK PAIN: 0

## 2018-02-03 ASSESSMENT — PAIN SCALES - GENERAL
PAINLEVEL_OUTOF10: 4
PAINLEVEL_OUTOF10: 5

## 2018-02-03 ASSESSMENT — LIFESTYLE VARIABLES: SUBSTANCE_ABUSE: 0

## 2018-02-03 NOTE — CARE PLAN
Problem: Communication  Goal: The ability to communicate needs accurately and effectively will improve  Educated on safety measures, using call light ect    Problem: Safety  Goal: Will remain free from injury  Discussed POC, pt communicates questions and poc well. Pt and family understand poc.

## 2018-02-03 NOTE — PROGRESS NOTES
[facility]  Inpatient Services  Initial Wound & Skin Care Evaluation    LIMB PRESERVATION SERVICE NOTE:    Wound(s): L BKA  Partial Thickness Diabetic Ulcer    Allergies: Fish  Start of Care: 2/3/2018  Room/Bed  T301/00      Subjective:      History of Present Illness:   Past Medical History:   Diagnosis Date   • BPH (benign prostatic hyperplasia) 12/20/2017   • Diabetes (CMS-HCC)    • Has received first dose of hepatitis B vaccine    • Hypertension    • Renal disorder    • Renal failure        Patient is a 57 y.o. male. Admitted for Cellulitis,        Patient presented with partial thickness ulcer to his Stump of his left BKA. The pt also has a Right AKA.                  Patient denies fevers chills, nausea, vomiting.     Pain:        Patient resting comfortably, Pain on dressing change/palpation of the left BKA.        Objective:        PHYSICAL EXAMINATION:     General Appearance:  Well developed,  nourished, in no acute distress    Sensory Assessment    NA          Vascular Assessment    Pop bilaterally palpable        Orthotic, protective, supportive devices:     Offloading    NA    Vitals    /80   Pulse 98   Temp 37.4 °C (99.3 °F)   Resp 18   Wt 86.2 kg (190 lb)   SpO2 95%   BMI 25.76 kg/m²      Wound Characteristics                                                    Location:     Initial Evaluation  Date:2/3/2018   Tissue Type and %: Boggy    Periwound: Discolored   Drainage: Scant Serosanginous   Exposed structures None   Wound Edges:   Attached   Odor: None   S&S of Infection:   Edema/Erythema   Edema: Localized at Site   Sensation: Intact               Measurements: Initial Evaluation  Date:2/3/2018   Length (cm) 1.2   Width (cm) 1.2   Depth (cm)    Tract/undermine            Tests and Measures:    Labs  Recent Labs      02/01/18   1754  02/02/18   0238   WBC  9.0  8.8   RBC  4.66*  4.74   HEMOGLOBIN  12.6*  12.7*   HEMATOCRIT  38.7*  39.8*   MCV  83.0  84.0   MCH  27.0  26.8*     MCHC  32.6*  31.9*   RDW  44.7  45.9   PLATELETCT  368  332   MPV  9.4  9.2     Recent Labs      02/01/18   1754  02/02/18   0238   SODIUM  128*  131*   POTASSIUM  4.1  4.8   CHLORIDE  95*  98   CO2  25  25   GLUCOSE  274*  266*   BUN  13  12       A1C a1c%  ESR: 41  CRP: 4.94    HOOD  NA     Duplex    Left.    Diffuse plaquing and triphasic flow throughout the common femoral and    proximal profunda femoral arteries.   Diffuse plaquing throughout the superficial femoral artery with an    approximately 50% stenosis in the mid segment.    Hyperemic flow in the distal superficial femoral artery.   Hyperemic flow throughout the popliteal artery and proximal posterior    tibial and peroneal arteries.    50-75% stenosis in the mid popliteal artery.     The remainder of the calf vessels were not visualized due to amputation    just below the knee.    Imaging    X-Ray: ordered  MRI pending    Infection Management  Microbiology Neg blod    Antibiotics Per IM       Procedures:     Debridement :  NA   Cleansed with:  NS                                                                        Periwound protected with: Skin Prep   Primary dressing: Foam   Secondary Dressing: Roll Guaze   Other:  Elastic Bandage     Patient Education: Implications of loss of protective sensation (LOPS) discussed with patient- including increased risk for amputation.  Advised to check foot at least daily, moisturize foot, and to always wear protective foot wear.    Professional Collaboration: Bedside Nursing, diabetic education, nutrition      Assessment:      Wound etiology: Diabetic Ulcer    Wound Progress:  Initial Assessment    Rationale for Treatment: Aquacel Ag (Silver):  Ag to manage bioburden    Patient tolerance/compliance: Pt willing to comply    Complicating factors: previous amps, DM    Need for ongoing  Wound Care: nursing to complete wound care, LPS to follow    Goals: Decreased wound size 2% each week -- 100% granulation in 2  weeks      Plan:      Treatment Plan and Recommendations:    Procedures:   Nursing to cleanse wound/periwound with NS.  Pat periwound dry.  Apply Arglase Powder to stump. Cover wound with non adhesive foam and secure with kerlix.  Then apply elastic bandage to secure dressings in place.   Notify wound team if wound deteriorates or fails to progress.      Frequency: NURSING TO CHANGE LEFT BKA DRESSING EVERY 48 HOURS AND PRN FOR SATURATION OR DISLODGEMENT      AB per IM  X ray LLE ordered  Wound Care by nursing, LPS to Follow          RSKIN: CURRENT (X) ORDERED (O)  Q shift Dk:  X  Q shift pressure point assessments:  X  Pressure redistribution mattress        SHANNON      Bariatric SHANNON      Bariatric foam        Heel float boots       Heels floated on pillows      Barrier wipes      Barrier Cream      Barrier paste      Sacral silicone dressing      Padded O2 tubing      Anchorfast      Trach with Optifoam split foam       Waffle cushion      Rectal tube or BMS      Antifungal tx    Turn q 2 hours  Up to chair  Ambulate   PT/OT     Dietician      PO     TF   TPN     PVN    NPO   # days   Other       WOUND TEAM PLAN OF CARE (X):   NPWT change 3 x week:        Dressing changes by wound team:       Follow up as needed:     X  Other (explain):    Anticipated discharge plans (X):  SNF:           Home Care:           Outpatient Wound Center:            Self Care:            Other:           TBD: X

## 2018-02-03 NOTE — ASSESSMENT & PLAN NOTE
2/1:  Admission stated he had plans for suicide.   1:1 sitter in room  Psychiatry consulted, legal hold initiated.

## 2018-02-03 NOTE — PROGRESS NOTES
Received report, assumed patient care. Patient alert and oriented. Pain 6/10 on his L stump,medicated per MAR. Sitter at bedside, safety precautions in place. Will continue to monitor.

## 2018-02-04 LAB
BACTERIA UR CULT: NORMAL
EST. AVERAGE GLUCOSE BLD GHB EST-MCNC: 275 MG/DL
GLUCOSE BLD-MCNC: 263 MG/DL (ref 65–99)
GLUCOSE BLD-MCNC: 304 MG/DL (ref 65–99)
GLUCOSE BLD-MCNC: 74 MG/DL (ref 65–99)
HBA1C MFR BLD: 11.2 % (ref 0–5.6)
SIGNIFICANT IND 70042: NORMAL
SITE SITE: NORMAL
SOURCE SOURCE: NORMAL

## 2018-02-04 PROCEDURE — A9270 NON-COVERED ITEM OR SERVICE: HCPCS | Performed by: INTERNAL MEDICINE

## 2018-02-04 PROCEDURE — 36415 COLL VENOUS BLD VENIPUNCTURE: CPT

## 2018-02-04 PROCEDURE — 700102 HCHG RX REV CODE 250 W/ 637 OVERRIDE(OP): Performed by: HOSPITALIST

## 2018-02-04 PROCEDURE — 83036 HEMOGLOBIN GLYCOSYLATED A1C: CPT

## 2018-02-04 PROCEDURE — 700111 HCHG RX REV CODE 636 W/ 250 OVERRIDE (IP): Performed by: INTERNAL MEDICINE

## 2018-02-04 PROCEDURE — A9270 NON-COVERED ITEM OR SERVICE: HCPCS | Performed by: HOSPITALIST

## 2018-02-04 PROCEDURE — 99232 SBSQ HOSP IP/OBS MODERATE 35: CPT | Performed by: HOSPITALIST

## 2018-02-04 PROCEDURE — 82962 GLUCOSE BLOOD TEST: CPT

## 2018-02-04 PROCEDURE — 770001 HCHG ROOM/CARE - MED/SURG/GYN PRIV*

## 2018-02-04 PROCEDURE — 700102 HCHG RX REV CODE 250 W/ 637 OVERRIDE(OP): Performed by: INTERNAL MEDICINE

## 2018-02-04 RX ORDER — DEXTROMETHORPHAN HBR. AND GUAIFENESIN 10; 100 MG/5ML; MG/5ML
10 SOLUTION ORAL EVERY 6 HOURS PRN
Status: DISCONTINUED | OUTPATIENT
Start: 2018-02-04 | End: 2018-02-09 | Stop reason: HOSPADM

## 2018-02-04 RX ADMIN — CITALOPRAM HYDROBROMIDE 20 MG: 20 TABLET ORAL at 10:20

## 2018-02-04 RX ADMIN — PREGABALIN 25 MG: 25 CAPSULE ORAL at 10:20

## 2018-02-04 RX ADMIN — GLIPIZIDE 10 MG: 5 TABLET ORAL at 19:30

## 2018-02-04 RX ADMIN — INSULIN HUMAN 3 UNITS: 100 INJECTION, SOLUTION PARENTERAL at 18:45

## 2018-02-04 RX ADMIN — STANDARDIZED SENNA CONCENTRATE AND DOCUSATE SODIUM 2 TABLET: 8.6; 5 TABLET, FILM COATED ORAL at 10:20

## 2018-02-04 RX ADMIN — OXYCODONE HYDROCHLORIDE 5 MG: 5 TABLET ORAL at 06:38

## 2018-02-04 RX ADMIN — ZOLPIDEM TARTRATE 5 MG: 5 TABLET, FILM COATED ORAL at 22:12

## 2018-02-04 RX ADMIN — TRIAMTERENE AND HYDROCHLOROTHIAZIDE 1 CAPSULE: 25; 37.5 CAPSULE ORAL at 10:22

## 2018-02-04 RX ADMIN — PREGABALIN 25 MG: 25 CAPSULE ORAL at 14:20

## 2018-02-04 RX ADMIN — AMLODIPINE BESYLATE 10 MG: 10 TABLET ORAL at 10:20

## 2018-02-04 RX ADMIN — ASPIRIN 81 MG: 81 TABLET, COATED ORAL at 10:20

## 2018-02-04 RX ADMIN — FAMOTIDINE 20 MG: 20 TABLET, FILM COATED ORAL at 10:20

## 2018-02-04 RX ADMIN — MIRTAZAPINE 45 MG: 15 TABLET, FILM COATED ORAL at 21:00

## 2018-02-04 RX ADMIN — INSULIN HUMAN 4 UNITS: 100 INJECTION, SOLUTION PARENTERAL at 21:00

## 2018-02-04 RX ADMIN — BUDESONIDE AND FORMOTEROL FUMARATE DIHYDRATE 2 PUFF: 160; 4.5 AEROSOL RESPIRATORY (INHALATION) at 20:00

## 2018-02-04 RX ADMIN — PREGABALIN 25 MG: 25 CAPSULE ORAL at 21:00

## 2018-02-04 RX ADMIN — ENOXAPARIN SODIUM 40 MG: 100 INJECTION SUBCUTANEOUS at 10:19

## 2018-02-04 RX ADMIN — THERA TABS 1 TABLET: TAB at 10:20

## 2018-02-04 RX ADMIN — AMOXICILLIN AND CLAVULANATE POTASSIUM 1 TABLET: 875; 125 TABLET, FILM COATED ORAL at 21:00

## 2018-02-04 RX ADMIN — GLIPIZIDE 10 MG: 5 TABLET ORAL at 07:30

## 2018-02-04 RX ADMIN — HYDRALAZINE HYDROCHLORIDE 25 MG: 50 TABLET ORAL at 06:38

## 2018-02-04 RX ADMIN — NICOTINE TRANSDERMAL SYSTEM 21 MG: 21 PATCH, EXTENDED RELEASE TRANSDERMAL at 06:38

## 2018-02-04 RX ADMIN — FAMOTIDINE 20 MG: 20 TABLET, FILM COATED ORAL at 21:00

## 2018-02-04 RX ADMIN — NICOTINE POLACRILEX 2 MG: 2 GUM, CHEWING BUCCAL at 14:25

## 2018-02-04 RX ADMIN — BUDESONIDE AND FORMOTEROL FUMARATE DIHYDRATE 2 PUFF: 160; 4.5 AEROSOL RESPIRATORY (INHALATION) at 10:22

## 2018-02-04 RX ADMIN — DOXYCYCLINE 100 MG: 100 TABLET ORAL at 10:20

## 2018-02-04 RX ADMIN — INSULIN GLARGINE 65 UNITS: 100 INJECTION, SOLUTION SUBCUTANEOUS at 21:00

## 2018-02-04 RX ADMIN — MORPHINE SULFATE 2 MG: 4 INJECTION INTRAVENOUS at 18:45

## 2018-02-04 RX ADMIN — DOXYCYCLINE 100 MG: 100 TABLET ORAL at 21:00

## 2018-02-04 RX ADMIN — FINASTERIDE 5 MG: 5 TABLET, FILM COATED ORAL at 10:20

## 2018-02-04 RX ADMIN — MORPHINE SULFATE 2 MG: 4 INJECTION INTRAVENOUS at 10:19

## 2018-02-04 RX ADMIN — OXYCODONE HYDROCHLORIDE 5 MG: 5 TABLET ORAL at 14:20

## 2018-02-04 RX ADMIN — HYDRALAZINE HYDROCHLORIDE 25 MG: 50 TABLET ORAL at 14:20

## 2018-02-04 RX ADMIN — AMOXICILLIN AND CLAVULANATE POTASSIUM 1 TABLET: 875; 125 TABLET, FILM COATED ORAL at 10:19

## 2018-02-04 RX ADMIN — TAMSULOSIN HYDROCHLORIDE 0.4 MG: 0.4 CAPSULE ORAL at 10:20

## 2018-02-04 RX ADMIN — HYDRALAZINE HYDROCHLORIDE 25 MG: 50 TABLET ORAL at 22:00

## 2018-02-04 ASSESSMENT — ENCOUNTER SYMPTOMS
PALPITATIONS: 0
SPEECH CHANGE: 0
MYALGIAS: 0
EYE PAIN: 0
SENSORY CHANGE: 0
CLAUDICATION: 0
FOCAL WEAKNESS: 0
BRUISES/BLEEDS EASILY: 0
CONSTIPATION: 0
DIAPHORESIS: 0
NAUSEA: 0
DIZZINESS: 0
DEPRESSION: 1
SORE THROAT: 0
VOMITING: 0
EYE DISCHARGE: 0
WHEEZING: 0
HEADACHES: 0
CHILLS: 0
SPUTUM PRODUCTION: 0
BACK PAIN: 0
HEMOPTYSIS: 0
COUGH: 0
WEAKNESS: 0
NECK PAIN: 0
SHORTNESS OF BREATH: 0
ABDOMINAL PAIN: 0
FEVER: 0
LOSS OF CONSCIOUSNESS: 0
DIARRHEA: 0

## 2018-02-04 ASSESSMENT — PAIN SCALES - GENERAL
PAINLEVEL_OUTOF10: 5
PAINLEVEL_OUTOF10: 5

## 2018-02-04 ASSESSMENT — LIFESTYLE VARIABLES: SUBSTANCE_ABUSE: 0

## 2018-02-04 NOTE — PROGRESS NOTES
[facility]ist Progress Note    Date of Service: 2/3/2018    Chief Complaint  57 y.o. male admitted 2/1/2018 with b/l amputee, IDDM, COPD, chronic left BKA stump infection with multiple courses of abx presented with purulent dc from left BKA stump sent by [facility] clinic MD to ER.   CT negative for abscess, OM, or gas formation.    Interval Problem Update  2/2:  Review of chart with LPS:  Does not need surgical consultation, but wound care at this time.  Patient states has never been fitted for a prosthetic, states told insurance would not cover.  He is to discuss this with LPS.   ambien for sleep, down graded his abx to unasyn IV and doxy po.  dc'd vanco IV.  Dry on exam medial aspect of prior surgical incision, no significant edema or erythema seen.  Unable to do wound culture, ordered today.  States no wound culture done at [facility] clinic or ER on admission.  Blood cultures x2 negative.  2/3:  Wound care consult recommended local wound care with dressing changes every other day.  Can change IV abx to po augmentin and doxycycline.   Legal hold extended per psychiatry.  C/o cough, nonproductive, lungs CTA b/l, smoker 1 ppd x several years.  Ordered albuterol inhaler q 4 hours prn and symbicort inh bid.    Consultants/Specialty  LPS   Psychiatry-SI    Disposition  Likely at baseline physically wc bound, no prosthetic fittings yet.  TBD with legal hold for SI.    Finished 33 year retirement sentence 6 weeks ago, living in homeless shelter now, states has wanted to roll his w/c into traffic.  Feels no reason to live.          Review of Systems   Constitutional: Negative for chills, diaphoresis, fever and malaise/fatigue.   HENT: Negative for congestion and sore throat.    Eyes: Negative for pain and discharge.   Respiratory: Negative for cough, hemoptysis, sputum production, shortness of breath and wheezing.    Cardiovascular: Negative for chest pain, palpitations, claudication and leg swelling.   Gastrointestinal:  Negative for abdominal pain, constipation, diarrhea, melena, nausea and vomiting.   Genitourinary: Negative for dysuria, frequency and urgency.   Musculoskeletal: Negative for back pain, joint pain, myalgias and neck pain.   Skin: Negative for itching and rash.   Neurological: Negative for dizziness, sensory change, speech change, focal weakness, loss of consciousness, weakness and headaches.   Endo/Heme/Allergies: Does not bruise/bleed easily.   Psychiatric/Behavioral: Positive for depression and suicidal ideas. Negative for substance abuse.      Physical Exam  Laboratory/Imaging   Hemodynamics  Temp (24hrs), Av.3 °C (99.1 °F), Min:36.9 °C (98.4 °F), Max:37.6 °C (99.6 °F)   Temperature: 37.6 °C (99.6 °F)  Pulse  Av  Min: 68  Max: 109    Blood Pressure: 157/79      Respiratory      Respiration: 18, Pulse Oximetry: 95 %             Fluids    Intake/Output Summary (Last 24 hours) at 18 1630  Last data filed at 18 0556   Gross per 24 hour   Intake              100 ml   Output             1650 ml   Net            -1550 ml       Nutrition  Orders Placed This Encounter   Procedures   • Diet Order     Standing Status:   Standing     Number of Occurrences:   1     Order Specific Question:   Diet:     Answer:   Diabetic [3]     Comments:   fish free      Physical Exam   Constitutional: He is oriented to person, place, and time. He appears well-developed and well-nourished. No distress.   HENT:   Head: Normocephalic and atraumatic.   Mouth/Throat: Oropharynx is clear and moist. No oropharyngeal exudate.   Eyes: Conjunctivae and EOM are normal. Pupils are equal, round, and reactive to light. Right eye exhibits no discharge. Left eye exhibits no discharge. No scleral icterus.   Neck: Normal range of motion. Neck supple. No JVD present. No tracheal deviation present. No thyromegaly present.   Cardiovascular: Normal rate, regular rhythm and normal heart sounds.  Exam reveals no gallop and no friction rub.     No murmur heard.  Pulmonary/Chest: Effort normal and breath sounds normal. No respiratory distress. He has no wheezes. He has no rales. He exhibits no tenderness.   Abdominal: Soft. Bowel sounds are normal. He exhibits no distension and no mass. There is no tenderness. There is no rebound and no guarding.   Musculoskeletal: Normal range of motion. He exhibits no edema or tenderness.   Rt AKA well healed.   Left BKA medial aspect of incision with minimal edema seen, no drainage, no palpable abscess, no dehiscence seen.   Lymphadenopathy:     He has no cervical adenopathy.   Neurological: He is alert and oriented to person, place, and time. No cranial nerve deficit. He exhibits normal muscle tone.   Skin: Skin is warm and dry. No rash noted. He is not diaphoretic. No erythema.   Psychiatric: He has a normal mood and affect. His behavior is normal. Judgment and thought content normal.   States he felt down yesterday about all his medical problems, b/l amputations etc.   Nursing note and vitals reviewed.      Recent Labs      02/01/18   1754  02/02/18   0238   WBC  9.0  8.8   RBC  4.66*  4.74   HEMOGLOBIN  12.6*  12.7*   HEMATOCRIT  38.7*  39.8*   MCV  83.0  84.0   MCH  27.0  26.8*   MCHC  32.6*  31.9*   RDW  44.7  45.9   PLATELETCT  368  332   MPV  9.4  9.2     Recent Labs      02/01/18   1754  02/02/18   0238   SODIUM  128*  131*   POTASSIUM  4.1  4.8   CHLORIDE  95*  98   CO2  25  25   GLUCOSE  274*  266*   BUN  13  12   CREATININE  1.07  0.84   CALCIUM  8.5  8.2*                      Assessment/Plan     Sepsis (CMS-[facility])   Overview    Presumably secondary to cellulitis and infected wound to the left BKA stump  Simple sepsis without)organ  Damage  Will put the patient on full sepsis protocol including lactic acid checks  Follow blood culture     Assessment & Plan    This is NOT SEPSIS.  Does not meet criteria for sepsis.        Cellulitis- (present on admission)   Assessment & Plan    acute on chronic, complication  of diabetes  Minimal area of cellulitis seen, CT negative, no further drainage, unable to culture wound on 2/2.  2/1 BCs negative x2  limb preservation service consult:  No ID or surgical consult need, likely localized wound care, check arterial pulses to ensure good vascular flow to area.  Sed rate and CRP elevated  Change unasyn IV to augmentin.  Wound care rec dressing changes qod.        Diabetes mellitus with neuropathy (HCC)- (present on admission)   Assessment & Plan    Continue glipizide,  home insulin and sliding scale  Hypoglycemia protocol        Cough due to bronchospasm   Assessment & Plan    Smoker 1 ppd  CXR no infiltrate  2/3 ordered albuterol inhaler q 4 hours prn and symbicort inh bid.        Current smoker   Assessment & Plan    Nicotine patch   Smoked 1 ppd x several years.        Depression with suicidal ideation- (present on admission)   Overview    Patient was found to have low SAD score and had plans for suicide recently  He is placed on suicidal precautions, psychiatry consult will be requested     Assessment & Plan    2/1:  Admission stated he had plans for suicide.   1:1 sitter in room  Psychiatry consulted, legal hold initiated.        BPH (benign prostatic hyperplasia)- (present on admission)   Assessment & Plan    Continue tamsulosin and finasteride          Quality-Core Measures

## 2018-02-04 NOTE — DIETARY
Nutrition Services:  Consult Received for DM diet education    Pt is currently on legal told for suicidal ideations. Pt followed by psych, per chart review. Per RD judgement, diet education is not appropriate at this time. Noted per chart review that pt is eating well. RD to complete education when appropriate.

## 2018-02-04 NOTE — PROGRESS NOTES
[facility] Progress Note    Date of Service: 2/4/2018    Chief Complaint  57 y.o. male admitted 2/1/2018 with b/l amputee, IDDM, COPD, chronic left BKA stump infection with multiple courses of abx presented with purulent dc from left BKA stump sent by [facility] clinic MD to ER.   CT negative for abscess, OM, or gas formation.    Interval Problem Update  2/2:  Review of chart with LPS:  Does not need surgical consultation, but wound care at this time.  Patient states has never been fitted for a prosthetic, states told insurance would not cover.  He is to discuss this with LPS.   ambien for sleep, down graded his abx to unasyn IV and doxy po.  dc'd vanco IV.  Dry on exam medial aspect of prior surgical incision, no significant edema or erythema seen.  Unable to do wound culture, ordered today.  States no wound culture done at [facility] clinic or ER on admission.  Blood cultures x2 negative.  2/3:  Wound care consult recommended local wound care with dressing changes every other day.  Can change IV abx to po augmentin and doxycycline.   Legal hold extended per psychiatry.  C/o cough, nonproductive, lungs CTA b/l, smoker 1 ppd x several years.  Ordered albuterol inhaler q 4 hours prn and symbicort inh bid.  2/4:  Still with occasional dry cough, started robitussin prn.  Wound care dressing change tomorrow.    Consultants/Specialty  [facility]   Psychiatry-SI    Disposition  Likely at baseline physically wc bound, no prosthetic fittings yet.  TBD with legal hold for SI.    Finished 33 year group home sentence 6 weeks ago, living in homeless shelter now, states has wanted to roll his w/c into traffic.  Feels no reason to live.  2/4:  Legal hold remains.  Medically cleared:  Left BKA wound more irritation, now dry, no erythema.          Review of Systems   Constitutional: Negative for chills, diaphoresis, fever and malaise/fatigue.   HENT: Negative for congestion and sore throat.    Eyes: Negative for pain and discharge.    Respiratory: Negative for cough, hemoptysis, sputum production, shortness of breath and wheezing.    Cardiovascular: Negative for chest pain, palpitations, claudication and leg swelling.   Gastrointestinal: Negative for abdominal pain, constipation, diarrhea, melena, nausea and vomiting.   Genitourinary: Negative for dysuria, frequency and urgency.   Musculoskeletal: Negative for back pain, joint pain, myalgias and neck pain.   Skin: Negative for itching and rash.   Neurological: Negative for dizziness, sensory change, speech change, focal weakness, loss of consciousness, weakness and headaches.   Endo/Heme/Allergies: Does not bruise/bleed easily.   Psychiatric/Behavioral: Positive for depression and suicidal ideas. Negative for substance abuse.      Physical Exam  Laboratory/Imaging   Hemodynamics  Temp (24hrs), Av.9 °C (98.5 °F), Min:36.2 °C (97.1 °F), Max:37.6 °C (99.6 °F)   Temperature: 37.1 °C (98.8 °F)  Pulse  Av.5  Min: 61  Max: 109    Blood Pressure: 138/81      Respiratory      Respiration: 17, Pulse Oximetry: 96 %             Fluids    Intake/Output Summary (Last 24 hours) at 18 1507  Last data filed at 18 1100   Gross per 24 hour   Intake                0 ml   Output             2050 ml   Net            -2050 ml       Nutrition  Orders Placed This Encounter   Procedures   • Diet Order     Standing Status:   Standing     Number of Occurrences:   1     Order Specific Question:   Diet:     Answer:   Diabetic [3]     Comments:   fish free      Physical Exam   Constitutional: He is oriented to person, place, and time. He appears well-developed and well-nourished. No distress.   HENT:   Head: Normocephalic and atraumatic.   Mouth/Throat: Oropharynx is clear and moist. No oropharyngeal exudate.   Eyes: Conjunctivae and EOM are normal. Pupils are equal, round, and reactive to light. Right eye exhibits no discharge. Left eye exhibits no discharge. No scleral icterus.   Neck: Normal range of  motion. Neck supple. No JVD present. No tracheal deviation present. No thyromegaly present.   Cardiovascular: Normal rate, regular rhythm and normal heart sounds.  Exam reveals no gallop and no friction rub.    No murmur heard.  Pulmonary/Chest: Effort normal and breath sounds normal. No respiratory distress. He has no wheezes. He has no rales. He exhibits no tenderness.   Abdominal: Soft. Bowel sounds are normal. He exhibits no distension and no mass. There is no tenderness. There is no rebound and no guarding.   Musculoskeletal: Normal range of motion. He exhibits no edema or tenderness.   Rt AKA well healed.   Left BKA medial aspect of incision with minimal edema seen, no drainage, no palpable abscess, no dehiscence seen.   Lymphadenopathy:     He has no cervical adenopathy.   Neurological: He is alert and oriented to person, place, and time. No cranial nerve deficit. He exhibits normal muscle tone.   Skin: Skin is warm and dry. No rash noted. He is not diaphoretic. No erythema.   Psychiatric: He has a normal mood and affect. His behavior is normal. Judgment and thought content normal.   States he felt down yesterday about all his medical problems, b/l amputations etc.   Nursing note and vitals reviewed.      Recent Labs      02/01/18   1754  02/02/18   0238   WBC  9.0  8.8   RBC  4.66*  4.74   HEMOGLOBIN  12.6*  12.7*   HEMATOCRIT  38.7*  39.8*   MCV  83.0  84.0   MCH  27.0  26.8*   MCHC  32.6*  31.9*   RDW  44.7  45.9   PLATELETCT  368  332   MPV  9.4  9.2     Recent Labs      02/01/18   1754  02/02/18   0238   SODIUM  128*  131*   POTASSIUM  4.1  4.8   CHLORIDE  95*  98   CO2  25  25   GLUCOSE  274*  266*   BUN  13  12   CREATININE  1.07  0.84   CALCIUM  8.5  8.2*                      Assessment/Plan     Sepsis (CMS-[facility])   Overview    Presumably secondary to cellulitis and infected wound to the left BKA stump  Simple sepsis without)organ  Damage  Will put the patient on full sepsis protocol including lactic  acid checks  Follow blood culture     Assessment & Plan    This is NOT SEPSIS.  Does not meet criteria for sepsis.        Cellulitis- (present on admission)   Assessment & Plan    acute on chronic, complication of diabetes  Minimal area of cellulitis seen, CT negative, no further drainage, unable to culture wound on 2/2.  2/1 BCs negative x2  limb preservation service consult:  No ID or surgical consult need, likely localized wound care, check arterial pulses to ensure good vascular flow to area.  Sed rate and CRP elevated  Change unasyn IV to augmentin.  Wound care rec dressing changes qod.        Diabetes mellitus with neuropathy (HCC)- (present on admission)   Assessment & Plan    Continue glipizide,  home insulin and sliding scale  Hypoglycemia protocol        Cough due to bronchospasm- (present on admission)   Assessment & Plan    Smoker 1 ppd  CXR no infiltrate  2/3 ordered albuterol inhaler q 4 hours prn and symbicort inh bid.  2/4:  Robitussin prn cough if keeping up at night.        Current smoker- (present on admission)   Assessment & Plan    Nicotine patch   Smoked 1 ppd x several years.        Depression with suicidal ideation- (present on admission)   Overview    Patient was found to have low SAD score and had plans for suicide recently  He is placed on suicidal precautions, psychiatry consult will be requested     Assessment & Plan    2/1:  Admission stated he had plans for suicide.   1:1 sitter in room  Psychiatry consulted, legal hold initiated.        BPH (benign prostatic hyperplasia)- (present on admission)   Assessment & Plan    Continue tamsulosin and finasteride          Quality-Core Measures

## 2018-02-04 NOTE — ASSESSMENT & PLAN NOTE
Smoker 1 ppd  CXR no infiltrate  2/3 ordered albuterol inhaler q 4 hours prn and symbicort inh bid.  2/4:  Robitussin prn cough if keeping up at night.

## 2018-02-04 NOTE — PROGRESS NOTES
Assumed pt care at 0700, pt is alert and oriented and on room air, minor discomfort present, in bed resting, sitter present, will continue to monitor

## 2018-02-05 LAB
GLUCOSE BLD-MCNC: 103 MG/DL (ref 65–99)
GLUCOSE BLD-MCNC: 241 MG/DL (ref 65–99)
GLUCOSE BLD-MCNC: 328 MG/DL (ref 65–99)
GLUCOSE BLD-MCNC: 65 MG/DL (ref 65–99)

## 2018-02-05 PROCEDURE — A9270 NON-COVERED ITEM OR SERVICE: HCPCS | Performed by: HOSPITALIST

## 2018-02-05 PROCEDURE — 770001 HCHG ROOM/CARE - MED/SURG/GYN PRIV*

## 2018-02-05 PROCEDURE — 700111 HCHG RX REV CODE 636 W/ 250 OVERRIDE (IP): Performed by: INTERNAL MEDICINE

## 2018-02-05 PROCEDURE — 700102 HCHG RX REV CODE 250 W/ 637 OVERRIDE(OP): Performed by: INTERNAL MEDICINE

## 2018-02-05 PROCEDURE — 82962 GLUCOSE BLOOD TEST: CPT | Mod: 91

## 2018-02-05 PROCEDURE — A9270 NON-COVERED ITEM OR SERVICE: HCPCS | Performed by: INTERNAL MEDICINE

## 2018-02-05 PROCEDURE — 700102 HCHG RX REV CODE 250 W/ 637 OVERRIDE(OP): Performed by: HOSPITALIST

## 2018-02-05 PROCEDURE — 99232 SBSQ HOSP IP/OBS MODERATE 35: CPT | Performed by: HOSPITALIST

## 2018-02-05 RX ORDER — INSULIN GLARGINE 100 [IU]/ML
65 INJECTION, SOLUTION SUBCUTANEOUS
Status: DISCONTINUED | OUTPATIENT
Start: 2018-02-05 | End: 2018-02-09

## 2018-02-05 RX ORDER — INSULIN GLARGINE 100 [IU]/ML
50 INJECTION, SOLUTION SUBCUTANEOUS EVERY EVENING
Status: DISCONTINUED | OUTPATIENT
Start: 2018-02-05 | End: 2018-02-06

## 2018-02-05 RX ORDER — CITALOPRAM 20 MG/1
40 TABLET ORAL DAILY
Status: DISCONTINUED | OUTPATIENT
Start: 2018-02-06 | End: 2018-02-09 | Stop reason: HOSPADM

## 2018-02-05 RX ADMIN — PREGABALIN 25 MG: 25 CAPSULE ORAL at 09:00

## 2018-02-05 RX ADMIN — HYDRALAZINE HYDROCHLORIDE 25 MG: 50 TABLET ORAL at 15:00

## 2018-02-05 RX ADMIN — DEXTROMETHORPHAN HYDROBROMIDE AND GUAIFENESIN 10 ML: 10; 100 LIQUID ORAL at 21:14

## 2018-02-05 RX ADMIN — FAMOTIDINE 20 MG: 20 TABLET, FILM COATED ORAL at 09:00

## 2018-02-05 RX ADMIN — AMOXICILLIN AND CLAVULANATE POTASSIUM 1 TABLET: 875; 125 TABLET, FILM COATED ORAL at 21:14

## 2018-02-05 RX ADMIN — ASPIRIN 81 MG: 81 TABLET, COATED ORAL at 09:00

## 2018-02-05 RX ADMIN — DOXYCYCLINE 100 MG: 100 TABLET ORAL at 09:00

## 2018-02-05 RX ADMIN — ENOXAPARIN SODIUM 40 MG: 100 INJECTION SUBCUTANEOUS at 09:00

## 2018-02-05 RX ADMIN — GLIPIZIDE 10 MG: 5 TABLET ORAL at 19:30

## 2018-02-05 RX ADMIN — PREGABALIN 25 MG: 25 CAPSULE ORAL at 21:14

## 2018-02-05 RX ADMIN — INSULIN HUMAN 4 UNITS: 100 INJECTION, SOLUTION PARENTERAL at 21:21

## 2018-02-05 RX ADMIN — MORPHINE SULFATE 2 MG: 4 INJECTION INTRAVENOUS at 06:24

## 2018-02-05 RX ADMIN — THERA TABS 1 TABLET: TAB at 09:00

## 2018-02-05 RX ADMIN — INSULIN GLARGINE 50 UNITS: 100 INJECTION, SOLUTION SUBCUTANEOUS at 21:20

## 2018-02-05 RX ADMIN — MORPHINE SULFATE 2 MG: 4 INJECTION INTRAVENOUS at 21:15

## 2018-02-05 RX ADMIN — TRIAMTERENE AND HYDROCHLOROTHIAZIDE 1 CAPSULE: 25; 37.5 CAPSULE ORAL at 09:00

## 2018-02-05 RX ADMIN — HYDRALAZINE HYDROCHLORIDE 25 MG: 50 TABLET ORAL at 06:00

## 2018-02-05 RX ADMIN — HYDRALAZINE HYDROCHLORIDE 25 MG: 50 TABLET ORAL at 21:15

## 2018-02-05 RX ADMIN — INSULIN HUMAN 2 UNITS: 100 INJECTION, SOLUTION PARENTERAL at 17:56

## 2018-02-05 RX ADMIN — OXYCODONE HYDROCHLORIDE 5 MG: 5 TABLET ORAL at 17:51

## 2018-02-05 RX ADMIN — TAMSULOSIN HYDROCHLORIDE 0.4 MG: 0.4 CAPSULE ORAL at 09:00

## 2018-02-05 RX ADMIN — OXYCODONE HYDROCHLORIDE 5 MG: 5 TABLET ORAL at 11:47

## 2018-02-05 RX ADMIN — MIRTAZAPINE 45 MG: 15 TABLET, FILM COATED ORAL at 21:14

## 2018-02-05 RX ADMIN — ZOLPIDEM TARTRATE 5 MG: 5 TABLET, FILM COATED ORAL at 21:14

## 2018-02-05 RX ADMIN — STANDARDIZED SENNA CONCENTRATE AND DOCUSATE SODIUM 2 TABLET: 8.6; 5 TABLET, FILM COATED ORAL at 09:00

## 2018-02-05 RX ADMIN — CITALOPRAM HYDROBROMIDE 20 MG: 20 TABLET ORAL at 09:00

## 2018-02-05 RX ADMIN — FINASTERIDE 5 MG: 5 TABLET, FILM COATED ORAL at 09:00

## 2018-02-05 RX ADMIN — AMOXICILLIN AND CLAVULANATE POTASSIUM 1 TABLET: 875; 125 TABLET, FILM COATED ORAL at 09:00

## 2018-02-05 RX ADMIN — NICOTINE TRANSDERMAL SYSTEM 21 MG: 21 PATCH, EXTENDED RELEASE TRANSDERMAL at 06:00

## 2018-02-05 RX ADMIN — PREGABALIN 25 MG: 25 CAPSULE ORAL at 15:00

## 2018-02-05 RX ADMIN — GLIPIZIDE 10 MG: 5 TABLET ORAL at 09:00

## 2018-02-05 RX ADMIN — BUDESONIDE AND FORMOTEROL FUMARATE DIHYDRATE 2 PUFF: 160; 4.5 AEROSOL RESPIRATORY (INHALATION) at 20:00

## 2018-02-05 RX ADMIN — DOXYCYCLINE 100 MG: 100 TABLET ORAL at 21:14

## 2018-02-05 RX ADMIN — FAMOTIDINE 20 MG: 20 TABLET, FILM COATED ORAL at 21:14

## 2018-02-05 RX ADMIN — INSULIN GLARGINE 65 UNITS: 100 INJECTION, SOLUTION SUBCUTANEOUS at 09:00

## 2018-02-05 RX ADMIN — AMLODIPINE BESYLATE 10 MG: 10 TABLET ORAL at 09:00

## 2018-02-05 RX ADMIN — BUDESONIDE AND FORMOTEROL FUMARATE DIHYDRATE 2 PUFF: 160; 4.5 AEROSOL RESPIRATORY (INHALATION) at 09:00

## 2018-02-05 ASSESSMENT — ENCOUNTER SYMPTOMS
WEAKNESS: 0
SHORTNESS OF BREATH: 0
SORE THROAT: 0
CLAUDICATION: 0
BRUISES/BLEEDS EASILY: 0
NECK PAIN: 0
COUGH: 0
LOSS OF CONSCIOUSNESS: 0
WHEEZING: 0
EYE DISCHARGE: 0
PALPITATIONS: 0
SPUTUM PRODUCTION: 0
CHILLS: 0
FEVER: 0
DIZZINESS: 0
FOCAL WEAKNESS: 0
EYE PAIN: 0
MYALGIAS: 0
SENSORY CHANGE: 0
DIARRHEA: 0
CONSTIPATION: 0
BACK PAIN: 0
DIAPHORESIS: 0
VOMITING: 0
NAUSEA: 0
HEMOPTYSIS: 0
SPEECH CHANGE: 0
ABDOMINAL PAIN: 0
HEADACHES: 0
DEPRESSION: 1

## 2018-02-05 ASSESSMENT — LIFESTYLE VARIABLES: SUBSTANCE_ABUSE: 0

## 2018-02-05 ASSESSMENT — PAIN SCALES - GENERAL
PAINLEVEL_OUTOF10: 4
PAINLEVEL_OUTOF10: 5

## 2018-02-05 NOTE — DISCHARGE PLANNING
Medical Social Work    Referral: Legal Hold referrals    Intervention: SW sent referrals to [facility] and [facility]. Legal Hold faxed separately.    Plan: Pt will present to [facility] mental health on Wednesday. Pt awaiting transfer to psychiatric facility.

## 2018-02-05 NOTE — DISCHARGE PLANNING
Legal Hold    Date of Legal Hold: 18  Time of Legal Hold: 40    Where was patient when legal hold initiated: Not admitted to [facility]    Legal Hold will : 18 1700    Medical Clearance Complete: yes    Psychiatric Certification Complete: yes    Paperwork sent to  for extension: yes [id number]    What doctor will be signing the extension: Dr. Torres    Extension paperwork attained: In process    Will patient present to [facility] mental health on Wednesday morning?: yes    Referral placed to Psychiatric Facility: no    Ongoing Plan:  Legal hold SW will send referrals for psychiatric placement.

## 2018-02-05 NOTE — FACE TO FACE
Face to Face Supporting Documentation - Home Health    The encounter with this patient was in whole or in part the primary reason for home health admission.    Date of encounter:   Patient:                    MRN:                       YOB: 2019  Jv Angelo  [id number]  1960     Home health to see patient for:  Skilled Nursing care for assessment, interventions & education, Physical Therapy evaluation and treatment and Occupational therapy evaluation and treatment    Skilled need for:  Surgical Aftercare left ORIF elbow fracture    Skilled nursing interventions to include:  Comment: home safety, sling and splint left arm, rt ortho boot WBAT    Homebound status evidenced by:  Need the aid of supportive devices such as crutches, canes, wheelchairs or walkers. Leaving home requires a considerable and taxing effort. There is a normal inability to leave the home.    Community Physician to provide follow up care: Pcp Pt States None     Optional Interventions? No      I certify the face to face encounter for this home health care referral meets the CMS requirements and the encounter/clinical assessment with the patient was, in whole, or in part, for the medical condition(s) listed above, which is the primary reason for home health care. Based on my clinical findings: the service(s) are medically necessary, support the need for home health care, and the homebound criteria are met.  I certify that this patient has had a face to face encounter by myself.  Jil Shannon M.D. - NPI: [id number]

## 2018-02-06 LAB
BACTERIA BLD CULT: NORMAL
BACTERIA BLD CULT: NORMAL
GLUCOSE BLD-MCNC: 121 MG/DL (ref 65–99)
GLUCOSE BLD-MCNC: 185 MG/DL (ref 65–99)
GLUCOSE BLD-MCNC: 189 MG/DL (ref 65–99)
GLUCOSE BLD-MCNC: 195 MG/DL (ref 65–99)
GLUCOSE BLD-MCNC: 51 MG/DL (ref 65–99)
GLUCOSE BLD-MCNC: 59 MG/DL (ref 65–99)
SIGNIFICANT IND 70042: NORMAL
SIGNIFICANT IND 70042: NORMAL
SITE SITE: NORMAL
SITE SITE: NORMAL
SOURCE SOURCE: NORMAL
SOURCE SOURCE: NORMAL

## 2018-02-06 PROCEDURE — 700102 HCHG RX REV CODE 250 W/ 637 OVERRIDE(OP): Performed by: PSYCHIATRY & NEUROLOGY

## 2018-02-06 PROCEDURE — A9270 NON-COVERED ITEM OR SERVICE: HCPCS | Performed by: HOSPITALIST

## 2018-02-06 PROCEDURE — A9270 NON-COVERED ITEM OR SERVICE: HCPCS | Performed by: PSYCHIATRY & NEUROLOGY

## 2018-02-06 PROCEDURE — A9270 NON-COVERED ITEM OR SERVICE: HCPCS | Performed by: INTERNAL MEDICINE

## 2018-02-06 PROCEDURE — 99233 SBSQ HOSP IP/OBS HIGH 50: CPT | Performed by: INTERNAL MEDICINE

## 2018-02-06 PROCEDURE — 700111 HCHG RX REV CODE 636 W/ 250 OVERRIDE (IP): Performed by: INTERNAL MEDICINE

## 2018-02-06 PROCEDURE — 700102 HCHG RX REV CODE 250 W/ 637 OVERRIDE(OP): Performed by: INTERNAL MEDICINE

## 2018-02-06 PROCEDURE — 82962 GLUCOSE BLOOD TEST: CPT | Mod: 91

## 2018-02-06 PROCEDURE — 700102 HCHG RX REV CODE 250 W/ 637 OVERRIDE(OP): Performed by: HOSPITALIST

## 2018-02-06 PROCEDURE — 770001 HCHG ROOM/CARE - MED/SURG/GYN PRIV*

## 2018-02-06 RX ORDER — INSULIN GLARGINE 100 [IU]/ML
45 INJECTION, SOLUTION SUBCUTANEOUS EVERY EVENING
Status: DISCONTINUED | OUTPATIENT
Start: 2018-02-06 | End: 2018-02-09 | Stop reason: HOSPADM

## 2018-02-06 RX ADMIN — DOXYCYCLINE 100 MG: 100 TABLET ORAL at 08:46

## 2018-02-06 RX ADMIN — THERA TABS 1 TABLET: TAB at 08:46

## 2018-02-06 RX ADMIN — BUDESONIDE AND FORMOTEROL FUMARATE DIHYDRATE 2 PUFF: 160; 4.5 AEROSOL RESPIRATORY (INHALATION) at 08:48

## 2018-02-06 RX ADMIN — OXYCODONE HYDROCHLORIDE 5 MG: 5 TABLET ORAL at 09:06

## 2018-02-06 RX ADMIN — PREGABALIN 25 MG: 25 CAPSULE ORAL at 21:04

## 2018-02-06 RX ADMIN — FAMOTIDINE 20 MG: 20 TABLET, FILM COATED ORAL at 21:05

## 2018-02-06 RX ADMIN — MIRTAZAPINE 45 MG: 15 TABLET, FILM COATED ORAL at 21:05

## 2018-02-06 RX ADMIN — TAMSULOSIN HYDROCHLORIDE 0.4 MG: 0.4 CAPSULE ORAL at 08:30

## 2018-02-06 RX ADMIN — CITALOPRAM HYDROBROMIDE 40 MG: 20 TABLET ORAL at 08:48

## 2018-02-06 RX ADMIN — STANDARDIZED SENNA CONCENTRATE AND DOCUSATE SODIUM 2 TABLET: 8.6; 5 TABLET, FILM COATED ORAL at 21:19

## 2018-02-06 RX ADMIN — TRIAMTERENE AND HYDROCHLOROTHIAZIDE 1 CAPSULE: 25; 37.5 CAPSULE ORAL at 08:45

## 2018-02-06 RX ADMIN — ASPIRIN 81 MG: 81 TABLET, COATED ORAL at 08:46

## 2018-02-06 RX ADMIN — MORPHINE SULFATE 2 MG: 4 INJECTION INTRAVENOUS at 21:09

## 2018-02-06 RX ADMIN — ENOXAPARIN SODIUM 40 MG: 100 INJECTION SUBCUTANEOUS at 08:49

## 2018-02-06 RX ADMIN — HYDRALAZINE HYDROCHLORIDE 25 MG: 50 TABLET ORAL at 16:43

## 2018-02-06 RX ADMIN — GLIPIZIDE 10 MG: 5 TABLET ORAL at 08:46

## 2018-02-06 RX ADMIN — PREGABALIN 25 MG: 25 CAPSULE ORAL at 16:45

## 2018-02-06 RX ADMIN — AMLODIPINE BESYLATE 10 MG: 10 TABLET ORAL at 08:46

## 2018-02-06 RX ADMIN — DOXYCYCLINE 100 MG: 100 TABLET ORAL at 21:05

## 2018-02-06 RX ADMIN — HYDRALAZINE HYDROCHLORIDE 25 MG: 50 TABLET ORAL at 21:06

## 2018-02-06 RX ADMIN — DEXTROMETHORPHAN HYDROBROMIDE AND GUAIFENESIN 10 ML: 10; 100 LIQUID ORAL at 21:04

## 2018-02-06 RX ADMIN — INSULIN GLARGINE 45 UNITS: 100 INJECTION, SOLUTION SUBCUTANEOUS at 21:11

## 2018-02-06 RX ADMIN — GLIPIZIDE 10 MG: 5 TABLET ORAL at 21:05

## 2018-02-06 RX ADMIN — PREGABALIN 25 MG: 25 CAPSULE ORAL at 08:47

## 2018-02-06 RX ADMIN — BUDESONIDE AND FORMOTEROL FUMARATE DIHYDRATE 2 PUFF: 160; 4.5 AEROSOL RESPIRATORY (INHALATION) at 21:17

## 2018-02-06 RX ADMIN — AMOXICILLIN AND CLAVULANATE POTASSIUM 1 TABLET: 875; 125 TABLET, FILM COATED ORAL at 08:47

## 2018-02-06 RX ADMIN — AMOXICILLIN AND CLAVULANATE POTASSIUM 1 TABLET: 875; 125 TABLET, FILM COATED ORAL at 21:04

## 2018-02-06 RX ADMIN — FAMOTIDINE 20 MG: 20 TABLET, FILM COATED ORAL at 08:46

## 2018-02-06 RX ADMIN — INSULIN HUMAN 1 UNITS: 100 INJECTION, SOLUTION PARENTERAL at 21:11

## 2018-02-06 RX ADMIN — MORPHINE SULFATE 2 MG: 4 INJECTION INTRAVENOUS at 11:33

## 2018-02-06 RX ADMIN — FINASTERIDE 5 MG: 5 TABLET, FILM COATED ORAL at 08:49

## 2018-02-06 RX ADMIN — ZOLPIDEM TARTRATE 5 MG: 5 TABLET, FILM COATED ORAL at 21:05

## 2018-02-06 RX ADMIN — INSULIN HUMAN 1 UNITS: 100 INJECTION, SOLUTION PARENTERAL at 11:26

## 2018-02-06 ASSESSMENT — ENCOUNTER SYMPTOMS
MEMORY LOSS: 0
FEVER: 0
HEMOPTYSIS: 0
SHORTNESS OF BREATH: 0
DEPRESSION: 0
SPEECH CHANGE: 0
COUGH: 0
FOCAL WEAKNESS: 0
NAUSEA: 0
HEADACHES: 0
WEAKNESS: 0
MYALGIAS: 0
VOMITING: 0
SORE THROAT: 0
CHILLS: 0
WHEEZING: 0
SENSORY CHANGE: 0
CLAUDICATION: 0
PALPITATIONS: 0
LOSS OF CONSCIOUSNESS: 0

## 2018-02-06 ASSESSMENT — PAIN SCALES - GENERAL
PAINLEVEL_OUTOF10: 6
PAINLEVEL_OUTOF10: 8
PAINLEVEL_OUTOF10: 2
PAINLEVEL_OUTOF10: 8
PAINLEVEL_OUTOF10: 2
PAINLEVEL_OUTOF10: 8

## 2018-02-06 ASSESSMENT — LIFESTYLE VARIABLES: DO YOU DRINK ALCOHOL: NO

## 2018-02-06 NOTE — PSYCHIATRY
PSYCHIATRIC FOLLOW UP:    Reason for Admission: LE cellulitis of stump. Depression and hopelessness.  Legal hold status:   +    Actually seems more depressed today. Continues to express hopelessness though not SI per se.    Psychiatric Examination: observed phenomenon:  Vitals:Blood pressure 136/78, pulse 93, temperature 36.9 °C (98.5 °F), resp. rate 18, weight 86.2 kg (190 lb), SpO2 96 %.  Musculoskeletal(abnormal movements, gait, etc): none noted. In bed.  Appearance:double BKA, poor eye contact, clean, no spontaneous conversation  Thoughts:paucity answering only questions asked, no psychosis  Speech:paucity  Mood: depressed  Affect: blunted  SI/HI: denies  Attention/Alertness: intact    Memory: intact  Orientation:x 4  Fund of Knowledge:  Not tested    Insight/Judgement into symptoms: fair to good    Lab results/tests:   Results for DAILY NOLEN (MRN [id number]) as of 2/5/2018 16:41   Ref. Range 2/4/2018 03:38   Glycohemoglobin Latest Ref Range: 0.0 - 5.6 % 11.2 (H)        Assessment:(acuity level)  Major Depression recurrent without psychosis, mild to moderate: R/O adjustment disorder  R/O PTSD          Plan:no changes.   legal hold:extended  Anticipated F/U: within 48 hours.     Will follow

## 2018-02-06 NOTE — PROGRESS NOTES
[facility] Hospitalist Progress Note    Date of Service: 2/6/2018    Chief Complaint  57 y.o. male admitted 2/1/2018 with b/l amputee, IDDM, COPD, chronic left BKA stump infection with multiple courses of abx presented with purulent dc from left BKA stump sent by [facility] clinic MD to ER.   CT negative for abscess, OM, or gas formation.    Interval Problem Update  2/2:  Review of chart with LPS:  Does not need surgical consultation, but wound care at this time.  Patient states has never been fitted for a prosthetic, states told insurance would not cover.  He is to discuss this with LPS.   ambien for sleep, down graded his abx to unasyn IV and doxy po.  dc'd vanco IV.  Dry on exam medial aspect of prior surgical incision, no significant edema or erythema seen.  Unable to do wound culture, ordered today.  States no wound culture done at [facility] clinic or ER on admission.  Blood cultures x2 negative.  2/3:  Wound care consult recommended local wound care with dressing changes every other day.  Can change IV abx to po augmentin and doxycycline.   Legal hold extended per psychiatry.  C/o cough, nonproductive, lungs CTA b/l, smoker 1 ppd x several years.  Ordered albuterol inhaler q 4 hours prn and symbicort inh bid.  2/4:  Still with occasional dry cough, started robitussin prn.  Wound care dressing change tomorrow.  2/5:  Stable, medically cleared.  Finish abx x 7 days total.  No further infection, bandage to keep area from repeat irritation.  2/6: Awaiting inpatient psychiatry placement, labile blood sugar    Consultants/Specialty  [facility]   Psychiatry-SI    Disposition  Likely at baseline physically wc bound, no prosthetic fittings yet.  TBD with legal hold for SI.    Finished 33 year care home sentence 6 weeks ago, living in homeless shelter now, states has wanted to roll his w/c into traffic.  Feels no reason to live.  2/4:  Legal hold remains.   Left BKA wound more irritation, now dry, no erythema.    2/6: not medically cleared  adjust insulin  D/w staff    Review of Systems   Constitutional: Negative for chills and fever.   HENT: Negative for congestion and sore throat.    Respiratory: Negative for cough, hemoptysis, shortness of breath and wheezing.    Cardiovascular: Negative for chest pain, palpitations, claudication and leg swelling.   Gastrointestinal: Negative for melena, nausea and vomiting.   Genitourinary: Negative for dysuria, frequency and urgency.   Musculoskeletal: Negative for joint pain and myalgias.   Skin: Negative for itching and rash.   Neurological: Negative for sensory change, speech change, focal weakness, loss of consciousness, weakness and headaches.   Psychiatric/Behavioral: Positive for suicidal ideas. Negative for depression and memory loss.      Physical Exam  Laboratory/Imaging   Hemodynamics  Temp (24hrs), Av.7 °C (98.1 °F), Min:36.6 °C (97.8 °F), Max:36.9 °C (98.5 °F)   Temperature: 36.6 °C (97.8 °F)  Pulse  Av.7  Min: 61  Max: 109    Blood Pressure: 134/76      Respiratory      Respiration: 17, Pulse Oximetry: 94 %             Fluids    Intake/Output Summary (Last 24 hours) at 18 1435  Last data filed at 18 1100   Gross per 24 hour   Intake              600 ml   Output             3650 ml   Net            -3050 ml       Nutrition  Orders Placed This Encounter   Procedures   • Diet Order     Standing Status:   Standing     Number of Occurrences:   1     Order Specific Question:   Diet:     Answer:   Diabetic [3]     Comments:   fish free      Physical Exam   Constitutional: He is oriented to person, place, and time. He appears well-developed and well-nourished. No distress.   HENT:   Head: Normocephalic and atraumatic.   Eyes: EOM are normal. Pupils are equal, round, and reactive to light. No scleral icterus.   Neck: Normal range of motion. Neck supple. No tracheal deviation present. No thyromegaly present.   Cardiovascular: Normal rate, regular rhythm and normal heart sounds.  Exam  reveals no friction rub.    Pulmonary/Chest: Effort normal and breath sounds normal. No respiratory distress. He exhibits no tenderness.   Abdominal: Soft. Bowel sounds are normal. He exhibits no distension and no mass. There is no tenderness.   Musculoskeletal: Normal range of motion. He exhibits no edema or tenderness.   Rt AKA well healed.   Left BKA medial aspect of incision with minimal edema seen, no drainage, no palpable abscess, no dehiscence seen.   Neurological: He is alert and oriented to person, place, and time. No cranial nerve deficit. He exhibits normal muscle tone. Coordination normal.   Skin: Skin is warm and dry.   Psychiatric: He has a normal mood and affect. His behavior is normal. Judgment and thought content normal.   States he felt down yesterday about all his medical problems, b/l amputations etc.   Nursing note and vitals reviewed.                               Assessment/Plan     Sepsis (CMS-[facility])   Overview    Presumably secondary to cellulitis and infected wound to the left BKA stump  Simple sepsis without)organ  Damage  Will put the patient on full sepsis protocol including lactic acid checks  Follow blood culture     Assessment & Plan    This is NOT SEPSIS.  Does not meet criteria for sepsis.        Cellulitis- (present on admission)   Assessment & Plan    acute on chronic, complication of diabetes  Minimal area of cellulitis seen, CT negative, no further drainage, unable to culture wound on 2/2.  2/1 BCs negative x2  limb preservation service consult:  No ID or surgical consult need, likely localized wound care, check arterial pulses to ensure good vascular flow to area.  Sed rate and CRP elevated  Change unasyn IV to augmentin.  Wound care rec dressing changes qod.  2/5:  abx x 7days total. Dressing sleeve in place to prevent continued irritation.        Diabetes mellitus with neuropathy ([facility])- (present on admission)   Assessment & Plan    With hyperglycemia, labile blood  sugars  Continue glipizide,  home insulin and sliding scale  Hypoglycemia protocol  Continue close blood sugar monitoring, blood sugar from   Decrease evening lantus 45 u nightly, and 65 in am        Cough due to bronchospasm- (present on admission)   Assessment & Plan    Smoker 1 ppd  CXR no infiltrate  2/3 ordered albuterol inhaler q 4 hours prn and symbicort inh bid.  2/4:  Robitussin prn cough if keeping up at night.        Current smoker- (present on admission)   Assessment & Plan    Nicotine patch   Smoked 1 ppd x several years.        Depression with suicidal ideation- (present on admission)   Overview    Patient was found to have low SAD score and had plans for suicide recently  He is placed on suicidal precautions, psychiatry consult will be requested     Assessment & Plan    2/1:  Admission stated he had plans for suicide.   1:1 sitter in room  Psychiatry consulted, legal hold initiated.        BPH (benign prostatic hyperplasia)- (present on admission)   Assessment & Plan    Continue tamsulosin and finasteride          Quality-Core Measures

## 2018-02-06 NOTE — PROGRESS NOTES
[facility] Hospitalist Progress Note    Date of Service: 2/5/2018    Chief Complaint  57 y.o. male admitted 2/1/2018 with b/l amputee, IDDM, COPD, chronic left BKA stump infection with multiple courses of abx presented with purulent dc from left BKA stump sent by [facility] clinic MD to ER.   CT negative for abscess, OM, or gas formation.    Interval Problem Update  2/2:  Review of chart with LPS:  Does not need surgical consultation, but wound care at this time.  Patient states has never been fitted for a prosthetic, states told insurance would not cover.  He is to discuss this with LPS.   ambien for sleep, down graded his abx to unasyn IV and doxy po.  dc'd vanco IV.  Dry on exam medial aspect of prior surgical incision, no significant edema or erythema seen.  Unable to do wound culture, ordered today.  States no wound culture done at [facility] clinic or ER on admission.  Blood cultures x2 negative.  2/3:  Wound care consult recommended local wound care with dressing changes every other day.  Can change IV abx to po augmentin and doxycycline.   Legal hold extended per psychiatry.  C/o cough, nonproductive, lungs CTA b/l, smoker 1 ppd x several years.  Ordered albuterol inhaler q 4 hours prn and symbicort inh bid.  2/4:  Still with occasional dry cough, started robitussin prn.  Wound care dressing change tomorrow.  2/5:  Stable, medically cleared.  Finish abx x 7 days total.  No further infection, bandage to keep area from repeat irritation.    Consultants/Specialty  [facility]   Psychiatry-SI    Disposition  Likely at baseline physically wc bound, no prosthetic fittings yet.  TBD with legal hold for SI.    Finished 33 year [facility] sentence 6 weeks ago, living in homeless shelter now, states has wanted to roll his w/c into traffic.  Feels no reason to live.  2/4:  Legal hold remains.  Medically cleared:  Left BKA wound more irritation, now dry, no erythema.          Review of Systems   Constitutional: Negative for chills, diaphoresis,  fever and malaise/fatigue.   HENT: Negative for congestion and sore throat.    Eyes: Negative for pain and discharge.   Respiratory: Negative for cough, hemoptysis, sputum production, shortness of breath and wheezing.    Cardiovascular: Negative for chest pain, palpitations, claudication and leg swelling.   Gastrointestinal: Negative for abdominal pain, constipation, diarrhea, melena, nausea and vomiting.   Genitourinary: Negative for dysuria, frequency and urgency.   Musculoskeletal: Negative for back pain, joint pain, myalgias and neck pain.   Skin: Negative for itching and rash.   Neurological: Negative for dizziness, sensory change, speech change, focal weakness, loss of consciousness, weakness and headaches.   Endo/Heme/Allergies: Does not bruise/bleed easily.   Psychiatric/Behavioral: Positive for depression and suicidal ideas. Negative for substance abuse.      Physical Exam  Laboratory/Imaging   Hemodynamics  Temp (24hrs), Av.6 °C (97.9 °F), Min:36.3 °C (97.3 °F), Max:36.9 °C (98.5 °F)   Temperature: 36.9 °C (98.5 °F)  Pulse  Av.1  Min: 61  Max: 109    Blood Pressure: 136/78      Respiratory      Respiration: 18, Pulse Oximetry: 96 %             Fluids    Intake/Output Summary (Last 24 hours) at 18 1720  Last data filed at 18 1600   Gross per 24 hour   Intake              480 ml   Output             2000 ml   Net            -1520 ml       Nutrition  Orders Placed This Encounter   Procedures   • Diet Order     Standing Status:   Standing     Number of Occurrences:   1     Order Specific Question:   Diet:     Answer:   Diabetic [3]     Comments:   fish free      Physical Exam   Constitutional: He is oriented to person, place, and time. He appears well-developed and well-nourished. No distress.   HENT:   Head: Normocephalic and atraumatic.   Mouth/Throat: Oropharynx is clear and moist. No oropharyngeal exudate.   Eyes: Conjunctivae and EOM are normal. Pupils are equal, round, and reactive  to light. Right eye exhibits no discharge. Left eye exhibits no discharge. No scleral icterus.   Neck: Normal range of motion. Neck supple. No JVD present. No tracheal deviation present. No thyromegaly present.   Cardiovascular: Normal rate, regular rhythm and normal heart sounds.  Exam reveals no gallop and no friction rub.    No murmur heard.  Pulmonary/Chest: Effort normal and breath sounds normal. No respiratory distress. He has no wheezes. He has no rales. He exhibits no tenderness.   Abdominal: Soft. Bowel sounds are normal. He exhibits no distension and no mass. There is no tenderness. There is no rebound and no guarding.   Musculoskeletal: Normal range of motion. He exhibits no edema or tenderness.   Rt AKA well healed.   Left BKA medial aspect of incision with minimal edema seen, no drainage, no palpable abscess, no dehiscence seen.   Lymphadenopathy:     He has no cervical adenopathy.   Neurological: He is alert and oriented to person, place, and time. No cranial nerve deficit. He exhibits normal muscle tone.   Skin: Skin is warm and dry. No rash noted. He is not diaphoretic. No erythema.   Psychiatric: He has a normal mood and affect. His behavior is normal. Judgment and thought content normal.   States he felt down yesterday about all his medical problems, b/l amputations etc.   Nursing note and vitals reviewed.                               Assessment/Plan     Sepsis (CMS-[facility])   Overview    Presumably secondary to cellulitis and infected wound to the left BKA stump  Simple sepsis without)organ  Damage  Will put the patient on full sepsis protocol including lactic acid checks  Follow blood culture     Assessment & Plan    This is NOT SEPSIS.  Does not meet criteria for sepsis.        Cellulitis- (present on admission)   Assessment & Plan    acute on chronic, complication of diabetes  Minimal area of cellulitis seen, CT negative, no further drainage, unable to culture wound on 2/2.  2/1 BCs negative  x2  limb preservation service consult:  No ID or surgical consult need, likely localized wound care, check arterial pulses to ensure good vascular flow to area.  Sed rate and CRP elevated  Change unasyn IV to augmentin.  Wound care rec dressing changes qod.  2/5:  abx x 7days total. Dressing sleeve in place to prevent continued irritation.        Diabetes mellitus with neuropathy ([facility])- (present on admission)   Assessment & Plan    Continue glipizide,  home insulin and sliding scale  Hypoglycemia protocol        Cough due to bronchospasm- (present on admission)   Assessment & Plan    Smoker 1 ppd  CXR no infiltrate  2/3 ordered albuterol inhaler q 4 hours prn and symbicort inh bid.  2/4:  Robitussin prn cough if keeping up at night.        Current smoker- (present on admission)   Assessment & Plan    Nicotine patch   Smoked 1 ppd x several years.        Depression with suicidal ideation- (present on admission)   Overview    Patient was found to have low SAD score and had plans for suicide recently  He is placed on suicidal precautions, psychiatry consult will be requested     Assessment & Plan    2/1:  Admission stated he had plans for suicide.   1:1 sitter in room  Psychiatry consulted, legal hold initiated.        BPH (benign prostatic hyperplasia)- (present on admission)   Assessment & Plan    Continue tamsulosin and finasteride          Quality-Core Measures

## 2018-02-06 NOTE — DISCHARGE PLANNING
Medical Social Work    Referral: Legal hold    Intervention: SW received VM from [facility] advising their physician is declining pt as they are unable to provide wound care for pt and he will need to go to [facility].     Declination faxed to [facility].    Plan: Pt awaiting accepting psychiatric facility.

## 2018-02-06 NOTE — CARE PLAN
Problem: Safety  Goal: Will remain free from injury  Outcome: PROGRESSING SLOWER THAN EXPECTED    Intervention: Provide assistance with mobility  Walker at bedside  Intervention: Collaborate with Interdisciplinary Team for safe transfer and mobilization techniques  MD and RN communication about advancing patient's mobility      Problem: Mobility  Goal: Risk for activity intolerance will decrease  Outcome: PROGRESSING SLOWER THAN EXPECTED    Intervention: Assess and monitor signs of activity intolerance  Will assess patient's progression

## 2018-02-06 NOTE — DISCHARGE PLANNING
Medical Social Work    Referral: Legal Hold Petition    Intervention: SW received copy of filed extension petition. Copy sent to unit SW to place in pt's chart.     Plan: Pt awaiting transfer to psychiatric facility.

## 2018-02-07 LAB
GLUCOSE BLD-MCNC: 148 MG/DL (ref 65–99)
GLUCOSE BLD-MCNC: 173 MG/DL (ref 65–99)
GLUCOSE BLD-MCNC: 240 MG/DL (ref 65–99)
GLUCOSE BLD-MCNC: 82 MG/DL (ref 65–99)
GLUCOSE BLD-MCNC: 86 MG/DL (ref 65–99)

## 2018-02-07 PROCEDURE — A9270 NON-COVERED ITEM OR SERVICE: HCPCS | Performed by: HOSPITALIST

## 2018-02-07 PROCEDURE — 700102 HCHG RX REV CODE 250 W/ 637 OVERRIDE(OP): Performed by: INTERNAL MEDICINE

## 2018-02-07 PROCEDURE — 770001 HCHG ROOM/CARE - MED/SURG/GYN PRIV*

## 2018-02-07 PROCEDURE — 82962 GLUCOSE BLOOD TEST: CPT | Mod: 91

## 2018-02-07 PROCEDURE — 700111 HCHG RX REV CODE 636 W/ 250 OVERRIDE (IP): Performed by: INTERNAL MEDICINE

## 2018-02-07 PROCEDURE — 700102 HCHG RX REV CODE 250 W/ 637 OVERRIDE(OP): Performed by: PSYCHIATRY & NEUROLOGY

## 2018-02-07 PROCEDURE — A9270 NON-COVERED ITEM OR SERVICE: HCPCS | Performed by: INTERNAL MEDICINE

## 2018-02-07 PROCEDURE — 99232 SBSQ HOSP IP/OBS MODERATE 35: CPT | Performed by: INTERNAL MEDICINE

## 2018-02-07 PROCEDURE — 700102 HCHG RX REV CODE 250 W/ 637 OVERRIDE(OP): Performed by: HOSPITALIST

## 2018-02-07 PROCEDURE — A9270 NON-COVERED ITEM OR SERVICE: HCPCS | Performed by: PSYCHIATRY & NEUROLOGY

## 2018-02-07 RX ORDER — OXYCODONE HYDROCHLORIDE 5 MG/1
2.5 TABLET ORAL
Status: DISCONTINUED | OUTPATIENT
Start: 2018-02-07 | End: 2018-02-09

## 2018-02-07 RX ORDER — IBUPROFEN 600 MG/1
600 TABLET ORAL EVERY 6 HOURS PRN
Status: DISCONTINUED | OUTPATIENT
Start: 2018-02-07 | End: 2018-02-09 | Stop reason: HOSPADM

## 2018-02-07 RX ORDER — MORPHINE SULFATE 4 MG/ML
1 INJECTION, SOLUTION INTRAMUSCULAR; INTRAVENOUS EVERY 4 HOURS PRN
Status: DISCONTINUED | OUTPATIENT
Start: 2018-02-07 | End: 2018-02-08

## 2018-02-07 RX ORDER — OXYCODONE HYDROCHLORIDE 5 MG/1
5 TABLET ORAL
Status: DISCONTINUED | OUTPATIENT
Start: 2018-02-07 | End: 2018-02-09

## 2018-02-07 RX ADMIN — IBUPROFEN 600 MG: 600 TABLET, FILM COATED ORAL at 11:55

## 2018-02-07 RX ADMIN — INSULIN GLARGINE 65 UNITS: 100 INJECTION, SOLUTION SUBCUTANEOUS at 06:07

## 2018-02-07 RX ADMIN — ASPIRIN 81 MG: 81 TABLET, COATED ORAL at 08:53

## 2018-02-07 RX ADMIN — FINASTERIDE 5 MG: 5 TABLET, FILM COATED ORAL at 08:53

## 2018-02-07 RX ADMIN — PREGABALIN 25 MG: 25 CAPSULE ORAL at 21:07

## 2018-02-07 RX ADMIN — OXYCODONE HYDROCHLORIDE 5 MG: 5 TABLET ORAL at 05:42

## 2018-02-07 RX ADMIN — DOXYCYCLINE 100 MG: 100 TABLET ORAL at 21:04

## 2018-02-07 RX ADMIN — PREGABALIN 25 MG: 25 CAPSULE ORAL at 08:53

## 2018-02-07 RX ADMIN — FAMOTIDINE 20 MG: 20 TABLET, FILM COATED ORAL at 08:53

## 2018-02-07 RX ADMIN — THERA TABS 1 TABLET: TAB at 08:53

## 2018-02-07 RX ADMIN — FAMOTIDINE 20 MG: 20 TABLET, FILM COATED ORAL at 21:04

## 2018-02-07 RX ADMIN — INSULIN HUMAN 2 UNITS: 100 INJECTION, SOLUTION PARENTERAL at 11:56

## 2018-02-07 RX ADMIN — HYDRALAZINE HYDROCHLORIDE 25 MG: 50 TABLET ORAL at 16:47

## 2018-02-07 RX ADMIN — MIRTAZAPINE 45 MG: 15 TABLET, FILM COATED ORAL at 21:04

## 2018-02-07 RX ADMIN — ZOLPIDEM TARTRATE 5 MG: 5 TABLET, FILM COATED ORAL at 20:59

## 2018-02-07 RX ADMIN — MORPHINE SULFATE 1 MG: 4 INJECTION INTRAVENOUS at 21:05

## 2018-02-07 RX ADMIN — AMLODIPINE BESYLATE 10 MG: 10 TABLET ORAL at 08:53

## 2018-02-07 RX ADMIN — BUDESONIDE AND FORMOTEROL FUMARATE DIHYDRATE 2 PUFF: 160; 4.5 AEROSOL RESPIRATORY (INHALATION) at 21:04

## 2018-02-07 RX ADMIN — BUDESONIDE AND FORMOTEROL FUMARATE DIHYDRATE 2 PUFF: 160; 4.5 AEROSOL RESPIRATORY (INHALATION) at 08:53

## 2018-02-07 RX ADMIN — TAMSULOSIN HYDROCHLORIDE 0.4 MG: 0.4 CAPSULE ORAL at 08:53

## 2018-02-07 RX ADMIN — GLIPIZIDE 10 MG: 5 TABLET ORAL at 21:04

## 2018-02-07 RX ADMIN — DEXTROMETHORPHAN HYDROBROMIDE AND GUAIFENESIN 10 ML: 10; 100 LIQUID ORAL at 21:04

## 2018-02-07 RX ADMIN — ENOXAPARIN SODIUM 40 MG: 100 INJECTION SUBCUTANEOUS at 08:53

## 2018-02-07 RX ADMIN — AMOXICILLIN AND CLAVULANATE POTASSIUM 1 TABLET: 875; 125 TABLET, FILM COATED ORAL at 21:04

## 2018-02-07 RX ADMIN — INSULIN GLARGINE 45 UNITS: 100 INJECTION, SOLUTION SUBCUTANEOUS at 21:11

## 2018-02-07 RX ADMIN — HYDRALAZINE HYDROCHLORIDE 25 MG: 50 TABLET ORAL at 21:14

## 2018-02-07 RX ADMIN — DOXYCYCLINE 100 MG: 100 TABLET ORAL at 08:53

## 2018-02-07 RX ADMIN — MORPHINE SULFATE 2 MG: 4 INJECTION INTRAVENOUS at 08:52

## 2018-02-07 RX ADMIN — GLIPIZIDE 10 MG: 5 TABLET ORAL at 08:53

## 2018-02-07 RX ADMIN — CITALOPRAM HYDROBROMIDE 40 MG: 20 TABLET ORAL at 08:53

## 2018-02-07 RX ADMIN — INSULIN HUMAN 1 UNITS: 100 INJECTION, SOLUTION PARENTERAL at 21:11

## 2018-02-07 RX ADMIN — MORPHINE SULFATE 1 MG: 4 INJECTION INTRAVENOUS at 16:48

## 2018-02-07 RX ADMIN — AMOXICILLIN AND CLAVULANATE POTASSIUM 1 TABLET: 875; 125 TABLET, FILM COATED ORAL at 08:53

## 2018-02-07 RX ADMIN — PREGABALIN 25 MG: 25 CAPSULE ORAL at 16:47

## 2018-02-07 RX ADMIN — TRIAMTERENE AND HYDROCHLOROTHIAZIDE 1 CAPSULE: 25; 37.5 CAPSULE ORAL at 08:53

## 2018-02-07 RX ADMIN — OXYCODONE HYDROCHLORIDE 5 MG: 5 TABLET ORAL at 11:55

## 2018-02-07 RX ADMIN — HYDRALAZINE HYDROCHLORIDE 25 MG: 50 TABLET ORAL at 05:43

## 2018-02-07 RX ADMIN — NICOTINE TRANSDERMAL SYSTEM 21 MG: 21 PATCH, EXTENDED RELEASE TRANSDERMAL at 05:43

## 2018-02-07 ASSESSMENT — ENCOUNTER SYMPTOMS
ABDOMINAL PAIN: 0
DIAPHORESIS: 0
VOMITING: 0
HEMOPTYSIS: 0
CHILLS: 0
FEVER: 0
MYALGIAS: 1
LOSS OF CONSCIOUSNESS: 0
FOCAL WEAKNESS: 0
PALPITATIONS: 0
CLAUDICATION: 0
SHORTNESS OF BREATH: 0
MEMORY LOSS: 0

## 2018-02-07 ASSESSMENT — PAIN SCALES - GENERAL
PAINLEVEL_OUTOF10: 7
PAINLEVEL_OUTOF10: 8
PAINLEVEL_OUTOF10: 7

## 2018-02-07 ASSESSMENT — LIFESTYLE VARIABLES: DO YOU DRINK ALCOHOL: NO

## 2018-02-07 NOTE — DISCHARGE PLANNING
Medical Social Work     Referral: Legal Hold Court     Intervention: Pt presented for legal hold meeting with  to discuss legal options of contesting hold and meeting with the court doctors this afternoon, or not contesting the hold and continuing the hold for one week. Per , pt's hold will be continued for one week (2/15/18).      Per pt, he has services through [facility]. His  is Araceli Lopez and his  is Heide Pagan (ph: [phone] fax: [phone] email: [email or web address]). JAVAD updated unit SW with above information for concurrent dc planning.     Plan: Pt's legal hold has been extended for one week. Pt awaiting transfer to an in patient psych facility.

## 2018-02-07 NOTE — PROGRESS NOTES
Psychiatry office was contacted and a message was left for physician to speak with patient about discharge placement. Will monitor.

## 2018-02-07 NOTE — PROGRESS NOTES
Patient setting up tray for dinner. Waiting for DM medication before eating. No c/o pain. Continue to monitor patient.

## 2018-02-07 NOTE — PROGRESS NOTES
[facility] Hospitalist Progress Note    Date of Service: 2/7/2018    Chief Complaint  57 y.o. male admitted 2/1/2018 with b/l amputee, IDDM, COPD, chronic left BKA stump infection with multiple courses of abx presented with purulent dc from left BKA stump sent by [facility] clinic MD to ER.   CT negative for abscess, OM, or gas formation.    Interval Problem Update  2/2:  Review of chart with LPS:  Does not need surgical consultation, but wound care at this time.  Patient states has never been fitted for a prosthetic, states told insurance would not cover.  He is to discuss this with LPS.   ambien for sleep, down graded his abx to unasyn IV and doxy po.  dc'd vanco IV.  Dry on exam medial aspect of prior surgical incision, no significant edema or erythema seen.  Unable to do wound culture, ordered today.  States no wound culture done at [facility] clinic or ER on admission.  Blood cultures x2 negative.  2/3:  Wound care consult recommended local wound care with dressing changes every other day.  Can change IV abx to po augmentin and doxycycline.   Legal hold extended per psychiatry.  C/o cough, nonproductive, lungs CTA b/l, smoker 1 ppd x several years.  Ordered albuterol inhaler q 4 hours prn and symbicort inh bid.  2/4:  Still with occasional dry cough, started robitussin prn.  Wound care dressing change tomorrow.  2/5:  Stable, medically cleared.  Finish abx x 7 days total.  No further infection, bandage to keep area from repeat irritation.  2/6: Awaiting inpatient psychiatry placement, labile blood sugar  2/7: No new events, blood sugar better controlled this a.m.  Encourage activity, pain controlled    Consultants/Specialty  LPS   Psychiatry-SI    Disposition  Likely at baseline physically wc bound, no prosthetic fittings yet.  TBD with legal hold for SI.    Finished 33 year custodial sentence 6 weeks ago, living in homeless shelter now, states has wanted to roll his w/c into traffic.  Feels no reason to live.  2/4:  Legal hold  remains.   Left BKA wound more irritation, now dry, no erythema.    : not medically cleared, taper off IV pain prescription    D/w staff    Review of Systems   Constitutional: Negative for chills, diaphoresis, fever and malaise/fatigue.   HENT: Negative for congestion.    Respiratory: Negative for hemoptysis and shortness of breath.    Cardiovascular: Negative for palpitations, claudication and leg swelling.   Gastrointestinal: Negative for abdominal pain, melena and vomiting.   Genitourinary: Negative for frequency and urgency.   Musculoskeletal: Positive for myalgias.   Skin: Negative for itching and rash.   Neurological: Negative for focal weakness and loss of consciousness.   Psychiatric/Behavioral: Positive for suicidal ideas. Negative for memory loss.      Physical Exam  Laboratory/Imaging   Hemodynamics  Temp (24hrs), Av.3 °C (97.3 °F), Min:36.2 °C (97.1 °F), Max:36.5 °C (97.7 °F)   Temperature: 36.2 °C (97.1 °F)  Pulse  Av.3  Min: 61  Max: 109    Blood Pressure: 119/72      Respiratory      Respiration: 17, Pulse Oximetry: 96 %             Fluids    Intake/Output Summary (Last 24 hours) at 18 1242  Last data filed at 18 0300   Gross per 24 hour   Intake              540 ml   Output             1000 ml   Net             -460 ml       Nutrition  Orders Placed This Encounter   Procedures   • Diet Order     Standing Status:   Standing     Number of Occurrences:   1     Order Specific Question:   Diet:     Answer:   Diabetic [3]     Comments:   fish free      Physical Exam   Constitutional: He is oriented to person, place, and time. He appears well-developed. No distress.   HENT:   Head: Normocephalic and atraumatic.   Mouth/Throat: No oropharyngeal exudate.   Eyes: EOM are normal. Pupils are equal, round, and reactive to light.   Neck: Normal range of motion. Neck supple.   Cardiovascular: Normal rate and regular rhythm.    Pulmonary/Chest: Effort normal. No respiratory distress. He  exhibits no tenderness.   Abdominal: Soft. Bowel sounds are normal. He exhibits no distension. There is no tenderness.   Musculoskeletal: Normal range of motion. He exhibits no edema or tenderness.   Rt AKA well healed.   Left BKA medial aspect of incision with minimal edema seen, no drainage, no palpable abscess, no dehiscence seen.   Neurological: He is alert and oriented to person, place, and time. No cranial nerve deficit.   Skin: Skin is warm and dry. He is not diaphoretic.   Psychiatric: He has a normal mood and affect. His behavior is normal. Judgment and thought content normal.   States he felt down yesterday about all his medical problems, b/l amputations etc.   Nursing note and vitals reviewed.                               Assessment/Plan     Sepsis (CMS-HCC)   Overview    Presumably secondary to cellulitis and infected wound to the left BKA stump  Simple sepsis without)organ  Damage  Will put the patient on full sepsis protocol including lactic acid checks  Follow blood culture     Assessment & Plan    This is NOT SEPSIS.  Does not meet criteria for sepsis.        Cellulitis- (present on admission)   Assessment & Plan    acute on chronic, complication of diabetes  Minimal area of cellulitis seen, CT negative, no further drainage, unable to culture wound on 2/2.  2/1 BCs negative x2  limb preservation service consult:  No ID or surgical consult need, likely localized wound care, check arterial pulses to ensure good vascular flow to area.  Sed rate and CRP elevated  Change unasyn IV to augmentin.  Wound care rec dressing changes qod.  2/5:  abx x 7days total. Dressing sleeve in place to prevent continued irritation.  2/6: still on iv pain rx, taper IV pain rx, cont oral  If off IV pain rx, medically cleared in am        Diabetes mellitus with neuropathy (HCC)- (present on admission)   Assessment & Plan    With hyperglycemia, labile blood sugars, improving  Continue glipizide,  home insulin and sliding  scale  Hypoglycemia protocol    on evening lantus 45 u nightly, and 65 in am        Cough due to bronchospasm- (present on admission)   Assessment & Plan    Smoker 1 ppd  CXR no infiltrate  2/3 ordered albuterol inhaler q 4 hours prn and symbicort inh bid.  2/4:  Robitussin prn cough if keeping up at night.        Current smoker- (present on admission)   Assessment & Plan    Nicotine patch   Smoked 1 ppd x several years.        Depression with suicidal ideation- (present on admission)   Overview    Patient was found to have low SAD score and had plans for suicide recently  He is placed on suicidal precautions, psychiatry consult will be requested     Assessment & Plan    2/1:  Admission stated he had plans for suicide.   1:1 sitter in room  Psychiatry consulted, legal hold initiated.        BPH (benign prostatic hyperplasia)- (present on admission)   Assessment & Plan    Continue tamsulosin and finasteride          Quality-Core Measures

## 2018-02-07 NOTE — CARE PLAN
Problem: Safety  Goal: Will remain free from injury  Outcome: PROGRESSING AS EXPECTED      Problem: Infection  Goal: Will remain free from infection  Outcome: PROGRESSING AS EXPECTED      Problem: Pain Management  Goal: Pain level will decrease to patient's comfort goal  Outcome: PROGRESSING AS EXPECTED      Problem: Mobility  Goal: Risk for activity intolerance will decrease  Outcome: PROGRESSING AS EXPECTED      Problem: Skin Integrity  Goal: Risk for impaired skin integrity will decrease  Outcome: PROGRESSING AS EXPECTED

## 2018-02-07 NOTE — PROGRESS NOTES
Report received by night shift nurse. Patient was hypoglycemic before report but currently resting comfortably and no longer hypoglycemic. Respiratory nonlabored and regular. No shortness of breath noted. AAOx4, with glasses. No nausea, vomiting, diarrhea overnight. Patient did c/o pain on the left leg. Administer pain medication per MAR. Will continue to monitor.

## 2018-02-07 NOTE — CARE PLAN
Problem: Infection  Goal: Will remain free from infection  Labs reviewed. Standard precautions in place. Dressing to LLE changed. CDI. Pt remains afebrile. Continue to assess.     Problem: Pain Management  Goal: Pain level will decrease to patient's comfort goal  Pain managed with current regimen. Pt reassessed regularly. Currently sleeping. No nonverbal indications of pain noted.

## 2018-02-07 NOTE — CONSULTS
Diabetes education: Pt has a hx of diabetes, per chart, on Glipizide, Regular sliding scale and Levermir at home.   Pt is currently on Glipizide 10  Mg BID, Lantus 45 units at HS ( just decreased from 50 units ) and 65 units in AM, with Regular sliding scale coverage ac and hs. Current blood sugars are :   51, 121 and  195 (1 unit) .   Order for education secondary to uncontrolled diabetes and blood sugars in the 300's. Pt is currently under a legal hold and per psychiatric note, pt more depressed today as well as continues to express hopelessness.  Plan: CDE will continue to follow blood sugars and meet with patient when appropriate. Please call [phone] if needs change.

## 2018-02-08 PROBLEM — R53.81 DEBILITY: Status: ACTIVE | Noted: 2018-02-08

## 2018-02-08 LAB
GLUCOSE BLD-MCNC: 124 MG/DL (ref 65–99)
GLUCOSE BLD-MCNC: 136 MG/DL (ref 65–99)
GLUCOSE BLD-MCNC: 206 MG/DL (ref 65–99)
GLUCOSE BLD-MCNC: 325 MG/DL (ref 65–99)
GLUCOSE BLD-MCNC: 56 MG/DL (ref 65–99)

## 2018-02-08 PROCEDURE — 700102 HCHG RX REV CODE 250 W/ 637 OVERRIDE(OP): Performed by: HOSPITALIST

## 2018-02-08 PROCEDURE — A9270 NON-COVERED ITEM OR SERVICE: HCPCS | Performed by: PSYCHIATRY & NEUROLOGY

## 2018-02-08 PROCEDURE — A9270 NON-COVERED ITEM OR SERVICE: HCPCS | Performed by: HOSPITALIST

## 2018-02-08 PROCEDURE — A9270 NON-COVERED ITEM OR SERVICE: HCPCS | Performed by: INTERNAL MEDICINE

## 2018-02-08 PROCEDURE — 700102 HCHG RX REV CODE 250 W/ 637 OVERRIDE(OP): Performed by: INTERNAL MEDICINE

## 2018-02-08 PROCEDURE — 770001 HCHG ROOM/CARE - MED/SURG/GYN PRIV*

## 2018-02-08 PROCEDURE — 82962 GLUCOSE BLOOD TEST: CPT | Mod: 91

## 2018-02-08 PROCEDURE — 700102 HCHG RX REV CODE 250 W/ 637 OVERRIDE(OP): Performed by: PSYCHIATRY & NEUROLOGY

## 2018-02-08 PROCEDURE — 99233 SBSQ HOSP IP/OBS HIGH 50: CPT | Performed by: INTERNAL MEDICINE

## 2018-02-08 RX ORDER — OXYCODONE HYDROCHLORIDE 5 MG/1
5 TABLET ORAL
Qty: 14 TAB | Refills: 0 | Status: SHIPPED | OUTPATIENT
Start: 2018-02-08 | End: 2018-02-10

## 2018-02-08 RX ORDER — ALBUTEROL SULFATE 90 UG/1
2 AEROSOL, METERED RESPIRATORY (INHALATION) EVERY 4 HOURS PRN
Qty: 8.5 G | Refills: 1 | Status: SHIPPED | OUTPATIENT
Start: 2018-02-08 | End: 2018-05-26

## 2018-02-08 RX ORDER — INSULIN GLARGINE 100 [IU]/ML
45 INJECTION, SOLUTION SUBCUTANEOUS EVERY EVENING
Qty: 10 ML | Refills: 1 | Status: SHIPPED | OUTPATIENT
Start: 2018-02-08 | End: 2018-02-09

## 2018-02-08 RX ORDER — AMOXICILLIN 250 MG
2 CAPSULE ORAL 2 TIMES DAILY
Qty: 30 TAB | Refills: 0 | Status: SHIPPED | OUTPATIENT
Start: 2018-02-08 | End: 2018-03-14

## 2018-02-08 RX ORDER — IBUPROFEN 600 MG/1
600 TABLET ORAL EVERY 6 HOURS PRN
Qty: 30 TAB | Refills: 1 | Status: ON HOLD | OUTPATIENT
Start: 2018-02-08 | End: 2018-03-23

## 2018-02-08 RX ORDER — INSULIN GLARGINE 100 [IU]/ML
65 INJECTION, SOLUTION SUBCUTANEOUS EVERY MORNING
Qty: 10 ML | Refills: 1 | Status: SHIPPED | OUTPATIENT
Start: 2018-02-09 | End: 2018-03-14

## 2018-02-08 RX ORDER — DOXYCYCLINE 100 MG/1
100 TABLET ORAL EVERY 12 HOURS
Qty: 2 TAB | Refills: 0 | Status: SHIPPED | OUTPATIENT
Start: 2018-02-08 | End: 2018-02-09

## 2018-02-08 RX ORDER — BUDESONIDE AND FORMOTEROL FUMARATE DIHYDRATE 160; 4.5 UG/1; UG/1
2 AEROSOL RESPIRATORY (INHALATION) 2 TIMES DAILY
Qty: 1 INHALER | Refills: 1 | Status: SHIPPED | OUTPATIENT
Start: 2018-02-08 | End: 2018-05-26

## 2018-02-08 RX ORDER — NICOTINE 21 MG/24HR
1 PATCH, TRANSDERMAL 24 HOURS TRANSDERMAL EVERY 24 HOURS
Qty: 30 PATCH | Refills: 1 | Status: SHIPPED | OUTPATIENT
Start: 2018-02-08 | End: 2018-03-14

## 2018-02-08 RX ORDER — AMOXICILLIN AND CLAVULANATE POTASSIUM 875; 125 MG/1; MG/1
1 TABLET, FILM COATED ORAL EVERY 12 HOURS
Qty: 2 TAB | Refills: 0 | Status: SHIPPED | OUTPATIENT
Start: 2018-02-08 | End: 2018-02-09

## 2018-02-08 RX ADMIN — INSULIN GLARGINE 45 UNITS: 100 INJECTION, SOLUTION SUBCUTANEOUS at 21:22

## 2018-02-08 RX ADMIN — AMOXICILLIN AND CLAVULANATE POTASSIUM 1 TABLET: 875; 125 TABLET, FILM COATED ORAL at 21:23

## 2018-02-08 RX ADMIN — DOXYCYCLINE 100 MG: 100 TABLET ORAL at 21:24

## 2018-02-08 RX ADMIN — DOXYCYCLINE 100 MG: 100 TABLET ORAL at 10:00

## 2018-02-08 RX ADMIN — AMOXICILLIN AND CLAVULANATE POTASSIUM 1 TABLET: 875; 125 TABLET, FILM COATED ORAL at 10:00

## 2018-02-08 RX ADMIN — AMLODIPINE BESYLATE 10 MG: 10 TABLET ORAL at 09:59

## 2018-02-08 RX ADMIN — BUDESONIDE AND FORMOTEROL FUMARATE DIHYDRATE 2 PUFF: 160; 4.5 AEROSOL RESPIRATORY (INHALATION) at 21:32

## 2018-02-08 RX ADMIN — HYDRALAZINE HYDROCHLORIDE 25 MG: 50 TABLET ORAL at 14:52

## 2018-02-08 RX ADMIN — FINASTERIDE 5 MG: 5 TABLET, FILM COATED ORAL at 10:00

## 2018-02-08 RX ADMIN — TRIAMTERENE AND HYDROCHLOROTHIAZIDE 1 CAPSULE: 25; 37.5 CAPSULE ORAL at 10:00

## 2018-02-08 RX ADMIN — INSULIN HUMAN 2 UNITS: 100 INJECTION, SOLUTION PARENTERAL at 16:39

## 2018-02-08 RX ADMIN — FAMOTIDINE 20 MG: 20 TABLET, FILM COATED ORAL at 21:24

## 2018-02-08 RX ADMIN — INSULIN GLARGINE 65 UNITS: 100 INJECTION, SOLUTION SUBCUTANEOUS at 06:28

## 2018-02-08 RX ADMIN — GLIPIZIDE 10 MG: 5 TABLET ORAL at 10:00

## 2018-02-08 RX ADMIN — INSULIN HUMAN 4 UNITS: 100 INJECTION, SOLUTION PARENTERAL at 21:26

## 2018-02-08 RX ADMIN — PREGABALIN 25 MG: 25 CAPSULE ORAL at 10:00

## 2018-02-08 RX ADMIN — THERA TABS 1 TABLET: TAB at 09:59

## 2018-02-08 RX ADMIN — GLIPIZIDE 10 MG: 5 TABLET ORAL at 21:25

## 2018-02-08 RX ADMIN — MIRTAZAPINE 45 MG: 15 TABLET, FILM COATED ORAL at 21:24

## 2018-02-08 RX ADMIN — PREGABALIN 25 MG: 25 CAPSULE ORAL at 21:24

## 2018-02-08 RX ADMIN — ZOLPIDEM TARTRATE 5 MG: 5 TABLET, FILM COATED ORAL at 21:32

## 2018-02-08 RX ADMIN — PREGABALIN 25 MG: 25 CAPSULE ORAL at 15:00

## 2018-02-08 RX ADMIN — HYDRALAZINE HYDROCHLORIDE 25 MG: 50 TABLET ORAL at 05:30

## 2018-02-08 RX ADMIN — TAMSULOSIN HYDROCHLORIDE 0.4 MG: 0.4 CAPSULE ORAL at 09:59

## 2018-02-08 RX ADMIN — NICOTINE TRANSDERMAL SYSTEM 21 MG: 21 PATCH, EXTENDED RELEASE TRANSDERMAL at 05:33

## 2018-02-08 RX ADMIN — OXYCODONE HYDROCHLORIDE 5 MG: 5 TABLET ORAL at 21:32

## 2018-02-08 RX ADMIN — HYDRALAZINE HYDROCHLORIDE 25 MG: 50 TABLET ORAL at 21:22

## 2018-02-08 RX ADMIN — CITALOPRAM HYDROBROMIDE 40 MG: 20 TABLET ORAL at 09:59

## 2018-02-08 RX ADMIN — OXYCODONE HYDROCHLORIDE 5 MG: 5 TABLET ORAL at 14:52

## 2018-02-08 RX ADMIN — OXYCODONE HYDROCHLORIDE 5 MG: 5 TABLET ORAL at 05:31

## 2018-02-08 RX ADMIN — ASPIRIN 81 MG: 81 TABLET, COATED ORAL at 10:00

## 2018-02-08 RX ADMIN — BUDESONIDE AND FORMOTEROL FUMARATE DIHYDRATE 2 PUFF: 160; 4.5 AEROSOL RESPIRATORY (INHALATION) at 09:58

## 2018-02-08 RX ADMIN — FAMOTIDINE 20 MG: 20 TABLET, FILM COATED ORAL at 10:00

## 2018-02-08 ASSESSMENT — ENCOUNTER SYMPTOMS
FOCAL WEAKNESS: 0
PALPITATIONS: 0
MYALGIAS: 1
DEPRESSION: 0
WEAKNESS: 0
CHILLS: 0
SHORTNESS OF BREATH: 0
FEVER: 0
NAUSEA: 0
ABDOMINAL PAIN: 0
HEMOPTYSIS: 0

## 2018-02-08 ASSESSMENT — PAIN SCALES - GENERAL
PAINLEVEL_OUTOF10: 6
PAINLEVEL_OUTOF10: 5
PAINLEVEL_OUTOF10: 5
PAINLEVEL_OUTOF10: 7
PAINLEVEL_OUTOF10: 5
PAINLEVEL_OUTOF10: 8
PAINLEVEL_OUTOF10: 7

## 2018-02-08 NOTE — PSYCHIATRY
"PSYCHIATRIC FOLLOW UP:  Seen yesterday.   Reason for Admission: LE cellulitis of stump. Depression and hopelessness.  Legal hold status:  +, now dc'd     Smiling, hopeful. Says  talked to him about placement a \"[facility]\". Not SI/HI.    Psychiatric Examination: observed phenomenon:  Vitals:Blood pressure 122/73, pulse 77, temperature 36.7 °C (98 °F), resp. rate 16, weight 96 kg (211 lb 10.3 oz), SpO2 95 %.  Musculoskeletal(abnormal movements, gait, etc): B BKA . None observed.  Appearance:double BKA, good eye contact, clean, conversant  Thoughts:linear,  no psychosis  Speech:wnl  Mood: \"okay\"  Affect: euthymic  SI/HI: denies  Attention/Alertness: intact    Memory: intact  Orientation:x 4  Fund of Knowledge:  Not tested    Insight/Judgement into symptoms: good     Assessment:(acuity level)  Major Depression recurrent without psychosis, mild to moderate: R/O adjustment disorder: very improved   R/O PTSD : not active             Plan:   legal hold:dc. Will need script on dc.   Signing off            "

## 2018-02-08 NOTE — DISCHARGE SUMMARY
CHIEF COMPLAINT ON ADMISSION  Chief Complaint   Patient presents with   • Wound Infection     BIB [facility] from [facility]. Sent by clinitian per infected wound L amputation stump. L stump swollen to knee.  Pt is double amputee, IIDM. pt seen previously for wound, on oral antibiotics.  Has had fevers.       CODE STATUS  Full Code    HPI & HOSPITAL COURSE  This is a 57 y.o. year old male here with b/l amputee, IDDM, COPD, chronic left BKA stump infection with multiple courses of abx presented with purulent dc from left BKA stump sent by [facility] MD to ER.   CT negative for abscess, OM, or gas formation.    Patient was last seen by limb preservation services as well as wound care. They're the wound appears dry with no discharge. No wound cultures were collected. Blood cultures and urine cultures remain negative. Patient was taken off of IV antibiotics and transitioned to oral antibiotics for cellulitis.  He has completed the course of antibiotics.  Patient's erythema appears to be improving.  He will need to follow up with wound care for his stump infection.    At this time, infectious disease and surgery were not consulted. Patient does report pain requiring IV pain medication which has improved. We have tapered patient off of IV pain medication. We have encouraged non-narcotic use of pain control.    During this admission patient does have a known history of major depression with adjustment disorder. He was seen by psychiatry and placed on legal hold. Psychiatry has continued to follow along with patient during this admission. At this time patient has no further suicidal or homicidal ideation. Patient has been discharged from legal hold.     Patient states that he lives alone. He is demonstrating appropriate ability for self transfer to wheelchair.  He appears independent with his care needs.    Patient has a low risk for abuse and overdose of pain prescription.  We have encourage continued tapering of oral  oxycodone use.  He is advised to use motrin as needed.  He will be discharged to home with a 5 day prescription of oxycodone prn.    Therefore, he is discharged in good and stable condition.    SPECIFIC OUTPATIENT FOLLOW-UP  Primary care provider/discharge clinic in one week  Psychiatry as scheduled  Orthopedic surgery as scheduled  Augmentin and doxycycline ×1 day    DISCHARGE PROBLEM LIST  Active Problems:    Cellulitis POA: Yes    Sepsis (CMS-HCC) POA: No      Overview: Presumably secondary to cellulitis and infected wound to the left BKA       stump      Simple sepsis without)organ  Damage      Will put the patient on full sepsis protocol including lactic acid checks      Follow blood culture    Diabetes mellitus with neuropathy ([facility]) POA: Yes    BPH (benign prostatic hyperplasia) POA: Yes    Depression with suicidal ideation POA: Yes      Overview: Patient was found to have low SAD score and had plans for suicide recently      He is placed on suicidal precautions, psychiatry consult will be requested    Current smoker POA: Yes    Cough due to bronchospasm POA: Yes  Resolved Problems:    * No resolved hospital problems. *      FOLLOW UP  No future appointments.  No follow-up provider specified.   Wound care in 2 days      MEDICATIONS ON DISCHARGE   Jv Angelo   Home Medication Instructions AGUSTO:[id number]    Printed on:02/09/18 1013   Medication Information                      albuterol 108 (90 Base) MCG/ACT Aero Soln inhalation aerosol  Inhale 2 Puffs by mouth every four hours as needed for Shortness of Breath.             amlodipine (NORVASC) 10 MG Tab  Take 1 Tab by mouth every day.             aspirin 81 MG tablet  Take 81 mg by mouth every day.             budesonide-formoterol (SYMBICORT) 160-4.5 MCG/ACT Aerosol  Inhale 2 Puffs by mouth 2 Times a Day.             citalopram (CELEXA) 20 MG Tab  Take 1 Tab by mouth every day.             finasteride (PROSCAR) 5 MG Tab  Take 1 Tab by mouth every day.              glipiZIDE (GLUCOTROL) 10 MG Tab  Take 1 Tab by mouth 2 times a day.             hydrALAZINE (APRESOLINE) 25 MG Tab  Take 1 Tab by mouth every 8 hours.             ibuprofen (MOTRIN) 600 MG Tab  Take 1 Tab by mouth every 6 hours as needed for Moderate Pain.             insulin glargine (LANTUS) 100 UNIT/ML Solution  Inject 65 Units as instructed every morning.             insulin glargine (LANTUS) 100 UNIT/ML Solution  Inject 70 Units as instructed every morning.             insulin regular human (HUMULIN/NOVOLIN R)  Inject 0-15 Units as instructed 2 Times a Day. 150-200=4 units  201-250=6 units  251-300=10 units  301-350=12 units  351-400=15 units             mirtazapine (REMERON) 45 MG tablet  Take 1 Tab by mouth every bedtime.             multivitamin (THERAGRAN) Tab  Take 1 Tab by mouth every day.             nicotine (NICODERM) 21 MG/24HR PATCH 24 HR  Apply 1 Patch to skin as directed every 24 hours.                        oxyCODONE immediate-release (ROXICODONE) 5 MG Tab  Take 0.5 Tabs by mouth every 6 hours as needed (Moderate Pain (NRS Pain Scale 4-6; CPOT Pain Scale 3-5)) for up to 5 days.             pregabalin (LYRICA) 25 MG Cap  Take 25 mg by mouth 3 times a day.             ranitidine (ZANTAC) 150 MG Tab  Take 1 Tab by mouth 2 times a day.             senna-docusate (PERICOLACE OR SENOKOT S) 8.6-50 MG Tab  Take 2 Tabs by mouth 2 Times a Day.             tamsulosin (FLOMAX) 0.4 MG capsule  Take 1 Cap by mouth ONE-HALF HOUR AFTER BREAKFAST.             triamterene/hctz (MAXZIDE-25/DYAZIDE) 37.5-25 MG Cap  Take 1 Cap by mouth every morning.                   DIET  Orders Placed This Encounter   Procedures   • Diet Order     Standing Status:   Standing     Number of Occurrences:   1     Order Specific Question:   Diet:     Answer:   Diabetic [3]     Comments:   fish free       ACTIVITY  As tolerated and directed by skilled nursing.  Class 3 - comfortable at rest but have symptoms with less-than-ordinary  effort.    LINES, DRAINS, AND WOUNDS  This is an automated list. Peripheral IVs will be removed prior to discharge.  PIV Group Left Antecubital 18g Flexible Catheter (Active)   Line Secured Taped;Transparent 2/7/2018  8:30 PM   Site Condition / Description Assessed;Patent;Clean;Dry;Intact 2/7/2018  8:30 PM   Dressing Type / Description Transparent;Clean;Dry;Intact 2/7/2018  8:30 PM   Dressing Status Observed 2/7/2018  8:30 PM   Saline Locked Yes 2/7/2018  8:30 PM   Infiltration Grading Used by [facility] 0 2/7/2018  8:30 PM   Phlebitis Scale (Used by [facility]) 0 2/7/2018  8:30 PM       Wound POA Diabetic Ulcer Leg Left Distal (Active)   Wound Bed Other (comment) 2/7/2018  8:00 PM   Drainage  None 2/7/2018  8:00 PM   Periwound Skin Normal;Intact;Edematous 2/7/2018  8:00 AM   Cleansing Not Applicable 2/7/2018  8:00 PM   Dressing Options Foam;Roll Gauze;Elastic Wrap;Silver Powder 2/7/2018  8:00 PM   Dressing Status / Change Clean;Dry;Intact;Observed 2/7/2018  8:00 PM   Dressing Cleansing/Solutions Not Applicable 2/6/2018  8:30 PM   Length (cm) 1.2 2/3/2018  2:00 PM   Width (cm) 1.2 2/3/2018  2:00 PM   Weekly Photo (Inpatient Only) 02/07/18 2/7/2018  1:00 AM   Dressing Change Frequency Every 48 hrs 2/7/2018  8:00 PM   Next Dressing Change  02/09/18 2/7/2018  8:00 PM   Protocol Orders Initiated Yes 2/6/2018  8:00 AM   Time Spent with Patient (mins) 60 2/3/2018  2:00 PM                  MENTAL STATUS ON TRANSFER  Level of Consciousness: Alert          CONSULTATIONS  Limb preservation services    PROCEDURES  None    LABORATORY  Lab Results   Component Value Date/Time    SODIUM 131 (L) 02/02/2018 02:38 AM    POTASSIUM 4.8 02/02/2018 02:38 AM    CHLORIDE 98 02/02/2018 02:38 AM    CO2 25 02/02/2018 02:38 AM    GLUCOSE 266 (H) 02/02/2018 02:38 AM    BUN 12 02/02/2018 02:38 AM    CREATININE 0.84 02/02/2018 02:38 AM        Lab Results   Component Value Date/Time    WBC 8.8 02/02/2018 02:38 AM    HEMOGLOBIN 12.7 (L) 02/02/2018  02:38 AM    HEMATOCRIT 39.8 (L) 02/02/2018 02:38 AM    PLATELETCT 332 02/02/2018 02:38 AM        Total time of the discharge process exceeds 45 minutes.

## 2018-02-08 NOTE — CARE PLAN

## 2018-02-08 NOTE — DISCHARGE PLANNING
Pt was discussed during rounds. MD anticipates he will DC tomorrow. PT/OT ordered for any safety or DME needs.     JAVAD left message for Case management at [facility] to determine what services he has in place.

## 2018-02-08 NOTE — PROGRESS NOTES
[facility] Hospitalist Progress Note    Date of Service: 2/8/2018    Chief Complaint  57 y.o. male admitted 2/1/2018 with b/l amputee, IDDM, COPD, chronic left BKA stump infection with multiple courses of abx presented with purulent dc from left BKA stump sent by [facility] MD to ER.   CT negative for abscess, OM, or gas formation.    Interval Problem Update  2/2:  Review of chart with LPS:  Does not need surgical consultation, but wound care at this time.  Patient states has never been fitted for a prosthetic, states told insurance would not cover.  He is to discuss this with LPS.   ambien for sleep, down graded his abx to unasyn IV and doxy po.  dc'd vanco IV.  Dry on exam medial aspect of prior surgical incision, no significant edema or erythema seen.  Unable to do wound culture, ordered today.  States no wound culture done at [facility] clinic or ER on admission.  Blood cultures x2 negative.  2/3:  Wound care consult recommended local wound care with dressing changes every other day.  Can change IV abx to po augmentin and doxycycline.   Legal hold extended per psychiatry.  C/o cough, nonproductive, lungs CTA b/l, smoker 1 ppd x several years.  Ordered albuterol inhaler q 4 hours prn and symbicort inh bid.  2/4:  Still with occasional dry cough, started robitussin prn.  Wound care dressing change tomorrow.  2/5:  Stable, medically cleared.  Finish abx x 7 days total.  No further infection, bandage to keep area from repeat irritation.  2/6: Awaiting inpatient psychiatry placement, labile blood sugar  2/7: No new events, blood sugar better controlled this a.m.  Encourage activity, pain controlled    2/8: pain controlled, transferring to wheelchair with ease, ongoing pain    Consultants/Specialty  LPS   Psychiatry-SI    Disposition  Likely at baseline physically wc bound, no prosthetic fittings yet.  TBD with legal hold for SI.    Finished 33 year jail sentence 6 weeks ago, living in homeless shelter now, states has  wanted to roll his w/c into traffic.  Feels no reason to live.  :  Legal hold remains.   Left BKA wound more irritation, now dry, no erythema.    : not medically cleared, taper off IV pain prescription    D/w staff    Review of Systems   Constitutional: Negative for chills and fever.   HENT: Negative for congestion and hearing loss.    Respiratory: Negative for hemoptysis and shortness of breath.    Cardiovascular: Negative for chest pain and palpitations.   Gastrointestinal: Negative for abdominal pain and nausea.   Genitourinary: Negative for dysuria.   Musculoskeletal: Positive for myalgias.   Neurological: Negative for focal weakness and weakness.   Psychiatric/Behavioral: Positive for suicidal ideas. Negative for depression.      Physical Exam  Laboratory/Imaging   Hemodynamics  Temp (24hrs), Av.7 °C (98 °F), Min:36.6 °C (97.8 °F), Max:36.8 °C (98.2 °F)   Temperature: 36.7 °C (98 °F)  Pulse  Av.3  Min: 61  Max: 109    Blood Pressure: 122/73      Respiratory      Respiration: 16, Pulse Oximetry: 95 %             Fluids    Intake/Output Summary (Last 24 hours) at 18 1438  Last data filed at 18 0400   Gross per 24 hour   Intake              440 ml   Output                0 ml   Net              440 ml       Nutrition  Orders Placed This Encounter   Procedures   • Diet Order     Standing Status:   Standing     Number of Occurrences:   1     Order Specific Question:   Diet:     Answer:   Diabetic [3]     Comments:   fish free      Physical Exam   Constitutional: He is oriented to person, place, and time. He appears well-developed. No distress.   HENT:   Head: Normocephalic and atraumatic.   Eyes: EOM are normal. Pupils are equal, round, and reactive to light. No scleral icterus.   Neck: Normal range of motion.   Cardiovascular: Normal rate and regular rhythm.    Pulmonary/Chest: Effort normal. No respiratory distress. He has no wheezes.   Abdominal: Soft. Bowel sounds are normal. He  exhibits no distension. There is no tenderness.   Musculoskeletal: Normal range of motion. He exhibits no edema or tenderness.   Rt AKA well healed.   Left BKA medial aspect of incision with minimal edema seen, no drainage, no palpable abscess, no dehiscence seen.   Neurological: He is alert and oriented to person, place, and time. No cranial nerve deficit. Coordination normal.   Skin: Skin is warm and dry. No rash noted. No erythema.   Psychiatric: He has a normal mood and affect. His behavior is normal. Judgment and thought content normal.   States he felt down yesterday about all his medical problems, b/l amputations etc.   Nursing note and vitals reviewed.                               Assessment/Plan     Sepsis (CMS-HCC)   Overview    Presumably secondary to cellulitis and infected wound to the left BKA stump  Simple sepsis without)organ  Damage  Will put the patient on full sepsis protocol including lactic acid checks  Follow blood culture     Assessment & Plan    This is NOT SEPSIS.  Does not meet criteria for sepsis.        Cellulitis- (present on admission)   Assessment & Plan    acute on chronic, complication of diabetes  Minimal area of cellulitis seen, CT negative, no further drainage, unable to culture wound on 2/2.  2/1 BCs negative x2  limb preservation service consult:  No ID or surgical consult need, likely localized wound care, check arterial pulses to ensure good vascular flow to area.  Sed rate and CRP elevated  Change unasyn IV to augmentin.  Wound care rec dressing changes qod.  2/5:  abx x 7days total. Dressing sleeve in place to prevent continued irritation.  2/6: still on iv pain rx, taper IV pain rx, cont oral  If off IV pain rx, medically cleared in am  2/8: Patient demonstrating self transfer to wheelchair  Reports black lower extremity stump pain, improved        Diabetes mellitus with neuropathy (HCC)- (present on admission)   Assessment & Plan    With hyperglycemia, labile blood  sugars, improving  Continue glipizide,  home insulin and sliding scale  Hypoglycemia protocol    on evening lantus 45 u nightly, and 65 in am        Debility   Assessment & Plan    History of bilateral BKA  Appears to be near baseline function  PT/OT evaluation          Cough due to bronchospasm- (present on admission)   Assessment & Plan    Smoker 1 ppd  CXR no infiltrate  2/3 ordered albuterol inhaler q 4 hours prn and symbicort inh bid.  2/4:  Robitussin prn cough if keeping up at night.        Current smoker- (present on admission)   Assessment & Plan    Nicotine patch   Smoked 1 ppd x several years.        Depression with suicidal ideation- (present on admission)   Overview    Patient was found to have low SAD score and had plans for suicide recently  He is placed on suicidal precautions, psychiatry consult will be requested     Assessment & Plan    2/1:  Admission stated he had plans for suicide.   1:1 sitter in room  Psychiatry consulted, legal hold initiated.        BPH (benign prostatic hyperplasia)- (present on admission)   Assessment & Plan    Continue tamsulosin and finasteride          Quality-Core Measures

## 2018-02-08 NOTE — CARE PLAN
Problem: Pain Management  Goal: Pain level will decrease to patient's comfort goal  Discussed discontinuing IV pain medications and using only oral PRN. Pt explained he was in severe 8/10 pain to leg stump. Administered morphine 1mg with relief. Will continue to educate pt and encourage only oral medications as needed.     Problem: Mobility  Goal: Risk for activity intolerance will decrease  Pt up to wheelchair with minimal assist. Travels through unit. Encouraged to get OOB often throughout day.

## 2018-02-09 VITALS
DIASTOLIC BLOOD PRESSURE: 65 MMHG | SYSTOLIC BLOOD PRESSURE: 118 MMHG | TEMPERATURE: 97.5 F | OXYGEN SATURATION: 97 % | HEART RATE: 84 BPM | BODY MASS INDEX: 28.7 KG/M2 | WEIGHT: 211.64 LBS | RESPIRATION RATE: 16 BRPM

## 2018-02-09 LAB
GLUCOSE BLD-MCNC: 55 MG/DL (ref 65–99)
GLUCOSE BLD-MCNC: 84 MG/DL (ref 65–99)

## 2018-02-09 PROCEDURE — 99239 HOSP IP/OBS DSCHRG MGMT >30: CPT | Performed by: INTERNAL MEDICINE

## 2018-02-09 PROCEDURE — 82962 GLUCOSE BLOOD TEST: CPT

## 2018-02-09 PROCEDURE — 700102 HCHG RX REV CODE 250 W/ 637 OVERRIDE(OP): Performed by: INTERNAL MEDICINE

## 2018-02-09 PROCEDURE — A9270 NON-COVERED ITEM OR SERVICE: HCPCS | Performed by: INTERNAL MEDICINE

## 2018-02-09 PROCEDURE — A9270 NON-COVERED ITEM OR SERVICE: HCPCS | Performed by: PSYCHIATRY & NEUROLOGY

## 2018-02-09 PROCEDURE — 700102 HCHG RX REV CODE 250 W/ 637 OVERRIDE(OP): Performed by: HOSPITALIST

## 2018-02-09 PROCEDURE — 700102 HCHG RX REV CODE 250 W/ 637 OVERRIDE(OP): Performed by: PSYCHIATRY & NEUROLOGY

## 2018-02-09 PROCEDURE — A9270 NON-COVERED ITEM OR SERVICE: HCPCS | Performed by: HOSPITALIST

## 2018-02-09 RX ORDER — INSULIN GLARGINE 100 [IU]/ML
70 INJECTION, SOLUTION SUBCUTANEOUS
Status: DISCONTINUED | OUTPATIENT
Start: 2018-02-10 | End: 2018-02-09 | Stop reason: HOSPADM

## 2018-02-09 RX ORDER — OXYCODONE HYDROCHLORIDE 5 MG/1
2.5 TABLET ORAL EVERY 6 HOURS PRN
Qty: 10 TAB | Refills: 0 | Status: SHIPPED | OUTPATIENT
Start: 2018-02-09 | End: 2018-02-14

## 2018-02-09 RX ORDER — INSULIN GLARGINE 100 [IU]/ML
70 INJECTION, SOLUTION SUBCUTANEOUS EVERY MORNING
Qty: 10 ML | Refills: 1 | Status: SHIPPED | OUTPATIENT
Start: 2018-02-10 | End: 2018-03-14

## 2018-02-09 RX ORDER — OXYCODONE HYDROCHLORIDE 5 MG/1
5 TABLET ORAL EVERY 6 HOURS PRN
Status: DISCONTINUED | OUTPATIENT
Start: 2018-02-09 | End: 2018-02-09 | Stop reason: HOSPADM

## 2018-02-09 RX ORDER — OXYCODONE HYDROCHLORIDE 5 MG/1
2.5 TABLET ORAL
Status: DISCONTINUED | OUTPATIENT
Start: 2018-02-09 | End: 2018-02-09 | Stop reason: HOSPADM

## 2018-02-09 RX ADMIN — THERA TABS 1 TABLET: TAB at 07:24

## 2018-02-09 RX ADMIN — GLIPIZIDE 10 MG: 5 TABLET ORAL at 07:23

## 2018-02-09 RX ADMIN — BUDESONIDE AND FORMOTEROL FUMARATE DIHYDRATE 2 PUFF: 160; 4.5 AEROSOL RESPIRATORY (INHALATION) at 08:00

## 2018-02-09 RX ADMIN — TAMSULOSIN HYDROCHLORIDE 0.4 MG: 0.4 CAPSULE ORAL at 07:23

## 2018-02-09 RX ADMIN — INSULIN GLARGINE 65 UNITS: 100 INJECTION, SOLUTION SUBCUTANEOUS at 06:16

## 2018-02-09 RX ADMIN — DOXYCYCLINE 100 MG: 100 TABLET ORAL at 07:23

## 2018-02-09 RX ADMIN — AMOXICILLIN AND CLAVULANATE POTASSIUM 1 TABLET: 875; 125 TABLET, FILM COATED ORAL at 07:23

## 2018-02-09 RX ADMIN — ASPIRIN 81 MG: 81 TABLET, COATED ORAL at 07:24

## 2018-02-09 RX ADMIN — NICOTINE TRANSDERMAL SYSTEM 21 MG: 21 PATCH, EXTENDED RELEASE TRANSDERMAL at 06:37

## 2018-02-09 RX ADMIN — PREGABALIN 25 MG: 25 CAPSULE ORAL at 07:23

## 2018-02-09 RX ADMIN — FAMOTIDINE 20 MG: 20 TABLET, FILM COATED ORAL at 07:24

## 2018-02-09 RX ADMIN — FINASTERIDE 5 MG: 5 TABLET, FILM COATED ORAL at 07:23

## 2018-02-09 RX ADMIN — TRIAMTERENE AND HYDROCHLOROTHIAZIDE 1 CAPSULE: 25; 37.5 CAPSULE ORAL at 07:23

## 2018-02-09 RX ADMIN — CITALOPRAM HYDROBROMIDE 40 MG: 20 TABLET ORAL at 07:24

## 2018-02-09 RX ADMIN — HYDRALAZINE HYDROCHLORIDE 25 MG: 50 TABLET ORAL at 06:37

## 2018-02-09 ASSESSMENT — PAIN SCALES - GENERAL: PAINLEVEL_OUTOF10: 7

## 2018-02-09 NOTE — DISCHARGE PLANNING
Transport arranged with Radha from [facility]. Patient will be leaving today @1100 going to [facility]. [facility] notified of patient wheelchair. JAVAD Valentine notified.

## 2018-02-09 NOTE — DISCHARGE INSTRUCTIONS
Discharge Instructions    Discharged to [facility] by medical transportation with escort. Discharged via wheelchair, hospital escort: Yes.  Special equipment needed: Not Applicable    Be sure to schedule a follow-up appointment with your primary care doctor or any specialists as instructed:  See your primary care doctor within 2 weeks  See wound care specialist within 2 days    Discharge Plan:   Smoking Cessation Offered: Patient Refused  Influenza Vaccine Indication: Not indicated: Previously immunized this influenza season and > 8 years of age    I understand that a diet low in cholesterol, fat, and sodium is recommended for good health. Unless I have been given specific instructions below for another diet, I accept this instruction as my diet prescription.   Other diet: Diabetic diet as tolerated    Special Instructions: Sepsis, Adult  Sepsis is a serious infection of your blood or tissues that affects your whole body. The infection that causes sepsis may be bacterial, viral, fungal, or parasitic. Sepsis may be life threatening. Sepsis can cause your blood pressure to drop. This may result in shock. Shock causes your central nervous system and your organs to stop working correctly.   RISK FACTORS  Sepsis can happen in anyone, but it is more likely to happen in people who have weakened immune systems.  SIGNS AND SYMPTOMS   Symptoms of sepsis can include:  · Fever or low body temperature (hypothermia).  · Rapid breathing (hyperventilation).  · Chills.  · Rapid heartbeat (tachycardia).  · Confusion or light-headedness.  · Trouble breathing.  · Urinating much less than usual.  · Cool, clammy skin or red, flushed skin.  · Other problems with the heart, kidneys, or brain.  DIAGNOSIS   Your health care provider will likely do tests to look for an infection, to see if the infection has spread to your blood, and to see how serious your condition is. Tests can include:  · Blood tests, including cultures of  your blood.  · Cultures of other fluids from your body, such as:  ¨ Urine.  ¨ Pus from wounds.  ¨ Mucus coughed up from your lungs.  · Urine tests other than cultures.  · X-ray exams or other imaging tests.  TREATMENT   Treatment will begin with elimination of the source of infection. If your sepsis is likely caused by a bacterial or fungal infection, you will be given antibiotic or antifungal medicines.  You may also receive:  · Oxygen.  · Fluids through an IV tube.  · Medicines to increase your blood pressure.  · A machine to clean your blood (dialysis) if your kidneys fail.  · A machine to help you breathe if your lungs fail.  SEEK IMMEDIATE MEDICAL CARE IF:  You get an infection or develop any of the signs and symptoms of sepsis after surgery or a hospitalization.     This information is not intended to replace advice given to you by your health care provider. Make sure you discuss any questions you have with your health care provider.     Document Released: 09/15/2004 Document Revised: 05/03/2016 Document Reviewed: 08/25/2014  [employer] Interactive Patient Education ©2016 [employer]      Cellulitis  Cellulitis is an infection of the skin and the tissue under the skin. The infected area is usually red and tender. This happens most often in the arms and lower legs.  HOME CARE   · Take your antibiotic medicine as told. Finish the medicine even if you start to feel better.  · Keep the infected arm or leg raised (elevated).  · Put a warm cloth on the area up to 4 times per day.  · Only take medicines as told by your doctor.  · Keep all doctor visits as told.  GET HELP IF:  · You see red streaks on the skin coming from the infected area.  · Your red area gets bigger or turns a dark color.  · Your bone or joint under the infected area is painful after the skin heals.  · Your infection comes back in the same area or different area.  · You have a puffy (swollen) bump in the infected area.  · You have new  symptoms.  · You have a fever.  GET HELP RIGHT AWAY IF:   · You feel very sleepy.  · You throw up (vomit) or have watery poop (diarrhea).  · You feel sick and have muscle aches and pains.  MAKE SURE YOU:   · Understand these instructions.  · Will watch your condition.  · Will get help right away if you are not doing well or get worse.     This information is not intended to replace advice given to you by your health care provider. Make sure you discuss any questions you have with your health care provider.     Document Released: 06/05/2009 Document Revised: 01/08/2016 Document Reviewed: 03/04/2013  [employer] Interactive Patient Education ©2016 [employer] Inc.      · Is patient discharged on Warfarin / Coumadin?   No     Depression / Suicide Risk    As you are discharged from this [facility] facility, it is important to learn how to keep safe from harming yourself.    Recognize the warning signs:  · Abrupt changes in personality, positive or negative- including increase in energy   · Giving away possessions  · Change in eating patterns- significant weight changes-  positive or negative  · Change in sleeping patterns- unable to sleep or sleeping all the time   · Unwillingness or inability to communicate  · Depression  · Unusual sadness, discouragement and loneliness  · Talk of wanting to die  · Neglect of personal appearance   · Rebelliousness- reckless behavior  · Withdrawal from people/activities they love  · Confusion- inability to concentrate     If you or a loved one observes any of these behaviors or has concerns about self-harm, here's what you can do:  · Talk about it- your feelings and reasons for harming yourself  · Remove any means that you might use to hurt yourself (examples: pills, rope, extension cords, firearm)  · Get professional help from the community (Mental Health, Substance Abuse, psychological counseling)  · Do not be alone:Call your Safe Contact- someone whom you trust who will be there for  you.  · Call your local CRISIS HOTLINE 282-5470 or 369-145-8388  · Call your local Children's Mobile Crisis Response Team [place] (730) 802-2187 or www.[employer]  · Call the toll free National Suicide Prevention Hotlines   · National Suicide Prevention Lifeline 884-200-ZUBT (9850)  · National People Pattern Line Network 800-SUICIDE (412-3316)

## 2018-02-09 NOTE — PROGRESS NOTES
Pt dc'd to [facility] via taxi. IV removed. Pt left unit via own WC with RN escort. Personal belongings, clothing, home meds with pt when leaving unit. Pt given discharge instructions prior to leaving unit including prescription and when to visit with physician; verbalizes understanding. Copy of discharge instructions with pt and in the chart.    Unable to locate pt belongings which were confiscated by security upon admit; Gray, from security dept, will cont to search and f/u today and staff will contact pt w update; Pt agreeable to this

## 2018-02-09 NOTE — CARE PLAN
Problem: Discharge Barriers/Planning  Goal: Patient's continuum of care needs will be met  Outcome: PROGRESSING AS EXPECTED  DC pending [facility] f/u on outpatient services (wound care, HH, etc)    Problem: Pain Management  Goal: Pain level will decrease to patient's comfort goal  Outcome: PROGRESSING AS EXPECTED  Pt c/o pain to L stump but states its manageable and declines intervention; Pt says he will call when/if intervention desired

## 2018-02-09 NOTE — DISCHARGE PLANNING
Medical Social Work    Referral: Court order    Intervention: SW received copy of filed extension order. Copy sent to unit SW to place in pt's chart. Pt's legal hold was released yesterday.    Plan: Pt no longer on a legal hold.

## 2018-02-09 NOTE — DISCHARGE PLANNING
"JAVAD spoke to Araceli with [facility]. She reports they have a bed ready for pt for \"recooperative care\"    JAVAD notified bedside RN of 11:00 transport to [facility].   "

## 2018-02-09 NOTE — CARE PLAN
Problem: Pain Management  Goal: Pain level will decrease to patient's comfort goal  Outcome: PROGRESSING AS EXPECTED  Pt medicated per MAR, reports adequate pain relief    Problem: Mobility  Goal: Risk for activity intolerance will decrease  Outcome: PROGRESSING AS EXPECTED  Pt transfers self to , up for meals

## 2018-02-09 NOTE — PROGRESS NOTES
Received shift report from noc RN and assumed care of this pt at 0715. Pt AOx4, calm, sitting up in bed. Pt reports pain is 6/10 to L sx site. Pt is up self to WC. Does call appropriately. Bed alarm is off per pt refusal. PIV assessed and is patent, CDI, SL. Pt is on RA with SaO2 >90%. Pt states priority this shift is DC home. Discussed POC for SW to f/u for DC planning, day time routine, comfort, and safety. Patient has call light and personal belongings within reach. Safety and fall precautions in place. Reviewed orders, notes, labs, and test results. Hourly rounding in place with RN rounding on odd hours and CNA on even hours.

## 2018-02-13 NOTE — DOCUMENTATION QUERY
DOCUMENTATION QUERY    PROVIDERS: Please select “Cosign w/ note”to reply to query.    To better represent the severity of illness of your patient, please review the following information and exercise your independent professional judgment in responding to this query.     There is conflicting documentation in the  Medical Record.     1.  Per the 2/7 Progress Note: This is not sepsis.  Does not meet criteria for sepsis.  2.  Per the 2/8 DC summary-  Sepsis- POA- No -  presumably  secondary to cellulitis  and infected wound to the left BKA stump.  Simple sepsis without organ damage.     Based upon the clinical findings, risk factors, and treatment, can this diagnosis of sepsis be further specified?    • Sepsis has been ruled in (POA or developed after admission)  • Sepsis has been ruled out  • Other explanation of clinical findings  • Unable to determine    The medical record reflects the following:   Clinical Findings Cellulitis and infected wound to left BKA stump;  WBC throughout admission- 8. 7 to 8.8;  Lactic acid 1.7 to 1.9;    T-  100.8 on day of admission- afebrile throughout admission  B/P-  173/95 day of admission;  110/73 lowest during admission  HR-  109 day of admission- intermittant tachycardia up to 2/3 then HR range of 69 - 87 throughout rest of admission.     Treatment Antibiotics, wound care eval;  Aquacel AG; foam dressing with elastic bandage   Risk Factors BKA stump; DM/ HTN   Location within medical record  History and Physical, Progress Notes, Discharge Summary and Lab Results      Thank you,  Maia Molina RN, CCDS   Sr. Clinical    - [phone]

## 2018-03-14 ENCOUNTER — APPOINTMENT (OUTPATIENT)
Dept: RADIOLOGY | Facility: MEDICAL CENTER | Age: 58
DRG: 498 | End: 2018-03-14
Attending: EMERGENCY MEDICINE
Payer: COMMERCIAL

## 2018-03-14 ENCOUNTER — RESOLUTE PROFESSIONAL BILLING HOSPITAL PROF FEE (OUTPATIENT)
Dept: HOSPITALIST | Facility: MEDICAL CENTER | Age: 58
End: 2018-03-14
Payer: COMMERCIAL

## 2018-03-14 ENCOUNTER — HOSPITAL ENCOUNTER (INPATIENT)
Facility: MEDICAL CENTER | Age: 58
LOS: 12 days | DRG: 498 | End: 2018-03-26
Attending: EMERGENCY MEDICINE | Admitting: INTERNAL MEDICINE
Payer: COMMERCIAL

## 2018-03-14 DIAGNOSIS — M86.9 OSTEOMYELITIS OF LEFT LOWER EXTREMITY (HCC): ICD-10-CM

## 2018-03-14 DIAGNOSIS — L02.416 ABSCESS OF LEFT LOWER EXTREMITY: ICD-10-CM

## 2018-03-14 DIAGNOSIS — E10.65 TYPE 1 DIABETES MELLITUS WITH HYPERGLYCEMIA (HCC): ICD-10-CM

## 2018-03-14 DIAGNOSIS — L03.116 CELLULITIS OF LEFT LOWER EXTREMITY: ICD-10-CM

## 2018-03-14 DIAGNOSIS — T87.44 INFECTION OF AMPUTATION STUMP OF LEFT LOWER EXTREMITY (HCC): ICD-10-CM

## 2018-03-14 LAB
ALBUMIN SERPL BCP-MCNC: 3.7 G/DL (ref 3.2–4.9)
ALBUMIN/GLOB SERPL: 1 G/DL
ALP SERPL-CCNC: 87 U/L (ref 30–99)
ALT SERPL-CCNC: 5 U/L (ref 2–50)
ANION GAP SERPL CALC-SCNC: 8 MMOL/L (ref 0–11.9)
AST SERPL-CCNC: 8 U/L (ref 12–45)
BASOPHILS # BLD AUTO: 0.4 % (ref 0–1.8)
BASOPHILS # BLD: 0.04 K/UL (ref 0–0.12)
BILIRUB SERPL-MCNC: 0.3 MG/DL (ref 0.1–1.5)
BUN SERPL-MCNC: 17 MG/DL (ref 8–22)
CALCIUM SERPL-MCNC: 9.5 MG/DL (ref 8.5–10.5)
CHLORIDE SERPL-SCNC: 98 MMOL/L (ref 96–112)
CO2 SERPL-SCNC: 22 MMOL/L (ref 20–33)
CREAT SERPL-MCNC: 1.08 MG/DL (ref 0.5–1.4)
EOSINOPHIL # BLD AUTO: 0.13 K/UL (ref 0–0.51)
EOSINOPHIL NFR BLD: 1.4 % (ref 0–6.9)
ERYTHROCYTE [DISTWIDTH] IN BLOOD BY AUTOMATED COUNT: 45.1 FL (ref 35.9–50)
GLOBULIN SER CALC-MCNC: 3.8 G/DL (ref 1.9–3.5)
GLUCOSE SERPL-MCNC: 315 MG/DL (ref 65–99)
HCT VFR BLD AUTO: 45.8 % (ref 42–52)
HGB BLD-MCNC: 14.5 G/DL (ref 14–18)
IMM GRANULOCYTES # BLD AUTO: 0.04 K/UL (ref 0–0.11)
IMM GRANULOCYTES NFR BLD AUTO: 0.4 % (ref 0–0.9)
LACTATE BLD-SCNC: 1.6 MMOL/L (ref 0.5–2)
LYMPHOCYTES # BLD AUTO: 2.42 K/UL (ref 1–4.8)
LYMPHOCYTES NFR BLD: 25.3 % (ref 22–41)
MCH RBC QN AUTO: 26.8 PG (ref 27–33)
MCHC RBC AUTO-ENTMCNC: 31.7 G/DL (ref 33.7–35.3)
MCV RBC AUTO: 84.5 FL (ref 81.4–97.8)
MONOCYTES # BLD AUTO: 0.9 K/UL (ref 0–0.85)
MONOCYTES NFR BLD AUTO: 9.4 % (ref 0–13.4)
NEUTROPHILS # BLD AUTO: 6.02 K/UL (ref 1.82–7.42)
NEUTROPHILS NFR BLD: 63.1 % (ref 44–72)
NRBC # BLD AUTO: 0 K/UL
NRBC BLD-RTO: 0 /100 WBC
PLATELET # BLD AUTO: 523 K/UL (ref 164–446)
PMV BLD AUTO: 9.4 FL (ref 9–12.9)
POTASSIUM SERPL-SCNC: 4.5 MMOL/L (ref 3.6–5.5)
PROT SERPL-MCNC: 7.5 G/DL (ref 6–8.2)
RBC # BLD AUTO: 5.42 M/UL (ref 4.7–6.1)
SODIUM SERPL-SCNC: 128 MMOL/L (ref 135–145)
WBC # BLD AUTO: 9.6 K/UL (ref 4.8–10.8)

## 2018-03-14 PROCEDURE — 99223 1ST HOSP IP/OBS HIGH 75: CPT | Performed by: INTERNAL MEDICINE

## 2018-03-14 PROCEDURE — 82962 GLUCOSE BLOOD TEST: CPT

## 2018-03-14 PROCEDURE — A9270 NON-COVERED ITEM OR SERVICE: HCPCS | Performed by: INTERNAL MEDICINE

## 2018-03-14 PROCEDURE — 96367 TX/PROPH/DG ADDL SEQ IV INF: CPT

## 2018-03-14 PROCEDURE — 71045 X-RAY EXAM CHEST 1 VIEW: CPT

## 2018-03-14 PROCEDURE — 83605 ASSAY OF LACTIC ACID: CPT

## 2018-03-14 PROCEDURE — 96365 THER/PROPH/DIAG IV INF INIT: CPT

## 2018-03-14 PROCEDURE — 96366 THER/PROPH/DIAG IV INF ADDON: CPT

## 2018-03-14 PROCEDURE — 87077 CULTURE AEROBIC IDENTIFY: CPT

## 2018-03-14 PROCEDURE — 700111 HCHG RX REV CODE 636 W/ 250 OVERRIDE (IP): Performed by: EMERGENCY MEDICINE

## 2018-03-14 PROCEDURE — 73552 X-RAY EXAM OF FEMUR 2/>: CPT | Mod: LT

## 2018-03-14 PROCEDURE — 87205 SMEAR GRAM STAIN: CPT

## 2018-03-14 PROCEDURE — 700105 HCHG RX REV CODE 258: Performed by: INTERNAL MEDICINE

## 2018-03-14 PROCEDURE — 87070 CULTURE OTHR SPECIMN AEROBIC: CPT

## 2018-03-14 PROCEDURE — 85025 COMPLETE CBC W/AUTO DIFF WBC: CPT

## 2018-03-14 PROCEDURE — 700105 HCHG RX REV CODE 258: Performed by: EMERGENCY MEDICINE

## 2018-03-14 PROCEDURE — 700111 HCHG RX REV CODE 636 W/ 250 OVERRIDE (IP): Performed by: INTERNAL MEDICINE

## 2018-03-14 PROCEDURE — 770006 HCHG ROOM/CARE - MED/SURG/GYN SEMI*

## 2018-03-14 PROCEDURE — 80053 COMPREHEN METABOLIC PANEL: CPT

## 2018-03-14 PROCEDURE — 36415 COLL VENOUS BLD VENIPUNCTURE: CPT

## 2018-03-14 PROCEDURE — 99285 EMERGENCY DEPT VISIT HI MDM: CPT

## 2018-03-14 PROCEDURE — 87040 BLOOD CULTURE FOR BACTERIA: CPT

## 2018-03-14 PROCEDURE — 87186 SC STD MICRODIL/AGAR DIL: CPT | Mod: 91

## 2018-03-14 PROCEDURE — 700102 HCHG RX REV CODE 250 W/ 637 OVERRIDE(OP): Performed by: INTERNAL MEDICINE

## 2018-03-14 PROCEDURE — 96375 TX/PRO/DX INJ NEW DRUG ADDON: CPT

## 2018-03-14 RX ORDER — SULFAMETHOXAZOLE AND TRIMETHOPRIM 800; 160 MG/1; MG/1
1 TABLET ORAL 2 TIMES DAILY
Status: ON HOLD | COMMUNITY
Start: 2018-02-15 | End: 2018-03-23

## 2018-03-14 RX ORDER — TAMSULOSIN HYDROCHLORIDE 0.4 MG/1
0.4 CAPSULE ORAL
Status: DISCONTINUED | OUTPATIENT
Start: 2018-03-15 | End: 2018-03-26 | Stop reason: HOSPADM

## 2018-03-14 RX ORDER — OXYCODONE HYDROCHLORIDE 10 MG/1
10 TABLET ORAL
Status: DISCONTINUED | OUTPATIENT
Start: 2018-03-14 | End: 2018-03-26 | Stop reason: HOSPADM

## 2018-03-14 RX ORDER — NICOTINE 21 MG/24HR
1 PATCH, TRANSDERMAL 24 HOURS TRANSDERMAL EVERY 24 HOURS
COMMUNITY
End: 2018-03-29

## 2018-03-14 RX ORDER — INSULIN GLARGINE 100 [IU]/ML
45 INJECTION, SOLUTION SUBCUTANEOUS NIGHTLY
Status: DISCONTINUED | OUTPATIENT
Start: 2018-03-14 | End: 2018-03-17

## 2018-03-14 RX ORDER — PREGABALIN 75 MG/1
75 CAPSULE ORAL 3 TIMES DAILY
Status: DISCONTINUED | OUTPATIENT
Start: 2018-03-14 | End: 2018-03-26 | Stop reason: HOSPADM

## 2018-03-14 RX ORDER — HYDRALAZINE HYDROCHLORIDE 50 MG/1
25 TABLET, FILM COATED ORAL EVERY 8 HOURS
Status: DISCONTINUED | OUTPATIENT
Start: 2018-03-14 | End: 2018-03-19

## 2018-03-14 RX ORDER — AMOXICILLIN 250 MG
2 CAPSULE ORAL 2 TIMES DAILY
Status: DISCONTINUED | OUTPATIENT
Start: 2018-03-14 | End: 2018-03-26 | Stop reason: HOSPADM

## 2018-03-14 RX ORDER — PREGABALIN 75 MG/1
75 CAPSULE ORAL 3 TIMES DAILY
COMMUNITY
End: 2018-05-26

## 2018-03-14 RX ORDER — SODIUM CHLORIDE 9 MG/ML
500 INJECTION, SOLUTION INTRAVENOUS
Status: COMPLETED | OUTPATIENT
Start: 2018-03-14 | End: 2018-03-16

## 2018-03-14 RX ORDER — NICOTINE 21 MG/24HR
21 PATCH, TRANSDERMAL 24 HOURS TRANSDERMAL
Status: DISCONTINUED | OUTPATIENT
Start: 2018-03-15 | End: 2018-03-26 | Stop reason: HOSPADM

## 2018-03-14 RX ORDER — CLINDAMYCIN HYDROCHLORIDE 150 MG/1
150 CAPSULE ORAL 3 TIMES DAILY
COMMUNITY
Start: 2018-02-15 | End: 2018-03-14

## 2018-03-14 RX ORDER — CITALOPRAM 20 MG/1
20 TABLET ORAL DAILY
Status: DISCONTINUED | OUTPATIENT
Start: 2018-03-15 | End: 2018-03-26 | Stop reason: HOSPADM

## 2018-03-14 RX ORDER — ACETAMINOPHEN 325 MG/1
650 TABLET ORAL EVERY 6 HOURS PRN
Status: DISCONTINUED | OUTPATIENT
Start: 2018-03-14 | End: 2018-03-26 | Stop reason: HOSPADM

## 2018-03-14 RX ORDER — RANITIDINE 150 MG/1
150 TABLET ORAL
COMMUNITY

## 2018-03-14 RX ORDER — FAMOTIDINE 20 MG/1
20 TABLET, FILM COATED ORAL EVERY EVENING
Status: DISCONTINUED | OUTPATIENT
Start: 2018-03-14 | End: 2018-03-26 | Stop reason: HOSPADM

## 2018-03-14 RX ORDER — MIRTAZAPINE 15 MG/1
45 TABLET, FILM COATED ORAL
Status: DISCONTINUED | OUTPATIENT
Start: 2018-03-14 | End: 2018-03-26 | Stop reason: HOSPADM

## 2018-03-14 RX ORDER — ALBUTEROL SULFATE 90 UG/1
2 AEROSOL, METERED RESPIRATORY (INHALATION) EVERY 4 HOURS PRN
Status: DISCONTINUED | OUTPATIENT
Start: 2018-03-14 | End: 2018-03-26 | Stop reason: HOSPADM

## 2018-03-14 RX ORDER — BUDESONIDE AND FORMOTEROL FUMARATE DIHYDRATE 160; 4.5 UG/1; UG/1
2 AEROSOL RESPIRATORY (INHALATION) 2 TIMES DAILY
Status: DISCONTINUED | OUTPATIENT
Start: 2018-03-14 | End: 2018-03-26 | Stop reason: HOSPADM

## 2018-03-14 RX ORDER — POLYETHYLENE GLYCOL 3350 17 G/17G
1 POWDER, FOR SOLUTION ORAL
Status: DISCONTINUED | OUTPATIENT
Start: 2018-03-14 | End: 2018-03-26 | Stop reason: HOSPADM

## 2018-03-14 RX ORDER — INSULIN GLARGINE 100 [IU]/ML
45 INJECTION, SOLUTION SUBCUTANEOUS NIGHTLY
Status: ON HOLD | COMMUNITY
End: 2018-03-23

## 2018-03-14 RX ORDER — OXYCODONE HYDROCHLORIDE 5 MG/1
5 TABLET ORAL
Status: DISCONTINUED | OUTPATIENT
Start: 2018-03-14 | End: 2018-03-26 | Stop reason: HOSPADM

## 2018-03-14 RX ORDER — FINASTERIDE 5 MG/1
5 TABLET, FILM COATED ORAL DAILY
Status: DISCONTINUED | OUTPATIENT
Start: 2018-03-14 | End: 2018-03-26 | Stop reason: HOSPADM

## 2018-03-14 RX ORDER — AMLODIPINE BESYLATE 10 MG/1
10 TABLET ORAL DAILY
Status: DISCONTINUED | OUTPATIENT
Start: 2018-03-15 | End: 2018-03-26 | Stop reason: HOSPADM

## 2018-03-14 RX ORDER — L. ACIDOPHILUS/L.BULGARICUS 100MM CELL
1 GRANULES IN PACKET (EA) ORAL
Status: DISCONTINUED | OUTPATIENT
Start: 2018-03-15 | End: 2018-03-26 | Stop reason: HOSPADM

## 2018-03-14 RX ORDER — BISACODYL 10 MG
10 SUPPOSITORY, RECTAL RECTAL
Status: DISCONTINUED | OUTPATIENT
Start: 2018-03-14 | End: 2018-03-26 | Stop reason: HOSPADM

## 2018-03-14 RX ORDER — DEXTROSE MONOHYDRATE 25 G/50ML
25 INJECTION, SOLUTION INTRAVENOUS
Status: DISCONTINUED | OUTPATIENT
Start: 2018-03-14 | End: 2018-03-26 | Stop reason: HOSPADM

## 2018-03-14 RX ORDER — RANITIDINE 150 MG/1
150 TABLET ORAL EVERY EVENING
Status: DISCONTINUED | OUTPATIENT
Start: 2018-03-14 | End: 2018-03-14

## 2018-03-14 RX ORDER — ZOLPIDEM TARTRATE 5 MG/1
5 TABLET ORAL NIGHTLY PRN
Status: DISCONTINUED | OUTPATIENT
Start: 2018-03-14 | End: 2018-03-26 | Stop reason: HOSPADM

## 2018-03-14 RX ADMIN — AMPICILLIN SODIUM AND SULBACTAM SODIUM 3 G: 2; 1 INJECTION, POWDER, FOR SOLUTION INTRAMUSCULAR; INTRAVENOUS at 15:58

## 2018-03-14 RX ADMIN — STANDARDIZED SENNA CONCENTRATE AND DOCUSATE SODIUM 2 TABLET: 8.6; 5 TABLET, FILM COATED ORAL at 22:26

## 2018-03-14 RX ADMIN — AMPICILLIN SODIUM AND SULBACTAM SODIUM 3 G: 2; 1 INJECTION, POWDER, FOR SOLUTION INTRAMUSCULAR; INTRAVENOUS at 22:48

## 2018-03-14 RX ADMIN — FENTANYL CITRATE 50 MCG: 50 INJECTION INTRAMUSCULAR; INTRAVENOUS at 15:19

## 2018-03-14 RX ADMIN — FAMOTIDINE 20 MG: 20 TABLET, FILM COATED ORAL at 23:21

## 2018-03-14 RX ADMIN — MIRTAZAPINE 45 MG: 15 TABLET, FILM COATED ORAL at 22:26

## 2018-03-14 RX ADMIN — BUDESONIDE AND FORMOTEROL FUMARATE DIHYDRATE 2 PUFF: 160; 4.5 AEROSOL RESPIRATORY (INHALATION) at 22:25

## 2018-03-14 RX ADMIN — INSULIN GLARGINE 45 UNITS: 100 INJECTION, SOLUTION SUBCUTANEOUS at 22:42

## 2018-03-14 RX ADMIN — ZOLPIDEM TARTRATE 5 MG: 5 TABLET, FILM COATED ORAL at 22:42

## 2018-03-14 RX ADMIN — FINASTERIDE 5 MG: 5 TABLET, FILM COATED ORAL at 22:27

## 2018-03-14 RX ADMIN — INSULIN HUMAN 5 UNITS: 100 INJECTION, SOLUTION PARENTERAL at 22:32

## 2018-03-14 RX ADMIN — VANCOMYCIN HYDROCHLORIDE 2300 MG: 100 INJECTION, POWDER, LYOPHILIZED, FOR SOLUTION INTRAVENOUS at 16:40

## 2018-03-14 RX ADMIN — HYDRALAZINE HYDROCHLORIDE 25 MG: 50 TABLET, FILM COATED ORAL at 22:26

## 2018-03-14 RX ADMIN — VANCOMYCIN HYDROCHLORIDE 2300 MG: 100 INJECTION, POWDER, LYOPHILIZED, FOR SOLUTION INTRAVENOUS at 23:45

## 2018-03-14 RX ADMIN — HYDROMORPHONE HYDROCHLORIDE 0.5 MG: 10 INJECTION, SOLUTION INTRAMUSCULAR; INTRAVENOUS; SUBCUTANEOUS at 22:21

## 2018-03-14 RX ADMIN — PREGABALIN 75 MG: 75 CAPSULE ORAL at 22:26

## 2018-03-14 ASSESSMENT — PAIN SCALES - GENERAL
PAINLEVEL_OUTOF10: 10
PAINLEVEL_OUTOF10: 4
PAINLEVEL_OUTOF10: 10
PAINLEVEL_OUTOF10: 5

## 2018-03-14 ASSESSMENT — PATIENT HEALTH QUESTIONNAIRE - PHQ9
SUM OF ALL RESPONSES TO PHQ9 QUESTIONS 1 AND 2: 2
1. LITTLE INTEREST OR PLEASURE IN DOING THINGS: NOT AT ALL
9. THOUGHTS THAT YOU WOULD BE BETTER OFF DEAD, OR OF HURTING YOURSELF: NOT AT ALL
3. TROUBLE FALLING OR STAYING ASLEEP OR SLEEPING TOO MUCH: SEVERAL DAYS
8. MOVING OR SPEAKING SO SLOWLY THAT OTHER PEOPLE COULD HAVE NOTICED. OR THE OPPOSITE, BEING SO FIGETY OR RESTLESS THAT YOU HAVE BEEN MOVING AROUND A LOT MORE THAN USUAL: NOT AT ALL
6. FEELING BAD ABOUT YOURSELF - OR THAT YOU ARE A FAILURE OR HAVE LET YOURSELF OR YOUR FAMILY DOWN: NOT AL ALL
7. TROUBLE CONCENTRATING ON THINGS, SUCH AS READING THE NEWSPAPER OR WATCHING TELEVISION: NOT AT ALL
5. POOR APPETITE OR OVEREATING: NOT AT ALL
2. FEELING DOWN, DEPRESSED, IRRITABLE, OR HOPELESS: MORE THAN HALF THE DAYS
4. FEELING TIRED OR HAVING LITTLE ENERGY: MORE THAN HALF THE DAYS
SUM OF ALL RESPONSES TO PHQ QUESTIONS 1-9: 5

## 2018-03-14 ASSESSMENT — COPD QUESTIONNAIRES
DO YOU EVER COUGH UP ANY MUCUS OR PHLEGM?: NO/ONLY WITH OCCASIONAL COLDS OR INFECTIONS
DURING THE PAST 4 WEEKS HOW MUCH DID YOU FEEL SHORT OF BREATH: NONE/LITTLE OF THE TIME
COPD SCREENING SCORE: 3
HAVE YOU SMOKED AT LEAST 100 CIGARETTES IN YOUR ENTIRE LIFE: YES

## 2018-03-14 ASSESSMENT — LIFESTYLE VARIABLES
DO YOU DRINK ALCOHOL: NO
EVER_SMOKED: YES
EVER_SMOKED: YES

## 2018-03-14 ASSESSMENT — ENCOUNTER SYMPTOMS
VOMITING: 0
SHORTNESS OF BREATH: 0
NAUSEA: 0
FEVER: 0
CHILLS: 0

## 2018-03-14 NOTE — ED TRIAGE NOTES
BIBA for eval of LLL infection at amputation site. Denies chills and fevers. Wheelchair bound. Left leg amputated (2017) below the knee, right leg amputated (2015) above the knee. A&OX4.

## 2018-03-14 NOTE — ED PROVIDER NOTES
ED Provider Note    Scribed for Vineet Cartwright M.D. by Benita Ojeda. 3/14/2018, 2:49 PM.    Primary care provider: Pcp Not In Computer  Means of arrival: Ambulance  History obtained from: Patient  History limited by: None    CHIEF COMPLAINT  Chief Complaint   Patient presents with   • Wound Infection     Left lef       HPI  Jv Angelo is a 57 y.o. male who presents to the Emergency Department via ambulance for evaluation of wound infection to left leg at site of BKA, onset a few weeks ago. He noticed drainage from this area one week ago. His right leg is missing secondary to AKA due to Diabetes.  No complaints of chest pain, shortness of breath, fevers, chills, nausea, or vomiting.        REVIEW OF SYSTEMS  Review of Systems   Constitutional: Negative for chills and fever.   Respiratory: Negative for shortness of breath.    Cardiovascular: Negative for chest pain.   Gastrointestinal: Negative for nausea and vomiting.   Skin:        Wound infection to left leg at site of BKA   Neurological: Loss of consciousness: .this.   All other systems reviewed and are negative.  C.     PAST MEDICAL HISTORY   has a past medical history of BPH (benign prostatic hyperplasia) (12/20/2017); Diabetes (CMS-HCC); Has received first dose of hepatitis B vaccine; Hypertension; Renal disorder; and Renal failure.    SURGICAL HISTORY   has a past surgical history that includes irrigation & debridement ortho (Left, 12/26/2015); other orthopedic surgery (Left, 2015); other orthopedic surgery (Right, 2013); and other orthopedic surgery.    SOCIAL HISTORY  Social History   Substance Use Topics   • Smoking status: Current Every Day Smoker     Packs/day: 1.00     Years: 20.00     Types: Cigarettes     Last attempt to quit: 12/26/2005   • Smokeless tobacco: Never Used   • Alcohol use No      History   Drug Use No       FAMILY HISTORY  History reviewed. No pertinent family history.    CURRENT MEDICATIONS  Home Medications     Reviewed by  Jayson Betancur (Pharmacy Tech) on 03/14/18 at 1748  Med List Status: Complete   Medication Last Dose Status   albuterol 108 (90 Base) MCG/ACT Aero Soln inhalation aerosol 3/14/2018 Active   amlodipine (NORVASC) 10 MG Tab 3/14/2018 Active   aspirin 81 MG tablet 3/14/2018 Active   budesonide-formoterol (SYMBICORT) 160-4.5 MCG/ACT Aerosol 3/14/2018 Active   citalopram (CELEXA) 20 MG Tab 3/14/2018 Active   clindamycin (CLEOCIN) 150 MG Cap 2/22/2018 Active   finasteride (PROSCAR) 5 MG Tab 3/14/2018 Active   glipiZIDE (GLUCOTROL) 10 MG Tab 3/13/2018 Active   hydrALAZINE (APRESOLINE) 25 MG Tab 3/14/2018 Active   ibuprofen (MOTRIN) 600 MG Tab 3/14/2018 Active   insulin glargine (LANTUS) 100 UNIT/ML Solution 3/13/2018 Active   insulin regular (HUMULIN R) 100 Unit/mL Solution 3/13/2018 Active   mirtazapine (REMERON) 45 MG tablet 3/13/2018 Active   multivitamin (THERAGRAN) Tab 3/14/2018 Active   nicotine (NICODERM) 21 MG/24HR PATCH 24 HR 3/13/2018 Active   pregabalin (LYRICA) 75 MG Cap 3/14/2018 Active   raNITidine (ZANTAC) 150 MG Tab 3/13/2018 Active   sulfamethoxazole-trimethoprim (BACTRIM DS) 800-160 MG tablet 2/22/2018 Active   tamsulosin (FLOMAX) 0.4 MG capsule 3/14/2018 Active   triamterene/hctz (MAXZIDE-25/DYAZIDE) 37.5-25 MG Cap 3/14/2018 Active                ALLERGIES  Allergies   Allergen Reactions   • Fish Anaphylaxis and Shortness of Breath       PHYSICAL EXAM  VITAL SIGNS: BP (!) 163/101   Pulse 98   Temp 36.9 °C (98.4 °F)   Resp 18   Ht 1.829 m (6')   Wt 90.7 kg (200 lb)   BMI 27.12 kg/m²     Constitutional: Well developed, Well nourished, No acute distress, Non-toxic appearance.   HENT: Normocephalic, Atraumatic, Bilateral external ears normal, oropharynx moist, No oral exudates, Nose normal.   Eyes: Pupils are equal round and react to light, extraocular motions are intact, conjunctiva is normal, there are no signs of exudate.   Neck: Supple, no meningeal signs.  Cardiovascular: Regular rate and  rhythm without murmurs gallops or rubs.   Thorax & Lungs: No respiratory distress. Breathing comfortably. Lungs are clear to auscultation bilaterally, there are no wheezes no rales. Chest wall is nontender.  Abdomen: Soft, nontender, nondistended. Bowel sounds are present.   Skin: Open wound on distal end of left leg at site of BKA with purulent drainage. Direct induration around it about 8-10 cm in diameter, tracking up medial aspect of leg.  Musculoskeletal: Good range of motion in all major joints. No tenderness to palpation or major deformities noted. Intact distal pulses, no clubbing, no cyanosis, no edema,   Neurologic: Alert & oriented x 3, Moving all extremities. No gross abnormalities.    Psychiatric: Affect normal, Judgment normal, Mood normal.       LABS  Results for orders placed or performed during the hospital encounter of 03/14/18   LACTIC ACID   Result Value Ref Range    Lactic Acid 1.6 0.5 - 2.0 mmol/L   CBC WITH DIFFERENTIAL   Result Value Ref Range    WBC 9.6 4.8 - 10.8 K/uL    RBC 5.42 4.70 - 6.10 M/uL    Hemoglobin 14.5 14.0 - 18.0 g/dL    Hematocrit 45.8 42.0 - 52.0 %    MCV 84.5 81.4 - 97.8 fL    MCH 26.8 (L) 27.0 - 33.0 pg    MCHC 31.7 (L) 33.7 - 35.3 g/dL    RDW 45.1 35.9 - 50.0 fL    Platelet Count 523 (H) 164 - 446 K/uL    MPV 9.4 9.0 - 12.9 fL    Neutrophils-Polys 63.10 44.00 - 72.00 %    Lymphocytes 25.30 22.00 - 41.00 %    Monocytes 9.40 0.00 - 13.40 %    Eosinophils 1.40 0.00 - 6.90 %    Basophils 0.40 0.00 - 1.80 %    Immature Granulocytes 0.40 0.00 - 0.90 %    Nucleated RBC 0.00 /100 WBC    Neutrophils (Absolute) 6.02 1.82 - 7.42 K/uL    Lymphs (Absolute) 2.42 1.00 - 4.80 K/uL    Monos (Absolute) 0.90 (H) 0.00 - 0.85 K/uL    Eos (Absolute) 0.13 0.00 - 0.51 K/uL    Baso (Absolute) 0.04 0.00 - 0.12 K/uL    Immature Granulocytes (abs) 0.04 0.00 - 0.11 K/uL    NRBC (Absolute) 0.00 K/uL   COMP METABOLIC PANEL   Result Value Ref Range    Sodium 128 (L) 135 - 145 mmol/L    Potassium 4.5 3.6  - 5.5 mmol/L    Chloride 98 96 - 112 mmol/L    Co2 22 20 - 33 mmol/L    Anion Gap 8.0 0.0 - 11.9    Glucose 315 (H) 65 - 99 mg/dL    Bun 17 8 - 22 mg/dL    Creatinine 1.08 0.50 - 1.40 mg/dL    Calcium 9.5 8.5 - 10.5 mg/dL    AST(SGOT) 8 (L) 12 - 45 U/L    ALT(SGPT) 5 2 - 50 U/L    Alkaline Phosphatase 87 30 - 99 U/L    Total Bilirubin 0.3 0.1 - 1.5 mg/dL    Albumin 3.7 3.2 - 4.9 g/dL    Total Protein 7.5 6.0 - 8.2 g/dL    Globulin 3.8 (H) 1.9 - 3.5 g/dL    A-G Ratio 1.0 g/dL   ESTIMATED GFR   Result Value Ref Range    GFR If African American >60 >60 mL/min/1.73 m 2    GFR If Non African American >60 >60 mL/min/1.73 m 2       All labs reviewed by me.          RADIOLOGY  DX-FEMUR-2+ LEFT   Final Result      1.  No acute left femoral fracture or dislocation.      2.  No evidence of left femoral or residual left tibia/fibula osteomyelitis.      DX-CHEST-PORTABLE (1 VIEW)   Final Result      No acute cardiac or pulmonary abnormality is noted.        The radiologist's interpretation of all radiological studies have been reviewed by me.    COURSE & MEDICAL DECISION MAKING  Pertinent Labs & Imaging studies reviewed. (See chart for details)    2:49 PM - Patient seen and examined at bedside. Patient will be treated with Unasyn 3 g, Fentanyl 50 mcg. Ordered DX Left Femur, DX Chest, Lactic Acid every 2 hours, Culture wound with Gram Stain, CBC, CMP, Blood Culture x2 to evaluate his symptoms. The differential diagnoses include but are not limited to: Cellulitis    Decision Making:  Presents for evaluation. The area of the stump is rather indurated in warm to the touch. There is a slight discharge coming from the wound. This will be cultured. At this point, it does appear to be a cellulitis. X-rays obtained is no signs of free air within the leg, so no signs of neck, eyes and fasciitis. Laboratory studies are as above. I did empirically start the patient vancomycin and Unasyn. At this point. If the patient should be admitted the  [facility] for treatment of his diabetes as well as this cellulitis. I spoken to the hospitalist for this admission.         FINAL IMPRESSION  1. Cellulitis of left lower extremity    2. Type 1 diabetes mellitus with hyperglycemia (CMS-[facility])          I, Benita Ojeda ([place]), am scribing for, and in the presence of, Vineet Cartwright M.D..    Electronically signed by: Benita Ojeda ([place]), 3/14/2018    IVineet M.D. personally performed the services described in this documentation, as scribed by Benita Ojeda in my presence, and it is both accurate and complete.    The note accurately reflects work and decisions made by me.  Vineet Cartwright  3/14/2018  7:33 PM

## 2018-03-15 PROBLEM — F17.200 TOBACCO DEPENDENCE: Status: ACTIVE | Noted: 2018-03-15

## 2018-03-15 PROBLEM — T87.44 INFECTION OF AMPUTATION STUMP OF LEFT LOWER EXTREMITY (HCC): Status: ACTIVE | Noted: 2018-03-15

## 2018-03-15 LAB
ANION GAP SERPL CALC-SCNC: 4 MMOL/L (ref 0–11.9)
BUN SERPL-MCNC: 17 MG/DL (ref 8–22)
CALCIUM SERPL-MCNC: 9 MG/DL (ref 8.5–10.5)
CHLORIDE SERPL-SCNC: 101 MMOL/L (ref 96–112)
CO2 SERPL-SCNC: 27 MMOL/L (ref 20–33)
CREAT SERPL-MCNC: 1.19 MG/DL (ref 0.5–1.4)
ERYTHROCYTE [DISTWIDTH] IN BLOOD BY AUTOMATED COUNT: 45.1 FL (ref 35.9–50)
GLUCOSE BLD-MCNC: 204 MG/DL (ref 65–99)
GLUCOSE BLD-MCNC: 332 MG/DL (ref 65–99)
GLUCOSE BLD-MCNC: 341 MG/DL (ref 65–99)
GLUCOSE BLD-MCNC: 392 MG/DL (ref 65–99)
GLUCOSE BLD-MCNC: 78 MG/DL (ref 65–99)
GLUCOSE SERPL-MCNC: 126 MG/DL (ref 65–99)
GRAM STN SPEC: NORMAL
HCT VFR BLD AUTO: 41 % (ref 42–52)
HGB BLD-MCNC: 12.9 G/DL (ref 14–18)
MCH RBC QN AUTO: 26.4 PG (ref 27–33)
MCHC RBC AUTO-ENTMCNC: 31.5 G/DL (ref 33.7–35.3)
MCV RBC AUTO: 83.8 FL (ref 81.4–97.8)
PLATELET # BLD AUTO: 469 K/UL (ref 164–446)
PMV BLD AUTO: 9.1 FL (ref 9–12.9)
POTASSIUM SERPL-SCNC: 4.2 MMOL/L (ref 3.6–5.5)
RBC # BLD AUTO: 4.89 M/UL (ref 4.7–6.1)
SIGNIFICANT IND 70042: NORMAL
SITE SITE: NORMAL
SODIUM SERPL-SCNC: 132 MMOL/L (ref 135–145)
SOURCE SOURCE: NORMAL
WBC # BLD AUTO: 8.1 K/UL (ref 4.8–10.8)

## 2018-03-15 PROCEDURE — 87070 CULTURE OTHR SPECIMN AEROBIC: CPT

## 2018-03-15 PROCEDURE — 87205 SMEAR GRAM STAIN: CPT

## 2018-03-15 PROCEDURE — 99233 SBSQ HOSP IP/OBS HIGH 50: CPT | Performed by: INTERNAL MEDICINE

## 2018-03-15 PROCEDURE — A9270 NON-COVERED ITEM OR SERVICE: HCPCS | Performed by: INTERNAL MEDICINE

## 2018-03-15 PROCEDURE — 87077 CULTURE AEROBIC IDENTIFY: CPT | Mod: 91

## 2018-03-15 PROCEDURE — 80048 BASIC METABOLIC PNL TOTAL CA: CPT

## 2018-03-15 PROCEDURE — 36415 COLL VENOUS BLD VENIPUNCTURE: CPT

## 2018-03-15 PROCEDURE — 85027 COMPLETE CBC AUTOMATED: CPT

## 2018-03-15 PROCEDURE — 700102 HCHG RX REV CODE 250 W/ 637 OVERRIDE(OP): Performed by: INTERNAL MEDICINE

## 2018-03-15 PROCEDURE — 700111 HCHG RX REV CODE 636 W/ 250 OVERRIDE (IP): Performed by: INTERNAL MEDICINE

## 2018-03-15 PROCEDURE — 82962 GLUCOSE BLOOD TEST: CPT | Mod: 91

## 2018-03-15 PROCEDURE — 770006 HCHG ROOM/CARE - MED/SURG/GYN SEMI*

## 2018-03-15 PROCEDURE — 700105 HCHG RX REV CODE 258: Performed by: INTERNAL MEDICINE

## 2018-03-15 RX ADMIN — THERA TABS 1 TABLET: TAB at 10:28

## 2018-03-15 RX ADMIN — INSULIN GLARGINE 45 UNITS: 100 INJECTION, SOLUTION SUBCUTANEOUS at 21:19

## 2018-03-15 RX ADMIN — AMPICILLIN SODIUM AND SULBACTAM SODIUM 3 G: 2; 1 INJECTION, POWDER, FOR SOLUTION INTRAMUSCULAR; INTRAVENOUS at 12:37

## 2018-03-15 RX ADMIN — LACTOBACILLUS ACIDOPHILUS / LACTOBACILLUS BULGARICUS 1 PACKET: 100 MILLION CFU STRENGTH GRANULES at 17:42

## 2018-03-15 RX ADMIN — INSULIN HUMAN 4 UNITS: 100 INJECTION, SOLUTION PARENTERAL at 17:43

## 2018-03-15 RX ADMIN — HYDROMORPHONE HYDROCHLORIDE 0.5 MG: 10 INJECTION, SOLUTION INTRAMUSCULAR; INTRAVENOUS; SUBCUTANEOUS at 07:38

## 2018-03-15 RX ADMIN — ASPIRIN 81 MG: 81 TABLET, COATED ORAL at 10:28

## 2018-03-15 RX ADMIN — MIRTAZAPINE 45 MG: 15 TABLET, FILM COATED ORAL at 21:14

## 2018-03-15 RX ADMIN — AMPICILLIN SODIUM AND SULBACTAM SODIUM 3 G: 2; 1 INJECTION, POWDER, FOR SOLUTION INTRAMUSCULAR; INTRAVENOUS at 21:15

## 2018-03-15 RX ADMIN — HYDROMORPHONE HYDROCHLORIDE 0.5 MG: 10 INJECTION, SOLUTION INTRAMUSCULAR; INTRAVENOUS; SUBCUTANEOUS at 10:28

## 2018-03-15 RX ADMIN — AMPICILLIN SODIUM AND SULBACTAM SODIUM 3 G: 2; 1 INJECTION, POWDER, FOR SOLUTION INTRAMUSCULAR; INTRAVENOUS at 04:02

## 2018-03-15 RX ADMIN — HYDROMORPHONE HYDROCHLORIDE 0.5 MG: 10 INJECTION, SOLUTION INTRAMUSCULAR; INTRAVENOUS; SUBCUTANEOUS at 15:06

## 2018-03-15 RX ADMIN — AMLODIPINE BESYLATE 10 MG: 10 TABLET ORAL at 10:27

## 2018-03-15 RX ADMIN — VANCOMYCIN HYDROCHLORIDE 1500 MG: 100 INJECTION, POWDER, LYOPHILIZED, FOR SOLUTION INTRAVENOUS at 13:32

## 2018-03-15 RX ADMIN — LACTOBACILLUS ACIDOPHILUS / LACTOBACILLUS BULGARICUS 1 PACKET: 100 MILLION CFU STRENGTH GRANULES at 07:39

## 2018-03-15 RX ADMIN — BUDESONIDE AND FORMOTEROL FUMARATE DIHYDRATE 2 PUFF: 160; 4.5 AEROSOL RESPIRATORY (INHALATION) at 09:00

## 2018-03-15 RX ADMIN — PREGABALIN 75 MG: 75 CAPSULE ORAL at 13:31

## 2018-03-15 RX ADMIN — PREGABALIN 75 MG: 75 CAPSULE ORAL at 21:13

## 2018-03-15 RX ADMIN — NICOTINE 21 MG: 21 PATCH, EXTENDED RELEASE TRANSDERMAL at 05:47

## 2018-03-15 RX ADMIN — INSULIN HUMAN 4 UNITS: 100 INJECTION, SOLUTION PARENTERAL at 21:20

## 2018-03-15 RX ADMIN — FINASTERIDE 5 MG: 5 TABLET, FILM COATED ORAL at 10:28

## 2018-03-15 RX ADMIN — INSULIN HUMAN 2 UNITS: 100 INJECTION, SOLUTION PARENTERAL at 12:45

## 2018-03-15 RX ADMIN — HYDROMORPHONE HYDROCHLORIDE 0.5 MG: 10 INJECTION, SOLUTION INTRAMUSCULAR; INTRAVENOUS; SUBCUTANEOUS at 22:10

## 2018-03-15 RX ADMIN — AMPICILLIN SODIUM AND SULBACTAM SODIUM 3 G: 2; 1 INJECTION, POWDER, FOR SOLUTION INTRAMUSCULAR; INTRAVENOUS at 16:37

## 2018-03-15 RX ADMIN — BUDESONIDE AND FORMOTEROL FUMARATE DIHYDRATE 2 PUFF: 160; 4.5 AEROSOL RESPIRATORY (INHALATION) at 21:15

## 2018-03-15 RX ADMIN — FAMOTIDINE 20 MG: 20 TABLET, FILM COATED ORAL at 21:12

## 2018-03-15 RX ADMIN — PREGABALIN 75 MG: 75 CAPSULE ORAL at 05:47

## 2018-03-15 RX ADMIN — HYDRALAZINE HYDROCHLORIDE 25 MG: 50 TABLET, FILM COATED ORAL at 05:47

## 2018-03-15 RX ADMIN — HYDRALAZINE HYDROCHLORIDE 25 MG: 50 TABLET, FILM COATED ORAL at 15:05

## 2018-03-15 RX ADMIN — ENOXAPARIN SODIUM 40 MG: 100 INJECTION SUBCUTANEOUS at 10:26

## 2018-03-15 RX ADMIN — OXYCODONE HYDROCHLORIDE 10 MG: 10 TABLET ORAL at 21:13

## 2018-03-15 RX ADMIN — LACTOBACILLUS ACIDOPHILUS / LACTOBACILLUS BULGARICUS 1 PACKET: 100 MILLION CFU STRENGTH GRANULES at 13:31

## 2018-03-15 RX ADMIN — HYDRALAZINE HYDROCHLORIDE 25 MG: 50 TABLET, FILM COATED ORAL at 21:12

## 2018-03-15 RX ADMIN — TAMSULOSIN HYDROCHLORIDE 0.4 MG: 0.4 CAPSULE ORAL at 07:39

## 2018-03-15 RX ADMIN — OXYCODONE HYDROCHLORIDE 5 MG: 5 TABLET ORAL at 03:56

## 2018-03-15 RX ADMIN — CITALOPRAM HYDROBROMIDE 20 MG: 20 TABLET ORAL at 10:28

## 2018-03-15 ASSESSMENT — ENCOUNTER SYMPTOMS
CHILLS: 0
COUGH: 0
NERVOUS/ANXIOUS: 0
EYE REDNESS: 0
SENSORY CHANGE: 0
HEADACHES: 0
FOCAL WEAKNESS: 0
SHORTNESS OF BREATH: 0
FEVER: 1
DIARRHEA: 0
INSOMNIA: 0
LOSS OF CONSCIOUSNESS: 0
TREMORS: 0
NAUSEA: 0
FEVER: 0
WEAKNESS: 1
SEIZURES: 0
WHEEZING: 0
MYALGIAS: 1
DIZZINESS: 0
CONSTIPATION: 0
HEMOPTYSIS: 0
PALPITATIONS: 0
CHILLS: 1
ABDOMINAL PAIN: 0
VOMITING: 0
BLOOD IN STOOL: 0
COUGH: 1
EYE PAIN: 0
FALLS: 0

## 2018-03-15 ASSESSMENT — PAIN SCALES - GENERAL
PAINLEVEL_OUTOF10: 8
PAINLEVEL_OUTOF10: 9
PAINLEVEL_OUTOF10: 8
PAINLEVEL_OUTOF10: 9

## 2018-03-15 NOTE — ASSESSMENT & PLAN NOTE
L BKA stump cellulitis and abscess s/p I+D; doing better  Continue abx per Madeleine MERCADO; patient has PICC and would like to go home soon and continue home IV abx.  Unable to get insurance coverage for home IV abx so far, per SW.  Cont inpatient care pending this.

## 2018-03-15 NOTE — PROGRESS NOTES
Pharmacy Kinetics     Patient is a 58 yo male with PMH of DM2, HTN, DVT, PVD, bilateral leg amputations (AKA and BKA), BPH, and constipation who presents to the ED with a wound infection (BKA of left leg), states that it began a few weeks ago and now has drainage from the sight. Patient is started on Unasyn and Vanco.    Indication for Treatment: Wound infection (skin and soft tissue)    Other antibiotics: Unasyn 3 grams Q 8 hours    Allergies: Fish     List concerns for renal function (possible concerns include abnormal LFTs, BUN/SCr ratio > 20:1, CHF, obesity, malnutrition/low albumin, hypermetabolic state (SIRS), pressors/hypotension, nephrotoxic drugs, etc.): Bilateral leg amputation with PVD    Pertinent cultures to date:   No cultures to date  Blood cultures x 2 ordered    Recent Labs      18   1500   WBC  9.6   NEUTSPOLYS  63.10     Recent Labs      18   1500   BUN  17   CREATININE  1.08   ALBUMIN  3.7     No results for input(s): VANCOTROUGH, VANCOPEAK, VANCORANDOM in the last 72 hours.No intake or output data in the 24 hours ending 18 2149   Blood pressure 160/89, pulse 94, temperature 36.3 °C (97.3 °F), resp. rate 18, height 1.829 m (6'), weight 90.7 kg (200 lb), SpO2 93 %. Temp (24hrs), Av.6 °C (97.9 °F), Min:36.3 °C (97.3 °F), Max:36.9 °C (98.4 °F)      A/P  Patient is a 58 yo male with a BKA wound infection x 3 weeks prior to admission, currently afebrile, no leukocytosis, BP and RR WNL. No previous antibiotics for his current infection. Will give a loading dose of 2300 mg x 1.     1. Maintenance dose of 1500 mg Q 12 hours, should give an estimated trough of 12  2. Next vancomycin level: No level ordered  3. Goal trough: 12-16      FILIPPO Gaytan, PharmD

## 2018-03-15 NOTE — ED NOTES
Med Rec complete per PT at bedside   Allergies Reviewed    Pt reported finishing a 7 day course of Bactrim DS and Clindamycin on 2/22.    Called [facility] pharmacy to confirm ABX RX's

## 2018-03-15 NOTE — RESPIRATORY CARE
COPD EDUCATION by COPD CLINICAL EDUCATOR  3/15/2018 at 5:51 AM by Sarah Tillman     Patient reviewed by COPD education team. Patient does not qualify for COPD program.

## 2018-03-15 NOTE — PROGRESS NOTES
Two RN skin assessment completed with fellow RN , Dewayne.   Patient presents an open wound on R BKA. Wound bed is pink with scant puss drainage noted. Wound pictures taken and wound care order placed.    Otherwise, patient's skin is intact.

## 2018-03-15 NOTE — PROGRESS NOTES
[facility] Hospitalist Progress Note    Date of Service: 3/15/2018    Chief Complaint  57 y.o. male with a PMH DM type 2 and B/L BKA admitted 3/14/2018 with left stump infection    Interval Problem Update  Patients pain is well controlled. His sugars are well controlled. LPS has evaluated the patient and he is scheduled for I&D and stump revision tomorrow. MRI has been ordered to evaluate for abscess or osteomyelitis. Continue IV vancomycin and Unasyn.    Consultants/Specialty  LPS    Disposition  To be determined        Review of Systems   Constitutional: Positive for malaise/fatigue. Negative for chills and fever.   All other systems reviewed and are negative.     Physical Exam  Laboratory/Imaging   Hemodynamics  Temp (24hrs), Av.3 °C (97.4 °F), Min:36.1 °C (97 °F), Max:36.7 °C (98 °F)   Temperature: 36.7 °C (98 °F)  Pulse  Av.3  Min: 85  Max: 98    Blood Pressure: 148/80, NIBP: 141/70      Respiratory      Respiration: 20, Pulse Oximetry: 95 %, O2 Daily Delivery Respiratory : Room Air with O2 Available             Fluids    Intake/Output Summary (Last 24 hours) at 03/15/18 1344  Last data filed at 03/15/18 0400   Gross per 24 hour   Intake                0 ml   Output              800 ml   Net             -800 ml       Nutrition  Orders Placed This Encounter   Procedures   • Diet Order     Standing Status:   Standing     Number of Occurrences:   1     Order Specific Question:   Diet:     Answer:   Diabetic [3]     Physical Exam   Constitutional: He is oriented to person, place, and time. He appears well-developed and well-nourished. No distress.   HENT:   Head: Normocephalic and atraumatic.   Mouth/Throat: Oropharynx is clear and moist.   Eyes: Conjunctivae are normal.   Neck: Neck supple.   Cardiovascular: Normal rate, regular rhythm and normal heart sounds.    Pulmonary/Chest: Effort normal and breath sounds normal. No respiratory distress. He has no wheezes. He has no rales.   Abdominal: Soft. Bowel  sounds are normal. He exhibits no distension. There is no tenderness.   Musculoskeletal:   Bilateral BKA   Neurological: He is alert and oriented to person, place, and time. No cranial nerve deficit. Coordination normal.   Skin: Skin is warm.   L BKA with edema, tenderness and purulent drainage    Psychiatric: He has a normal mood and affect. His behavior is normal.   Nursing note and vitals reviewed.      Recent Labs      03/14/18   1500  03/15/18   0301   WBC  9.6  8.1   RBC  5.42  4.89   HEMOGLOBIN  14.5  12.9*   HEMATOCRIT  45.8  41.0*   MCV  84.5  83.8   MCH  26.8*  26.4*   MCHC  31.7*  31.5*   RDW  45.1  45.1   PLATELETCT  523*  469*   MPV  9.4  9.1     Recent Labs      03/14/18   1500  03/15/18   0301   SODIUM  128*  132*   POTASSIUM  4.5  4.2   CHLORIDE  98  101   CO2  22  27   GLUCOSE  315*  126*   BUN  17  17   CREATININE  1.08  1.19   CALCIUM  9.5  9.0                      Assessment/Plan     * Cellulitis of left lower extremity- (present on admission)   Assessment & Plan    L BKA stump cellulitis  IV antibiotics  Wound consult  LPS consult        Infection of amputation stump of left lower extremity (CMS-HCC)- (present on admission)   Assessment & Plan    With purulent cellulitis and concern for underlying osteomyelitis  Patient has been started on IV vancomycin, monitor for toxicity and follow trough levels  Continue IV Unasyn  Limb preservation services has been consulted  Patient is planned for I&D with stump revision tomorrow  MRI has been ordered          Chronic hyponatremia- (present on admission)   Assessment & Plan    Stop HCTZ  Avoid free water        Hypertension- (present on admission)   Assessment & Plan    Continue home antihypertensives        Type 2 diabetes mellitus (CMS-[facility])- (present on admission)   Assessment & Plan    Continue glipizide, basal insulin and SSI        Tobacco dependence- (present on admission)   Assessment & Plan    Tobacco cessation education provided. Nicotine  replacement protocol will be provided to the patient.              Quality-Core Measures   Reviewed items::  Labs reviewed, Medications reviewed and Radiology images reviewed  DVT prophylaxis pharmacological::  Enoxaparin (Lovenox)  Antibiotics:  Treating active infection/contamination beyond 24 hours perioperative coverage      Patient plan of care discussed at multidisplinary team rounds, with patient and R.N at beside.

## 2018-03-15 NOTE — PROGRESS NOTES
Patient arrived from ER in a [facility]. Report received from AURA Taylor . Patient is a/ox4. Calm and cooperative. Patient has a left BKA and right AKA and presented to ER for left leg wound infection. Patient noted drainage from wound since about a week ago. Patient complains of pain but denies any other symptoms. All scheduled and prn medications administered per MAR. RN called Pharmacy to request ranitidine, per pharmacist, medication was not available and it was switched to famotidine.   Patient is otherwise stable with no other significant findings.   Bed alarm in use, call light and personal belongings within reach, bed kept low, treaded socks on. Assisted as necessary. Kept rested and comfortable at all times.

## 2018-03-15 NOTE — CARE PLAN
Problem: Infection  Goal: Will remain free from infection  abx administered per MAR. Wound care order in place.     Problem: Pain Management  Goal: Pain level will decrease to patient's comfort goal  PRN medications administered per MAR

## 2018-03-15 NOTE — PROGRESS NOTES
LIMB PRESERVATION SERVICE      DATE OF CONSULTATION: 3/15/2018    REASON FOR CONSULT: Infected wound to left BKA stump     HISTORY OF PRESENT ILLNESS: Patient is a 57-year-old male, with a past medical history that includes uncontrolled type 2 diabetes, PVD, and smoking admitted for admitted for an infected wound to his left BKA stump. He originally developed blisters to his stump about a month and a half ago.  He was admitted to [facility] from 2/1 to 2/8.  An xray and a CT were done during that admission, neither of which showed OM or abcess.  He was treated with IV antibiotics, and then discharged home on PO Augmentin and doxycycline x 1 day.  Within a week or so the wound began to drain again.  He was seen at the [facility] and was prescribed a course of Clindamycin and Bactrim.  The wound did not improve on abx, and when he returned to [facility] he was advised to go to the hospital.  He was started on IV unasyn and Vanco on admission.  Blood cultures are pending.  WBC 8.1    He was diagnosed with type 2 diabetes over 30 years ago and currently manages with insulin and oral medications.  He checks his blood sugars 4 times per day and reports that these typically run between 70 and 140.  His A1c during recent admission was 11.2.  He underwent a Right BKA in 2013 due to OM, followed by an AKA in 2017 d/t infection in the stump.  His LLE was amputated in 2015 d/t OM.  He has had recurring infections on this limb since then.   He is a current everyday smoker, 1ppd x 45 years.         PAST MEDICAL HISTORY:   Past Medical History:   Diagnosis Date   • BPH (benign prostatic hyperplasia) 12/20/2017   • Diabetes (CMS-HCC)    • Has received first dose of hepatitis B vaccine    • Hypertension    • Renal disorder    • Renal failure         MEDICATIONS:   Current Facility-Administered Medications   Medication Dose   • albuterol inhaler 2 Puff  2 Puff   • amLODIPine (NORVASC) tablet 10 mg  10 mg   • aspirin EC (ECOTRIN) tablet 81 mg   81 mg   • budesonide-formoterol (SYMBICORT) 160-4.5 MCG/ACT inhaler 2 Puff  2 Puff   • citalopram (CELEXA) tablet 20 mg  20 mg   • finasteride (PROSCAR) tablet 5 mg  5 mg   • hydrALAZINE (APRESOLINE) tablet 25 mg  25 mg   • insulin glargine (LANTUS) injection 45 Units  45 Units   • mirtazapine (REMERON) tablet 45 mg  45 mg   • multivitamin (THERAGRAN) tablet 1 Tab  1 Tab   • pregabalin (LYRICA) capsule 75 mg  75 mg   • tamsulosin (FLOMAX) capsule 0.4 mg  0.4 mg   • senna-docusate (PERICOLACE or SENOKOT S) 8.6-50 MG per tablet 2 Tab  2 Tab    And   • polyethylene glycol/lytes (MIRALAX) PACKET 1 Packet  1 Packet    And   • magnesium hydroxide (MILK OF MAGNESIA) suspension 30 mL  30 mL    And   • bisacodyl (DULCOLAX) suppository 10 mg  10 mg   • enoxaparin (LOVENOX) inj 40 mg  40 mg   • nicotine (NICODERM) 21 MG/24HR 21 mg  21 mg    And   • nicotine polacrilex (NICORETTE) 2 MG piece 2 mg  2 mg   • acetaminophen (TYLENOL) tablet 650 mg  650 mg   • Pharmacy Consult Request ...Pain Management Review      And   • oxyCODONE immediate-release (ROXICODONE) tablet 5 mg  5 mg    And   • oxyCODONE immediate-release (ROXICODONE) tablet 10 mg  10 mg    And   • HYDROmorphone (DILAUDID) injection 0.5 mg  0.5 mg   • insulin regular (HUMULIN R) injection 1-6 Units  1-6 Units    And   • glucose 4 g chewable tablet 16 g  16 g    And   • dextrose 50% (D50W) injection 25 mL  25 mL   • Respiratory Care per Protocol     • zolpidem (AMBIEN) tablet 5 mg  5 mg   • NS (BOLUS) infusion 500 mL  500 mL   • ampicillin/sulbactam (UNASYN) 3 g in  mL IVPB  3 g   • MD ALERT... vancomycin per pharmacy protocol 1 Each  1 Each   • lactobacillus granules (LACTINEX/FLORANEX) packet 1 Packet  1 Packet   • vancomycin 1,500 mg in  mL IVPB  1,500 mg   • famotidine (PEPCID) tablet 20 mg  20 mg       ALLERGIES:    Allergies   Allergen Reactions   • Fish Anaphylaxis and Shortness of Breath        PAST SURGICAL HISTORY:   Past Surgical History:  "  Procedure Laterality Date   • IRRIGATION & DEBRIDEMENT ORTHO Left 12/26/2015    Procedure: IRRIGATION & DEBRIDEMENT ORTHO and wound closure of BKA;  Surgeon: William Welsh M.D.;  Location: SURGERY [facility];  Service:    • OTHER ORTHOPEDIC SURGERY Left 2015    bka   • OTHER ORTHOPEDIC SURGERY Right 2013    bka   • OTHER ORTHOPEDIC SURGERY      R forearm deep lac repair       SOCIAL HISTORY:   Social History     Social History   • Marital status: Single     Spouse name: N/A   • Number of children: N/A   • Years of education: N/A     Social History Main Topics   • Smoking status: Current Every Day Smoker     Packs/day: 1.00     Years: 20.00     Types: Cigarettes     Last attempt to quit: 12/26/2005   • Smokeless tobacco: Never Used   • Alcohol use No   • Drug use: No   • Sexual activity: Not on file     Other Topics Concern   • Not on file     Social History Narrative   • No narrative on file        FAMILY HISTORY: History reviewed. No pertinent family history.    REVIEW OF SYSTEMS:   Review of Systems   Constitutional: Positive for fever. Negative for chills.   Respiratory: Positive for cough. Negative for shortness of breath.         \"smokers cough\"   Cardiovascular: Positive for leg swelling. Negative for chest pain.   Gastrointestinal: Negative for constipation, diarrhea, nausea and vomiting.   Neurological: Negative for sensory change.       PHYSICAL EXAMINATION:   VITAL SIGNS: Blood pressure 148/80, pulse 89, temperature 36.7 °C (98 °F), resp. rate 20, height 1.829 m (6'), weight 90.7 kg (200 lb), SpO2 95 %.  Physical Exam   Constitutional: He is oriented to person, place, and time and well-developed, well-nourished, and in no distress.   HENT:   Head: Normocephalic.   Eyes: Pupils are equal, round, and reactive to light.   Pulmonary/Chest: Effort normal.   Musculoskeletal: He exhibits edema.   Healed R AKA  Left BKA with wound to distal/medial stump, purulent drainage, bogginess around wound   "   Neurological: He is oriented to person, place, and time.   Skin: Skin is warm.   Psychiatric: Affect normal.         DIAGNOSTIC DATA:   Recent Labs      03/14/18   1500  03/15/18   0301   WBC  9.6  8.1   RBC  5.42  4.89   HEMOGLOBIN  14.5  12.9*   HEMATOCRIT  45.8  41.0*   MCV  84.5  83.8   MCH  26.8*  26.4*   MCHC  31.7*  31.5*   RDW  45.1  45.1   PLATELETCT  523*  469*   MPV  9.4  9.1     Recent Labs      03/14/18   1500  03/15/18   0301   SODIUM  128*  132*   POTASSIUM  4.5  4.2   CHLORIDE  98  101   CO2  22  27   GLUCOSE  315*  126*   BUN  17  17         ASSESSMENT AND PLAN:     1. Left BKA infection- Recurring wound to distal stump, s/p IV and PO abx in early February.  Purulent drainage noted with probing of wound, new wound cx sent.  MRI ordered to assess for abscess and/or OM.  NPO at MN in anticipation of surgical I&D tomorrow, possible stump revision.  ID to consult.    2. Diabetes mellitus type 2, uncontrolled- A1c 11.2. Blood glucose as high as 392 yesterday, though 78 today.  Diabetes educator to see patient    3. Tobacco abuse- 1ppd x 45 years.  Counseled

## 2018-03-15 NOTE — ASSESSMENT & PLAN NOTE
Tobacco cessation education provided. Nicotine replacement protocol will be provided to the patient.

## 2018-03-15 NOTE — PROGRESS NOTES
Pharmacy Kinetics 57 y.o. male on vancomycin day # 2   3/15/2018    Currently on Vancomycin 1500 mg IV q12hrs    Indication for Treatment: SSTI    Pertinent history per medical record: Admitted on 3/14/2018 with PMH of DM2, HTN, DVT, PVD, bilateral leg amputations (AKA and BKA), BPH, and constipation who presents to the ED with a wound infection (BKA of left leg), states that it began a few weeks ago and now has drainage from the sight. Patient is started on Unasyn and Vanco.    Other antibiotics: Unasyn 3g IV q6hrs    Allergies: Fish     List concerns for renal function: DM, increasing SCr    Pertinent cultures to date:   3/14/18 Wound cx (LLE): Gram neg rods, Enterococcus  3/14/18 Blood cx x2: NGTD    Recent Labs      18   1500  03/15/18   0301   WBC  9.6  8.1   NEUTSPOLYS  63.10   --      Recent Labs      18   1500  03/15/18   0301   BUN  17  17   CREATININE  1.08  1.19   ALBUMIN  3.7   --      No results for input(s): VANCOTROUGH, VANCOPEAK, VANCORANDOM in the last 72 hours.  Intake/Output Summary (Last 24 hours) at 03/15/18 1426  Last data filed at 03/15/18 0400   Gross per 24 hour   Intake                0 ml   Output              800 ml   Net             -800 ml      Blood pressure 148/80, pulse 89, temperature 36.7 °C (98 °F), resp. rate 20, height 1.829 m (6'), weight 90.7 kg (200 lb), SpO2 95 %. Temp (24hrs), Av.3 °C (97.4 °F), Min:36.1 °C (97 °F), Max:36.7 °C (98 °F)      A/P   1. Vancomycin dose change: continue 1500 mg IV q12hrs  2. Next vancomycin level: 3/16 @0530  3. Goal trough: 12-16 mcg/mL  4. Comments: MRI ordered to rule out osteomyelitis. Wound cultures growing Gram negatives as well as Enterococcus. Current antibiotics appropriate for empiric coverage. SCr increased today, patient at risk of accumulation. Patient was given the equivalent of 2 loading doses last PM, therefore will push back tonight's dose ~6 hours and check a level (prior to 2nd maintenance dose) and follow up  result in AM.     Elian ClaudioD

## 2018-03-15 NOTE — H&P
Hospital Medicine History and Physical    Date of Service  3/14/2018    Chief Complaint  Chief Complaint   Patient presents with   • Wound Infection     Left lef       History of Presenting Illness  57 y.o. male who presented 3/14/2018 with Wound Infection (Left lef)  He lives at home, is a bilateral LE amputee due to diabetes, has C and follows [facility]. He is seen bu Wound care nurses and early Feb, blisters noted on his left BKA stump. This became swollen and had purulent discharge today. He has subjective fevers and chills for the past 2 days. He has no other symptoms or complaints. He has diabetes and admits they aren't well controlled. He also smokes. He navigates at home with wheelchair. He is on HCTZ combination for HTN.  At ED, afebrile, hemodynamically stable. Xray did not reveal gas under it. No leukocytosis. Mild to moderate hyponatremia on labs.   When I saw him at ED, no acute distres. Amputations noted. Stump swelling and mild soreness and redness. No discharge currently.    Primary Care Physician  Pcp Not In Computer    Consultants      Code Status  full    Review of Systems  Review of Systems   Constitutional: Positive for chills, fever and malaise/fatigue.   HENT: Negative for congestion, hearing loss and nosebleeds.    Eyes: Negative for pain and redness.   Respiratory: Negative for cough, hemoptysis, shortness of breath and wheezing.    Cardiovascular: Negative for chest pain and palpitations.   Gastrointestinal: Negative for abdominal pain, blood in stool, constipation, diarrhea, nausea and vomiting.   Genitourinary: Negative for dysuria, frequency and hematuria.   Musculoskeletal: Positive for myalgias. Negative for falls and joint pain.   Skin: Positive for rash (L BKA stump cellulitis).   Neurological: Positive for weakness. Negative for dizziness, tremors, focal weakness, seizures, loss of consciousness and headaches.   Psychiatric/Behavioral: The patient is not nervous/anxious and does  not have insomnia.    All other systems reviewed and are negative.       Past Medical History  Past Medical History:   Diagnosis Date   • BPH (benign prostatic hyperplasia) 12/20/2017   • Diabetes (CMS-HCC)    • Has received first dose of hepatitis B vaccine    • Hypertension    • Renal disorder    • Renal failure        Surgical History  Past Surgical History:   Procedure Laterality Date   • IRRIGATION & DEBRIDEMENT ORTHO Left 12/26/2015    Procedure: IRRIGATION & DEBRIDEMENT ORTHO and wound closure of BKA;  Surgeon: William Welsh M.D.;  Location: SURGERY [facility];  Service:    • OTHER ORTHOPEDIC SURGERY Left 2015    bka   • OTHER ORTHOPEDIC SURGERY Right 2013    bka   • OTHER ORTHOPEDIC SURGERY      R forearm deep lac repair       Medications  No current facility-administered medications on file prior to encounter.      Current Outpatient Prescriptions on File Prior to Encounter   Medication Sig Dispense Refill   • ibuprofen (MOTRIN) 600 MG Tab Take 1 Tab by mouth every 6 hours as needed for Moderate Pain. 30 Tab 1   • albuterol 108 (90 Base) MCG/ACT Aero Soln inhalation aerosol Inhale 2 Puffs by mouth every four hours as needed for Shortness of Breath. 8.5 g 1   • budesonide-formoterol (SYMBICORT) 160-4.5 MCG/ACT Aerosol Inhale 2 Puffs by mouth 2 Times a Day. 1 Inhaler 1   • aspirin 81 MG tablet Take 81 mg by mouth every day.     • citalopram (CELEXA) 20 MG Tab Take 1 Tab by mouth every day. 30 Tab 0   • mirtazapine (REMERON) 45 MG tablet Take 1 Tab by mouth every bedtime. 30 Tab 0   • glipiZIDE (GLUCOTROL) 10 MG Tab Take 1 Tab by mouth 2 times a day. 60 Tab 0   • hydrALAZINE (APRESOLINE) 25 MG Tab Take 1 Tab by mouth every 8 hours. 90 Tab 0   • amlodipine (NORVASC) 10 MG Tab Take 1 Tab by mouth every day. 30 Tab 0   • triamterene/hctz (MAXZIDE-25/DYAZIDE) 37.5-25 MG Cap Take 1 Cap by mouth every morning. 30 Cap 3   • finasteride (PROSCAR) 5 MG Tab Take 1 Tab by mouth every day. 30 Tab 0   • tamsulosin  (FLOMAX) 0.4 MG capsule Take 1 Cap by mouth ONE-HALF HOUR AFTER BREAKFAST. 30 Cap 0   • multivitamin (THERAGRAN) Tab Take 1 Tab by mouth every day.         Family History  History reviewed. No pertinent family history.    Social History  Social History   Substance Use Topics   • Smoking status: Current Every Day Smoker     Packs/day: 1.00     Years: 20.00     Types: Cigarettes     Last attempt to quit: 2005   • Smokeless tobacco: Never Used   • Alcohol use No       Allergies  Allergies   Allergen Reactions   • Fish Anaphylaxis and Shortness of Breath        Physical Exam  Laboratory   Hemodynamics  Temp (24hrs), Av.6 °C (97.9 °F), Min:36.3 °C (97.3 °F), Max:36.9 °C (98.4 °F)   Temperature: 36.3 °C (97.3 °F)  Pulse  Av.6  Min: 91  Max: 98    Blood Pressure: 160/89, NIBP: 141/70      Respiratory      Respiration: 18, Pulse Oximetry: 93 %, O2 Daily Delivery Respiratory : Room Air with O2 Available             Physical Exam   Constitutional: He appears well-developed and well-nourished.   HENT:   Head: Normocephalic and atraumatic.   Eyes: Conjunctivae and EOM are normal. No scleral icterus.   Neck: Normal range of motion. Neck supple.   Cardiovascular: Normal rate and regular rhythm.  Exam reveals no gallop and no friction rub.    Murmur heard.  Pulmonary/Chest: Effort normal and breath sounds normal. No respiratory distress. He has no wheezes. He has no rales.   Abdominal: Soft. Bowel sounds are normal. He exhibits no distension. There is no tenderness. There is no rebound and no guarding.   Musculoskeletal: He exhibits edema and tenderness.   L and R amputation  L BKA has edema, tenderness minimal discharge   Neurological: He is alert.   Skin: Skin is warm. Rash noted.   Psychiatric: He has a normal mood and affect. His behavior is normal.       Recent Labs      18   1500   WBC  9.6   RBC  5.42   HEMOGLOBIN  14.5   HEMATOCRIT  45.8   MCV  84.5   MCH  26.8*   MCHC  31.7*   RDW  45.1   PLATELETCT   523*   MPV  9.4     Recent Labs      03/14/18   1500   SODIUM  128*   POTASSIUM  4.5   CHLORIDE  98   CO2  22   GLUCOSE  315*   BUN  17   CREATININE  1.08   CALCIUM  9.5     Recent Labs      03/14/18   1500   ALTSGPT  5   ASTSGOT  8*   ALKPHOSPHAT  87   TBILIRUBIN  0.3   GLUCOSE  315*                 Lab Results   Component Value Date    TROPONINI <0.01 12/20/2017     Urinalysis:    Lab Results  Component Value Date/Time   SPECGRAVITY 1.022 02/02/2018 1211   GLUCOSEUR >=1000 (A) 02/02/2018 1211   KETONES Negative 02/02/2018 1211   NITRITE Negative 02/02/2018 1211   WBCURINE 0-2 (A) 02/02/2018 1211   RBCURINE 2-5 (A) 02/02/2018 1211   BACTERIA Negative 02/02/2018 1211   EPITHELCELL Negative 02/02/2018 1211        Imaging  Dx-chest-portable (1 View)    Result Date: 3/14/2018  3/14/2018 3:54 PM HISTORY/REASON FOR EXAM:  Sepsis. Shortness of breath. Open wound left leg. TECHNIQUE/EXAM DESCRIPTION AND NUMBER OF VIEWS: Single portable view of the chest. COMPARISON:  2/1/2018 FINDINGS: The cardiac silhouette is within normal limits. No infiltrates or consolidations are noted. No pleural effusion is noted.     No acute cardiac or pulmonary abnormality is noted.    Dx-femur-2+ Left    Result Date: 3/14/2018  3/14/2018 3:54 PM HISTORY/REASON FOR EXAM:  Open wound left amputation site with pain. TECHNIQUE/EXAM DESCRIPTION AND NUMBER OF VIEWS:  2 views of the LEFT femur. COMPARISON: 2/3/2018 FINDINGS: No femoral fracture or dislocation is present. There is no soft tissue mass identified. There is a left below-knee amputation with stable edematous appearance of the left lower extremity stump. No subcutaneous gas is identified. There is no plain film evidence of osteomyelitis.     1.  No acute left femoral fracture or dislocation. 2.  No evidence of left femoral or residual left tibia/fibula osteomyelitis.     Assessment/Plan     I anticipate this patient will require at least two midnights for appropriate medical management,  necessitating inpatient admission.    * Cellulitis of left lower extremity   Assessment & Plan    L BKA stump cellulitis  IV antibiotics  Wound consult  LPS consult        Chronic hyponatremia- (present on admission)   Assessment & Plan    Stop HCTZ  Avoid free water        Hypertension- (present on admission)   Assessment & Plan    Continue home antihypertensives        Type 2 diabetes mellitus (CMS-HCC)- (present on admission)   Assessment & Plan    Continue glipizide, basal insulin and SSI        Tobacco dependence   Assessment & Plan    I spent 5 minutes, discussing tobacco dependence and cardiac as well as pulmonary risk. Nicotine replacement and smoking cessation instructions. Code 79950   Alsoi discussed smoking effects on healing  He does not seem ready to quit.              VTE prophylaxis: pharmacologic.    I spent 72 minutes, reviewing the chart, notes, vitals, labs, imaging, ordering labs, evaluating Jv Angelo for assessment, enacting the plan above. 50% of the time was spent in counseling Jv Angelo andanswering questions. Discussed with ED physician. Time was devoted to counseling and coordinating care including review of records, pertinent lab data and studies, as well as discussing diagnostic evaluation and work up, planned therapeutic interventions and future disposition of care. Where indicated, the assessment and plan reflect discussion of patient with consultants, other healthcare providers, family members, and additional research needed to obtain further information in formulating the plan of care for Jv Angelo.

## 2018-03-15 NOTE — ASSESSMENT & PLAN NOTE
With purulent cellulitis and concern for underlying osteomyelitis  Cultures revealed E faecalis and E. Coli and B. fragilis  Switch to IV Zosyn, stop date 4/14/2018  PICC line placed; change to Dapto/Ertapenem when go home per ID  ID and Ortho onboard

## 2018-03-16 LAB
ANION GAP SERPL CALC-SCNC: 7 MMOL/L (ref 0–11.9)
BASOPHILS # BLD AUTO: 0.6 % (ref 0–1.8)
BASOPHILS # BLD: 0.04 K/UL (ref 0–0.12)
BUN SERPL-MCNC: 13 MG/DL (ref 8–22)
CALCIUM SERPL-MCNC: 8.8 MG/DL (ref 8.5–10.5)
CHLORIDE SERPL-SCNC: 102 MMOL/L (ref 96–112)
CO2 SERPL-SCNC: 24 MMOL/L (ref 20–33)
CREAT SERPL-MCNC: 1.08 MG/DL (ref 0.5–1.4)
EOSINOPHIL # BLD AUTO: 0.19 K/UL (ref 0–0.51)
EOSINOPHIL NFR BLD: 2.8 % (ref 0–6.9)
ERYTHROCYTE [DISTWIDTH] IN BLOOD BY AUTOMATED COUNT: 43.5 FL (ref 35.9–50)
GLUCOSE BLD-MCNC: 131 MG/DL (ref 65–99)
GLUCOSE BLD-MCNC: 160 MG/DL (ref 65–99)
GLUCOSE BLD-MCNC: 234 MG/DL (ref 65–99)
GLUCOSE BLD-MCNC: 261 MG/DL (ref 65–99)
GLUCOSE SERPL-MCNC: 152 MG/DL (ref 65–99)
HCT VFR BLD AUTO: 39.4 % (ref 42–52)
HGB BLD-MCNC: 12.7 G/DL (ref 14–18)
IMM GRANULOCYTES # BLD AUTO: 0.02 K/UL (ref 0–0.11)
IMM GRANULOCYTES NFR BLD AUTO: 0.3 % (ref 0–0.9)
LYMPHOCYTES # BLD AUTO: 1.98 K/UL (ref 1–4.8)
LYMPHOCYTES NFR BLD: 29 % (ref 22–41)
MCH RBC QN AUTO: 26.8 PG (ref 27–33)
MCHC RBC AUTO-ENTMCNC: 32.2 G/DL (ref 33.7–35.3)
MCV RBC AUTO: 83.3 FL (ref 81.4–97.8)
MONOCYTES # BLD AUTO: 0.76 K/UL (ref 0–0.85)
MONOCYTES NFR BLD AUTO: 11.1 % (ref 0–13.4)
NEUTROPHILS # BLD AUTO: 3.84 K/UL (ref 1.82–7.42)
NEUTROPHILS NFR BLD: 56.2 % (ref 44–72)
NRBC # BLD AUTO: 0 K/UL
NRBC BLD-RTO: 0 /100 WBC
PLATELET # BLD AUTO: 441 K/UL (ref 164–446)
PMV BLD AUTO: 9.4 FL (ref 9–12.9)
POTASSIUM SERPL-SCNC: 4.1 MMOL/L (ref 3.6–5.5)
RBC # BLD AUTO: 4.73 M/UL (ref 4.7–6.1)
SODIUM SERPL-SCNC: 133 MMOL/L (ref 135–145)
VANCOMYCIN TROUGH SERPL-MCNC: 15.5 UG/ML (ref 10–20)
WBC # BLD AUTO: 6.8 K/UL (ref 4.8–10.8)

## 2018-03-16 PROCEDURE — 700111 HCHG RX REV CODE 636 W/ 250 OVERRIDE (IP)

## 2018-03-16 PROCEDURE — 36415 COLL VENOUS BLD VENIPUNCTURE: CPT

## 2018-03-16 PROCEDURE — 700105 HCHG RX REV CODE 258: Performed by: INTERNAL MEDICINE

## 2018-03-16 PROCEDURE — 700111 HCHG RX REV CODE 636 W/ 250 OVERRIDE (IP): Performed by: INTERNAL MEDICINE

## 2018-03-16 PROCEDURE — 80048 BASIC METABOLIC PNL TOTAL CA: CPT

## 2018-03-16 PROCEDURE — 500891 HCHG PACK, ORTHO MAJOR: Performed by: ORTHOPAEDIC SURGERY

## 2018-03-16 PROCEDURE — 87077 CULTURE AEROBIC IDENTIFY: CPT

## 2018-03-16 PROCEDURE — 160035 HCHG PACU - 1ST 60 MINS PHASE I: Performed by: ORTHOPAEDIC SURGERY

## 2018-03-16 PROCEDURE — 85025 COMPLETE CBC W/AUTO DIFF WBC: CPT

## 2018-03-16 PROCEDURE — 160048 HCHG OR STATISTICAL LEVEL 1-5: Performed by: ORTHOPAEDIC SURGERY

## 2018-03-16 PROCEDURE — 770006 HCHG ROOM/CARE - MED/SURG/GYN SEMI*

## 2018-03-16 PROCEDURE — 87205 SMEAR GRAM STAIN: CPT

## 2018-03-16 PROCEDURE — 80202 ASSAY OF VANCOMYCIN: CPT

## 2018-03-16 PROCEDURE — 700102 HCHG RX REV CODE 250 W/ 637 OVERRIDE(OP)

## 2018-03-16 PROCEDURE — 160027 HCHG SURGERY MINUTES - 1ST 30 MINS LEVEL 2: Performed by: ORTHOPAEDIC SURGERY

## 2018-03-16 PROCEDURE — A9270 NON-COVERED ITEM OR SERVICE: HCPCS

## 2018-03-16 PROCEDURE — 700101 HCHG RX REV CODE 250

## 2018-03-16 PROCEDURE — 0QBC0ZZ EXCISION OF LEFT LOWER FEMUR, OPEN APPROACH: ICD-10-PCS | Performed by: ORTHOPAEDIC SURGERY

## 2018-03-16 PROCEDURE — 160002 HCHG RECOVERY MINUTES (STAT): Performed by: ORTHOPAEDIC SURGERY

## 2018-03-16 PROCEDURE — 87070 CULTURE OTHR SPECIMN AEROBIC: CPT

## 2018-03-16 PROCEDURE — 700102 HCHG RX REV CODE 250 W/ 637 OVERRIDE(OP): Performed by: INTERNAL MEDICINE

## 2018-03-16 PROCEDURE — 501838 HCHG SUTURE GENERAL: Performed by: ORTHOPAEDIC SURGERY

## 2018-03-16 PROCEDURE — 82962 GLUCOSE BLOOD TEST: CPT | Mod: 91

## 2018-03-16 PROCEDURE — A9270 NON-COVERED ITEM OR SERVICE: HCPCS | Performed by: INTERNAL MEDICINE

## 2018-03-16 PROCEDURE — 87075 CULTR BACTERIA EXCEPT BLOOD: CPT

## 2018-03-16 PROCEDURE — 99233 SBSQ HOSP IP/OBS HIGH 50: CPT | Performed by: INTERNAL MEDICINE

## 2018-03-16 PROCEDURE — 160038 HCHG SURGERY MINUTES - EA ADDL 1 MIN LEVEL 2: Performed by: ORTHOPAEDIC SURGERY

## 2018-03-16 PROCEDURE — 160009 HCHG ANES TIME/MIN: Performed by: ORTHOPAEDIC SURGERY

## 2018-03-16 RX ORDER — OXYCODONE HCL 5 MG/5 ML
SOLUTION, ORAL ORAL
Status: COMPLETED
Start: 2018-03-16 | End: 2018-03-16

## 2018-03-16 RX ORDER — HYDRALAZINE HYDROCHLORIDE 20 MG/ML
20 INJECTION INTRAMUSCULAR; INTRAVENOUS EVERY 6 HOURS PRN
Status: DISCONTINUED | OUTPATIENT
Start: 2018-03-16 | End: 2018-03-26 | Stop reason: HOSPADM

## 2018-03-16 RX ORDER — LABETALOL HYDROCHLORIDE 5 MG/ML
10 INJECTION, SOLUTION INTRAVENOUS EVERY 4 HOURS PRN
Status: DISCONTINUED | OUTPATIENT
Start: 2018-03-16 | End: 2018-03-26 | Stop reason: HOSPADM

## 2018-03-16 RX ADMIN — LACTOBACILLUS ACIDOPHILUS / LACTOBACILLUS BULGARICUS 1 PACKET: 100 MILLION CFU STRENGTH GRANULES at 08:03

## 2018-03-16 RX ADMIN — INSULIN HUMAN 3 UNITS: 100 INJECTION, SOLUTION PARENTERAL at 20:44

## 2018-03-16 RX ADMIN — HYDROMORPHONE HYDROCHLORIDE 0.5 MG: 10 INJECTION, SOLUTION INTRAMUSCULAR; INTRAVENOUS; SUBCUTANEOUS at 10:18

## 2018-03-16 RX ADMIN — HYDRALAZINE HYDROCHLORIDE 25 MG: 50 TABLET, FILM COATED ORAL at 14:22

## 2018-03-16 RX ADMIN — INSULIN HUMAN 1 UNITS: 100 INJECTION, SOLUTION PARENTERAL at 05:36

## 2018-03-16 RX ADMIN — PREGABALIN 75 MG: 75 CAPSULE ORAL at 14:22

## 2018-03-16 RX ADMIN — LACTOBACILLUS ACIDOPHILUS / LACTOBACILLUS BULGARICUS 1 PACKET: 100 MILLION CFU STRENGTH GRANULES at 17:37

## 2018-03-16 RX ADMIN — FAMOTIDINE 20 MG: 20 TABLET, FILM COATED ORAL at 20:36

## 2018-03-16 RX ADMIN — INSULIN GLARGINE 45 UNITS: 100 INJECTION, SOLUTION SUBCUTANEOUS at 20:47

## 2018-03-16 RX ADMIN — HYDROMORPHONE HYDROCHLORIDE 0.5 MG: 10 INJECTION, SOLUTION INTRAMUSCULAR; INTRAVENOUS; SUBCUTANEOUS at 16:10

## 2018-03-16 RX ADMIN — FINASTERIDE 5 MG: 5 TABLET, FILM COATED ORAL at 08:03

## 2018-03-16 RX ADMIN — BUDESONIDE AND FORMOTEROL FUMARATE DIHYDRATE 2 PUFF: 160; 4.5 AEROSOL RESPIRATORY (INHALATION) at 08:02

## 2018-03-16 RX ADMIN — AMPICILLIN SODIUM AND SULBACTAM SODIUM 3 G: 2; 1 INJECTION, POWDER, FOR SOLUTION INTRAMUSCULAR; INTRAVENOUS at 10:11

## 2018-03-16 RX ADMIN — OXYCODONE HYDROCHLORIDE 10 MG: 5 SOLUTION ORAL at 16:02

## 2018-03-16 RX ADMIN — TAMSULOSIN HYDROCHLORIDE 0.4 MG: 0.4 CAPSULE ORAL at 08:02

## 2018-03-16 RX ADMIN — PREGABALIN 75 MG: 75 CAPSULE ORAL at 05:33

## 2018-03-16 RX ADMIN — INSULIN HUMAN 2 UNITS: 100 INJECTION, SOLUTION PARENTERAL at 17:32

## 2018-03-16 RX ADMIN — AMPICILLIN SODIUM AND SULBACTAM SODIUM 3 G: 2; 1 INJECTION, POWDER, FOR SOLUTION INTRAMUSCULAR; INTRAVENOUS at 16:54

## 2018-03-16 RX ADMIN — VANCOMYCIN HYDROCHLORIDE 1500 MG: 100 INJECTION, POWDER, LYOPHILIZED, FOR SOLUTION INTRAVENOUS at 11:15

## 2018-03-16 RX ADMIN — PREGABALIN 75 MG: 75 CAPSULE ORAL at 20:36

## 2018-03-16 RX ADMIN — VANCOMYCIN HYDROCHLORIDE 1500 MG: 100 INJECTION, POWDER, LYOPHILIZED, FOR SOLUTION INTRAVENOUS at 21:36

## 2018-03-16 RX ADMIN — AMLODIPINE BESYLATE 10 MG: 10 TABLET ORAL at 08:02

## 2018-03-16 RX ADMIN — HYDROMORPHONE HYDROCHLORIDE 0.5 MG: 10 INJECTION, SOLUTION INTRAMUSCULAR; INTRAVENOUS; SUBCUTANEOUS at 20:38

## 2018-03-16 RX ADMIN — LACTOBACILLUS ACIDOPHILUS / LACTOBACILLUS BULGARICUS 1 PACKET: 100 MILLION CFU STRENGTH GRANULES at 11:19

## 2018-03-16 RX ADMIN — AMPICILLIN SODIUM AND SULBACTAM SODIUM 3 G: 2; 1 INJECTION, POWDER, FOR SOLUTION INTRAMUSCULAR; INTRAVENOUS at 03:58

## 2018-03-16 RX ADMIN — OXYCODONE HYDROCHLORIDE 10 MG: 10 TABLET ORAL at 14:24

## 2018-03-16 RX ADMIN — ZOLPIDEM TARTRATE 5 MG: 5 TABLET, FILM COATED ORAL at 20:35

## 2018-03-16 RX ADMIN — OXYCODONE HYDROCHLORIDE 10 MG: 10 TABLET ORAL at 08:02

## 2018-03-16 RX ADMIN — MIRTAZAPINE 45 MG: 15 TABLET, FILM COATED ORAL at 20:36

## 2018-03-16 RX ADMIN — VANCOMYCIN HYDROCHLORIDE 1500 MG: 100 INJECTION, POWDER, LYOPHILIZED, FOR SOLUTION INTRAVENOUS at 05:41

## 2018-03-16 RX ADMIN — HYDRALAZINE HYDROCHLORIDE 25 MG: 50 TABLET, FILM COATED ORAL at 20:35

## 2018-03-16 RX ADMIN — ASPIRIN 81 MG: 81 TABLET, COATED ORAL at 08:03

## 2018-03-16 RX ADMIN — SODIUM CHLORIDE 500 ML: 9 INJECTION, SOLUTION INTRAVENOUS at 05:40

## 2018-03-16 RX ADMIN — CITALOPRAM HYDROBROMIDE 20 MG: 20 TABLET ORAL at 08:03

## 2018-03-16 RX ADMIN — ENOXAPARIN SODIUM 40 MG: 100 INJECTION SUBCUTANEOUS at 08:02

## 2018-03-16 RX ADMIN — AMPICILLIN SODIUM AND SULBACTAM SODIUM 3 G: 2; 1 INJECTION, POWDER, FOR SOLUTION INTRAMUSCULAR; INTRAVENOUS at 20:59

## 2018-03-16 RX ADMIN — HYDRALAZINE HYDROCHLORIDE 25 MG: 50 TABLET, FILM COATED ORAL at 05:33

## 2018-03-16 RX ADMIN — HYDROMORPHONE HYDROCHLORIDE 0.5 MG: 10 INJECTION, SOLUTION INTRAMUSCULAR; INTRAVENOUS; SUBCUTANEOUS at 16:03

## 2018-03-16 RX ADMIN — THERA TABS 1 TABLET: TAB at 08:03

## 2018-03-16 RX ADMIN — BUDESONIDE AND FORMOTEROL FUMARATE DIHYDRATE 2 PUFF: 160; 4.5 AEROSOL RESPIRATORY (INHALATION) at 20:36

## 2018-03-16 RX ADMIN — NICOTINE 21 MG: 21 PATCH, EXTENDED RELEASE TRANSDERMAL at 05:33

## 2018-03-16 ASSESSMENT — ENCOUNTER SYMPTOMS
CHILLS: 0
FEVER: 0

## 2018-03-16 ASSESSMENT — PAIN SCALES - GENERAL
PAINLEVEL_OUTOF10: 4
PAINLEVEL_OUTOF10: 9
PAINLEVEL_OUTOF10: 8
PAINLEVEL_OUTOF10: 8
PAINLEVEL_OUTOF10: 0
PAINLEVEL_OUTOF10: 5

## 2018-03-16 NOTE — PROGRESS NOTES
Pharmacy Kinetics 57 y.o. male on vancomycin day # 3 3/16/2018    Currently on Vancomycin 1500 mg IV q12hrs     Indication for Treatment: SSTI     Pertinent history per medical record: Admitted on 3/14/2018 with PMH of DM2, HTN, DVT, PVD, bilateral leg amputations (AKA and BKA), BPH, and constipation who presents to the ED with a wound infection (BKA of left leg), states that it began a few weeks ago and now has drainage from the site. Patient is started on Unasyn and Vanco.     Other antibiotics: Unasyn 3g IV q6hrs     Allergies: Fish      List concerns for renal function: DM, increasing SCr     Pertinent cultures to date:   3/14/18 Wound cx (LLE): Gram neg rods, Enterococcus  3/14/18 Blood cx x2: NGTD    Recent Labs      18   1500  03/15/18   0301  18   0532   WBC  9.6  8.1  6.8   NEUTSPOLYS  63.10   --   56.20     Recent Labs      18   1500  03/15/18   0301  18   0532   BUN  17  17  13   CREATININE  1.08  1.19  1.08   ALBUMIN  3.7   --    --      Recent Labs      18   0532   VANCOTROUGH  15.5     Intake/Output Summary (Last 24 hours) at 18 0922  Last data filed at 18 0400   Gross per 24 hour   Intake              440 ml   Output             2300 ml   Net            -1860 ml      Blood pressure 129/78, pulse 82, temperature 36.4 °C (97.5 °F), resp. rate 17, height 1.829 m (6'), weight 90.7 kg (200 lb), SpO2 98 %. Temp (24hrs), Av.6 °C (97.9 °F), Min:36.3 °C (97.3 °F), Max:37 °C (98.6 °F)      A/P   1. Vancomycin dose change: continue 1500 mg q12hrs  2. Next vancomycin level: 2-3 days  3. Goal trough: 12-16 mcg/mL  4. Comments: Trough level this AM within goal range, will continue current dosing. Renal indices stable and UOP adequate. Cultures from infected amputation site growing Enterococcus and Gram-negative rods, still awaiting sensitivities. Recommend de-escalation from vancomycin if Enterococcus sensitive to penicillin/ampicillin. MRI and I&D of infected site  planned for today. Pharmacy will follow.    Glenn Marroquin, ElianD

## 2018-03-16 NOTE — CARE PLAN
Problem: Safety  Goal: Will remain free from injury  Outcome: PROGRESSING AS EXPECTED  Encouraging strip built in alarm, pt refusing but agreeing to built in alarm, Maintaining the call light within reach, and fluids within reach, bed is locked and in low position, hourly rounding in place.     Problem: Pain Management  Goal: Pain level will decrease to patient's comfort goal  Outcome: PROGRESSING AS EXPECTED  Encouraging PO before IV pain medications. Encouraging distraction and non pharmacological methods for pain control

## 2018-03-16 NOTE — PROGRESS NOTES
LIMB PRESERVATION SERVICE     57-year-old male, with a past medical history that includes uncontrolled type 2 diabetes, PVD, and smoking admitted for admitted for an infected wound to his left BKA stump.    Purulent drainage from wound to medial stump    MRI ordered, not yet done    Wound cx + for Group D enterococcus      D/W Dr. Dyson.  MRI canceled.  Pt scheduled for I&D, possible stump revision later today  He has been NPO since MN    /78   Pulse 82   Temp 36.4 °C (97.5 °F)   Resp 17   Ht 1.829 m (6')   Wt 90.7 kg (200 lb)   SpO2 98%   BMI 27.12 kg/m²

## 2018-03-16 NOTE — PROGRESS NOTES
Pt educated regarding fall risk. Pt refuses strip alarm despite education. Agreeing to built in and states he will call appropriately,
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
## 2018-03-16 NOTE — OR NURSING
Pt tolerating 5/10 pain; states that he is ready to go back to his room. Pt transported in stable condition to floor.

## 2018-03-16 NOTE — CONSULTS
3/16/2018    Jv Angelo is a 57 y.o. male who presents with a BKA infection and is here for operative management. Patient denies numbness, parasthesias, loss of concousness or other trauma    Past Medical History:   Diagnosis Date   • BPH (benign prostatic hyperplasia) 12/20/2017   • Diabetes (CMS-HCC)    • Has received first dose of hepatitis B vaccine    • Hypertension    • Renal disorder    • Renal failure        Past Surgical History:   Procedure Laterality Date   • IRRIGATION & DEBRIDEMENT ORTHO Left 12/26/2015    Procedure: IRRIGATION & DEBRIDEMENT ORTHO and wound closure of BKA;  Surgeon: William Welsh M.D.;  Location: SURGERY [facility];  Service:    • OTHER ORTHOPEDIC SURGERY Left 2015    bka   • OTHER ORTHOPEDIC SURGERY Right 2013    bka   • OTHER ORTHOPEDIC SURGERY      R forearm deep lac repair       Medications  No current facility-administered medications on file prior to encounter.      Current Outpatient Prescriptions on File Prior to Encounter   Medication Sig Dispense Refill   • ibuprofen (MOTRIN) 600 MG Tab Take 1 Tab by mouth every 6 hours as needed for Moderate Pain. 30 Tab 1   • albuterol 108 (90 Base) MCG/ACT Aero Soln inhalation aerosol Inhale 2 Puffs by mouth every four hours as needed for Shortness of Breath. 8.5 g 1   • budesonide-formoterol (SYMBICORT) 160-4.5 MCG/ACT Aerosol Inhale 2 Puffs by mouth 2 Times a Day. 1 Inhaler 1   • aspirin 81 MG tablet Take 81 mg by mouth every day.     • citalopram (CELEXA) 20 MG Tab Take 1 Tab by mouth every day. 30 Tab 0   • mirtazapine (REMERON) 45 MG tablet Take 1 Tab by mouth every bedtime. 30 Tab 0   • glipiZIDE (GLUCOTROL) 10 MG Tab Take 1 Tab by mouth 2 times a day. 60 Tab 0   • hydrALAZINE (APRESOLINE) 25 MG Tab Take 1 Tab by mouth every 8 hours. 90 Tab 0   • amlodipine (NORVASC) 10 MG Tab Take 1 Tab by mouth every day. 30 Tab 0   • triamterene/hctz (MAXZIDE-25/DYAZIDE) 37.5-25 MG Cap Take 1 Cap by mouth every morning. 30 Cap 3   •  finasteride (PROSCAR) 5 MG Tab Take 1 Tab by mouth every day. 30 Tab 0   • tamsulosin (FLOMAX) 0.4 MG capsule Take 1 Cap by mouth ONE-HALF HOUR AFTER BREAKFAST. 30 Cap 0   • multivitamin (THERAGRAN) Tab Take 1 Tab by mouth every day.         Allergies  Fish    ROS  Left BKA infection. All other systems were reviewed and found to be negative    History reviewed. No pertinent family history.    Social History     Social History   • Marital status: Single     Spouse name: N/A   • Number of children: N/A   • Years of education: N/A     Social History Main Topics   • Smoking status: Current Every Day Smoker     Packs/day: 1.00     Years: 20.00     Types: Cigarettes     Last attempt to quit: 12/26/2005   • Smokeless tobacco: Never Used   • Alcohol use No   • Drug use: No   • Sexual activity: Not on file     Other Topics Concern   • Not on file     Social History Narrative   • No narrative on file       Physical Exam  Vitals  Blood pressure 129/78, pulse 90, temperature 36.7 °C (98 °F), resp. rate 17, height 1.829 m (6'), weight 90.7 kg (200 lb), SpO2 96 %.  General: Well Developed, Well Nourished, no acute distress  HEENT: Normocephalic, atraumatic  Eyes: Anicteric, PERRLA, EOMI  Neck: Supple, nontender, no masses  Lungs: CTA, no wheezes or crackles  Heart: RRR, no murmurs, rubs or gallops  Abdomen: Soft, NT, ND  Pelvis: Stable to AP and Lateral Compression  Skin: Intact, no open wounds  Extremities: Left BKA purulent draining wound  Neuro: NVI  Vascular: 1+Fem, Capillary refill <2 seconds    Radiographs:  DX-FEMUR-2+ LEFT   Final Result      1.  No acute left femoral fracture or dislocation.      2.  No evidence of left femoral or residual left tibia/fibula osteomyelitis.      DX-CHEST-PORTABLE (1 VIEW)   Final Result      No acute cardiac or pulmonary abnormality is noted.          Laboratory Values  Recent Labs      03/14/18   1500  03/15/18   0301  03/16/18   0532   WBC  9.6  8.1  6.8   RBC  5.42  4.89  4.73    HEMOGLOBIN  14.5  12.9*  12.7*   HEMATOCRIT  45.8  41.0*  39.4*   MCV  84.5  83.8  83.3   MCH  26.8*  26.4*  26.8*   MCHC  31.7*  31.5*  32.2*   RDW  45.1  45.1  43.5   PLATELETCT  523*  469*  441   MPV  9.4  9.1  9.4     Recent Labs      03/14/18   1500  03/15/18   0301  03/16/18   0532   SODIUM  128*  132*  133*   POTASSIUM  4.5  4.2  4.1   CHLORIDE  98  101  102   CO2  22  27  24   GLUCOSE  315*  126*  152*   BUN  17  17  13             Impression:Left BKA wound infection    Plan:Operative intervention. Risks and benefits of surgery were discussed which include but are not limited to bleeding, infection, neurovascular damage, malunion, nonunion, instability, limb length discrepancy, DVT, PE, MI, Stroke and death. They understand these risks and wish to proceed.

## 2018-03-16 NOTE — OP REPORT
DATE OF SERVICE:  03/16/2018    PREOPERATIVE DIAGNOSIS:  Left below-knee amputation abscess.    POSTOPERATIVE DIAGNOSIS:  Left below-knee amputation abscess.    PROCEDURE:  Irrigation and debridement of left below-knee amputation abscess.    SURGEON:  Jose Raza MD    ASSISTANT:  None.    ESTIMATED BLOOD LOSS:  None.    INDICATIONS:  This 57-year-old male who had a below-knee amputation for many   years.  He developed a purulent draining wound that does not involve the bone.    Risks and benefits of abscess, irrigation and debridement were discussed,   which include but not limited to bleeding, infection, neurovascular damage,   pain, stiffness, need for further surgery.  He understands all these risks and   wished to proceed.    DESCRIPTION OF PROCEDURE:  Patient was sedated with LMA anesthesia and   administered preoperative antibiotics.  Left lower extremity was prepped and   draped in usual sterile fashion.  His 2x3 cm abscess was excised completely of   skin, subcutaneous tissue, underlying muscle, and bone with a knife and   rongeur.  This was sent for culture and sensitivity.  It was then irrigated   with pulsatile lavage and closed with nylon sutures.  Sterile dressings were   applied.  Patient tolerated the procedure well.    POSTOPERATIVE PLAN:  The patient will be admitted under the medicine service   per infectious disease consultation and nonweightbearing until his wound   heals.       ____________________________________     JOSE RAZA MD PLA / NTS    DD:  03/16/2018 15:45:39  DT:  03/16/2018 16:00:05    D#:  [id number]  Job#:  [id number]

## 2018-03-17 PROBLEM — L02.416 ABSCESS OF LEFT LOWER EXTREMITY: Status: ACTIVE | Noted: 2018-03-14

## 2018-03-17 LAB
ANION GAP SERPL CALC-SCNC: 7 MMOL/L (ref 0–11.9)
BACTERIA WND AEROBE CULT: ABNORMAL
BASOPHILS # BLD AUTO: 0.4 % (ref 0–1.8)
BASOPHILS # BLD: 0.04 K/UL (ref 0–0.12)
BUN SERPL-MCNC: 9 MG/DL (ref 8–22)
CALCIUM SERPL-MCNC: 8.8 MG/DL (ref 8.5–10.5)
CHLORIDE SERPL-SCNC: 105 MMOL/L (ref 96–112)
CO2 SERPL-SCNC: 20 MMOL/L (ref 20–33)
CREAT SERPL-MCNC: 0.97 MG/DL (ref 0.5–1.4)
EOSINOPHIL # BLD AUTO: 0.22 K/UL (ref 0–0.51)
EOSINOPHIL NFR BLD: 2.3 % (ref 0–6.9)
ERYTHROCYTE [DISTWIDTH] IN BLOOD BY AUTOMATED COUNT: 43.6 FL (ref 35.9–50)
GLUCOSE BLD-MCNC: 130 MG/DL (ref 65–99)
GLUCOSE BLD-MCNC: 214 MG/DL (ref 65–99)
GLUCOSE BLD-MCNC: 246 MG/DL (ref 65–99)
GLUCOSE BLD-MCNC: 48 MG/DL (ref 65–99)
GLUCOSE BLD-MCNC: 52 MG/DL (ref 65–99)
GLUCOSE SERPL-MCNC: 54 MG/DL (ref 65–99)
GRAM STN SPEC: ABNORMAL
GRAM STN SPEC: ABNORMAL
GRAM STN SPEC: NORMAL
GRAM STN SPEC: NORMAL
HCT VFR BLD AUTO: 39.7 % (ref 42–52)
HGB BLD-MCNC: 12.9 G/DL (ref 14–18)
IMM GRANULOCYTES # BLD AUTO: 0.03 K/UL (ref 0–0.11)
IMM GRANULOCYTES NFR BLD AUTO: 0.3 % (ref 0–0.9)
LYMPHOCYTES # BLD AUTO: 2.23 K/UL (ref 1–4.8)
LYMPHOCYTES NFR BLD: 23.4 % (ref 22–41)
MCH RBC QN AUTO: 26.7 PG (ref 27–33)
MCHC RBC AUTO-ENTMCNC: 32.5 G/DL (ref 33.7–35.3)
MCV RBC AUTO: 82.2 FL (ref 81.4–97.8)
MONOCYTES # BLD AUTO: 1.02 K/UL (ref 0–0.85)
MONOCYTES NFR BLD AUTO: 10.7 % (ref 0–13.4)
NEUTROPHILS # BLD AUTO: 5.98 K/UL (ref 1.82–7.42)
NEUTROPHILS NFR BLD: 62.9 % (ref 44–72)
NRBC # BLD AUTO: 0 K/UL
NRBC BLD-RTO: 0 /100 WBC
PLATELET # BLD AUTO: 487 K/UL (ref 164–446)
PMV BLD AUTO: 9.5 FL (ref 9–12.9)
POTASSIUM SERPL-SCNC: 3.8 MMOL/L (ref 3.6–5.5)
RBC # BLD AUTO: 4.83 M/UL (ref 4.7–6.1)
SIGNIFICANT IND 70042: ABNORMAL
SIGNIFICANT IND 70042: ABNORMAL
SIGNIFICANT IND 70042: NORMAL
SIGNIFICANT IND 70042: NORMAL
SITE SITE: ABNORMAL
SITE SITE: ABNORMAL
SITE SITE: NORMAL
SITE SITE: NORMAL
SODIUM SERPL-SCNC: 132 MMOL/L (ref 135–145)
SOURCE SOURCE: ABNORMAL
SOURCE SOURCE: ABNORMAL
SOURCE SOURCE: NORMAL
SOURCE SOURCE: NORMAL
WBC # BLD AUTO: 9.5 K/UL (ref 4.8–10.8)

## 2018-03-17 PROCEDURE — 700105 HCHG RX REV CODE 258: Performed by: INTERNAL MEDICINE

## 2018-03-17 PROCEDURE — 36415 COLL VENOUS BLD VENIPUNCTURE: CPT

## 2018-03-17 PROCEDURE — 700111 HCHG RX REV CODE 636 W/ 250 OVERRIDE (IP): Performed by: INTERNAL MEDICINE

## 2018-03-17 PROCEDURE — 700102 HCHG RX REV CODE 250 W/ 637 OVERRIDE(OP): Performed by: INTERNAL MEDICINE

## 2018-03-17 PROCEDURE — 80048 BASIC METABOLIC PNL TOTAL CA: CPT

## 2018-03-17 PROCEDURE — A9270 NON-COVERED ITEM OR SERVICE: HCPCS | Performed by: INTERNAL MEDICINE

## 2018-03-17 PROCEDURE — 99233 SBSQ HOSP IP/OBS HIGH 50: CPT | Performed by: INTERNAL MEDICINE

## 2018-03-17 PROCEDURE — 85025 COMPLETE CBC W/AUTO DIFF WBC: CPT

## 2018-03-17 PROCEDURE — 82962 GLUCOSE BLOOD TEST: CPT | Mod: 91

## 2018-03-17 PROCEDURE — 770006 HCHG ROOM/CARE - MED/SURG/GYN SEMI*

## 2018-03-17 RX ORDER — INSULIN GLARGINE 100 [IU]/ML
20 INJECTION, SOLUTION SUBCUTANEOUS 2 TIMES DAILY
Status: DISCONTINUED | OUTPATIENT
Start: 2018-03-17 | End: 2018-03-18

## 2018-03-17 RX ADMIN — ENOXAPARIN SODIUM 40 MG: 100 INJECTION SUBCUTANEOUS at 08:34

## 2018-03-17 RX ADMIN — CITALOPRAM HYDROBROMIDE 20 MG: 20 TABLET ORAL at 08:34

## 2018-03-17 RX ADMIN — HYDROMORPHONE HYDROCHLORIDE 0.5 MG: 10 INJECTION, SOLUTION INTRAMUSCULAR; INTRAVENOUS; SUBCUTANEOUS at 21:05

## 2018-03-17 RX ADMIN — ASPIRIN 81 MG: 81 TABLET, COATED ORAL at 08:34

## 2018-03-17 RX ADMIN — HYDRALAZINE HYDROCHLORIDE 25 MG: 50 TABLET, FILM COATED ORAL at 14:47

## 2018-03-17 RX ADMIN — VANCOMYCIN HYDROCHLORIDE 1500 MG: 100 INJECTION, POWDER, LYOPHILIZED, FOR SOLUTION INTRAVENOUS at 10:07

## 2018-03-17 RX ADMIN — MIRTAZAPINE 45 MG: 15 TABLET, FILM COATED ORAL at 20:58

## 2018-03-17 RX ADMIN — AMPICILLIN SODIUM AND SULBACTAM SODIUM 3 G: 2; 1 INJECTION, POWDER, FOR SOLUTION INTRAMUSCULAR; INTRAVENOUS at 12:17

## 2018-03-17 RX ADMIN — NICOTINE 21 MG: 21 PATCH, EXTENDED RELEASE TRANSDERMAL at 05:48

## 2018-03-17 RX ADMIN — THERA TABS 1 TABLET: TAB at 08:34

## 2018-03-17 RX ADMIN — OXYCODONE HYDROCHLORIDE 10 MG: 10 TABLET ORAL at 08:40

## 2018-03-17 RX ADMIN — HYDROMORPHONE HYDROCHLORIDE 0.5 MG: 10 INJECTION, SOLUTION INTRAMUSCULAR; INTRAVENOUS; SUBCUTANEOUS at 06:01

## 2018-03-17 RX ADMIN — LACTOBACILLUS ACIDOPHILUS / LACTOBACILLUS BULGARICUS 1 PACKET: 100 MILLION CFU STRENGTH GRANULES at 12:17

## 2018-03-17 RX ADMIN — OXYCODONE HYDROCHLORIDE 10 MG: 10 TABLET ORAL at 18:23

## 2018-03-17 RX ADMIN — INSULIN GLARGINE 20 UNITS: 100 INJECTION, SOLUTION SUBCUTANEOUS at 21:02

## 2018-03-17 RX ADMIN — OXYCODONE HYDROCHLORIDE 10 MG: 10 TABLET ORAL at 14:46

## 2018-03-17 RX ADMIN — PIPERACILLIN SODIUM AND TAZOBACTAM SODIUM 3.38 G: 3; .375 INJECTION, POWDER, FOR SOLUTION INTRAVENOUS at 18:28

## 2018-03-17 RX ADMIN — TAMSULOSIN HYDROCHLORIDE 0.4 MG: 0.4 CAPSULE ORAL at 08:34

## 2018-03-17 RX ADMIN — HYDRALAZINE HYDROCHLORIDE 25 MG: 50 TABLET, FILM COATED ORAL at 20:59

## 2018-03-17 RX ADMIN — BUDESONIDE AND FORMOTEROL FUMARATE DIHYDRATE 2 PUFF: 160; 4.5 AEROSOL RESPIRATORY (INHALATION) at 20:57

## 2018-03-17 RX ADMIN — HYDROMORPHONE HYDROCHLORIDE 0.5 MG: 10 INJECTION, SOLUTION INTRAMUSCULAR; INTRAVENOUS; SUBCUTANEOUS at 12:24

## 2018-03-17 RX ADMIN — INSULIN HUMAN 2 UNITS: 100 INJECTION, SOLUTION PARENTERAL at 21:03

## 2018-03-17 RX ADMIN — HYDROMORPHONE HYDROCHLORIDE 0.5 MG: 10 INJECTION, SOLUTION INTRAMUSCULAR; INTRAVENOUS; SUBCUTANEOUS at 16:54

## 2018-03-17 RX ADMIN — HYDRALAZINE HYDROCHLORIDE 25 MG: 50 TABLET, FILM COATED ORAL at 05:51

## 2018-03-17 RX ADMIN — FINASTERIDE 5 MG: 5 TABLET, FILM COATED ORAL at 08:34

## 2018-03-17 RX ADMIN — LACTOBACILLUS ACIDOPHILUS / LACTOBACILLUS BULGARICUS 1 PACKET: 100 MILLION CFU STRENGTH GRANULES at 08:34

## 2018-03-17 RX ADMIN — PREGABALIN 75 MG: 75 CAPSULE ORAL at 14:46

## 2018-03-17 RX ADMIN — AMLODIPINE BESYLATE 10 MG: 10 TABLET ORAL at 08:34

## 2018-03-17 RX ADMIN — PREGABALIN 75 MG: 75 CAPSULE ORAL at 20:58

## 2018-03-17 RX ADMIN — FAMOTIDINE 20 MG: 20 TABLET, FILM COATED ORAL at 20:58

## 2018-03-17 RX ADMIN — ZOLPIDEM TARTRATE 5 MG: 5 TABLET, FILM COATED ORAL at 20:59

## 2018-03-17 RX ADMIN — LACTOBACILLUS ACIDOPHILUS / LACTOBACILLUS BULGARICUS 1 PACKET: 100 MILLION CFU STRENGTH GRANULES at 18:23

## 2018-03-17 RX ADMIN — BUDESONIDE AND FORMOTEROL FUMARATE DIHYDRATE 2 PUFF: 160; 4.5 AEROSOL RESPIRATORY (INHALATION) at 08:34

## 2018-03-17 RX ADMIN — INSULIN HUMAN 2 UNITS: 100 INJECTION, SOLUTION PARENTERAL at 18:23

## 2018-03-17 RX ADMIN — PIPERACILLIN SODIUM AND TAZOBACTAM SODIUM 3.38 G: 3; .375 INJECTION, POWDER, FOR SOLUTION INTRAVENOUS at 17:11

## 2018-03-17 RX ADMIN — INSULIN HUMAN 2 UNITS: 100 INJECTION, SOLUTION PARENTERAL at 12:55

## 2018-03-17 RX ADMIN — PREGABALIN 75 MG: 75 CAPSULE ORAL at 05:48

## 2018-03-17 RX ADMIN — AMPICILLIN SODIUM AND SULBACTAM SODIUM 3 G: 2; 1 INJECTION, POWDER, FOR SOLUTION INTRAMUSCULAR; INTRAVENOUS at 03:41

## 2018-03-17 ASSESSMENT — ENCOUNTER SYMPTOMS
FEVER: 0
CHILLS: 0

## 2018-03-17 ASSESSMENT — LIFESTYLE VARIABLES: DO YOU DRINK ALCOHOL: NO

## 2018-03-17 ASSESSMENT — PAIN SCALES - GENERAL
PAINLEVEL_OUTOF10: 9
PAINLEVEL_OUTOF10: 9

## 2018-03-17 NOTE — PROGRESS NOTES
Patient alert/oriented x4,room air,plan of care discussed ,has rle camden and bahman guardado,fall risk,refused strip alarm despite education,fall prevention protocol in placed.

## 2018-03-17 NOTE — PROGRESS NOTES
[facility] Hospitalist Progress Note    Date of Service: 3/17/2018    Chief Complaint  57 y.o. male with a PMH DM type 2 and B/L BKA admitted 3/14/2018 with left stump infection    Interval Problem Update  3/15 Patients pain is well controlled. His sugars are well controlled. LPS has evaluated the patient and he is scheduled for I&D and stump revision tomorrow. MRI has been ordered to evaluate for abscess or osteomyelitis. Continue IV vancomycin and Unasyn.    3/16 No acute events overnight. Patient is planned for I&D per Ortho today. His sugars are better controlled. His wound culture is positive for group D enterococcus species and lactose fermenting gram negative rods.     3/17 POD 1 I&D of left BKA abscess. I have discussed the case with ID who will consult. Saida glucose is elevated at night and low in the morning. He had an episode of hypoglycemia of 48 at 6 AM. I will change his lantus to 20 units BID.     Consultants/Specialty  LPS    Disposition  To be determined        Review of Systems   Constitutional: Positive for malaise/fatigue. Negative for chills and fever.   All other systems reviewed and are negative.     Physical Exam  Laboratory/Imaging   Hemodynamics  Temp (24hrs), Av.4 °C (97.6 °F), Min:35.6 °C (96.1 °F), Max:37.1 °C (98.8 °F)   Temperature: 36.7 °C (98.1 °F)  Pulse  Av.6  Min: 79  Max: 102 Heart Rate (Monitored): 87  Blood Pressure: 155/71, NIBP: 152/87      Respiratory      Respiration: 18, Pulse Oximetry: 100 %             Fluids    Intake/Output Summary (Last 24 hours) at 18 0822  Last data filed at 18 0600   Gross per 24 hour   Intake             2200 ml   Output                0 ml   Net             2200 ml       Nutrition  Orders Placed This Encounter   Procedures   • DIET ORDER     Standing Status:   Standing     Number of Occurrences:   1     Order Specific Question:   Diet:     Answer:   Diabetic [3]     Physical Exam   Constitutional: He is oriented to person,  place, and time. He appears well-developed and well-nourished. No distress.   HENT:   Head: Normocephalic and atraumatic.   Mouth/Throat: Oropharynx is clear and moist.   Eyes: Conjunctivae are normal.   Neck: Neck supple.   Cardiovascular: Normal rate, regular rhythm and normal heart sounds.    Pulmonary/Chest: Effort normal and breath sounds normal. No respiratory distress. He has no wheezes. He has no rales.   Abdominal: Soft. Bowel sounds are normal. He exhibits no distension. There is no tenderness.   Musculoskeletal:   Bilateral BKA   Neurological: He is alert and oriented to person, place, and time. No cranial nerve deficit. Coordination normal.   Skin: Skin is warm.   L BKA with edema, tenderness and purulent drainage    Psychiatric: He has a normal mood and affect. His behavior is normal.   Nursing note and vitals reviewed.      Recent Labs      03/15/18   0301  03/16/18   0532  03/17/18   0246   WBC  8.1  6.8  9.5   RBC  4.89  4.73  4.83   HEMOGLOBIN  12.9*  12.7*  12.9*   HEMATOCRIT  41.0*  39.4*  39.7*   MCV  83.8  83.3  82.2   MCH  26.4*  26.8*  26.7*   MCHC  31.5*  32.2*  32.5*   RDW  45.1  43.5  43.6   PLATELETCT  469*  441  487*   MPV  9.1  9.4  9.5     Recent Labs      03/15/18   0301  03/16/18   0532  03/17/18   0246   SODIUM  132*  133*  132*   POTASSIUM  4.2  4.1  3.8   CHLORIDE  101  102  105   CO2  27  24  20   GLUCOSE  126*  152*  54*   BUN  17  13  9   CREATININE  1.19  1.08  0.97   CALCIUM  9.0  8.8  8.8                      Assessment/Plan     * Abscess of left lower extremity- (present on admission)   Assessment & Plan    L BKA stump cellulitis and abscess  Continue IV Vancomycin, monitor for toxicity and follow trough levels  Continue Unasyn  I have consulted ID   Follow cultures and sensitivities         Infection of amputation stump of left lower extremity (CMS-[facility])- (present on admission)   Assessment & Plan    With purulent cellulitis and concern for underlying osteomyelitis  Patient  has been started on IV vancomycin, monitor for toxicity and follow trough levels  Continue IV Unasyn  Limb preservation services has been consulted  Patient is planned for I&D with stump revision today  Consulted ID  Follow cultures          Chronic hyponatremia- (present on admission)   Assessment & Plan    Stop HCTZ  Avoid free water        Hypertension- (present on admission)   Assessment & Plan    Continue home antihypertensives        Type 2 diabetes mellitus (CMS-HCC)- (present on admission)   Assessment & Plan    Uncontrolled  Morning glucose has been low and his evening levels were high  I will change to Lantus 20 U BID and continue ISS  Hypoglycemic protocol in place        Tobacco dependence- (present on admission)   Assessment & Plan    Tobacco cessation education provided. Nicotine replacement protocol will be provided to the patient.              Quality-Core Measures   Reviewed items::  Labs reviewed, Medications reviewed and Radiology images reviewed  DVT prophylaxis pharmacological::  Enoxaparin (Lovenox)  Antibiotics:  Treating active infection/contamination beyond 24 hours perioperative coverage      Patient plan of care discussed at multidisplinary team rounds, with patient and R.N at [facility].

## 2018-03-17 NOTE — CONSULTS
ADULT INFECTIOUS DISEASE CONSULT     Date of Service: 3/17/2018    Consult Requested By: Herbie Ayala M.D.    Reason for Consultation: Left stump infection    History of Present Illness:   Jv Angelo is a 57 y.o. male with long-standing history of diabetes mellitus and bilateral below-knee amputation. In 2015 he had dehiscence of his left knee rotation site and on 12/26/2015 he underwent debridement with wound closure. He has been admitted multiple times since then. Last he was admitted on 2/1/2018 for left BKA site infection. Patient was treated with IV antibiotics while in the hospital then switched to oral antibiotics. He was discharged on 2/8/2018. He came back into the emergency room on 3/14/2018 with again wound infection to the left BKA site. He was taken to the OR on 3/16 and underwent I&D of an abscess. His cultures are growing Enterococcus faecalis. In view of his chronic wound infectious disease consult has been called    Review Of Systems:  Gen.-Denies f fevers. Denies any chills.  HEENT- denies any sore throat, headache or vision changes  Pulmonary- denies any cough, shortness of breath  Cardiovascular- denies any chest pain, leg swelling.    GI- denies any nausea vomiting diarrhea or abdominal pain  Musculoskeletal- complains of pain in the left BKA site  Neuro- denies any weakness or sensory change  Psych- denies any depression or suicidal ideation  Genitourinary- denies any frequency or dysuria        PMH:   Past Medical History:   Diagnosis Date   • BPH (benign prostatic hyperplasia) 12/20/2017   • Diabetes (CMS-HCC)    • Has received first dose of hepatitis B vaccine    • Hypertension    • Renal disorder    • Renal failure          PSH:  Past Surgical History:   Procedure Laterality Date   • IRRIGATION & DEBRIDEMENT ORTHO Left 12/26/2015    Procedure: IRRIGATION & DEBRIDEMENT ORTHO and wound closure of BKA;  Surgeon: William Welsh M.D.;  Location: SURGERY [facility];  Service:    •  OTHER ORTHOPEDIC SURGERY Left 2015    bka   • OTHER ORTHOPEDIC SURGERY Right 2013    bka   • OTHER ORTHOPEDIC SURGERY      R forearm deep lac repair       FAMILY HX:  History reviewed. No pertinent family history.    SOCIAL HX:  Social History     Social History   • Marital status: Single     Spouse name: N/A   • Number of children: N/A   • Years of education: N/A     Occupational History   • Not on file.     Social History Main Topics   • Smoking status: Current Every Day Smoker     Packs/day: 1.00     Years: 20.00     Types: Cigarettes     Last attempt to quit: 12/26/2005   • Smokeless tobacco: Never Used   • Alcohol use No   • Drug use: No   • Sexual activity: Not on file     Other Topics Concern   • Not on file     Social History Narrative   • No narrative on file     History   Smoking Status   • Current Every Day Smoker   • Packs/day: 1.00   • Years: 20.00   • Types: Cigarettes   • Last attempt to quit: 12/26/2005   Smokeless Tobacco   • Never Used     History   Alcohol Use No       Allergies/Intolerances:  Allergies   Allergen Reactions   • Fish Anaphylaxis and Shortness of Breath       History reviewed with the patient    Other Current Medications:    Current Facility-Administered Medications:   •  insulin glargine (LANTUS) injection 20 Units, 20 Units, Subcutaneous, BID, Herbie Ayala M.D.  •  vancomycin 1,500 mg in  mL IVPB, 1,500 mg, Intravenous, Q12HR, Herbie Ayala M.D., Last Rate: 250 mL/hr at 03/17/18 1007, 1,500 mg at 03/17/18 1007  •  hydrALAZINE (APRESOLINE) injection 20 mg, 20 mg, Intravenous, Q6HRS PRN, Herbie Ayala M.D.  •  labetalol (NORMODYNE,TRANDATE) injection 10 mg, 10 mg, Intravenous, Q4HRS PRN, Herbie Ayala M.D.  •  albuterol inhaler 2 Puff, 2 Puff, Inhalation, Q4HRS PRN, Wellington Em M.D.  •  amLODIPine (NORVASC) tablet 10 mg, 10 mg, Oral, DAILY, Wellington Em M.D., 10 mg at 03/17/18 0834  •  aspirin EC (ECOTRIN) tablet 81 mg, 81 mg, Oral, DAILY, Wellington Em M.D., 81 mg  at 03/17/18 0834  •  budesonide-formoterol (SYMBICORT) 160-4.5 MCG/ACT inhaler 2 Puff, 2 Puff, Inhalation, BID, Wellington Em M.D., 2 Puff at 03/17/18 0834  •  citalopram (CELEXA) tablet 20 mg, 20 mg, Oral, DAILY, Wellington Em M.D., 20 mg at 03/17/18 0834  •  finasteride (PROSCAR) tablet 5 mg, 5 mg, Oral, DAILY, Wellington Em M.D., 5 mg at 03/17/18 0834  •  hydrALAZINE (APRESOLINE) tablet 25 mg, 25 mg, Oral, Q8HRS, Wellington Em M.D., 25 mg at 03/17/18 0551  •  mirtazapine (REMERON) tablet 45 mg, 45 mg, Oral, QHS, Wellington Em M.D., 45 mg at 03/16/18 2036  •  multivitamin (THERAGRAN) tablet 1 Tab, 1 Tab, Oral, DAILY, Wellington Em M.D., 1 Tab at 03/17/18 0834  •  pregabalin (LYRICA) capsule 75 mg, 75 mg, Oral, TID, Wellington Em M.D., 75 mg at 03/17/18 0548  •  tamsulosin (FLOMAX) capsule 0.4 mg, 0.4 mg, Oral, AFTER BREAKFAST, Wellington Em M.D., 0.4 mg at 03/17/18 0834  •  senna-docusate (PERICOLACE or SENOKOT S) 8.6-50 MG per tablet 2 Tab, 2 Tab, Oral, BID, 2 Tab at 03/14/18 2226 **AND** polyethylene glycol/lytes (MIRALAX) PACKET 1 Packet, 1 Packet, Oral, QDAY PRN **AND** magnesium hydroxide (MILK OF MAGNESIA) suspension 30 mL, 30 mL, Oral, QDAY PRN **AND** bisacodyl (DULCOLAX) suppository 10 mg, 10 mg, Rectal, QDAY PRN, Wellington Em M.D.  •  enoxaparin (LOVENOX) inj 40 mg, 40 mg, Subcutaneous, DAILY, Wellington Em M.D., 40 mg at 03/17/18 0834  •  INITIATE NICOTINE REPLACEMENT PROTOCOL , , , Once **AND** nicotine (NICODERM) 21 MG/24HR 21 mg, 21 mg, Transdermal, Daily-0600, 21 mg at 03/17/18 0548 **AND** Protocol 205 PATIENT EDUCATION MATERIALS, , , Once **AND** Protocol 205 Rotate nicotine patch application sites daily , , , CONTINUOUS **AND** nicotine polacrilex (NICORETTE) 2 MG piece 2 mg, 2 mg, Oral, Q HOUR PRN, Wellington Em M.D.  •  acetaminophen (TYLENOL) tablet 650 mg, 650 mg, Oral, Q6HRS PRN, Wellington Em M.D.  •  Notify provider if pain remains  uncontrolled, , , CONTINUOUS **AND** Use the numeric rating scale (NRS-11) on regular floors and Critical-Care Pain Observation Tool (CPOT) on ICUs/Trauma to assess pain, , , CONTINUOUS **AND** Pulse Ox (Oximetry), , , CONTINUOUS **AND** Pharmacy Consult Request ...Pain Management Review, , Other, PRN **AND** If patient difficult to arouse and/or has respiratory depression, stop any opiates that are currently infusing and call a Rapid Response., , , CONTINUOUS **AND** oxyCODONE immediate-release (ROXICODONE) tablet 5 mg, 5 mg, Oral, Q3HRS PRN, 5 mg at 03/15/18 0356 **AND** oxyCODONE immediate-release (ROXICODONE) tablet 10 mg, 10 mg, Oral, Q3HRS PRN, 10 mg at 03/17/18 0840 **AND** HYDROmorphone (DILAUDID) injection 0.5 mg, 0.5 mg, Intravenous, Q3HRS PRN, Wellington Em M.D., 0.5 mg at 03/17/18 0601  •  insulin regular (HUMULIN R) injection 1-6 Units, 1-6 Units, Subcutaneous, 4X/DAY ACHS, Stopped at 03/17/18 0700 **AND** Accu-Chek ACHS, , , Q AC AND BEDTIME(S) **AND** NOTIFY MD and PharmD, , , Once **AND** glucose 4 g chewable tablet 16 g, 16 g, Oral, Q15 MIN PRN **AND** dextrose 50% (D50W) injection 25 mL, 25 mL, Intravenous, Q15 MIN PRN, Wellington Em M.D.  •  Respiratory Care per Protocol, , Nebulization, Continuous RT, Wellington Em M.D.  •  zolpidem (AMBIEN) tablet 5 mg, 5 mg, Oral, HS PRN, Wellington Em M.D., 5 mg at 03/16/18 2035  •  ampicillin/sulbactam (UNASYN) 3 g in  mL IVPB, 3 g, Intravenous, Q6HRS, Wellington Em M.D., Stopped at 03/17/18 0411  •  MD ALERT... vancomycin per pharmacy protocol 1 Each, 1 Each, Other, pharmacy to dose, Wellington Em M.D.  •  lactobacillus granules (LACTINEX/FLORANEX) packet 1 Packet, 1 Packet, Oral, TID WITH MEALS, Wellington Em M.D., 1 Packet at 03/17/18 0876  •  famotidine (PEPCID) tablet 20 mg, 20 mg, Oral, Q EVENING, Wellington Em M.D., 20 mg at 03/16/18 2036  [unfilled]    Most Recent Vital Signs:  /81 Comment: nurse  aware  Pulse 91   Temp 36.6 °C (97.8 °F)   Resp 16   Ht 1.829 m (6')   Wt 90.7 kg (200 lb)   SpO2 97%   BMI 27.12 kg/m²   Temp  Av.5 °C (97.7 °F)  Min: 35.6 °C (96.1 °F)  Max: 37.1 °C (98.8 °F)    Physical Exam:  General: Nontoxic, no acute distress  HEENT: sclera anicteric, PERRL, EOMI, MMM, no oral lesions  Neck: supple, no lymphadenopathy  Chest: CTAB, no r/r/w, normal work of breathing.  Cardiac: Regular, no murmurs no gallops heard  Abdomen: + bowel sounds, soft, non-tender, non-distended, no HSM  Extremities: Right AKA clean. Left BKA bandaged  Skin: no rashes or erythema  Neuro: Alert and oriented times 3, non-focal exam    Pertinent Lab Results:  Recent Labs      03/15/18   0301  18   0532  18   0246   WBC  8.1  6.8  9.5      Recent Labs      03/15/18   0301  18   0532  18   0246   HEMOGLOBIN  12.9*  12.7*  12.9*   HEMATOCRIT  41.0*  39.4*  39.7*   MCV  83.8  83.3  82.2   MCH  26.4*  26.8*  26.7*   PLATELETCT  469*  441  487*         Recent Labs      03/15/18   0301  18   0532  18   0246   SODIUM  132*  133*  132*   POTASSIUM  4.2  4.1  3.8   CHLORIDE  101  102  105   CO2  27  24  20   CREATININE  1.19  1.08  0.97        Recent Labs      18   1500   ALBUMIN  3.7        Pertinent Micro:  Results     Procedure Component Value Units Date/Time    CULTURE WOUND W/ GRAM STAIN [[id number]]  (Abnormal)  (Susceptibility) Collected:  18 1500    Order Status:  Completed Specimen:  Wound from Left Leg Updated:  18 1031     Significant Indicator POS (POS)     Source WND     Site LEFT LEG     Culture Result Wound -- (A)     Gram Stain Result Moderate WBCs.  Rare Gram positive cocci.       Culture Result Wound Gram negative wisam  Light growth   (A)      Enterococcus faecalis  Light growth  Combination therapy with ampicillin, penicillin, or  vancomycin (for susceptible strains) plus an aminoglycoside  is usually indicated for serious enterococcal  infections,  such as endocarditis unless high-level resistance to both  gentamicin and streptomycin is documented; such combinations  are predicted to result in synergistic killing of the  Enterococcus.  The susceptibility profile for this organism indicates that  Streptomycin would not be an effective component of  combination therapy.  The susceptibility profile for this organism indicates that  Gentamycin would not be an effective component of combination  therapy.   (A)    Culture & Susceptibility     ENTEROCOCCUS FAECALIS     Antibiotic Sensitivity Microscan Unit Status    Ampicillin Sensitive <=2 mcg/mL Final    Daptomycin Sensitive 1 mcg/mL Final    Gent Synergy Resistant >500 mcg/mL Final    Penicillin Sensitive 8 mcg/mL Final    Strep Synergy Resistant >1000 mcg/mL Final    Vancomycin Sensitive 2 mcg/mL Final                       GRAM STAIN [[id number]] Collected:  03/16/18 1535    Order Status:  Completed Specimen:  Wound Updated:  03/17/18 0826     Significant Indicator .     Source WND     Site left knee amputation wound     Gram Stain Result Moderate WBCs.  Rare Gram positive cocci.  Rare Gram negative rods.      CULTURE WOUND W/ GRAM STAIN [[id number]] Collected:  03/16/18 1535    Order Status:  Completed Specimen:  Other Updated:  03/16/18 1842    ANAEROBIC CULTURE [[id number]] Collected:  03/16/18 1535    Order Status:  Completed Specimen:  Other Updated:  03/16/18 1842    CULTURE WOUND W/ GRAM STAIN [[id number]]  (Abnormal) Collected:  03/15/18 1059    Order Status:  Completed Specimen:  Wound from Left Leg Updated:  03/16/18 1357     Significant Indicator POS (POS)     Source WND     Site LEFT LEG     Culture Result Wound -- (A)     Gram Stain Result Moderate WBCs.  Few Gram positive cocci.  Rare Gram positive rods.       Culture Result Wound Group D Enterococcus species  Light growth  Combination therapy with ampicillin, penicillin, or  vancomycin (for susceptible strains) plus an aminoglycoside  is  "usually indicated for serious enterococcal infections,  such as endocarditis unless high-level resistance to both  gentamicin and streptomycin is documented; such combinations  are predicted to result in synergistic killing of the  Enterococcus.   (A)      Lactose fermenting Gram negative wisam  Light growth   (A)    Narrative:       Collected By:[id number] BRITT ALY  Collected from wound on distal BKA stump at 0940    GRAM STAIN [[id number]] Collected:  03/15/18 1059    Order Status:  Completed Specimen:  Wound Updated:  03/15/18 1623     Significant Indicator .     Source WND     Site LEFT LEG     Gram Stain Result Moderate WBCs.  Few Gram positive cocci.  Rare Gram positive rods.      Narrative:       Collected By:[id number] BRITT ALY  Collected from wound on distal BKA stump at 0940    GRAM STAIN [[id number]] Collected:  03/14/18 1500    Order Status:  Completed Specimen:  Wound Updated:  03/15/18 0744     Significant Indicator .     Source WND     Site LEFT LEG     Gram Stain Result Moderate WBCs.  Rare Gram positive cocci.      BLOOD CULTURE [[id number]] Collected:  03/14/18 1500    Order Status:  Completed Specimen:  Blood from Peripheral Updated:  03/15/18 0647     Significant Indicator NEG     Source BLD     Site PERIPHERAL     Blood Culture No Growth    Note: Blood cultures are incubated for 5 days and  are monitored continuously.Positive blood cultures  are called to the RN and reported as soon as  they are identified.      Narrative:       Per Hospital Policy: Only change Specimen Src: to \"Line\" if  specified by physician order.    BLOOD CULTURE [[id number]] Collected:  03/14/18 1507    Order Status:  Completed Specimen:  Blood from Peripheral Updated:  03/15/18 0647     Significant Indicator NEG     Source BLD     Site PERIPHERAL     Blood Culture No Growth    Note: Blood cultures are incubated for 5 days and  are monitored continuously.Positive blood cultures  are called to the RN and reported as " "soon as  they are identified.      Narrative:       Per Hospital Policy: Only change Specimen Src: to \"Line\" if  specified by physician order.    Blood Culture [[id number]]     Order Status:  Sent Specimen:  Blood from Peripheral     Blood Culture [[id number]]     Order Status:  Sent Specimen:  Blood from Peripheral     Urinalysis [[id number]]     Order Status:  Sent Specimen:  Urine         Blood Culture   Date Value Ref Range Status   03/14/2018   Preliminary    No Growth    Note: Blood cultures are incubated for 5 days and  are monitored continuously.Positive blood cultures  are called to the RN and reported as soon as  they are identified.          Studies:  Dx-chest-portable (1 View)    Result Date: 3/14/2018  3/14/2018 3:54 PM HISTORY/REASON FOR EXAM:  Sepsis. Shortness of breath. Open wound left leg. TECHNIQUE/EXAM DESCRIPTION AND NUMBER OF VIEWS: Single portable view of the chest. COMPARISON:  2/1/2018 FINDINGS: The cardiac silhouette is within normal limits. No infiltrates or consolidations are noted. No pleural effusion is noted.     No acute cardiac or pulmonary abnormality is noted.    Dx-femur-2+ Left    Result Date: 3/14/2018  3/14/2018 3:54 PM HISTORY/REASON FOR EXAM:  Open wound left amputation site with pain. TECHNIQUE/EXAM DESCRIPTION AND NUMBER OF VIEWS:  2 views of the LEFT femur. COMPARISON: 2/3/2018 FINDINGS: No femoral fracture or dislocation is present. There is no soft tissue mass identified. There is a left below-knee amputation with stable edematous appearance of the left lower extremity stump. No subcutaneous gas is identified. There is no plain film evidence of osteomyelitis.     1.  No acute left femoral fracture or dislocation. 2.  No evidence of left femoral or residual left tibia/fibula osteomyelitis.  IMPRESSION:     Left BKA infection  Diabetes mellitus uncontrolled  Ongoing tobacco abuse  Peripheral vascular disease      PLAN:   Jv Angelo is a 57 y.o. -American male with " left BKA infection  Patient's last glycohemoglobin was 11.2 and he has ongoing smoking  Patient was counseled regarding above  Patient's cultures are Escherichia coli and Enterococcus faecalis  Discontinue Unasyn and vancomycin  Start Zosyn  Patient will likely need 4 weeks of antibiotics  I have reviewed all the records      Discussed with IM. Will continue to follow    Noemí Faustin M.D.

## 2018-03-17 NOTE — PROGRESS NOTES
Patient blood sugar sugar around 0540 was 52,2 orange juice and 2 ashwin,rechecked after 30 minutes,2 chocolate ice cream and will rechecked in 0630.

## 2018-03-17 NOTE — CARE PLAN
Problem: Pain Management  Goal: Pain level will decrease to patient's comfort goal    Intervention: Follow pain managment plan developed in collaboration with patient and Interdisciplinary Team  Patient in pain,pain meds given  with adequate result.Pre and post assessment done,patient sleeping comfortably.

## 2018-03-17 NOTE — PROGRESS NOTES
Patient seen and examined    Blood pressure 155/71, pulse 87, temperature 36.7 °C (98.1 °F), resp. rate 18, height 1.829 m (6'), weight 90.7 kg (200 lb), SpO2 100 %.    Recent Labs      03/15/18   0301  03/16/18   0532  03/17/18   0246   WBC  8.1  6.8  9.5   RBC  4.89  4.73  4.83   HEMOGLOBIN  12.9*  12.7*  12.9*   HEMATOCRIT  41.0*  39.4*  39.7*   MCV  83.8  83.3  82.2   MCH  26.4*  26.8*  26.7*   MCHC  31.5*  32.2*  32.5*   RDW  45.1  43.5  43.6   PLATELETCT  469*  441  487*   MPV  9.1  9.4  9.5       No acute distress  Dressing clean dry and intact  Neurovascularly intact    POD#1    Plan:  DVT Prophylaxis- NONE  Weight Bearing Status-NWB LLE  PT/OT  Antibiotics: per ID  Case Coordination

## 2018-03-17 NOTE — PROGRESS NOTES
[facility] Hospitalist Progress Note    Date of Service: 3/16/2018    Chief Complaint  57 y.o. male with a PMH DM type 2 and B/L BKA admitted 3/14/2018 with left stump infection    Interval Problem Update  3/15 Patients pain is well controlled. His sugars are well controlled. LPS has evaluated the patient and he is scheduled for I&D and stump revision tomorrow. MRI has been ordered to evaluate for abscess or osteomyelitis. Continue IV vancomycin and Unasyn.    3/16 No acute events overnight. Patient is planned for I&D per Ortho today. His sugars are better controlled. His wound culture is positive for group D enterococcus species and lactose fermenting gram negative rods.     Consultants/Specialty  LPS    Disposition  To be determined        Review of Systems   Constitutional: Positive for malaise/fatigue. Negative for chills and fever.   All other systems reviewed and are negative.     Physical Exam  Laboratory/Imaging   Hemodynamics  Temp (24hrs), Av.3 °C (97.3 °F), Min:35.6 °C (96.1 °F), Max:36.7 °C (98 °F)   Temperature: 36.1 °C (97 °F)  Pulse  Av.5  Min: 79  Max: 102 Heart Rate (Monitored): 87  Blood Pressure: (!) 163/82 (rn notified ), NIBP: 152/87      Respiratory      Respiration: 17, Pulse Oximetry: 97 %             Fluids    Intake/Output Summary (Last 24 hours) at 18 1807  Last data filed at 18 1552   Gross per 24 hour   Intake              740 ml   Output             1300 ml   Net             -560 ml       Nutrition  Orders Placed This Encounter   Procedures   • DIET ORDER     Standing Status:   Standing     Number of Occurrences:   1     Order Specific Question:   Diet:     Answer:   Diabetic [3]     Physical Exam   Constitutional: He is oriented to person, place, and time. He appears well-developed and well-nourished. No distress.   HENT:   Head: Normocephalic and atraumatic.   Mouth/Throat: Oropharynx is clear and moist.   Eyes: Conjunctivae are normal.   Neck: Neck supple.    Cardiovascular: Normal rate, regular rhythm and normal heart sounds.    Pulmonary/Chest: Effort normal and breath sounds normal. No respiratory distress. He has no wheezes. He has no rales.   Abdominal: Soft. Bowel sounds are normal. He exhibits no distension. There is no tenderness.   Musculoskeletal:   Bilateral BKA   Neurological: He is alert and oriented to person, place, and time. No cranial nerve deficit. Coordination normal.   Skin: Skin is warm.   L BKA with edema, tenderness and purulent drainage    Psychiatric: He has a normal mood and affect. His behavior is normal.   Nursing note and vitals reviewed.      Recent Labs      03/14/18   1500  03/15/18   0301  03/16/18   0532   WBC  9.6  8.1  6.8   RBC  5.42  4.89  4.73   HEMOGLOBIN  14.5  12.9*  12.7*   HEMATOCRIT  45.8  41.0*  39.4*   MCV  84.5  83.8  83.3   MCH  26.8*  26.4*  26.8*   MCHC  31.7*  31.5*  32.2*   RDW  45.1  45.1  43.5   PLATELETCT  523*  469*  441   MPV  9.4  9.1  9.4     Recent Labs      03/14/18   1500  03/15/18   0301  03/16/18   0532   SODIUM  128*  132*  133*   POTASSIUM  4.5  4.2  4.1   CHLORIDE  98  101  102   CO2  22  27  24   GLUCOSE  315*  126*  152*   BUN  17  17  13   CREATININE  1.08  1.19  1.08   CALCIUM  9.5  9.0  8.8                      Assessment/Plan     * Cellulitis of left lower extremity- (present on admission)   Assessment & Plan    L BKA stump cellulitis  IV antibiotics  Wound consult  LPS consult        Infection of amputation stump of left lower extremity (CMS-[facility])- (present on admission)   Assessment & Plan    With purulent cellulitis and concern for underlying osteomyelitis  Patient has been started on IV vancomycin, monitor for toxicity and follow trough levels  Continue IV Unasyn  Limb preservation services has been consulted  Patient is planned for I&D with stump revision today            Chronic hyponatremia- (present on admission)   Assessment & Plan    Stop HCTZ  Avoid free water        Hypertension-  (present on admission)   Assessment & Plan    Continue home antihypertensives        Type 2 diabetes mellitus (CMS-HCC)- (present on admission)   Assessment & Plan    Continue glipizide, basal insulin and SSI        Tobacco dependence- (present on admission)   Assessment & Plan    Tobacco cessation education provided. Nicotine replacement protocol will be provided to the patient.              Quality-Core Measures   Reviewed items::  Labs reviewed, Medications reviewed and Radiology images reviewed  DVT prophylaxis pharmacological::  Enoxaparin (Lovenox)  Antibiotics:  Treating active infection/contamination beyond 24 hours perioperative coverage      Patient plan of care discussed at multidisplinary team rounds, with patient and R.N at [facility].

## 2018-03-18 LAB
ANION GAP SERPL CALC-SCNC: 7 MMOL/L (ref 0–11.9)
BACTERIA WND AEROBE CULT: ABNORMAL
BASOPHILS # BLD AUTO: 0.7 % (ref 0–1.8)
BASOPHILS # BLD: 0.05 K/UL (ref 0–0.12)
BUN SERPL-MCNC: 9 MG/DL (ref 8–22)
CALCIUM SERPL-MCNC: 9.3 MG/DL (ref 8.5–10.5)
CHLORIDE SERPL-SCNC: 104 MMOL/L (ref 96–112)
CO2 SERPL-SCNC: 22 MMOL/L (ref 20–33)
CREAT SERPL-MCNC: 0.94 MG/DL (ref 0.5–1.4)
EOSINOPHIL # BLD AUTO: 0.28 K/UL (ref 0–0.51)
EOSINOPHIL NFR BLD: 3.7 % (ref 0–6.9)
ERYTHROCYTE [DISTWIDTH] IN BLOOD BY AUTOMATED COUNT: 43.8 FL (ref 35.9–50)
GLUCOSE BLD-MCNC: 211 MG/DL (ref 65–99)
GLUCOSE BLD-MCNC: 238 MG/DL (ref 65–99)
GLUCOSE BLD-MCNC: 350 MG/DL (ref 65–99)
GLUCOSE BLD-MCNC: 366 MG/DL (ref 65–99)
GLUCOSE BLD-MCNC: 88 MG/DL (ref 65–99)
GLUCOSE SERPL-MCNC: 98 MG/DL (ref 65–99)
GRAM STN SPEC: ABNORMAL
HCT VFR BLD AUTO: 39.1 % (ref 42–52)
HGB BLD-MCNC: 12.7 G/DL (ref 14–18)
IMM GRANULOCYTES # BLD AUTO: 0.03 K/UL (ref 0–0.11)
IMM GRANULOCYTES NFR BLD AUTO: 0.4 % (ref 0–0.9)
LYMPHOCYTES # BLD AUTO: 2.3 K/UL (ref 1–4.8)
LYMPHOCYTES NFR BLD: 30.8 % (ref 22–41)
MCH RBC QN AUTO: 27.1 PG (ref 27–33)
MCHC RBC AUTO-ENTMCNC: 32.5 G/DL (ref 33.7–35.3)
MCV RBC AUTO: 83.4 FL (ref 81.4–97.8)
MONOCYTES # BLD AUTO: 0.9 K/UL (ref 0–0.85)
MONOCYTES NFR BLD AUTO: 12 % (ref 0–13.4)
NEUTROPHILS # BLD AUTO: 3.91 K/UL (ref 1.82–7.42)
NEUTROPHILS NFR BLD: 52.4 % (ref 44–72)
NRBC # BLD AUTO: 0 K/UL
NRBC BLD-RTO: 0 /100 WBC
PLATELET # BLD AUTO: 423 K/UL (ref 164–446)
PMV BLD AUTO: 9.1 FL (ref 9–12.9)
POTASSIUM SERPL-SCNC: 4.2 MMOL/L (ref 3.6–5.5)
RBC # BLD AUTO: 4.69 M/UL (ref 4.7–6.1)
SIGNIFICANT IND 70042: ABNORMAL
SITE SITE: ABNORMAL
SODIUM SERPL-SCNC: 133 MMOL/L (ref 135–145)
SOURCE SOURCE: ABNORMAL
WBC # BLD AUTO: 7.5 K/UL (ref 4.8–10.8)

## 2018-03-18 PROCEDURE — 700105 HCHG RX REV CODE 258: Performed by: INTERNAL MEDICINE

## 2018-03-18 PROCEDURE — A9270 NON-COVERED ITEM OR SERVICE: HCPCS | Performed by: INTERNAL MEDICINE

## 2018-03-18 PROCEDURE — 82962 GLUCOSE BLOOD TEST: CPT | Mod: 91

## 2018-03-18 PROCEDURE — 700102 HCHG RX REV CODE 250 W/ 637 OVERRIDE(OP): Performed by: INTERNAL MEDICINE

## 2018-03-18 PROCEDURE — 700111 HCHG RX REV CODE 636 W/ 250 OVERRIDE (IP): Performed by: INTERNAL MEDICINE

## 2018-03-18 PROCEDURE — 85025 COMPLETE CBC W/AUTO DIFF WBC: CPT

## 2018-03-18 PROCEDURE — 770006 HCHG ROOM/CARE - MED/SURG/GYN SEMI*

## 2018-03-18 PROCEDURE — 80048 BASIC METABOLIC PNL TOTAL CA: CPT

## 2018-03-18 PROCEDURE — 36415 COLL VENOUS BLD VENIPUNCTURE: CPT

## 2018-03-18 PROCEDURE — 99232 SBSQ HOSP IP/OBS MODERATE 35: CPT | Performed by: INTERNAL MEDICINE

## 2018-03-18 RX ORDER — GLIPIZIDE 10 MG/1
10 TABLET ORAL 2 TIMES DAILY WITH MEALS
Status: DISCONTINUED | OUTPATIENT
Start: 2018-03-18 | End: 2018-03-26 | Stop reason: HOSPADM

## 2018-03-18 RX ORDER — INSULIN GLARGINE 100 [IU]/ML
17 INJECTION, SOLUTION SUBCUTANEOUS 2 TIMES DAILY
Status: DISCONTINUED | OUTPATIENT
Start: 2018-03-18 | End: 2018-03-18

## 2018-03-18 RX ORDER — INSULIN GLARGINE 100 [IU]/ML
18 INJECTION, SOLUTION SUBCUTANEOUS 2 TIMES DAILY
Status: DISCONTINUED | OUTPATIENT
Start: 2018-03-18 | End: 2018-03-19

## 2018-03-18 RX ADMIN — INSULIN HUMAN 2 UNITS: 100 INJECTION, SOLUTION PARENTERAL at 13:05

## 2018-03-18 RX ADMIN — HYDROMORPHONE HYDROCHLORIDE 0.5 MG: 10 INJECTION, SOLUTION INTRAMUSCULAR; INTRAVENOUS; SUBCUTANEOUS at 15:17

## 2018-03-18 RX ADMIN — NICOTINE 21 MG: 21 PATCH, EXTENDED RELEASE TRANSDERMAL at 05:31

## 2018-03-18 RX ADMIN — FAMOTIDINE 20 MG: 20 TABLET, FILM COATED ORAL at 20:47

## 2018-03-18 RX ADMIN — LACTOBACILLUS ACIDOPHILUS / LACTOBACILLUS BULGARICUS 1 PACKET: 100 MILLION CFU STRENGTH GRANULES at 17:35

## 2018-03-18 RX ADMIN — ENOXAPARIN SODIUM 40 MG: 100 INJECTION SUBCUTANEOUS at 09:32

## 2018-03-18 RX ADMIN — HYDRALAZINE HYDROCHLORIDE 25 MG: 50 TABLET, FILM COATED ORAL at 20:47

## 2018-03-18 RX ADMIN — BUDESONIDE AND FORMOTEROL FUMARATE DIHYDRATE 2 PUFF: 160; 4.5 AEROSOL RESPIRATORY (INHALATION) at 09:42

## 2018-03-18 RX ADMIN — HYDRALAZINE HYDROCHLORIDE 25 MG: 50 TABLET, FILM COATED ORAL at 05:31

## 2018-03-18 RX ADMIN — HYDROMORPHONE HYDROCHLORIDE 0.5 MG: 10 INJECTION, SOLUTION INTRAMUSCULAR; INTRAVENOUS; SUBCUTANEOUS at 05:32

## 2018-03-18 RX ADMIN — INSULIN HUMAN 5 UNITS: 100 INJECTION, SOLUTION PARENTERAL at 21:00

## 2018-03-18 RX ADMIN — PIPERACILLIN SODIUM AND TAZOBACTAM SODIUM 3.38 G: 3; .375 INJECTION, POWDER, FOR SOLUTION INTRAVENOUS at 05:31

## 2018-03-18 RX ADMIN — TAMSULOSIN HYDROCHLORIDE 0.4 MG: 0.4 CAPSULE ORAL at 09:32

## 2018-03-18 RX ADMIN — HYDRALAZINE HYDROCHLORIDE 20 MG: 20 INJECTION INTRAMUSCULAR; INTRAVENOUS at 22:13

## 2018-03-18 RX ADMIN — PREGABALIN 75 MG: 75 CAPSULE ORAL at 05:31

## 2018-03-18 RX ADMIN — HYDROMORPHONE HYDROCHLORIDE 0.5 MG: 10 INJECTION, SOLUTION INTRAMUSCULAR; INTRAVENOUS; SUBCUTANEOUS at 20:51

## 2018-03-18 RX ADMIN — PREGABALIN 75 MG: 75 CAPSULE ORAL at 15:17

## 2018-03-18 RX ADMIN — THERA TABS 1 TABLET: TAB at 09:32

## 2018-03-18 RX ADMIN — INSULIN HUMAN 4 UNITS: 100 INJECTION, SOLUTION PARENTERAL at 17:35

## 2018-03-18 RX ADMIN — GLIPIZIDE 10 MG: 10 TABLET ORAL at 17:35

## 2018-03-18 RX ADMIN — INSULIN GLARGINE 18 UNITS: 100 INJECTION, SOLUTION SUBCUTANEOUS at 21:00

## 2018-03-18 RX ADMIN — LACTOBACILLUS ACIDOPHILUS / LACTOBACILLUS BULGARICUS 1 PACKET: 100 MILLION CFU STRENGTH GRANULES at 13:05

## 2018-03-18 RX ADMIN — FINASTERIDE 5 MG: 5 TABLET, FILM COATED ORAL at 09:31

## 2018-03-18 RX ADMIN — CITALOPRAM HYDROBROMIDE 20 MG: 20 TABLET ORAL at 09:32

## 2018-03-18 RX ADMIN — AMLODIPINE BESYLATE 10 MG: 10 TABLET ORAL at 09:32

## 2018-03-18 RX ADMIN — BUDESONIDE AND FORMOTEROL FUMARATE DIHYDRATE 2 PUFF: 160; 4.5 AEROSOL RESPIRATORY (INHALATION) at 20:47

## 2018-03-18 RX ADMIN — PIPERACILLIN SODIUM AND TAZOBACTAM SODIUM 3.38 G: 3; .375 INJECTION, POWDER, FOR SOLUTION INTRAVENOUS at 13:00

## 2018-03-18 RX ADMIN — HYDRALAZINE HYDROCHLORIDE 25 MG: 50 TABLET, FILM COATED ORAL at 15:17

## 2018-03-18 RX ADMIN — ASPIRIN 81 MG: 81 TABLET, COATED ORAL at 09:32

## 2018-03-18 RX ADMIN — HYDROMORPHONE HYDROCHLORIDE 0.5 MG: 10 INJECTION, SOLUTION INTRAMUSCULAR; INTRAVENOUS; SUBCUTANEOUS at 09:32

## 2018-03-18 RX ADMIN — ZOLPIDEM TARTRATE 5 MG: 5 TABLET, FILM COATED ORAL at 20:48

## 2018-03-18 RX ADMIN — LACTOBACILLUS ACIDOPHILUS / LACTOBACILLUS BULGARICUS 1 PACKET: 100 MILLION CFU STRENGTH GRANULES at 09:32

## 2018-03-18 RX ADMIN — PREGABALIN 75 MG: 75 CAPSULE ORAL at 20:48

## 2018-03-18 RX ADMIN — MIRTAZAPINE 45 MG: 15 TABLET, FILM COATED ORAL at 20:48

## 2018-03-18 RX ADMIN — PIPERACILLIN SODIUM AND TAZOBACTAM SODIUM 3.38 G: 3; .375 INJECTION, POWDER, FOR SOLUTION INTRAVENOUS at 20:48

## 2018-03-18 ASSESSMENT — ENCOUNTER SYMPTOMS
NAUSEA: 0
SPEECH CHANGE: 0
CHILLS: 0
SHORTNESS OF BREATH: 0
ABDOMINAL PAIN: 0
COUGH: 0
VOMITING: 0
FEVER: 0
MYALGIAS: 1

## 2018-03-18 ASSESSMENT — PAIN SCALES - GENERAL
PAINLEVEL_OUTOF10: 0
PAINLEVEL_OUTOF10: 8
PAINLEVEL_OUTOF10: 8
PAINLEVEL_OUTOF10: 6

## 2018-03-18 ASSESSMENT — LIFESTYLE VARIABLES: DO YOU DRINK ALCOHOL: NO

## 2018-03-18 NOTE — PROGRESS NOTES
Pt refusing use of bed alarm despite education regarding fall risk and use for fall prevention. Pt does not attempt to get out of bed without staff assist, uses call light appropriately. Fall precautions in place; bed lowest position, call light within reach, wheelchair outside room.

## 2018-03-18 NOTE — CARE PLAN
Problem: Safety  Goal: Will remain free from falls    Intervention: Implement fall precautions  Pt has bilat BKA, uses wheelchair when out of bed. Assist x1. Educated pt on use of call light to call for staff assistance. Fall precautions in place; bed lowest position, call light within reach, near nursing station.       Problem: Infection  Goal: Will remain free from infection    Intervention: Assess signs and symptoms of infection  No new s/sx of infection. VSS. Receiving IV abx. Educated pt on performing hand hygiene.       Problem: Pain Management  Goal: Pain level will decrease to patient's comfort goal    Intervention: Follow pain managment plan developed in collaboration with patient and Interdisciplinary Team  Pt complained of 9/10 pain, medicated per MAR. Pt has PRN pain meds available for pain control.

## 2018-03-18 NOTE — PROGRESS NOTES
LIMB PRESERVATION SERVICE NOTE:    Wound(s): S/P Irrigation and debridement of left below-knee amputation abscess. 3/16/18 Dr Dyson.  Allergies: Fish  Start of Care: 3/18/2018  Room/Bed  S620/01      Subjective:      History of Present Illness:   Past Medical History:   Diagnosis Date   • BPH (benign prostatic hyperplasia) 12/20/2017   • Diabetes (CMS-HCC)    • Has received first dose of hepatitis B vaccine    • Hypertension    • Renal disorder    • Renal failure        Patient is a 57 y.o. male. Admitted for Cellulitis,         Patient presented S/P Irrigation and debridement of left below-knee amputation abscess. 3/16/18 Dr Dyson.The pt also has a Right AKA.                   Patient denies fevers chills, nausea, vomiting.      Pain:        Patient resting comfortably, Pain on dressing change/palpation of the left BKA.      Objective:        PHYSICAL EXAMINATION:      General Appearance:  Well developed,  nourished, in no acute distress     Sensory Assessment     NA          Vascular Assessment     Pop bilaterally palpable        Orthotic, protective, supportive devices:      Offloading     NA - pt uses wheelchair for mobility - does not use prosthetics.    Vitals    /84   Pulse 99   Temp 36.9 °C (98.4 °F)   Resp 16   Ht 1.829 m (6')   Wt 88.4 kg (194 lb 14.2 oz)   SpO2 98%   BMI 26.43 kg/m²      Wound Characteristics                                                    Location:   Initial Evaluation  Date:3/18/2018   Tissue Type and %: 100% Pink   Periwound: Normal Skin Tone   Drainage: Scant Serosanginous   Exposed structures Sutures   Wound Edges:   Attached 100% approximated   Odor: None   S&S of Infection:   Edema   Edema: Localized at Site   Sensation: Intact              Tests and Measures:    Labs  Recent Labs      03/16/18   0532  03/17/18   0246  03/18/18   0505   WBC  6.8  9.5  7.5   RBC  4.73  4.83  4.69*   HEMOGLOBIN  12.7*  12.9*  12.7*   HEMATOCRIT  39.4*  39.7*  39.1*   MCV  83.3   82.2  83.4   MCH  26.8*  26.7*  27.1   MCHC  32.2*  32.5*  32.5*   RDW  43.5  43.6  43.8   PLATELETCT  441  487*  423   MPV  9.4  9.5  9.1     Recent Labs      03/16/18   0532  03/17/18   0246  03/18/18   0420   SODIUM  133*  132*  133*   POTASSIUM  4.1  3.8  4.2   CHLORIDE  102  105  104   CO2  24  20  22   GLUCOSE  152*  54*  98   BUN  13  9  9       A1C 11.2 % uncontrolled diabetic  ESR: NA  CRP: NA    Arterial Duplex     Left.    Diffuse plaquing and triphasic flow throughout the common femoral and    proximal profunda femoral arteries.   Diffuse plaquing throughout the superficial femoral artery with an    approximately 50% stenosis in the mid segment.    Hyperemic flow in the distal superficial femoral artery.   Hyperemic flow throughout the popliteal artery and proximal posterior    tibial and peroneal arteries.    50-75% stenosis in the mid popliteal artery.     The remainder of the calf vessels were not visualized due to amputation    just below the knee.      Imaging    X-Ray: 3/14/18 Left femur  Impression       1.  No acute left femoral fracture or dislocation.    2.  No evidence of left femoral or residual left tibia/fibula osteomyelitis.     Infection Management  Microbiology Left BKA  Culture Result Wound  (Abnormal)    (A)   Escherichia coli   Light growth   See previous culture for sensitivity report.     Culture Result Wound  (Abnormal)    (A)   Enterococcus faecalis   Light growth   See previous culture for sensitivity report.      Antibiotics  Per ID, Zosyn       Procedures:     Debridement :  NA   Cleansed with:     NS                                                                     Periwound protected with: skin prep   Primary dressing: adaptic   Secondary Dressing: foam   Other:     Procedures/Frequency:    NURSING TO CHANGE  LEFT BKA SURGICAL SITE DRESSING EVERY 72 HOURS AND PRN FOR SATURATION OR DISLODGEMENT    Nursing to cleanse wound/periwound with NS.  Pat periwound dry.  Apply skin  prep/No Sting to periwound.  Let air dry for 1-2 minutes. Apply single layer adaptic, cut to size, to suture sites. Cover with Non Adhesive Foam and secure with Roll Gauze.  Then apply elastic bandage to secure dressings in place.   Please take Weekly Wound Photos. Notify wound team if wound deteriorates or fails to progress.     Patient Education: Implications of loss of protective sensation (LOPS) discussed with patient- including increased risk for amputation.  Advised to check foot at least daily, moisturize foot, and to always wear protective foot wear.    Professional Collaboration: Bedside Nursing, Dr. Dyson, ID      Assessment:      WWound etiology: Surgical     Wound Progress:  Initial Assessment     Rationale for Treatment: Decrease bioburden     Patient tolerance/compliance: Pt willing to comply     Complicating factors: previous amps, DM     Need for ongoing  Wound Care: nursing to complete wound care, LPS to follow     Goals: Decreased wound size 2% each week -- 100% granulation in 2 weeks    Plan:      Treatment Plan and Recommendations:    ID involved, AB per ID  X ray reviewed  Wound Care by nursing, LPS to Follow  Dressing orders updated  Suture removal at LPS rounds 4/6/18    Anticipated discharge plans (X):  SNF:           Home Care:           Outpatient Wound Center:  Self Care:            Other:           TBD: X

## 2018-03-18 NOTE — PROGRESS NOTES
[facility] Hospitalist Progress Note    Date of Service: 3/18/2018    Chief Complaint  57 y.o. male with a PMH DM type 2 and B/L BKA admitted 3/14/2018 with left stump infection    Interval Problem Update  3/15 Patients pain is well controlled. His sugars are well controlled. LPS has evaluated the patient and he is scheduled for I&D and stump revision tomorrow. MRI has been ordered to evaluate for abscess or osteomyelitis. Continue IV vancomycin and Unasyn.    3/16 No acute events overnight. Patient is planned for I&D per Ortho today. His sugars are better controlled. His wound culture is positive for group D enterococcus species and lactose fermenting gram negative rods.     3/17 POD 1 I&D of left BKA abscess. I have discussed the case with ID who will consult. Saida glucose is elevated at night and low in the morning. He had an episode of hypoglycemia of 48 at 6 AM. I will change his lantus to 20 units BID.     3/18 patient seen sitting on chair doing well with no active complaints. His vitals are stable. His sugars are better controlled. Wound culture grows E. coli and E faecalis, sensitivities pending. Continue IV Zosyn. PT OT eval    Consultants/Specialty  LPS  ID  Ortho  Disposition  DC once medically cleared        Review of Systems   Constitutional: Positive for malaise/fatigue. Negative for chills and fever.   All other systems reviewed and are negative.     Physical Exam  Laboratory/Imaging   Hemodynamics  Temp (24hrs), Av.8 °C (98.3 °F), Min:36.7 °C (98 °F), Max:36.9 °C (98.5 °F)   Temperature: 36.7 °C (98.1 °F)  Pulse  Av.5  Min: 71  Max: 102    Blood Pressure: 153/77      Respiratory      Respiration: 16, Pulse Oximetry: 98 %             Fluids    Intake/Output Summary (Last 24 hours) at 18 1309  Last data filed at 18 0900   Gross per 24 hour   Intake             1000 ml   Output             4000 ml   Net            -3000 ml       Nutrition  Orders Placed This Encounter   Procedures    • DIET ORDER     Standing Status:   Standing     Number of Occurrences:   1     Order Specific Question:   Diet:     Answer:   Diabetic [3]     Physical Exam   Constitutional: He is oriented to person, place, and time. He appears well-developed and well-nourished. No distress.   HENT:   Head: Normocephalic and atraumatic.   Mouth/Throat: Oropharynx is clear and moist.   Eyes: Conjunctivae are normal.   Neck: Neck supple.   Cardiovascular: Normal rate, regular rhythm and normal heart sounds.    Pulmonary/Chest: Effort normal and breath sounds normal. No respiratory distress. He has no wheezes. He has no rales.   Abdominal: Soft. Bowel sounds are normal. He exhibits no distension. There is no tenderness.   Musculoskeletal:   Bilateral BKA   Neurological: He is alert and oriented to person, place, and time. No cranial nerve deficit. Coordination normal.   Skin: Skin is warm.   L BKA with edema, tenderness and purulent drainage    Psychiatric: He has a normal mood and affect. His behavior is normal.   Nursing note and vitals reviewed.      Recent Labs      03/16/18   0532  03/17/18   0246  03/18/18   0505   WBC  6.8  9.5  7.5   RBC  4.73  4.83  4.69*   HEMOGLOBIN  12.7*  12.9*  12.7*   HEMATOCRIT  39.4*  39.7*  39.1*   MCV  83.3  82.2  83.4   MCH  26.8*  26.7*  27.1   MCHC  32.2*  32.5*  32.5*   RDW  43.5  43.6  43.8   PLATELETCT  441  487*  423   MPV  9.4  9.5  9.1     Recent Labs      03/16/18   0532  03/17/18   0246  03/18/18   0420   SODIUM  133*  132*  133*   POTASSIUM  4.1  3.8  4.2   CHLORIDE  102  105  104   CO2  24  20  22   GLUCOSE  152*  54*  98   BUN  13  9  9   CREATININE  1.08  0.97  0.94   CALCIUM  8.8  8.8  9.3                      Assessment/Plan     * Abscess of left lower extremity- (present on admission)   Assessment & Plan    L BKA stump cellulitis and abscess  Continue IV Vancomycin, monitor for toxicity and follow trough levels  Continue Unasyn  I have consulted ID   Follow cultures and  sensitivities         Infection of amputation stump of left lower extremity (CMS-HCC)- (present on admission)   Assessment & Plan    With purulent cellulitis and concern for underlying osteomyelitis  Cultures revealed E faecalis and E. coli  Switch to IV Zosyn  ID and Ortho onboard  Follow cultures and sensitivities        Chronic hyponatremia- (present on admission)   Assessment & Plan    Stop HCTZ  Avoid free water        Hypertension- (present on admission)   Assessment & Plan    Continue home antihypertensives        Type 2 diabetes mellitus (CMS-HCC)- (present on admission)   Assessment & Plan    Better controlled  Continue Lantus at 18 units twice a day and continue insulin sliding scale with serial Accu-Cheks  Continue home regimen of glipizide 10 mg twice a day  Hypoglycemic protocol in place        Tobacco dependence- (present on admission)   Assessment & Plan    Tobacco cessation education provided. Nicotine replacement protocol will be provided to the patient.              Quality-Core Measures   Reviewed items::  Labs reviewed, Medications reviewed and Radiology images reviewed  DVT prophylaxis pharmacological::  Enoxaparin (Lovenox)  Antibiotics:  Treating active infection/contamination beyond 24 hours perioperative coverage      Patient plan of care discussed at multidisplinary team rounds, with patient and R.N at beside.

## 2018-03-18 NOTE — PROGRESS NOTES
Infectious Disease Progress Note    Author: Noemí Faustin M.D. Date & Time of service: 3/18/2018  4:08 PM    Chief Complaint:  Left BKA stump infection    Interval History:  3/18/2018- Tmax98.5 WBC 7.5 troponin 0.94  Labs Reviewed, Medications Reviewed, Radiology Reviewed and Wound Reviewed.    Review of Systems:  Review of Systems   Constitutional: Positive for malaise/fatigue. Negative for fever.   HENT: Negative for hearing loss.    Respiratory: Negative for cough and shortness of breath.    Cardiovascular: Negative for chest pain.   Gastrointestinal: Negative for abdominal pain, nausea and vomiting.   Genitourinary: Negative for dysuria.   Musculoskeletal: Positive for myalgias.   Neurological: Negative for speech change.       Hemodynamics:  Temp (24hrs), Av.8 °C (98.3 °F), Min:36.7 °C (98 °F), Max:36.9 °C (98.5 °F)  Temperature: 36.9 °C (98.4 °F)  Pulse  Av.8  Min: 71  Max: 102   Blood Pressure: 154/84       Physical Exam:  Physical Exam   Constitutional: He is oriented to person, place, and time. No distress.   HENT:   Mouth/Throat: No oropharyngeal exudate.   Eyes: No scleral icterus.   Neck: Neck supple.   Cardiovascular: Regular rhythm.    No murmur heard.  Pulmonary/Chest: He has no wheezes. He has no rales.   Abdominal: Soft. There is no tenderness. There is no rebound.   Musculoskeletal:   Right AKA  Left BKA bandaged   Neurological: He is alert and oriented to person, place, and time.   Nursing note and vitals reviewed.      Meds:    Current Facility-Administered Medications:   •  insulin glargine  •  glipiZIDE  •  [COMPLETED] piperacillin-tazobactam **AND** piperacillin-tazobactam  •  hydrALAZINE  •  labetalol  •  albuterol  •  amLODIPine  •  aspirin EC  •  budesonide-formoterol  •  citalopram  •  finasteride  •  hydrALAZINE  •  mirtazapine  •  multivitamin  •  pregabalin  •  tamsulosin  •  senna-docusate **AND** polyethylene glycol/lytes **AND** magnesium hydroxide **AND** bisacodyl  •   enoxaparin  •  INITIATE NICOTINE REPLACEMENT PROTOCOL  **AND** nicotine **AND** Protocol 205 PATIENT EDUCATION MATERIALS **AND** Protocol 205 Rotate nicotine patch application sites daily  **AND** nicotine polacrilex  •  acetaminophen  •  Notify provider if pain remains uncontrolled **AND** Use the numeric rating scale (NRS-11) on regular floors and Critical-Care Pain Observation Tool (CPOT) on ICUs/Trauma to assess pain **AND** Pulse Ox (Oximetry) **AND** Pharmacy Consult Request **AND** If patient difficult to arouse and/or has respiratory depression, stop any opiates that are currently infusing and call a Rapid Response. **AND** oxyCODONE immediate-release **AND** oxyCODONE immediate-release **AND** HYDROmorphone  •  insulin regular **AND** Accu-Chek ACHS **AND** NOTIFY MD and PharmD **AND** glucose 4 g **AND** dextrose 50%  •  Respiratory Care per Protocol  •  zolpidem  •  lactobacillus granules  •  famotidine    Labs:  Recent Labs      03/16/18   0532 03/17/18   0246  03/18/18   0505   WBC  6.8  9.5  7.5   RBC  4.73  4.83  4.69*   HEMOGLOBIN  12.7*  12.9*  12.7*   HEMATOCRIT  39.4*  39.7*  39.1*   MCV  83.3  82.2  83.4   MCH  26.8*  26.7*  27.1   RDW  43.5  43.6  43.8   PLATELETCT  441  487*  423   MPV  9.4  9.5  9.1   NEUTSPOLYS  56.20  62.90  52.40   LYMPHOCYTES  29.00  23.40  30.80   MONOCYTES  11.10  10.70  12.00   EOSINOPHILS  2.80  2.30  3.70   BASOPHILS  0.60  0.40  0.70     Recent Labs      03/16/18   0532  03/17/18   0246  03/18/18   0420   SODIUM  133*  132*  133*   POTASSIUM  4.1  3.8  4.2   CHLORIDE  102  105  104   CO2  24  20  22   GLUCOSE  152*  54*  98   BUN  13  9  9     Recent Labs      03/16/18   0532  03/17/18   0246  03/18/18   0420   CREATININE  1.08  0.97  0.94       Imaging:  Dx-chest-portable (1 View)    Result Date: 3/14/2018  3/14/2018 3:54 PM HISTORY/REASON FOR EXAM:  Sepsis. Shortness of breath. Open wound left leg. TECHNIQUE/EXAM DESCRIPTION AND NUMBER OF VIEWS: Single portable  view of the chest. COMPARISON:  2/1/2018 FINDINGS: The cardiac silhouette is within normal limits. No infiltrates or consolidations are noted. No pleural effusion is noted.     No acute cardiac or pulmonary abnormality is noted.    Dx-femur-2+ Left    Result Date: 3/14/2018  3/14/2018 3:54 PM HISTORY/REASON FOR EXAM:  Open wound left amputation site with pain. TECHNIQUE/EXAM DESCRIPTION AND NUMBER OF VIEWS:  2 views of the LEFT femur. COMPARISON: 2/3/2018 FINDINGS: No femoral fracture or dislocation is present. There is no soft tissue mass identified. There is a left below-knee amputation with stable edematous appearance of the left lower extremity stump. No subcutaneous gas is identified. There is no plain film evidence of osteomyelitis.     1.  No acute left femoral fracture or dislocation. 2.  No evidence of left femoral or residual left tibia/fibula osteomyelitis.      Micro:  Results     Procedure Component Value Units Date/Time    CULTURE WOUND W/ GRAM STAIN [[id number]]  (Abnormal) Collected:  03/16/18 1535    Order Status:  Completed Specimen:  Wound Updated:  03/18/18 1139     Significant Indicator POS (POS)     Source WND     Site left knee amputation wound     Culture Result Wound -- (A)     Gram Stain Result Moderate WBCs.  Rare Gram positive cocci.  Rare Gram negative rods.       Culture Result Wound Escherichia coli  Light growth  See previous culture for sensitivity report.   (A)      Enterococcus faecalis  Light growth  See previous culture for sensitivity report.   (A)    ANAEROBIC CULTURE [[id number]] Collected:  03/16/18 1535    Order Status:  Completed Specimen:  Wound Updated:  03/18/18 1139     Significant Indicator NEG     Source WND     Site left knee amputation wound     Anaerobic Culture, Culture Res Culture in progress.    GRAM STAIN [[id number]] Collected:  03/14/18 1500    Order Status:  Completed Specimen:  Wound Updated:  03/17/18 1352     Significant Indicator .     Source WND     Site  LEFT LEG     Gram Stain Result Moderate WBCs.  Rare Gram positive cocci.      CULTURE WOUND W/ GRAM STAIN [[id number]]  (Abnormal)  (Susceptibility) Collected:  03/14/18 1500    Order Status:  Completed Specimen:  Wound from Left Leg Updated:  03/17/18 1351     Significant Indicator POS (POS)     Source WND     Site LEFT LEG     Culture Result Wound -- (A)     Gram Stain Result Moderate WBCs.  Rare Gram positive cocci.       Culture Result Wound Escherichia coli  Light growth   (A)      Enterococcus faecalis  Light growth  Combination therapy with ampicillin, penicillin, or  vancomycin (for susceptible strains) plus an aminoglycoside  is usually indicated for serious enterococcal infections,  such as endocarditis unless high-level resistance to both  gentamicin and streptomycin is documented; such combinations  are predicted to result in synergistic killing of the  Enterococcus.  The susceptibility profile for this organism indicates that  Streptomycin would not be an effective component of  combination therapy.  The susceptibility profile for this organism indicates that  Gentamycin would not be an effective component of combination  therapy.   (A)    Culture & Susceptibility     ENTEROCOCCUS FAECALIS     Antibiotic Sensitivity Microscan Unit Status    Ampicillin Sensitive <=2 mcg/mL Final    Daptomycin Sensitive 1 mcg/mL Final    Gent Synergy Resistant >500 mcg/mL Final    Penicillin Sensitive 8 mcg/mL Final    Strep Synergy Resistant >1000 mcg/mL Final    Vancomycin Sensitive 2 mcg/mL Final              ESCHERICHIA COLI     Antibiotic Sensitivity Microscan Unit Status    Ampicillin Resistant >16 mcg/mL Final    Ampicillin/sulbactam Resistant >16/8 mcg/mL Final    Cefepime Sensitive <=8 mcg/mL Final    Cefotaxime Sensitive <=2 mcg/mL Final    Cefotetan Sensitive <=16 mcg/mL Final    Ceftazidime Sensitive <=1 mcg/mL Final    Ceftriaxone Sensitive <=8 mcg/mL Final    Cefuroxime Sensitive <=4 mcg/mL Final     Ciprofloxacin Resistant >2 mcg/mL Final    Ertapenem Sensitive <=1 mcg/mL Final    Gentamicin Sensitive <=4 mcg/mL Final    Pip/Tazobactam Sensitive <=16 mcg/mL Final    Tigecycline Sensitive <=2 mcg/mL Final    Tobramycin Sensitive <=4 mcg/mL Final    Trimeth/Sulfa Sensitive <=2/38 mcg/mL Final                       CULTURE WOUND W/ GRAM STAIN [[id number]]  (Abnormal) Collected:  03/15/18 1059    Order Status:  Completed Specimen:  Wound from Left Leg Updated:  03/17/18 1148     Significant Indicator POS (POS)     Source WND     Site LEFT LEG     Culture Result Wound -- (A)     Gram Stain Result Moderate WBCs.  Few Gram positive cocci.  Rare Gram positive rods.       Culture Result Wound Enterococcus faecalis  Light growth  Combination therapy with ampicillin, penicillin, or  vancomycin (for susceptible strains) plus an aminoglycoside  is usually indicated for serious enterococcal infections,  such as endocarditis unless high-level resistance to both  gentamicin and streptomycin is documented; such combinations  are predicted to result in synergistic killing of the  Enterococcus.  See previous culture for sensitivity report.   (A)      Escherichia coli  Light growth  See previous culture for sensitivity report.   (A)    Narrative:       Collected By:[id number] KATEYLYNNE IBRAHIMFabio  Collected from wound on distal BKA stump at 0940    GRAM STAIN [[id number]] Collected:  03/16/18 1535    Order Status:  Completed Specimen:  Wound Updated:  03/17/18 0826     Significant Indicator .     Source WND     Site left knee amputation wound     Gram Stain Result Moderate WBCs.  Rare Gram positive cocci.  Rare Gram negative rods.      GRAM STAIN [[id number]] Collected:  03/15/18 1059    Order Status:  Completed Specimen:  Wound Updated:  03/15/18 1623     Significant Indicator .     Source WND     Site LEFT LEG     Gram Stain Result Moderate WBCs.  Few Gram positive cocci.  Rare Gram positive rods.      Narrative:       Collected  "By:[id number] BRITT ALY  Collected from wound on distal BKA stump at 0940    BLOOD CULTURE [[id number]] Collected:  03/14/18 1500    Order Status:  Completed Specimen:  Blood from Peripheral Updated:  03/15/18 0647     Significant Indicator NEG     Source BLD     Site PERIPHERAL     Blood Culture No Growth    Note: Blood cultures are incubated for 5 days and  are monitored continuously.Positive blood cultures  are called to the RN and reported as soon as  they are identified.      Narrative:       Per Hospital Policy: Only change Specimen Src: to \"Line\" if  specified by physician order.    BLOOD CULTURE [[id number]] Collected:  03/14/18 1507    Order Status:  Completed Specimen:  Blood from Peripheral Updated:  03/15/18 0647     Significant Indicator NEG     Source BLD     Site PERIPHERAL     Blood Culture No Growth    Note: Blood cultures are incubated for 5 days and  are monitored continuously.Positive blood cultures  are called to the RN and reported as soon as  they are identified.      Narrative:       Per Hospital Policy: Only change Specimen Src: to \"Line\" if  specified by physician order.    Blood Culture [[id number]] Collected:  03/14/18 0000    Order Status:  Canceled Specimen:  Other from Peripheral     Blood Culture [[id number]] Collected:  03/14/18 0000    Order Status:  Canceled Specimen:  Other from Peripheral     Urinalysis [[id number]] Collected:  03/14/18 0000    Order Status:  Canceled Specimen:  Urine           Assessment:  Active Hospital Problems    Diagnosis   • *Abscess of left lower extremity [L02.416]   • Infection of amputation stump of left lower extremity (CMS-HCC) [T87.44]   • Chronic hyponatremia [E87.1]   • Hypertension [I10]   • Type 2 diabetes mellitus (CMS-HCC) [E11.9]   • Tobacco dependence [F17.200]   • PVD (peripheral vascular disease) (CMS-HCC) [I73.9]       Plan:  BKA infection  Status post IND on 3/16/2018  Cultures are Enterococcus faecalis and Escherichia coli  Continue " Zosyn  Aim for at least 4 weeks of IV  PICC line    Tobacco abuse    Uncontrolled diabetes mellitus  Keep sugars less than 154 for  wound healing  Discussed with internal medicine.

## 2018-03-18 NOTE — PROGRESS NOTES
Orthopaedic PA Progress Note    Interval changes: DId well overnight. Comfortable, watching TV    ROS - Patient denies any new issues. No chest pain, dyspnea, or fever.  Pain well controlled.    Blood pressure (!) 178/84, pulse 94, temperature 36.9 °C (98.4 °F), resp. rate 17, height 1.829 m (6'), weight 90.7 kg (200 lb), SpO2 98 %.    Patient seen and examined  No acute distress  Breathing non labored  RRR  Surgical dressing is clean, dry, and intact. Surrounding compartments soft well perfused and nontender    Recent Labs      03/15/18   0301  03/16/18   0532  03/17/18   0246   WBC  8.1  6.8  9.5   RBC  4.89  4.73  4.83   HEMOGLOBIN  12.9*  12.7*  12.9*   HEMATOCRIT  41.0*  39.4*  39.7*   MCV  83.8  83.3  82.2   MCH  26.4*  26.8*  26.7*   MCHC  31.5*  32.2*  32.5*   RDW  45.1  43.5  43.6   PLATELETCT  469*  441  487*   MPV  9.1  9.4  9.5       Active Hospital Problems    Diagnosis   • Infection of amputation stump of left lower extremity (CMS-HCC) [T87.44]     Priority: High   • Abscess of left lower extremity [L02.416]     Priority: High   • Chronic hyponatremia [E87.1]     Priority: Medium   • Hypertension [I10]     Priority: Medium   • Type 2 diabetes mellitus (CMS-HCC) [E11.9]     Priority: Medium   • Tobacco dependence [F17.200]   • PVD (peripheral vascular disease) (CMS-HCC) [I73.9]       Assessment/Plan:  POD#1 S/P Irrigation and debridement of left below-knee amputation abscess.  Wt bearing status - NWB LLE  PT/OT-initiated  Wound care:dressing change next 1-2 days  Drains - no  Thompson-no  Sutures/Staples out- 10-14 days post operatively  Antibiotics: per ID  DVT Prophylaxis- TEDS/SCDs/Foot pumps.   Case Coordination for Discharge Planning - Disposition pending

## 2018-03-18 NOTE — PROGRESS NOTES
Assumed care of pt, discussed plan of care. A&Ox4. RA, tolerating well. Complains of 9/10 pain to LLE, medicated per MAR. Last BM, 03/16. Continent of bowel, bladder. Skin intact; x L.BKA with dressing in place. Assist x1, use of wheelchair. On diabetic diet, takes pills whole without difficulty. IV, CDI, running zosyn at 25 mL/hr. Fall precautions in place; bed lowest position, call light within reach. All needs met at this time.

## 2018-03-19 ENCOUNTER — APPOINTMENT (OUTPATIENT)
Dept: RADIOLOGY | Facility: MEDICAL CENTER | Age: 58
DRG: 498 | End: 2018-03-19
Attending: INTERNAL MEDICINE
Payer: COMMERCIAL

## 2018-03-19 LAB
ANION GAP SERPL CALC-SCNC: 8 MMOL/L (ref 0–11.9)
BACTERIA BLD CULT: NORMAL
BACTERIA BLD CULT: NORMAL
BACTERIA SPEC ANAEROBE CULT: ABNORMAL
BACTERIA SPEC ANAEROBE CULT: ABNORMAL
BASOPHILS # BLD AUTO: 0.4 % (ref 0–1.8)
BASOPHILS # BLD: 0.04 K/UL (ref 0–0.12)
BUN SERPL-MCNC: 12 MG/DL (ref 8–22)
CALCIUM SERPL-MCNC: 9.2 MG/DL (ref 8.5–10.5)
CHLORIDE SERPL-SCNC: 102 MMOL/L (ref 96–112)
CO2 SERPL-SCNC: 23 MMOL/L (ref 20–33)
CREAT SERPL-MCNC: 1.23 MG/DL (ref 0.5–1.4)
EOSINOPHIL # BLD AUTO: 0.3 K/UL (ref 0–0.51)
EOSINOPHIL NFR BLD: 3.3 % (ref 0–6.9)
ERYTHROCYTE [DISTWIDTH] IN BLOOD BY AUTOMATED COUNT: 43.9 FL (ref 35.9–50)
GLUCOSE BLD-MCNC: 103 MG/DL (ref 65–99)
GLUCOSE BLD-MCNC: 224 MG/DL (ref 65–99)
GLUCOSE BLD-MCNC: 290 MG/DL (ref 65–99)
GLUCOSE BLD-MCNC: 304 MG/DL (ref 65–99)
GLUCOSE BLD-MCNC: 49 MG/DL (ref 65–99)
GLUCOSE SERPL-MCNC: 82 MG/DL (ref 65–99)
HCT VFR BLD AUTO: 39.5 % (ref 42–52)
HGB BLD-MCNC: 12.7 G/DL (ref 14–18)
IMM GRANULOCYTES # BLD AUTO: 0.03 K/UL (ref 0–0.11)
IMM GRANULOCYTES NFR BLD AUTO: 0.3 % (ref 0–0.9)
LYMPHOCYTES # BLD AUTO: 2.06 K/UL (ref 1–4.8)
LYMPHOCYTES NFR BLD: 22.6 % (ref 22–41)
MCH RBC QN AUTO: 26.4 PG (ref 27–33)
MCHC RBC AUTO-ENTMCNC: 32.2 G/DL (ref 33.7–35.3)
MCV RBC AUTO: 82.1 FL (ref 81.4–97.8)
MONOCYTES # BLD AUTO: 0.91 K/UL (ref 0–0.85)
MONOCYTES NFR BLD AUTO: 10 % (ref 0–13.4)
NEUTROPHILS # BLD AUTO: 5.77 K/UL (ref 1.82–7.42)
NEUTROPHILS NFR BLD: 63.4 % (ref 44–72)
NRBC # BLD AUTO: 0 K/UL
NRBC BLD-RTO: 0 /100 WBC
PLATELET # BLD AUTO: 472 K/UL (ref 164–446)
PMV BLD AUTO: 9.3 FL (ref 9–12.9)
POTASSIUM SERPL-SCNC: 3.5 MMOL/L (ref 3.6–5.5)
RBC # BLD AUTO: 4.81 M/UL (ref 4.7–6.1)
SIGNIFICANT IND 70042: ABNORMAL
SIGNIFICANT IND 70042: NORMAL
SIGNIFICANT IND 70042: NORMAL
SITE SITE: ABNORMAL
SITE SITE: NORMAL
SITE SITE: NORMAL
SODIUM SERPL-SCNC: 133 MMOL/L (ref 135–145)
SOURCE SOURCE: ABNORMAL
SOURCE SOURCE: NORMAL
SOURCE SOURCE: NORMAL
WBC # BLD AUTO: 9.1 K/UL (ref 4.8–10.8)

## 2018-03-19 PROCEDURE — 82962 GLUCOSE BLOOD TEST: CPT | Mod: 91

## 2018-03-19 PROCEDURE — 700105 HCHG RX REV CODE 258: Performed by: INTERNAL MEDICINE

## 2018-03-19 PROCEDURE — 770006 HCHG ROOM/CARE - MED/SURG/GYN SEMI*

## 2018-03-19 PROCEDURE — 99232 SBSQ HOSP IP/OBS MODERATE 35: CPT | Performed by: INTERNAL MEDICINE

## 2018-03-19 PROCEDURE — 700102 HCHG RX REV CODE 250 W/ 637 OVERRIDE(OP): Performed by: INTERNAL MEDICINE

## 2018-03-19 PROCEDURE — 85025 COMPLETE CBC W/AUTO DIFF WBC: CPT

## 2018-03-19 PROCEDURE — 36415 COLL VENOUS BLD VENIPUNCTURE: CPT

## 2018-03-19 PROCEDURE — A9270 NON-COVERED ITEM OR SERVICE: HCPCS | Performed by: INTERNAL MEDICINE

## 2018-03-19 PROCEDURE — 02HV33Z INSERTION OF INFUSION DEVICE INTO SUPERIOR VENA CAVA, PERCUTANEOUS APPROACH: ICD-10-PCS | Performed by: ORTHOPAEDIC SURGERY

## 2018-03-19 PROCEDURE — 80048 BASIC METABOLIC PNL TOTAL CA: CPT

## 2018-03-19 PROCEDURE — 700111 HCHG RX REV CODE 636 W/ 250 OVERRIDE (IP): Performed by: INTERNAL MEDICINE

## 2018-03-19 PROCEDURE — 36569 INSJ PICC 5 YR+ W/O IMAGING: CPT

## 2018-03-19 RX ORDER — HYDRALAZINE HYDROCHLORIDE 50 MG/1
50 TABLET, FILM COATED ORAL EVERY 8 HOURS
Status: DISCONTINUED | OUTPATIENT
Start: 2018-03-19 | End: 2018-03-26 | Stop reason: HOSPADM

## 2018-03-19 RX ORDER — INSULIN GLARGINE 100 [IU]/ML
10 INJECTION, SOLUTION SUBCUTANEOUS EVERY EVENING
Status: DISCONTINUED | OUTPATIENT
Start: 2018-03-19 | End: 2018-03-21

## 2018-03-19 RX ORDER — INSULIN GLARGINE 100 [IU]/ML
20 INJECTION, SOLUTION SUBCUTANEOUS
Status: DISCONTINUED | OUTPATIENT
Start: 2018-03-20 | End: 2018-03-21

## 2018-03-19 RX ADMIN — INSULIN GLARGINE 10 UNITS: 100 INJECTION, SOLUTION SUBCUTANEOUS at 20:16

## 2018-03-19 RX ADMIN — HYDRALAZINE HYDROCHLORIDE 50 MG: 50 TABLET, FILM COATED ORAL at 20:19

## 2018-03-19 RX ADMIN — PIPERACILLIN SODIUM AND TAZOBACTAM SODIUM 3.38 G: 3; .375 INJECTION, POWDER, FOR SOLUTION INTRAVENOUS at 13:09

## 2018-03-19 RX ADMIN — PREGABALIN 75 MG: 75 CAPSULE ORAL at 05:47

## 2018-03-19 RX ADMIN — BUDESONIDE AND FORMOTEROL FUMARATE DIHYDRATE 2 PUFF: 160; 4.5 AEROSOL RESPIRATORY (INHALATION) at 08:57

## 2018-03-19 RX ADMIN — BUDESONIDE AND FORMOTEROL FUMARATE DIHYDRATE 2 PUFF: 160; 4.5 AEROSOL RESPIRATORY (INHALATION) at 20:15

## 2018-03-19 RX ADMIN — NICOTINE 21 MG: 21 PATCH, EXTENDED RELEASE TRANSDERMAL at 05:47

## 2018-03-19 RX ADMIN — TAMSULOSIN HYDROCHLORIDE 0.4 MG: 0.4 CAPSULE ORAL at 08:58

## 2018-03-19 RX ADMIN — CITALOPRAM HYDROBROMIDE 20 MG: 20 TABLET ORAL at 08:58

## 2018-03-19 RX ADMIN — INSULIN HUMAN 2 UNITS: 100 INJECTION, SOLUTION PARENTERAL at 18:09

## 2018-03-19 RX ADMIN — AMLODIPINE BESYLATE 10 MG: 10 TABLET ORAL at 08:58

## 2018-03-19 RX ADMIN — PIPERACILLIN SODIUM AND TAZOBACTAM SODIUM 3.38 G: 3; .375 INJECTION, POWDER, FOR SOLUTION INTRAVENOUS at 05:47

## 2018-03-19 RX ADMIN — FINASTERIDE 5 MG: 5 TABLET, FILM COATED ORAL at 08:58

## 2018-03-19 RX ADMIN — HYDROMORPHONE HYDROCHLORIDE 0.5 MG: 10 INJECTION, SOLUTION INTRAMUSCULAR; INTRAVENOUS; SUBCUTANEOUS at 10:46

## 2018-03-19 RX ADMIN — FAMOTIDINE 20 MG: 20 TABLET, FILM COATED ORAL at 20:19

## 2018-03-19 RX ADMIN — HYDROMORPHONE HYDROCHLORIDE 0.5 MG: 10 INJECTION, SOLUTION INTRAMUSCULAR; INTRAVENOUS; SUBCUTANEOUS at 06:20

## 2018-03-19 RX ADMIN — GLIPIZIDE 10 MG: 10 TABLET ORAL at 18:09

## 2018-03-19 RX ADMIN — INSULIN GLARGINE 18 UNITS: 100 INJECTION, SOLUTION SUBCUTANEOUS at 08:58

## 2018-03-19 RX ADMIN — HYDROMORPHONE HYDROCHLORIDE 0.5 MG: 10 INJECTION, SOLUTION INTRAMUSCULAR; INTRAVENOUS; SUBCUTANEOUS at 18:13

## 2018-03-19 RX ADMIN — INSULIN HUMAN 3 UNITS: 100 INJECTION, SOLUTION PARENTERAL at 12:35

## 2018-03-19 RX ADMIN — HYDRALAZINE HYDROCHLORIDE 50 MG: 50 TABLET, FILM COATED ORAL at 14:24

## 2018-03-19 RX ADMIN — OXYCODONE HYDROCHLORIDE 10 MG: 10 TABLET ORAL at 12:41

## 2018-03-19 RX ADMIN — PREGABALIN 75 MG: 75 CAPSULE ORAL at 14:24

## 2018-03-19 RX ADMIN — HYDROMORPHONE HYDROCHLORIDE 0.5 MG: 10 INJECTION, SOLUTION INTRAMUSCULAR; INTRAVENOUS; SUBCUTANEOUS at 21:41

## 2018-03-19 RX ADMIN — HYDRALAZINE HYDROCHLORIDE 25 MG: 50 TABLET, FILM COATED ORAL at 05:47

## 2018-03-19 RX ADMIN — OXYCODONE HYDROCHLORIDE 10 MG: 10 TABLET ORAL at 20:07

## 2018-03-19 RX ADMIN — PREGABALIN 75 MG: 75 CAPSULE ORAL at 20:19

## 2018-03-19 RX ADMIN — THERA TABS 1 TABLET: TAB at 08:58

## 2018-03-19 RX ADMIN — MIRTAZAPINE 45 MG: 15 TABLET, FILM COATED ORAL at 20:19

## 2018-03-19 RX ADMIN — ASPIRIN 81 MG: 81 TABLET, COATED ORAL at 08:58

## 2018-03-19 RX ADMIN — ENOXAPARIN SODIUM 40 MG: 100 INJECTION SUBCUTANEOUS at 08:57

## 2018-03-19 RX ADMIN — INSULIN HUMAN 4 UNITS: 100 INJECTION, SOLUTION PARENTERAL at 20:16

## 2018-03-19 RX ADMIN — OXYCODONE HYDROCHLORIDE 10 MG: 10 TABLET ORAL at 09:03

## 2018-03-19 RX ADMIN — ZOLPIDEM TARTRATE 5 MG: 5 TABLET, FILM COATED ORAL at 20:19

## 2018-03-19 RX ADMIN — GLIPIZIDE 10 MG: 10 TABLET ORAL at 08:58

## 2018-03-19 RX ADMIN — PIPERACILLIN SODIUM AND TAZOBACTAM SODIUM 3.38 G: 3; .375 INJECTION, POWDER, FOR SOLUTION INTRAVENOUS at 20:20

## 2018-03-19 ASSESSMENT — ENCOUNTER SYMPTOMS
COUGH: 0
MYALGIAS: 1
SPEECH CHANGE: 0
ABDOMINAL PAIN: 0
NAUSEA: 0
VOMITING: 0
SHORTNESS OF BREATH: 0
FEVER: 0
CHILLS: 0

## 2018-03-19 ASSESSMENT — PAIN SCALES - GENERAL
PAINLEVEL_OUTOF10: 0
PAINLEVEL_OUTOF10: 9
PAINLEVEL_OUTOF10: 7

## 2018-03-19 ASSESSMENT — LIFESTYLE VARIABLES: DO YOU DRINK ALCOHOL: NO

## 2018-03-19 NOTE — PROGRESS NOTES
Assumed care of pt, discussed plan of care. A&Ox4. RA. Complains of 8/10 pain to LLE, medicated per MAR. Last BM, 03/16. Continent of bowel, bladder. Skin intact; x L.BKA with dressing in place, CDI. Assist x1, use of wheelchair. On diabetic diet, takes pills whole without difficulty. IV, CDI, running TKO at 10 mL/hr. Fall precautions in place; bed lowest position, call light within reach. All needs met at this time.

## 2018-03-19 NOTE — PROGRESS NOTES
Infectious Disease Progress Note    Author: Shaista Jarvis M.D. Date & Time of service: 3/19/2018  2:31 PM    Chief Complaint:  Left BKA stump infection    Interval History:  3/18/2018- Tmax98.5 WBC 7.5 troponin 0.94  3/19 AF WBC 9.1 denies SE abx  Labs Reviewed, Medications Reviewed, Radiology Reviewed and Wound Reviewed.    Review of Systems:  Review of Systems   Constitutional: Negative for fever.   HENT: Negative for hearing loss.    Respiratory: Negative for cough and shortness of breath.    Cardiovascular: Negative for chest pain.   Gastrointestinal: Negative for abdominal pain, nausea and vomiting.   Genitourinary: Negative for dysuria.   Musculoskeletal: Positive for myalgias.   Neurological: Negative for speech change.       Hemodynamics:  Temp (24hrs), Av.6 °C (97.9 °F), Min:36.3 °C (97.3 °F), Max:36.9 °C (98.4 °F)  Temperature: 36.8 °C (98.2 °F)  Pulse  Av.8  Min: 71  Max: 102   Blood Pressure: 130/60       Physical Exam:  Physical Exam   Constitutional: He is oriented to person, place, and time. He appears well-developed. No distress.   HENT:   Head: Normocephalic and atraumatic.   Mouth/Throat: No oropharyngeal exudate.   Eyes: EOM are normal. Pupils are equal, round, and reactive to light. No scleral icterus.   Neck: Neck supple.   Cardiovascular: Normal rate and regular rhythm.    No murmur heard.  Pulmonary/Chest: Effort normal. No respiratory distress.   Abdominal: Soft. He exhibits no distension.   Musculoskeletal: He exhibits no edema.   Right AKA  Left BKA sutures intact small dehiscence-no active drainage   Neurological: He is alert and oriented to person, place, and time.   Skin: No rash noted.   Nursing note and vitals reviewed.      Meds:    Current Facility-Administered Medications:   •  [START ON 3/20/2018] insulin glargine  •  insulin glargine  •  hydrALAZINE  •  glipiZIDE  •  [COMPLETED] piperacillin-tazobactam **AND** piperacillin-tazobactam  •  hydrALAZINE  •  labetalol  •   albuterol  •  amLODIPine  •  aspirin EC  •  budesonide-formoterol  •  citalopram  •  finasteride  •  mirtazapine  •  multivitamin  •  pregabalin  •  tamsulosin  •  senna-docusate **AND** polyethylene glycol/lytes **AND** magnesium hydroxide **AND** bisacodyl  •  enoxaparin  •  INITIATE NICOTINE REPLACEMENT PROTOCOL  **AND** nicotine **AND** Protocol 205 PATIENT EDUCATION MATERIALS **AND** Protocol 205 Rotate nicotine patch application sites daily  **AND** nicotine polacrilex  •  acetaminophen  •  Notify provider if pain remains uncontrolled **AND** Use the numeric rating scale (NRS-11) on regular floors and Critical-Care Pain Observation Tool (CPOT) on ICUs/Trauma to assess pain **AND** Pulse Ox (Oximetry) **AND** Pharmacy Consult Request **AND** If patient difficult to arouse and/or has respiratory depression, stop any opiates that are currently infusing and call a Rapid Response. **AND** oxyCODONE immediate-release **AND** oxyCODONE immediate-release **AND** HYDROmorphone  •  insulin regular **AND** Accu-Chek ACHS **AND** NOTIFY MD and PharmD **AND** glucose 4 g **AND** dextrose 50%  •  Respiratory Care per Protocol  •  zolpidem  •  lactobacillus granules  •  famotidine    Labs:  Recent Labs      03/17/18   0246  03/18/18   0505  03/19/18   0353   WBC  9.5  7.5  9.1   RBC  4.83  4.69*  4.81   HEMOGLOBIN  12.9*  12.7*  12.7*   HEMATOCRIT  39.7*  39.1*  39.5*   MCV  82.2  83.4  82.1   MCH  26.7*  27.1  26.4*   RDW  43.6  43.8  43.9   PLATELETCT  487*  423  472*   MPV  9.5  9.1  9.3   NEUTSPOLYS  62.90  52.40  63.40   LYMPHOCYTES  23.40  30.80  22.60   MONOCYTES  10.70  12.00  10.00   EOSINOPHILS  2.30  3.70  3.30   BASOPHILS  0.40  0.70  0.40     Recent Labs      03/17/18   0246  03/18/18   0420  03/19/18   0353   SODIUM  132*  133*  133*   POTASSIUM  3.8  4.2  3.5*   CHLORIDE  105  104  102   CO2  20  22  23   GLUCOSE  54*  98  82   BUN  9  9  12     Recent Labs      03/17/18   0246  03/18/18   0420  03/19/18    0353   CREATININE  0.97  0.94  1.23       Imaging:  Dx-chest-portable (1 View)    Result Date: 3/14/2018  3/14/2018 3:54 PM HISTORY/REASON FOR EXAM:  Sepsis. Shortness of breath. Open wound left leg. TECHNIQUE/EXAM DESCRIPTION AND NUMBER OF VIEWS: Single portable view of the chest. COMPARISON:  2/1/2018 FINDINGS: The cardiac silhouette is within normal limits. No infiltrates or consolidations are noted. No pleural effusion is noted.     No acute cardiac or pulmonary abnormality is noted.    Dx-femur-2+ Left    Result Date: 3/14/2018  3/14/2018 3:54 PM HISTORY/REASON FOR EXAM:  Open wound left amputation site with pain. TECHNIQUE/EXAM DESCRIPTION AND NUMBER OF VIEWS:  2 views of the LEFT femur. COMPARISON: 2/3/2018 FINDINGS: No femoral fracture or dislocation is present. There is no soft tissue mass identified. There is a left below-knee amputation with stable edematous appearance of the left lower extremity stump. No subcutaneous gas is identified. There is no plain film evidence of osteomyelitis.     1.  No acute left femoral fracture or dislocation. 2.  No evidence of left femoral or residual left tibia/fibula osteomyelitis.      Micro:  Results     Procedure Component Value Units Date/Time    CULTURE WOUND W/ GRAM STAIN [[id number]]  (Abnormal) Collected:  03/16/18 1535    Order Status:  Completed Specimen:  Wound Updated:  03/19/18 1029     Significant Indicator POS (POS)     Source WND     Site left knee amputation wound     Culture Result Wound -- (A)     Gram Stain Result Moderate WBCs.  Rare Gram positive cocci.  Rare Gram negative rods.       Culture Result Wound Escherichia coli  Light growth  See previous culture for sensitivity report.   (A)      Enterococcus faecalis  Light growth  See previous culture for sensitivity report.   (A)    ANAEROBIC CULTURE [[id number]]  (Abnormal) Collected:  03/16/18 1535    Order Status:  Completed Specimen:  Wound Updated:  03/19/18 1029     Significant Indicator POS (POS)      Source WND     Site left knee amputation wound     Anaerobic Culture, Culture Res Light growth Mixed anaerobic hugo including (A)      Bacteroides fragilis Group (A)    GRAM STAIN [[id number]] Collected:  03/14/18 1500    Order Status:  Completed Specimen:  Wound Updated:  03/17/18 1352     Significant Indicator .     Source WND     Site LEFT LEG     Gram Stain Result Moderate WBCs.  Rare Gram positive cocci.      CULTURE WOUND W/ GRAM STAIN [[id number]]  (Abnormal)  (Susceptibility) Collected:  03/14/18 1500    Order Status:  Completed Specimen:  Wound from Left Leg Updated:  03/17/18 1352     Significant Indicator POS (POS)     Source WND     Site LEFT LEG     Culture Result Wound -- (A)     Gram Stain Result Moderate WBCs.  Rare Gram positive cocci.       Culture Result Wound Escherichia coli  Light growth   (A)      Enterococcus faecalis  Light growth  Combination therapy with ampicillin, penicillin, or  vancomycin (for susceptible strains) plus an aminoglycoside  is usually indicated for serious enterococcal infections,  such as endocarditis unless high-level resistance to both  gentamicin and streptomycin is documented; such combinations  are predicted to result in synergistic killing of the  Enterococcus.  The susceptibility profile for this organism indicates that  Streptomycin would not be an effective component of  combination therapy.  The susceptibility profile for this organism indicates that  Gentamycin would not be an effective component of combination  therapy.   (A)    Culture & Susceptibility     ENTEROCOCCUS FAECALIS     Antibiotic Sensitivity Microscan Unit Status    Ampicillin Sensitive <=2 mcg/mL Final    Daptomycin Sensitive 1 mcg/mL Final    Gent Synergy Resistant >500 mcg/mL Final    Penicillin Sensitive 8 mcg/mL Final    Strep Synergy Resistant >1000 mcg/mL Final    Vancomycin Sensitive 2 mcg/mL Final              ESCHERICHIA COLI     Antibiotic Sensitivity Microscan Unit Status     Ampicillin Resistant >16 mcg/mL Final    Ampicillin/sulbactam Resistant >16/8 mcg/mL Final    Cefepime Sensitive <=8 mcg/mL Final    Cefotaxime Sensitive <=2 mcg/mL Final    Cefotetan Sensitive <=16 mcg/mL Final    Ceftazidime Sensitive <=1 mcg/mL Final    Ceftriaxone Sensitive <=8 mcg/mL Final    Cefuroxime Sensitive <=4 mcg/mL Final    Ciprofloxacin Resistant >2 mcg/mL Final    Ertapenem Sensitive <=1 mcg/mL Final    Gentamicin Sensitive <=4 mcg/mL Final    Pip/Tazobactam Sensitive <=16 mcg/mL Final    Tigecycline Sensitive <=2 mcg/mL Final    Tobramycin Sensitive <=4 mcg/mL Final    Trimeth/Sulfa Sensitive <=2/38 mcg/mL Final                       CULTURE WOUND W/ GRAM STAIN [[id number]]  (Abnormal) Collected:  03/15/18 1059    Order Status:  Completed Specimen:  Wound from Left Leg Updated:  03/17/18 1148     Significant Indicator POS (POS)     Source WND     Site LEFT LEG     Culture Result Wound -- (A)     Gram Stain Result Moderate WBCs.  Few Gram positive cocci.  Rare Gram positive rods.       Culture Result Wound Enterococcus faecalis  Light growth  Combination therapy with ampicillin, penicillin, or  vancomycin (for susceptible strains) plus an aminoglycoside  is usually indicated for serious enterococcal infections,  such as endocarditis unless high-level resistance to both  gentamicin and streptomycin is documented; such combinations  are predicted to result in synergistic killing of the  Enterococcus.  See previous culture for sensitivity report.   (A)      Escherichia coli  Light growth  See previous culture for sensitivity report.   (A)    Narrative:       Collected By:[id number] BRITT ALY  Collected from wound on distal BKA stump at 0940    GRAM STAIN [[id number]] Collected:  03/16/18 1535    Order Status:  Completed Specimen:  Wound Updated:  03/17/18 0826     Significant Indicator .     Source WND     Site left knee amputation wound     Gram Stain Result Moderate WBCs.  Rare Gram positive  "cocci.  Rare Gram negative rods.      GRAM STAIN [[id number]] Collected:  03/15/18 1059    Order Status:  Completed Specimen:  Wound Updated:  03/15/18 1623     Significant Indicator .     Source WND     Site LEFT LEG     Gram Stain Result Moderate WBCs.  Few Gram positive cocci.  Rare Gram positive rods.      Narrative:       Collected By:[id number] BRITT ALY  Collected from wound on distal BKA stump at 0940    BLOOD CULTURE [[id number]] Collected:  03/14/18 1500    Order Status:  Completed Specimen:  Blood from Peripheral Updated:  03/15/18 0647     Significant Indicator NEG     Source BLD     Site PERIPHERAL     Blood Culture No Growth    Note: Blood cultures are incubated for 5 days and  are monitored continuously.Positive blood cultures  are called to the RN and reported as soon as  they are identified.      Narrative:       Per Hospital Policy: Only change Specimen Src: to \"Line\" if  specified by physician order.    BLOOD CULTURE [[id number]] Collected:  03/14/18 1507    Order Status:  Completed Specimen:  Blood from Peripheral Updated:  03/15/18 0647     Significant Indicator NEG     Source BLD     Site PERIPHERAL     Blood Culture No Growth    Note: Blood cultures are incubated for 5 days and  are monitored continuously.Positive blood cultures  are called to the RN and reported as soon as  they are identified.      Narrative:       Per Hospital Policy: Only change Specimen Src: to \"Line\" if  specified by physician order.    Blood Culture [[id number]] Collected:  03/14/18 0000    Order Status:  Canceled Specimen:  Other from Peripheral     Blood Culture [[id number]] Collected:  03/14/18 0000    Order Status:  Canceled Specimen:  Other from Peripheral     Urinalysis [[id number]] Collected:  03/14/18 0000    Order Status:  Canceled Specimen:  Urine           Assessment:  Active Hospital Problems    Diagnosis   • *Abscess of left lower extremity [L02.416]   • Infection of amputation stump of left lower extremity " (CMS-[facility]) [T87.44]   • Chronic hyponatremia [E87.1]   • Hypertension [I10]   • Type 2 diabetes mellitus (CMS-HCC) [E11.9]   • Tobacco dependence [F17.200]   • PVD (peripheral vascular disease) (CMS-HCC) [I73.9]       Plan:  BKA infection  Afebrile  No leukocytosis  Status post I and D on 3/16/2018  Cultures are Enterococcus faecalis, B frag, and Escherichia coli  Continue Zosyn  Aim for at least 4 weeks of IV followed by PO Augmentin  PICC line ordered  Stop date IV 4/14/2018    Diabetes mellitus  Keep BS under 150 to help control current infection  Last glycohgb 11.2    Discussed with internal medicine.

## 2018-03-19 NOTE — PROGRESS NOTES
Orthopaedic PA Progress Note    Interval changes: did well overnight    ROS - Patient denies any new issues. No chest pain, dyspnea, or fever.  Pain well controlled.    Blood pressure 154/84, pulse 99, temperature 36.9 °C (98.4 °F), resp. rate 16, height 1.829 m (6'), weight 88.4 kg (194 lb 14.2 oz), SpO2 98 %.    Patient seen and examined  No acute distress  Breathing non labored  RRR  Dressing CDI, prox compartments soft warm and well perfused    Recent Labs      03/16/18   0532  03/17/18   0246  03/18/18   0505   WBC  6.8  9.5  7.5   RBC  4.73  4.83  4.69*   HEMOGLOBIN  12.7*  12.9*  12.7*   HEMATOCRIT  39.4*  39.7*  39.1*   MCV  83.3  82.2  83.4   MCH  26.8*  26.7*  27.1   MCHC  32.2*  32.5*  32.5*   RDW  43.5  43.6  43.8   PLATELETCT  441  487*  423   MPV  9.4  9.5  9.1       Active Hospital Problems    Diagnosis   • Infection of amputation stump of left lower extremity (CMS-HCC) [T87.44]     Priority: High   • Abscess of left lower extremity [L02.416]     Priority: High   • Chronic hyponatremia [E87.1]     Priority: Medium   • Hypertension [I10]     Priority: Medium   • Type 2 diabetes mellitus (CMS-HCC) [E11.9]     Priority: Medium   • Tobacco dependence [F17.200]   • PVD (peripheral vascular disease) (CMS-HCC) [I73.9]     Assessment/Plan:  POD#2 S/P Irrigation and debridement of left below-knee amputation abscess.  Wt bearing status - NWB LLE  PT/OT-initiated  Wound care:dressing change next 1-2 days  Drains - no  Thompson-no  Sutures/Staples out- 10-14 days post operatively  Antibiotics: per ID  DVT Prophylaxis- TEDS/SCDs/Foot pumps.   Case Coordination for Discharge Planning - Disposition pending

## 2018-03-19 NOTE — PROGRESS NOTES
PICC Insertion Procedure Note    Consents confirmed, vessel patency confirmed with ultrasound, real time ultrasound image printed and placed in patient chart. Risks and benefits of procedure explained to patient/family and education regarding central line associated bloodstream infections provided. Questions answered.     PICC placed in LUE per MD order with ultrasound guidance. 4 Fr, single lumen PICC placed in basilic vein after 1 attempt(s). 4 cc's of 1% lidocaine injected intradermally, 21 gauge microintroducer needle and modified Seldinger technique used. 42 cm total catheter length, 0 cm external measurement inserted with good blood return. Each lumen flushed without resistance with 10 mL 0.9% normal saline. PICC line secured with Biopatch and Tegaderm.    PICC tip location in the SVC confirmed by ECG technology. Pt tolerated procedure well.  Patient condition relayed to unit RN or ordering physician via this post procedure note in the EMR.     BARD Power PICC ref # [id number], Lot # [id number]    Note by AURA Singh for Joshua Nunes RN.

## 2018-03-19 NOTE — PROGRESS NOTES
[facility] Hospitalist Progress Note    Date of Service: 3/19/2018    Chief Complaint  57 y.o. male with a PMH DM type 2 and B/L BKA admitted 3/14/2018 with left stump infection    Interval Problem Update  3/15 Patients pain is well controlled. His sugars are well controlled. LPS has evaluated the patient and he is scheduled for I&D and stump revision tomorrow. MRI has been ordered to evaluate for abscess or osteomyelitis. Continue IV vancomycin and Unasyn.    3/16 No acute events overnight. Patient is planned for I&D per Ortho today. His sugars are better controlled. His wound culture is positive for group D enterococcus species and lactose fermenting gram negative rods.     3/17 POD 1 I&D of left BKA abscess. I have discussed the case with ID who will consult. Patiens glucose is elevated at night and low in the morning. He had an episode of hypoglycemia of 48 at 6 AM. I will change his lantus to 20 units BID.     3/18 patient seen sitting on chair doing well with no active complaints. His vitals are stable. His sugars are better controlled. Wound culture grows E. coli and E faecalis, sensitivities pending. Continue IV Zosyn. PT OT eval    3/19 Plan for 4 weeks of IV Zosyn, PICC line ordered. Pt had another episode of hypoglycemia this morning, his day time glucose range was elevated in the 300's. I will decrease his Night time Lantus dose to 10 units and increase morning dose to 20 units. His BP has been uncontrolled, will increase hydralazine to 50 mg TID.     Consultants/Specialty  LPS  ID  Ortho  Disposition  DC once medically cleared        Review of Systems   Constitutional: Positive for malaise/fatigue. Negative for chills and fever.   All other systems reviewed and are negative.     Physical Exam  Laboratory/Imaging   Hemodynamics  Temp (24hrs), Av.6 °C (97.9 °F), Min:36.3 °C (97.3 °F), Max:36.9 °C (98.4 °F)   Temperature: 36.8 °C (98.2 °F)  Pulse  Av.8  Min: 71  Max: 102    Blood Pressure: 130/60       Respiratory      Respiration: 16, Pulse Oximetry: 93 %             Fluids    Intake/Output Summary (Last 24 hours) at 03/19/18 0908  Last data filed at 03/19/18 0600   Gross per 24 hour   Intake              840 ml   Output             2600 ml   Net            -1760 ml       Nutrition  Orders Placed This Encounter   Procedures   • DIET ORDER     Standing Status:   Standing     Number of Occurrences:   1     Order Specific Question:   Diet:     Answer:   Diabetic [3]     Physical Exam   Constitutional: He is oriented to person, place, and time. He appears well-developed and well-nourished. No distress.   HENT:   Head: Normocephalic and atraumatic.   Mouth/Throat: Oropharynx is clear and moist.   Eyes: Conjunctivae are normal.   Neck: Neck supple.   Cardiovascular: Normal rate, regular rhythm and normal heart sounds.    Pulmonary/Chest: Effort normal and breath sounds normal. No respiratory distress. He has no wheezes. He has no rales.   Abdominal: Soft. Bowel sounds are normal. He exhibits no distension. There is no tenderness.   Musculoskeletal:   Bilateral BKA   Neurological: He is alert and oriented to person, place, and time. No cranial nerve deficit. Coordination normal.   Skin: Skin is warm.   L BKA with edema, tenderness and purulent drainage    Psychiatric: He has a normal mood and affect. His behavior is normal.   Nursing note and vitals reviewed.      Recent Labs      03/17/18   0246  03/18/18   0505  03/19/18   0353   WBC  9.5  7.5  9.1   RBC  4.83  4.69*  4.81   HEMOGLOBIN  12.9*  12.7*  12.7*   HEMATOCRIT  39.7*  39.1*  39.5*   MCV  82.2  83.4  82.1   MCH  26.7*  27.1  26.4*   MCHC  32.5*  32.5*  32.2*   RDW  43.6  43.8  43.9   PLATELETCT  487*  423  472*   MPV  9.5  9.1  9.3     Recent Labs      03/17/18   0246  03/18/18   0420  03/19/18   0353   SODIUM  132*  133*  133*   POTASSIUM  3.8  4.2  3.5*   CHLORIDE  105  104  102   CO2  20  22  23   GLUCOSE  54*  98  82   BUN  9  9  12   CREATININE  0.97   0.94  1.23   CALCIUM  8.8  9.3  9.2                      Assessment/Plan     * Abscess of left lower extremity- (present on admission)   Assessment & Plan    L BKA stump cellulitis and abscess  Continue abx per ID          Infection of amputation stump of left lower extremity (CMS-HCC)- (present on admission)   Assessment & Plan    With purulent cellulitis and concern for underlying osteomyelitis  Cultures revealed E faecalis and E. coli  Switch to IV Zosyn, stop date 4/14/2018  PICC line placed  ID and Ortho onboard  Follow cultures and sensitivities        Chronic hyponatremia- (present on admission)   Assessment & Plan    Stop HCTZ  Avoid free water        Hypertension- (present on admission)   Assessment & Plan    Continue home antihypertensives        Type 2 diabetes mellitus (CMS-HCC)- (present on admission)   Assessment & Plan    Episodes of morning hypoglycemia  Decrease Lantus to 10 units at night and continue Lantus at 20 units in the morning  Continue insulin sliding scale with serial Accu-Cheks  Continue home regimen of glipizide 10 mg twice a day  Hypoglycemic protocol in place        Tobacco dependence- (present on admission)   Assessment & Plan    Tobacco cessation education provided. Nicotine replacement protocol will be provided to the patient.              Quality-Core Measures   Reviewed items::  Labs reviewed, Medications reviewed and Radiology images reviewed  DVT prophylaxis pharmacological::  Enoxaparin (Lovenox)  Antibiotics:  Treating active infection/contamination beyond 24 hours perioperative coverage      Patient plan of care discussed at multidisplinary team rounds, with patient and R.N at beside.

## 2018-03-19 NOTE — PROGRESS NOTES
Orthopaedic PA Progress Note    Interval changes: Doing well, anticipating PICC, LPS on board, wound with scant bloody drainage    ROS - Patient denies any new issues. No chest pain, dyspnea, or fever.  Pain well controlled.    Blood pressure 130/60, pulse 97, temperature 36.8 °C (98.2 °F), resp. rate 16, height 1.829 m (6'), weight 88.4 kg (194 lb 14.2 oz), SpO2 93 %.    Patient seen and examined  No acute distress  Breathing non labored  RRR  Dressing changed. Incision well approximated with scant serosanguinous drainage.    Recent Labs      03/17/18   0246  03/18/18   0505  03/19/18   0353   WBC  9.5  7.5  9.1   RBC  4.83  4.69*  4.81   HEMOGLOBIN  12.9*  12.7*  12.7*   HEMATOCRIT  39.7*  39.1*  39.5*   MCV  82.2  83.4  82.1   MCH  26.7*  27.1  26.4*   MCHC  32.5*  32.5*  32.2*   RDW  43.6  43.8  43.9   PLATELETCT  487*  423  472*   MPV  9.5  9.1  9.3       Active Hospital Problems    Diagnosis   • Infection of amputation stump of left lower extremity (CMS-HCC) [T87.44]     Priority: High   • Abscess of left lower extremity [L02.416]     Priority: High   • Chronic hyponatremia [E87.1]     Priority: Medium   • Hypertension [I10]     Priority: Medium   • Type 2 diabetes mellitus (CMS-HCC) [E11.9]     Priority: Medium   • Tobacco dependence [F17.200]   • PVD (peripheral vascular disease) (CMS-HCC) [I73.9]       Assessment/Plan:  POD#2 S/P Irrigation and debridement of left below-knee amputation abscess.  Wt bearing status - NWB LLE  PT/OT-initiated  Wound care:dressing changed yesterday by LPS and today by me  Drains - no  Thompson-no  Sutures/Staples out- 10-14 days post operatively  Antibiotics: per ID  DVT Prophylaxis- TEDS/SCDs/Foot pumps.   Case Coordination for Discharge Planning - Disposition pending, will defer  to LPS team  Thank you for the opportunity to participate in this patient's care

## 2018-03-19 NOTE — THERAPY
PT discussed pt's current level of function with pt. He is currently able to transfer as he was prior to admit. He verbalized not having mobility concern at this time and does have necessary equipment. Pt does not require acute skilled PT intervention and order will be DC'd.

## 2018-03-20 LAB
ANION GAP SERPL CALC-SCNC: 5 MMOL/L (ref 0–11.9)
BASOPHILS # BLD AUTO: 0.4 % (ref 0–1.8)
BASOPHILS # BLD: 0.04 K/UL (ref 0–0.12)
BUN SERPL-MCNC: 12 MG/DL (ref 8–22)
CALCIUM SERPL-MCNC: 8.8 MG/DL (ref 8.5–10.5)
CHLORIDE SERPL-SCNC: 102 MMOL/L (ref 96–112)
CO2 SERPL-SCNC: 25 MMOL/L (ref 20–33)
CREAT SERPL-MCNC: 1.12 MG/DL (ref 0.5–1.4)
EOSINOPHIL # BLD AUTO: 0.27 K/UL (ref 0–0.51)
EOSINOPHIL NFR BLD: 2.8 % (ref 0–6.9)
ERYTHROCYTE [DISTWIDTH] IN BLOOD BY AUTOMATED COUNT: 44 FL (ref 35.9–50)
GLUCOSE BLD-MCNC: 181 MG/DL (ref 65–99)
GLUCOSE BLD-MCNC: 482 MG/DL (ref 65–99)
GLUCOSE SERPL-MCNC: 183 MG/DL (ref 65–99)
HCT VFR BLD AUTO: 38.5 % (ref 42–52)
HGB BLD-MCNC: 12.4 G/DL (ref 14–18)
IMM GRANULOCYTES # BLD AUTO: 0.03 K/UL (ref 0–0.11)
IMM GRANULOCYTES NFR BLD AUTO: 0.3 % (ref 0–0.9)
LYMPHOCYTES # BLD AUTO: 2.58 K/UL (ref 1–4.8)
LYMPHOCYTES NFR BLD: 26.6 % (ref 22–41)
MCH RBC QN AUTO: 26.7 PG (ref 27–33)
MCHC RBC AUTO-ENTMCNC: 32.2 G/DL (ref 33.7–35.3)
MCV RBC AUTO: 82.8 FL (ref 81.4–97.8)
MONOCYTES # BLD AUTO: 0.94 K/UL (ref 0–0.85)
MONOCYTES NFR BLD AUTO: 9.7 % (ref 0–13.4)
NEUTROPHILS # BLD AUTO: 5.84 K/UL (ref 1.82–7.42)
NEUTROPHILS NFR BLD: 60.2 % (ref 44–72)
NRBC # BLD AUTO: 0 K/UL
NRBC BLD-RTO: 0 /100 WBC
PLATELET # BLD AUTO: 406 K/UL (ref 164–446)
PMV BLD AUTO: 9.1 FL (ref 9–12.9)
POTASSIUM SERPL-SCNC: 4.2 MMOL/L (ref 3.6–5.5)
RBC # BLD AUTO: 4.65 M/UL (ref 4.7–6.1)
SODIUM SERPL-SCNC: 132 MMOL/L (ref 135–145)
WBC # BLD AUTO: 9.7 K/UL (ref 4.8–10.8)

## 2018-03-20 PROCEDURE — 700102 HCHG RX REV CODE 250 W/ 637 OVERRIDE(OP): Performed by: INTERNAL MEDICINE

## 2018-03-20 PROCEDURE — 700111 HCHG RX REV CODE 636 W/ 250 OVERRIDE (IP): Performed by: INTERNAL MEDICINE

## 2018-03-20 PROCEDURE — 700105 HCHG RX REV CODE 258: Performed by: INTERNAL MEDICINE

## 2018-03-20 PROCEDURE — 97165 OT EVAL LOW COMPLEX 30 MIN: CPT

## 2018-03-20 PROCEDURE — 80048 BASIC METABOLIC PNL TOTAL CA: CPT

## 2018-03-20 PROCEDURE — A9270 NON-COVERED ITEM OR SERVICE: HCPCS | Performed by: INTERNAL MEDICINE

## 2018-03-20 PROCEDURE — 99232 SBSQ HOSP IP/OBS MODERATE 35: CPT | Performed by: INTERNAL MEDICINE

## 2018-03-20 PROCEDURE — 82962 GLUCOSE BLOOD TEST: CPT | Mod: 91

## 2018-03-20 PROCEDURE — G8987 SELF CARE CURRENT STATUS: HCPCS | Mod: CI

## 2018-03-20 PROCEDURE — 770006 HCHG ROOM/CARE - MED/SURG/GYN SEMI*

## 2018-03-20 PROCEDURE — G8989 SELF CARE D/C STATUS: HCPCS | Mod: CI

## 2018-03-20 PROCEDURE — G8988 SELF CARE GOAL STATUS: HCPCS | Mod: CI

## 2018-03-20 PROCEDURE — 85025 COMPLETE CBC W/AUTO DIFF WBC: CPT

## 2018-03-20 RX ORDER — LISINOPRIL 5 MG/1
5 TABLET ORAL
Status: DISCONTINUED | OUTPATIENT
Start: 2018-03-20 | End: 2018-03-26 | Stop reason: HOSPADM

## 2018-03-20 RX ADMIN — FINASTERIDE 5 MG: 5 TABLET, FILM COATED ORAL at 08:58

## 2018-03-20 RX ADMIN — HYDRALAZINE HYDROCHLORIDE 50 MG: 50 TABLET, FILM COATED ORAL at 06:21

## 2018-03-20 RX ADMIN — LACTOBACILLUS ACIDOPHILUS / LACTOBACILLUS BULGARICUS 1 PACKET: 100 MILLION CFU STRENGTH GRANULES at 17:54

## 2018-03-20 RX ADMIN — CITALOPRAM HYDROBROMIDE 20 MG: 20 TABLET ORAL at 08:58

## 2018-03-20 RX ADMIN — HYDROMORPHONE HYDROCHLORIDE 0.5 MG: 10 INJECTION, SOLUTION INTRAMUSCULAR; INTRAVENOUS; SUBCUTANEOUS at 00:22

## 2018-03-20 RX ADMIN — HYDROMORPHONE HYDROCHLORIDE 0.5 MG: 10 INJECTION, SOLUTION INTRAMUSCULAR; INTRAVENOUS; SUBCUTANEOUS at 08:54

## 2018-03-20 RX ADMIN — BUDESONIDE AND FORMOTEROL FUMARATE DIHYDRATE 2 PUFF: 160; 4.5 AEROSOL RESPIRATORY (INHALATION) at 21:10

## 2018-03-20 RX ADMIN — HYDRALAZINE HYDROCHLORIDE 50 MG: 50 TABLET, FILM COATED ORAL at 14:52

## 2018-03-20 RX ADMIN — HYDRALAZINE HYDROCHLORIDE 50 MG: 50 TABLET, FILM COATED ORAL at 21:12

## 2018-03-20 RX ADMIN — LACTOBACILLUS ACIDOPHILUS / LACTOBACILLUS BULGARICUS 1 PACKET: 100 MILLION CFU STRENGTH GRANULES at 08:58

## 2018-03-20 RX ADMIN — ENOXAPARIN SODIUM 40 MG: 100 INJECTION SUBCUTANEOUS at 08:58

## 2018-03-20 RX ADMIN — INSULIN GLARGINE 20 UNITS: 100 INJECTION, SOLUTION SUBCUTANEOUS at 06:21

## 2018-03-20 RX ADMIN — INSULIN HUMAN 4 UNITS: 100 INJECTION, SOLUTION PARENTERAL at 17:58

## 2018-03-20 RX ADMIN — PREGABALIN 75 MG: 75 CAPSULE ORAL at 14:52

## 2018-03-20 RX ADMIN — PIPERACILLIN SODIUM AND TAZOBACTAM SODIUM 3.38 G: 3; .375 INJECTION, POWDER, FOR SOLUTION INTRAVENOUS at 21:12

## 2018-03-20 RX ADMIN — HYDROMORPHONE HYDROCHLORIDE 0.5 MG: 10 INJECTION, SOLUTION INTRAMUSCULAR; INTRAVENOUS; SUBCUTANEOUS at 12:42

## 2018-03-20 RX ADMIN — OXYCODONE HYDROCHLORIDE 10 MG: 10 TABLET ORAL at 06:21

## 2018-03-20 RX ADMIN — INSULIN HUMAN 1 UNITS: 100 INJECTION, SOLUTION PARENTERAL at 06:20

## 2018-03-20 RX ADMIN — BUDESONIDE AND FORMOTEROL FUMARATE DIHYDRATE 2 PUFF: 160; 4.5 AEROSOL RESPIRATORY (INHALATION) at 09:04

## 2018-03-20 RX ADMIN — PREGABALIN 75 MG: 75 CAPSULE ORAL at 06:21

## 2018-03-20 RX ADMIN — GLIPIZIDE 10 MG: 10 TABLET ORAL at 09:04

## 2018-03-20 RX ADMIN — ASPIRIN 81 MG: 81 TABLET, COATED ORAL at 08:58

## 2018-03-20 RX ADMIN — THERA TABS 1 TABLET: TAB at 08:58

## 2018-03-20 RX ADMIN — INSULIN GLARGINE 10 UNITS: 100 INJECTION, SOLUTION SUBCUTANEOUS at 21:08

## 2018-03-20 RX ADMIN — FAMOTIDINE 20 MG: 20 TABLET, FILM COATED ORAL at 21:11

## 2018-03-20 RX ADMIN — TAMSULOSIN HYDROCHLORIDE 0.4 MG: 0.4 CAPSULE ORAL at 08:58

## 2018-03-20 RX ADMIN — HYDROMORPHONE HYDROCHLORIDE 0.5 MG: 10 INJECTION, SOLUTION INTRAMUSCULAR; INTRAVENOUS; SUBCUTANEOUS at 16:22

## 2018-03-20 RX ADMIN — LISINOPRIL 5 MG: 5 TABLET ORAL at 16:22

## 2018-03-20 RX ADMIN — INSULIN HUMAN 6 UNITS: 100 INJECTION, SOLUTION PARENTERAL at 21:05

## 2018-03-20 RX ADMIN — NICOTINE 21 MG: 21 PATCH, EXTENDED RELEASE TRANSDERMAL at 06:21

## 2018-03-20 RX ADMIN — AMLODIPINE BESYLATE 10 MG: 10 TABLET ORAL at 08:58

## 2018-03-20 RX ADMIN — ZOLPIDEM TARTRATE 5 MG: 5 TABLET, FILM COATED ORAL at 21:12

## 2018-03-20 RX ADMIN — MIRTAZAPINE 45 MG: 15 TABLET, FILM COATED ORAL at 22:37

## 2018-03-20 RX ADMIN — LACTOBACILLUS ACIDOPHILUS / LACTOBACILLUS BULGARICUS 1 PACKET: 100 MILLION CFU STRENGTH GRANULES at 12:42

## 2018-03-20 RX ADMIN — INSULIN HUMAN 4 UNITS: 100 INJECTION, SOLUTION PARENTERAL at 12:32

## 2018-03-20 RX ADMIN — HYDROMORPHONE HYDROCHLORIDE 0.5 MG: 10 INJECTION, SOLUTION INTRAMUSCULAR; INTRAVENOUS; SUBCUTANEOUS at 21:14

## 2018-03-20 RX ADMIN — PIPERACILLIN SODIUM AND TAZOBACTAM SODIUM 3.38 G: 3; .375 INJECTION, POWDER, FOR SOLUTION INTRAVENOUS at 05:00

## 2018-03-20 RX ADMIN — PREGABALIN 75 MG: 75 CAPSULE ORAL at 21:11

## 2018-03-20 RX ADMIN — PIPERACILLIN SODIUM AND TAZOBACTAM SODIUM 3.38 G: 3; .375 INJECTION, POWDER, FOR SOLUTION INTRAVENOUS at 12:42

## 2018-03-20 RX ADMIN — GLIPIZIDE 10 MG: 10 TABLET ORAL at 18:00

## 2018-03-20 ASSESSMENT — COGNITIVE AND FUNCTIONAL STATUS - GENERAL
DAILY ACTIVITIY SCORE: 24
SUGGESTED CMS G CODE MODIFIER DAILY ACTIVITY: CH

## 2018-03-20 ASSESSMENT — ACTIVITIES OF DAILY LIVING (ADL): TOILETING: INDEPENDENT

## 2018-03-20 ASSESSMENT — ENCOUNTER SYMPTOMS
VOMITING: 0
NAUSEA: 0
SHORTNESS OF BREATH: 0
SPEECH CHANGE: 0
COUGH: 0
MYALGIAS: 1
ABDOMINAL PAIN: 0
CHILLS: 0
FEVER: 0

## 2018-03-20 ASSESSMENT — PAIN SCALES - GENERAL
PAINLEVEL_OUTOF10: 8
PAINLEVEL_OUTOF10: 9
PAINLEVEL_OUTOF10: 4
PAINLEVEL_OUTOF10: 8
PAINLEVEL_OUTOF10: 5
PAINLEVEL_OUTOF10: 9
PAINLEVEL_OUTOF10: 0
PAINLEVEL_OUTOF10: 6
PAINLEVEL_OUTOF10: 8

## 2018-03-20 NOTE — PROGRESS NOTES
Assume pt care. Pt a/o x3, VSS, No acute issues overnight. Pt rounds Q1-2hr with assessment of 4p's. IV assessment and care given. Pt education provided base on admission, care plan, current medications, and PRN. Pt has Left arm PICC for abx. S/p Left BKA with cellulitis, hx Right aka. InD done on LLE abcess 3/16. Pt is DM FS ac/hs req coverage and taking lantus.     ID - Aim for at least 4 weeks of IV followed by PO Augmentin. Stop date IV 4/14/2018    Disposition  DC once medically cleared

## 2018-03-20 NOTE — DISCHARGE PLANNING
Reviewed in IDT rounds. Pt with infection amp stump. Pt needs IV Zosyn q 8 hrs x 4 weeks till 4/14/18 and wound care. Plan for pt to dc to SNF for IV ABX and wound care. MD to add SNF order.

## 2018-03-20 NOTE — PROGRESS NOTES
[facility] Hospitalist Progress Note    Date of Service: 3/20/2018    Chief Complaint  57 y.o. male with a PMH DM type 2 and B/L BKA admitted 3/14/2018 with left stump infection    Interval Problem Update  Doing well; no hypoglycemic sx's; no pain or new c/o; wants to go home soon with home IV abx.    Consultants/Specialty  LPS  ID  Ortho  Disposition  home        Review of Systems   Constitutional: Negative for chills, fever and malaise/fatigue.   All other systems reviewed and are negative.     Physical Exam  Laboratory/Imaging   Hemodynamics  Temp (24hrs), Av.5 °C (97.7 °F), Min:36.3 °C (97.4 °F), Max:36.6 °C (97.9 °F)   Temperature: 36.3 °C (97.4 °F)  Pulse  Av.9  Min: 71  Max: 102    Blood Pressure: 141/83      Respiratory      Respiration: 16, Pulse Oximetry: 94 %        RUL Breath Sounds: Clear, RML Breath Sounds: Clear, RLL Breath Sounds: Diminished, ANNA Breath Sounds: Clear, LLL Breath Sounds: Diminished    Fluids    Intake/Output Summary (Last 24 hours) at 18 1555  Last data filed at 18 1300   Gross per 24 hour   Intake             1900 ml   Output             4350 ml   Net            -2450 ml       Nutrition  Orders Placed This Encounter   Procedures   • DIET ORDER     Standing Status:   Standing     Number of Occurrences:   1     Order Specific Question:   Diet:     Answer:   Diabetic [3]     Physical Exam   Constitutional: He is oriented to person, place, and time. He appears well-developed and well-nourished. No distress.   HENT:   Head: Normocephalic and atraumatic.   Mouth/Throat: Oropharynx is clear and moist.   Eyes: Conjunctivae are normal.   Neck: Neck supple.   Cardiovascular: Normal rate, regular rhythm and normal heart sounds.    Pulmonary/Chest: Effort normal and breath sounds normal. No respiratory distress. He has no wheezes. He has no rales.   Abdominal: Soft. Bowel sounds are normal. He exhibits no distension. There is no tenderness.   Musculoskeletal:   Bilateral BKA, L  BKA stump in dressing   Neurological: He is alert and oriented to person, place, and time. No cranial nerve deficit. Coordination normal.   Skin: Skin is warm.   Psychiatric: He has a normal mood and affect. His behavior is normal.   Nursing note and vitals reviewed.      Recent Labs      03/18/18   0505  03/19/18   0353  03/20/18   0636   WBC  7.5  9.1  9.7   RBC  4.69*  4.81  4.65*   HEMOGLOBIN  12.7*  12.7*  12.4*   HEMATOCRIT  39.1*  39.5*  38.5*   MCV  83.4  82.1  82.8   MCH  27.1  26.4*  26.7*   MCHC  32.5*  32.2*  32.2*   RDW  43.8  43.9  44.0   PLATELETCT  423  472*  406   MPV  9.1  9.3  9.1     Recent Labs      03/18/18   0420  03/19/18   0353  03/20/18   0636   SODIUM  133*  133*  132*   POTASSIUM  4.2  3.5*  4.2   CHLORIDE  104  102  102   CO2  22  23  25   GLUCOSE  98  82  183*   BUN  9  12  12   CREATININE  0.94  1.23  1.12   CALCIUM  9.3  9.2  8.8                      Assessment/Plan     * Abscess of left lower extremity- (present on admission)   Assessment & Plan    L BKA stump cellulitis and abscess s/p I+D; doing better  Continue abx per ID, Zosyn; patient has PICC and would like to go home soon and continue home IV abx.            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 Coli and B. fragilis  Switch to IV Zosyn, stop date 4/14/2018  PICC line placed  ID and Ortho onboard  Follow cultures and sensitivities        Chronic hyponatremia- (present on admission)   Assessment & Plan    HCTZ stopped.  Stable level; monitor          Hypertension- (present on admission)   Assessment & Plan    Continue home antihypertensives.  Add ACE-I in light of DM.        Type 2 diabetes mellitus (CMS-HCC)- (present on admission)   Assessment & Plan    Adjust insulin for good control        Tobacco dependence- (present on admission)   Assessment & Plan     Tobacco cessation education provided. Nicotine replacement protocol will be provided to the patient.              Quality-Core Measures   Reviewed items::  Labs reviewed, Medications reviewed and Radiology images reviewed  DVT prophylaxis pharmacological::  Enoxaparin (Lovenox)  Antibiotics:  Treating active infection/contamination beyond 24 hours perioperative coverage      Patient plan of care discussed at multidisplinary team rounds, with patient and R.N at [facility].

## 2018-03-20 NOTE — PROGRESS NOTES
Received report and assumed care at 0700 - Patient is AOx4, pleasant.  Sitting in wheelchair for breakfast.  Complains of 8/10 pain, medicated with PRN medications.  Tolerated all scheduled medications.  On RA.  Patient transfers to and from wheelchair independently, no bed alarm.  Bed locked in lowest position, call light within reach, all needs met at this time.

## 2018-03-20 NOTE — CARE PLAN
Problem: Infection  Goal: Will remain free from infection  Outcome: PROGRESSING AS EXPECTED  Antibiotics administered per MAR.  Handwashing performed before and after patient care.    Problem: Pain Management  Goal: Pain level will decrease to patient's comfort goal  Outcome: PROGRESSING AS EXPECTED  Patient reports pain 9/10.  Medicated with PRN medications per MAR.

## 2018-03-20 NOTE — THERAPY
"Occupational Therapy Evaluation completed.   Functional Status: Mod I supine > < EOB, mod I depression scoot to/from WC, sup depression scoot to/from toilet  Plan of Care: Patient with no further skilled OT needs in the acute care setting at this time  Discharge Recommendations:  Equipment: No Equipment Needed. Post-acute therapy Currently anticipate no further skilled therapy needs once patient is discharged from the inpatient setting.    See \"Rehab Therapy-Acute\" Patient Summary Report for complete documentation.    57 y.o. male with h/o DMII, HTN, R AKA, L BKA, with non-healing wound to L residual limb. Multiple recent admissions with HH f/u. Seen for OT eval. Pt completed bed mobility, WC transfer, toilet transfer. Pt performs depression scoot transfers with good buttock clearance. He functions well at WC level in this setting. He is interested in [facility]. Obtained application from  and returned to them completed. Pt has no further acute OT needs at this time.     "

## 2018-03-20 NOTE — PROGRESS NOTES
Infectious Disease Progress Note    Author: Shaista Jarvis M.D. Date & Time of service: 3/20/2018  12:10 PM    Chief Complaint:  Left BKA stump infection    Interval History:  3/18/2018- Tmax98.5 WBC 7.5 troponin 0.94  3/19 AF WBC 9.1 denies SE abx  3/20 AF got PICC-tolerating abx well  Labs Reviewed, Medications Reviewed, Radiology Reviewed and Wound Reviewed.    Review of Systems:  Review of Systems   Constitutional: Negative for fever.   HENT: Negative for hearing loss.    Respiratory: Negative for cough and shortness of breath.    Cardiovascular: Negative for chest pain.   Gastrointestinal: Negative for abdominal pain, nausea and vomiting.   Genitourinary: Negative for dysuria.   Musculoskeletal: Positive for myalgias.   Neurological: Negative for speech change.       Hemodynamics:  Temp (24hrs), Av.4 °C (97.6 °F), Min:36.2 °C (97.2 °F), Max:36.6 °C (97.9 °F)  Temperature: 36.5 °C (97.7 °F)  Pulse  Av.7  Min: 71  Max: 102   Blood Pressure: 139/82       Physical Exam:  Physical Exam   Constitutional: He is oriented to person, place, and time. He appears well-developed. No distress.   HENT:   Head: Normocephalic and atraumatic.   Mouth/Throat: No oropharyngeal exudate.   Eyes: EOM are normal. Pupils are equal, round, and reactive to light. No scleral icterus.   Neck: Neck supple.   Cardiovascular: Normal rate and regular rhythm.    No murmur heard.  Pulmonary/Chest: Effort normal. No respiratory distress.   Abdominal: Soft. He exhibits no distension.   Musculoskeletal: He exhibits no edema.   Right AKA  Left BKA sutures intact small dehiscence-no active drainage  LUE PICC   Neurological: He is alert and oriented to person, place, and time.   Skin: No rash noted.   Nursing note and vitals reviewed.      Meds:    Current Facility-Administered Medications:   •  insulin glargine  •  insulin glargine  •  hydrALAZINE  •  PICC Line Insertion has been implemented **AND** May use Lidocaine 1% not to exceed 3  mls for local at insertion site **AND** NOTIFY MD **AND** Tip to dwell in the superior vena cava **AND** Further evaluation of the PICC placement can be retrieved from X-Ray and Imaging **AND** Blood draws through PICC line; draws by RN only **AND** FLUSHING GUIDELINES WHEN IN USE **AND** normal saline PF **AND** FLUSHING GUIDELINES WHEN NOT IN USE **AND** DRESSING MAINTENANCE **AND** Change needleless pressure ports and IV tubing every 72 hours per hospital policy **AND** TUBING **AND** If there is an MD order to remove the PICC line, any RN may remove the PICC line **AND** [] PATIENT EDUCATION MATERIALS **AND** NURSING COMMUNICATION  •  glipiZIDE  •  [COMPLETED] piperacillin-tazobactam **AND** piperacillin-tazobactam  •  hydrALAZINE  •  labetalol  •  albuterol  •  amLODIPine  •  aspirin EC  •  budesonide-formoterol  •  citalopram  •  finasteride  •  mirtazapine  •  multivitamin  •  pregabalin  •  tamsulosin  •  senna-docusate **AND** polyethylene glycol/lytes **AND** magnesium hydroxide **AND** bisacodyl  •  enoxaparin  •  INITIATE NICOTINE REPLACEMENT PROTOCOL  **AND** nicotine **AND** Protocol 205 PATIENT EDUCATION MATERIALS **AND** Protocol 205 Rotate nicotine patch application sites daily  **AND** nicotine polacrilex  •  acetaminophen  •  Notify provider if pain remains uncontrolled **AND** Use the numeric rating scale (NRS-11) on regular floors and Critical-Care Pain Observation Tool (CPOT) on ICUs/Trauma to assess pain **AND** Pulse Ox (Oximetry) **AND** Pharmacy Consult Request **AND** If patient difficult to arouse and/or has respiratory depression, stop any opiates that are currently infusing and call a Rapid Response. **AND** oxyCODONE immediate-release **AND** oxyCODONE immediate-release **AND** HYDROmorphone  •  insulin regular **AND** Accu-Chek ACHS **AND** NOTIFY MD and PharmD **AND** glucose 4 g **AND** dextrose 50%  •  Respiratory Care per Protocol  •  zolpidem  •  lactobacillus granules  •   famotidine    Labs:  Recent Labs      03/18/18   0505  03/19/18   0353  03/20/18   0636   WBC  7.5  9.1  9.7   RBC  4.69*  4.81  4.65*   HEMOGLOBIN  12.7*  12.7*  12.4*   HEMATOCRIT  39.1*  39.5*  38.5*   MCV  83.4  82.1  82.8   MCH  27.1  26.4*  26.7*   RDW  43.8  43.9  44.0   PLATELETCT  423  472*  406   MPV  9.1  9.3  9.1   NEUTSPOLYS  52.40  63.40  60.20   LYMPHOCYTES  30.80  22.60  26.60   MONOCYTES  12.00  10.00  9.70   EOSINOPHILS  3.70  3.30  2.80   BASOPHILS  0.70  0.40  0.40     Recent Labs      03/18/18   0420  03/19/18   0353  03/20/18   0636   SODIUM  133*  133*  132*   POTASSIUM  4.2  3.5*  4.2   CHLORIDE  104  102  102   CO2  22  23  25   GLUCOSE  98  82  183*   BUN  9  12  12     Recent Labs      03/18/18   0420  03/19/18   0353  03/20/18   0636   CREATININE  0.94  1.23  1.12       Imaging:  Dx-chest-portable (1 View)    Result Date: 3/14/2018  3/14/2018 3:54 PM HISTORY/REASON FOR EXAM:  Sepsis. Shortness of breath. Open wound left leg. TECHNIQUE/EXAM DESCRIPTION AND NUMBER OF VIEWS: Single portable view of the chest. COMPARISON:  2/1/2018 FINDINGS: The cardiac silhouette is within normal limits. No infiltrates or consolidations are noted. No pleural effusion is noted.     No acute cardiac or pulmonary abnormality is noted.    Dx-femur-2+ Left    Result Date: 3/14/2018  3/14/2018 3:54 PM HISTORY/REASON FOR EXAM:  Open wound left amputation site with pain. TECHNIQUE/EXAM DESCRIPTION AND NUMBER OF VIEWS:  2 views of the LEFT femur. COMPARISON: 2/3/2018 FINDINGS: No femoral fracture or dislocation is present. There is no soft tissue mass identified. There is a left below-knee amputation with stable edematous appearance of the left lower extremity stump. No subcutaneous gas is identified. There is no plain film evidence of osteomyelitis.     1.  No acute left femoral fracture or dislocation. 2.  No evidence of left femoral or residual left tibia/fibula osteomyelitis.      Micro:  Results     Procedure  "Component Value Units Date/Time    BLOOD CULTURE [[id number]] Collected:  03/14/18 1507    Order Status:  Completed Specimen:  Blood from Peripheral Updated:  03/19/18 1700     Significant Indicator NEG     Source BLD     Site PERIPHERAL     Blood Culture No growth after 5 days of incubation.    Narrative:       Per Hospital Policy: Only change Specimen Src: to \"Line\" if  specified by physician order.    BLOOD CULTURE [[id number]] Collected:  03/14/18 1500    Order Status:  Completed Specimen:  Blood from Peripheral Updated:  03/19/18 1700     Significant Indicator NEG     Source BLD     Site PERIPHERAL     Blood Culture No growth after 5 days of incubation.    Narrative:       Per Hospital Policy: Only change Specimen Src: to \"Line\" if  specified by physician order.    CULTURE WOUND W/ GRAM STAIN [[id number]]  (Abnormal) Collected:  03/16/18 1535    Order Status:  Completed Specimen:  Wound Updated:  03/19/18 1029     Significant Indicator POS (POS)     Source WND     Site left knee amputation wound     Culture Result Wound -- (A)     Gram Stain Result Moderate WBCs.  Rare Gram positive cocci.  Rare Gram negative rods.       Culture Result Wound Escherichia coli  Light growth  See previous culture for sensitivity report.   (A)      Enterococcus faecalis  Light growth  See previous culture for sensitivity report.   (A)    ANAEROBIC CULTURE [[id number]]  (Abnormal) Collected:  03/16/18 1535    Order Status:  Completed Specimen:  Wound Updated:  03/19/18 1029     Significant Indicator POS (POS)     Source WND     Site left knee amputation wound     Anaerobic Culture, Culture Res Light growth Mixed anaerobic hugo including (A)      Bacteroides fragilis Group (A)    GRAM STAIN [[id number]] Collected:  03/14/18 1500    Order Status:  Completed Specimen:  Wound Updated:  03/17/18 1352     Significant Indicator .     Source WND     Site LEFT LEG     Gram Stain Result Moderate WBCs.  Rare Gram positive cocci.      CULTURE WOUND " W/ GRAM STAIN [[id number]]  (Abnormal)  (Susceptibility) Collected:  03/14/18 1500    Order Status:  Completed Specimen:  Wound from Left Leg Updated:  03/17/18 1351     Significant Indicator POS (POS)     Source WND     Site LEFT LEG     Culture Result Wound -- (A)     Gram Stain Result Moderate WBCs.  Rare Gram positive cocci.       Culture Result Wound Escherichia coli  Light growth   (A)      Enterococcus faecalis  Light growth  Combination therapy with ampicillin, penicillin, or  vancomycin (for susceptible strains) plus an aminoglycoside  is usually indicated for serious enterococcal infections,  such as endocarditis unless high-level resistance to both  gentamicin and streptomycin is documented; such combinations  are predicted to result in synergistic killing of the  Enterococcus.  The susceptibility profile for this organism indicates that  Streptomycin would not be an effective component of  combination therapy.  The susceptibility profile for this organism indicates that  Gentamycin would not be an effective component of combination  therapy.   (A)    Culture & Susceptibility     ENTEROCOCCUS FAECALIS     Antibiotic Sensitivity Microscan Unit Status    Ampicillin Sensitive <=2 mcg/mL Final    Daptomycin Sensitive 1 mcg/mL Final    Gent Synergy Resistant >500 mcg/mL Final    Penicillin Sensitive 8 mcg/mL Final    Strep Synergy Resistant >1000 mcg/mL Final    Vancomycin Sensitive 2 mcg/mL Final              ESCHERICHIA COLI     Antibiotic Sensitivity Microscan Unit Status    Ampicillin Resistant >16 mcg/mL Final    Ampicillin/sulbactam Resistant >16/8 mcg/mL Final    Cefepime Sensitive <=8 mcg/mL Final    Cefotaxime Sensitive <=2 mcg/mL Final    Cefotetan Sensitive <=16 mcg/mL Final    Ceftazidime Sensitive <=1 mcg/mL Final    Ceftriaxone Sensitive <=8 mcg/mL Final    Cefuroxime Sensitive <=4 mcg/mL Final    Ciprofloxacin Resistant >2 mcg/mL Final    Ertapenem Sensitive <=1 mcg/mL Final    Gentamicin  Sensitive <=4 mcg/mL Final    Pip/Tazobactam Sensitive <=16 mcg/mL Final    Tigecycline Sensitive <=2 mcg/mL Final    Tobramycin Sensitive <=4 mcg/mL Final    Trimeth/Sulfa Sensitive <=2/38 mcg/mL Final                       CULTURE WOUND W/ GRAM STAIN [[id number]]  (Abnormal) Collected:  03/15/18 1059    Order Status:  Completed Specimen:  Wound from Left Leg Updated:  03/17/18 1148     Significant Indicator POS (POS)     Source WND     Site LEFT LEG     Culture Result Wound -- (A)     Gram Stain Result Moderate WBCs.  Few Gram positive cocci.  Rare Gram positive rods.       Culture Result Wound Enterococcus faecalis  Light growth  Combination therapy with ampicillin, penicillin, or  vancomycin (for susceptible strains) plus an aminoglycoside  is usually indicated for serious enterococcal infections,  such as endocarditis unless high-level resistance to both  gentamicin and streptomycin is documented; such combinations  are predicted to result in synergistic killing of the  Enterococcus.  See previous culture for sensitivity report.   (A)      Escherichia coli  Light growth  See previous culture for sensitivity report.   (A)    Narrative:       Collected By:[id number] BRITT ALY  Collected from wound on distal BKA stump at 0940    GRAM STAIN [[id number]] Collected:  03/16/18 1535    Order Status:  Completed Specimen:  Wound Updated:  03/17/18 0826     Significant Indicator .     Source WND     Site left knee amputation wound     Gram Stain Result Moderate WBCs.  Rare Gram positive cocci.  Rare Gram negative rods.      GRAM STAIN [[id number]] Collected:  03/15/18 1059    Order Status:  Completed Specimen:  Wound Updated:  03/15/18 1623     Significant Indicator .     Source WND     Site LEFT LEG     Gram Stain Result Moderate WBCs.  Few Gram positive cocci.  Rare Gram positive rods.      Narrative:       Collected By:[id number] BRITT ALY  Collected from wound on distal BKA stump at 0940    Blood Culture  [[id number]] Collected:  03/14/18 0000    Order Status:  Canceled Specimen:  Other from Peripheral     Blood Culture [[id number]] Collected:  03/14/18 0000    Order Status:  Canceled Specimen:  Other from Peripheral     Urinalysis [[id number]] Collected:  03/14/18 0000    Order Status:  Canceled Specimen:  Urine           Assessment:  Active Hospital Problems    Diagnosis   • *Abscess of left lower extremity [L02.416]   • Infection of amputation stump of left lower extremity (CMS-HCC) [T87.44]   • Chronic hyponatremia [E87.1]   • Hypertension [I10]   • Type 2 diabetes mellitus (CMS-HCC) [E11.9]   • Tobacco dependence [F17.200]   • PVD (peripheral vascular disease) (CMS-HCC) [I73.9]       Plan:  BKA infection  Afebrile  No leukocytosis  Status post I and D on 3/16/2018  Cultures are Enterococcus faecalis, B frag, and Escherichia coli  Continue Zosyn  Aim for at least 4 weeks of IV followed by PO Augmentin  PICC line done  Stop date IV 4/14/2018    Diabetes mellitus  Keep BS under 150 to help control current infection  Last glycohgb 11.2    Discussed with RN

## 2018-03-21 LAB
GLUCOSE BLD-MCNC: 187 MG/DL (ref 65–99)
GLUCOSE BLD-MCNC: 258 MG/DL (ref 65–99)
GLUCOSE BLD-MCNC: 335 MG/DL (ref 65–99)
GLUCOSE BLD-MCNC: 337 MG/DL (ref 65–99)
GLUCOSE BLD-MCNC: 441 MG/DL (ref 65–99)

## 2018-03-21 PROCEDURE — 700111 HCHG RX REV CODE 636 W/ 250 OVERRIDE (IP): Performed by: INTERNAL MEDICINE

## 2018-03-21 PROCEDURE — 700102 HCHG RX REV CODE 250 W/ 637 OVERRIDE(OP): Performed by: INTERNAL MEDICINE

## 2018-03-21 PROCEDURE — 770006 HCHG ROOM/CARE - MED/SURG/GYN SEMI*

## 2018-03-21 PROCEDURE — A9270 NON-COVERED ITEM OR SERVICE: HCPCS | Performed by: INTERNAL MEDICINE

## 2018-03-21 PROCEDURE — 700105 HCHG RX REV CODE 258: Performed by: INTERNAL MEDICINE

## 2018-03-21 PROCEDURE — 82962 GLUCOSE BLOOD TEST: CPT | Mod: 91

## 2018-03-21 PROCEDURE — 99232 SBSQ HOSP IP/OBS MODERATE 35: CPT | Performed by: INTERNAL MEDICINE

## 2018-03-21 RX ORDER — INSULIN GLARGINE 100 [IU]/ML
30 INJECTION, SOLUTION SUBCUTANEOUS
Status: DISCONTINUED | OUTPATIENT
Start: 2018-03-22 | End: 2018-03-26 | Stop reason: HOSPADM

## 2018-03-21 RX ORDER — INSULIN GLARGINE 100 [IU]/ML
10 INJECTION, SOLUTION SUBCUTANEOUS ONCE
Status: COMPLETED | OUTPATIENT
Start: 2018-03-21 | End: 2018-03-21

## 2018-03-21 RX ORDER — INSULIN GLARGINE 100 [IU]/ML
15 INJECTION, SOLUTION SUBCUTANEOUS EVERY EVENING
Status: DISCONTINUED | OUTPATIENT
Start: 2018-03-21 | End: 2018-03-26 | Stop reason: HOSPADM

## 2018-03-21 RX ADMIN — INSULIN HUMAN 3 UNITS: 100 INJECTION, SOLUTION PARENTERAL at 05:19

## 2018-03-21 RX ADMIN — INSULIN GLARGINE 15 UNITS: 100 INJECTION, SOLUTION SUBCUTANEOUS at 21:52

## 2018-03-21 RX ADMIN — GLIPIZIDE 10 MG: 10 TABLET ORAL at 17:20

## 2018-03-21 RX ADMIN — INSULIN GLARGINE 20 UNITS: 100 INJECTION, SOLUTION SUBCUTANEOUS at 05:17

## 2018-03-21 RX ADMIN — ENOXAPARIN SODIUM 40 MG: 100 INJECTION SUBCUTANEOUS at 09:25

## 2018-03-21 RX ADMIN — PREGABALIN 75 MG: 75 CAPSULE ORAL at 14:44

## 2018-03-21 RX ADMIN — TAMSULOSIN HYDROCHLORIDE 0.4 MG: 0.4 CAPSULE ORAL at 09:25

## 2018-03-21 RX ADMIN — BUDESONIDE AND FORMOTEROL FUMARATE DIHYDRATE 2 PUFF: 160; 4.5 AEROSOL RESPIRATORY (INHALATION) at 21:58

## 2018-03-21 RX ADMIN — HYDROMORPHONE HYDROCHLORIDE 0.5 MG: 10 INJECTION, SOLUTION INTRAMUSCULAR; INTRAVENOUS; SUBCUTANEOUS at 14:48

## 2018-03-21 RX ADMIN — NICOTINE 21 MG: 21 PATCH, EXTENDED RELEASE TRANSDERMAL at 05:22

## 2018-03-21 RX ADMIN — FAMOTIDINE 20 MG: 20 TABLET, FILM COATED ORAL at 21:57

## 2018-03-21 RX ADMIN — BUDESONIDE AND FORMOTEROL FUMARATE DIHYDRATE 2 PUFF: 160; 4.5 AEROSOL RESPIRATORY (INHALATION) at 09:26

## 2018-03-21 RX ADMIN — HYDROMORPHONE HYDROCHLORIDE 0.5 MG: 10 INJECTION, SOLUTION INTRAMUSCULAR; INTRAVENOUS; SUBCUTANEOUS at 11:32

## 2018-03-21 RX ADMIN — HYDROMORPHONE HYDROCHLORIDE 0.5 MG: 10 INJECTION, SOLUTION INTRAMUSCULAR; INTRAVENOUS; SUBCUTANEOUS at 05:21

## 2018-03-21 RX ADMIN — LISINOPRIL 5 MG: 5 TABLET ORAL at 09:25

## 2018-03-21 RX ADMIN — INSULIN HUMAN 2 UNITS: 100 INJECTION, SOLUTION PARENTERAL at 17:25

## 2018-03-21 RX ADMIN — CITALOPRAM HYDROBROMIDE 20 MG: 20 TABLET ORAL at 09:25

## 2018-03-21 RX ADMIN — OXYCODONE HYDROCHLORIDE 10 MG: 10 TABLET ORAL at 09:33

## 2018-03-21 RX ADMIN — LACTOBACILLUS ACIDOPHILUS / LACTOBACILLUS BULGARICUS 1 PACKET: 100 MILLION CFU STRENGTH GRANULES at 12:26

## 2018-03-21 RX ADMIN — GLIPIZIDE 10 MG: 10 TABLET ORAL at 09:24

## 2018-03-21 RX ADMIN — HYDRALAZINE HYDROCHLORIDE 50 MG: 50 TABLET, FILM COATED ORAL at 21:56

## 2018-03-21 RX ADMIN — PREGABALIN 75 MG: 75 CAPSULE ORAL at 21:57

## 2018-03-21 RX ADMIN — MIRTAZAPINE 45 MG: 15 TABLET, FILM COATED ORAL at 21:56

## 2018-03-21 RX ADMIN — HYDROMORPHONE HYDROCHLORIDE 0.5 MG: 10 INJECTION, SOLUTION INTRAMUSCULAR; INTRAVENOUS; SUBCUTANEOUS at 21:56

## 2018-03-21 RX ADMIN — HYDRALAZINE HYDROCHLORIDE 50 MG: 50 TABLET, FILM COATED ORAL at 14:44

## 2018-03-21 RX ADMIN — ASPIRIN 81 MG: 81 TABLET, COATED ORAL at 09:25

## 2018-03-21 RX ADMIN — THERA TABS 1 TABLET: TAB at 09:25

## 2018-03-21 RX ADMIN — FINASTERIDE 5 MG: 5 TABLET, FILM COATED ORAL at 09:25

## 2018-03-21 RX ADMIN — INSULIN GLARGINE 10 UNITS: 100 INJECTION, SOLUTION SUBCUTANEOUS at 12:29

## 2018-03-21 RX ADMIN — LACTOBACILLUS ACIDOPHILUS / LACTOBACILLUS BULGARICUS 1 PACKET: 100 MILLION CFU STRENGTH GRANULES at 09:24

## 2018-03-21 RX ADMIN — PIPERACILLIN SODIUM AND TAZOBACTAM SODIUM 3.38 G: 3; .375 INJECTION, POWDER, FOR SOLUTION INTRAVENOUS at 21:59

## 2018-03-21 RX ADMIN — LACTOBACILLUS ACIDOPHILUS / LACTOBACILLUS BULGARICUS 1 PACKET: 100 MILLION CFU STRENGTH GRANULES at 17:20

## 2018-03-21 RX ADMIN — PIPERACILLIN SODIUM AND TAZOBACTAM SODIUM 3.38 G: 3; .375 INJECTION, POWDER, FOR SOLUTION INTRAVENOUS at 12:41

## 2018-03-21 RX ADMIN — PREGABALIN 75 MG: 75 CAPSULE ORAL at 05:21

## 2018-03-21 RX ADMIN — PIPERACILLIN SODIUM AND TAZOBACTAM SODIUM 3.38 G: 3; .375 INJECTION, POWDER, FOR SOLUTION INTRAVENOUS at 05:22

## 2018-03-21 RX ADMIN — ZOLPIDEM TARTRATE 5 MG: 5 TABLET, FILM COATED ORAL at 21:57

## 2018-03-21 RX ADMIN — INSULIN HUMAN 6 UNITS: 100 INJECTION, SOLUTION PARENTERAL at 12:31

## 2018-03-21 RX ADMIN — AMLODIPINE BESYLATE 10 MG: 10 TABLET ORAL at 09:25

## 2018-03-21 RX ADMIN — HYDRALAZINE HYDROCHLORIDE 50 MG: 50 TABLET, FILM COATED ORAL at 05:21

## 2018-03-21 RX ADMIN — HYDROMORPHONE HYDROCHLORIDE 0.5 MG: 10 INJECTION, SOLUTION INTRAMUSCULAR; INTRAVENOUS; SUBCUTANEOUS at 18:03

## 2018-03-21 ASSESSMENT — PAIN SCALES - GENERAL
PAINLEVEL_OUTOF10: 8
PAINLEVEL_OUTOF10: 4
PAINLEVEL_OUTOF10: 5
PAINLEVEL_OUTOF10: 8

## 2018-03-21 ASSESSMENT — ENCOUNTER SYMPTOMS
FEVER: 0
SHORTNESS OF BREATH: 0
CHILLS: 0
VOMITING: 0
NAUSEA: 0
ABDOMINAL PAIN: 0
MYALGIAS: 1
SPEECH CHANGE: 0
COUGH: 0

## 2018-03-21 NOTE — CARE PLAN
Problem: Communication  Goal: The ability to communicate needs accurately and effectively will improve  Outcome: PROGRESSING AS EXPECTED  Pt is able to communicate needs accurately during dressing change. Pt was able to communicate verbally and demonstrate proper dressing change protocol.     Problem: Infection  Goal: Will remain free from infection  Outcome: PROGRESSING AS EXPECTED  Antibiotics administered per MAR.

## 2018-03-21 NOTE — FACE TO FACE
Face to Face Supporting Documentation - Home Health    The encounter with this patient was in whole or in part the primary reason for home health admission.    Date of encounter:   Patient:                    MRN:                       YOB: 2019  Jv Angelo  [id number]  1960     Home health to see patient for:  Skilled Nursing care for assessment, interventions & education    Skilled need for:  New Onset Medical Diagnosis BKA osteomyelitis    Skilled nursing interventions to include:  Home IV infusion therapy    Homebound status evidenced by:  Need the aid of supportive devices such as crutches, canes, wheelchairs or walkers. Leaving home requires a considerable and taxing effort. There is a normal inability to leave the home.    Community Physician to provide follow up care: Pcp Not In Computer     Optional Interventions? No      I certify the face to face encounter for this home health care referral meets the CMS requirements and the encounter/clinical assessment with the patient was, in whole, or in part, for the medical condition(s) listed above, which is the primary reason for home health care. Based on my clinical findings: the service(s) are medically necessary, support the need for home health care, and the homebound criteria are met.  I certify that this patient has had a face to face encounter by myself.  Reggie Deluca M.D. - NPI: [id number]

## 2018-03-21 NOTE — PROGRESS NOTES
Received bedside report and accepted care of patient. Patient currently resting in bed in no visible or stated signs of distress. Bed controls on and bed in locked and lowest position. Call light and personal possessions within reach. Patient educated about use of call light and verbalized understanding.     Patient refusing bed alarm despite education on fall risk and risk for injury. Patient verbalizes understanding of risks to his person. Agrees to use call light when in need of assistance at this time.

## 2018-03-21 NOTE — PROGRESS NOTES
Infectious Disease Progress Note    Author: Shaista Jarvis M.D. Date & Time of service: 3/21/2018  2:39 PM    Chief Complaint:  Left BKA stump infection    Interval History:  3/18/2018- Tmax98.5 WBC 7.5 troponin 0.94  3/19 AF WBC 9.1 denies SE abx  3/20 AF got PICC-tolerating abx well  3/21 AF WBC 9.7 tolerating abx well  Labs Reviewed, Medications Reviewed, Radiology Reviewed and Wound Reviewed.    Review of Systems:  Review of Systems   Constitutional: Negative for fever.   HENT: Negative for hearing loss.    Respiratory: Negative for cough and shortness of breath.    Cardiovascular: Negative for chest pain.   Gastrointestinal: Negative for abdominal pain, nausea and vomiting.   Genitourinary: Negative for dysuria.   Musculoskeletal: Positive for myalgias.   Neurological: Negative for speech change.       Hemodynamics:  Temp (24hrs), Av.7 °C (98.1 °F), Min:36.3 °C (97.4 °F), Max:37.1 °C (98.8 °F)  Temperature: 37.1 °C (98.8 °F)  Pulse  Av  Min: 71  Max: 102   Blood Pressure: 131/65       Physical Exam:  Physical Exam   Constitutional: He is oriented to person, place, and time. He appears well-developed. No distress.   HENT:   Head: Normocephalic and atraumatic.   Mouth/Throat: No oropharyngeal exudate.   Eyes: EOM are normal. Pupils are equal, round, and reactive to light. No scleral icterus.   Neck: Neck supple.   Cardiovascular: Normal rate and regular rhythm.    No murmur heard.  Pulmonary/Chest: Effort normal. No respiratory distress.   Abdominal: Soft. He exhibits no distension.   Musculoskeletal: He exhibits no edema.   Right AKA  Left BKA sutures intact small dehiscence-no active drainage  LUE PICC   Neurological: He is alert and oriented to person, place, and time.   Skin: No rash noted.   Nursing note and vitals reviewed.      Meds:    Current Facility-Administered Medications:   •  [START ON 3/22/2018] insulin glargine  •  insulin glargine  •  lisinopril  •  hydrALAZINE  •  PICC Line  Insertion has been implemented **AND** May use Lidocaine 1% not to exceed 3 mls for local at insertion site **AND** NOTIFY MD **AND** Tip to dwell in the superior vena cava **AND** Further evaluation of the PICC placement can be retrieved from X-Ray and Imaging **AND** Blood draws through PICC line; draws by RN only **AND** FLUSHING GUIDELINES WHEN IN USE **AND** normal saline PF **AND** FLUSHING GUIDELINES WHEN NOT IN USE **AND** DRESSING MAINTENANCE **AND** Change needleless pressure ports and IV tubing every 72 hours per hospital policy **AND** TUBING **AND** If there is an MD order to remove the PICC line, any RN may remove the PICC line **AND** [] PATIENT EDUCATION MATERIALS **AND** NURSING COMMUNICATION  •  glipiZIDE  •  [COMPLETED] piperacillin-tazobactam **AND** piperacillin-tazobactam  •  hydrALAZINE  •  labetalol  •  albuterol  •  amLODIPine  •  aspirin EC  •  budesonide-formoterol  •  citalopram  •  finasteride  •  mirtazapine  •  multivitamin  •  pregabalin  •  tamsulosin  •  senna-docusate **AND** polyethylene glycol/lytes **AND** magnesium hydroxide **AND** bisacodyl  •  enoxaparin  •  INITIATE NICOTINE REPLACEMENT PROTOCOL  **AND** nicotine **AND** Protocol 205 PATIENT EDUCATION MATERIALS **AND** Protocol 205 Rotate nicotine patch application sites daily  **AND** nicotine polacrilex  •  acetaminophen  •  Notify provider if pain remains uncontrolled **AND** Use the numeric rating scale (NRS-11) on regular floors and Critical-Care Pain Observation Tool (CPOT) on ICUs/Trauma to assess pain **AND** Pulse Ox (Oximetry) **AND** Pharmacy Consult Request **AND** If patient difficult to arouse and/or has respiratory depression, stop any opiates that are currently infusing and call a Rapid Response. **AND** oxyCODONE immediate-release **AND** oxyCODONE immediate-release **AND** HYDROmorphone  •  insulin regular **AND** Accu-Chek ACHS **AND** NOTIFY MD and PharmD **AND** glucose 4 g **AND** dextrose  50%  •  Respiratory Care per Protocol  •  zolpidem  •  lactobacillus granules  •  famotidine    Labs:  Recent Labs      03/19/18   0353  03/20/18   0636   WBC  9.1  9.7   RBC  4.81  4.65*   HEMOGLOBIN  12.7*  12.4*   HEMATOCRIT  39.5*  38.5*   MCV  82.1  82.8   MCH  26.4*  26.7*   RDW  43.9  44.0   PLATELETCT  472*  406   MPV  9.3  9.1   NEUTSPOLYS  63.40  60.20   LYMPHOCYTES  22.60  26.60   MONOCYTES  10.00  9.70   EOSINOPHILS  3.30  2.80   BASOPHILS  0.40  0.40     Recent Labs      03/19/18   0353  03/20/18   0636   SODIUM  133*  132*   POTASSIUM  3.5*  4.2   CHLORIDE  102  102   CO2  23  25   GLUCOSE  82  183*   BUN  12  12     Recent Labs      03/19/18   0353  03/20/18   0636   CREATININE  1.23  1.12       Imaging:  Dx-chest-portable (1 View)    Result Date: 3/14/2018  3/14/2018 3:54 PM HISTORY/REASON FOR EXAM:  Sepsis. Shortness of breath. Open wound left leg. TECHNIQUE/EXAM DESCRIPTION AND NUMBER OF VIEWS: Single portable view of the chest. COMPARISON:  2/1/2018 FINDINGS: The cardiac silhouette is within normal limits. No infiltrates or consolidations are noted. No pleural effusion is noted.     No acute cardiac or pulmonary abnormality is noted.    Dx-femur-2+ Left    Result Date: 3/14/2018  3/14/2018 3:54 PM HISTORY/REASON FOR EXAM:  Open wound left amputation site with pain. TECHNIQUE/EXAM DESCRIPTION AND NUMBER OF VIEWS:  2 views of the LEFT femur. COMPARISON: 2/3/2018 FINDINGS: No femoral fracture or dislocation is present. There is no soft tissue mass identified. There is a left below-knee amputation with stable edematous appearance of the left lower extremity stump. No subcutaneous gas is identified. There is no plain film evidence of osteomyelitis.     1.  No acute left femoral fracture or dislocation. 2.  No evidence of left femoral or residual left tibia/fibula osteomyelitis.      Micro:  Results     Procedure Component Value Units Date/Time    BLOOD CULTURE [[id number]] Collected:  03/14/18 8328     "Order Status:  Completed Specimen:  Blood from Peripheral Updated:  03/19/18 1700     Significant Indicator NEG     Source BLD     Site PERIPHERAL     Blood Culture No growth after 5 days of incubation.    Narrative:       Per Hospital Policy: Only change Specimen Src: to \"Line\" if  specified by physician order.    BLOOD CULTURE [[id number]] Collected:  03/14/18 1500    Order Status:  Completed Specimen:  Blood from Peripheral Updated:  03/19/18 1700     Significant Indicator NEG     Source BLD     Site PERIPHERAL     Blood Culture No growth after 5 days of incubation.    Narrative:       Per Hospital Policy: Only change Specimen Src: to \"Line\" if  specified by physician order.    CULTURE WOUND W/ GRAM STAIN [[id number]]  (Abnormal) Collected:  03/16/18 1535    Order Status:  Completed Specimen:  Wound Updated:  03/19/18 1029     Significant Indicator POS (POS)     Source WND     Site left knee amputation wound     Culture Result Wound -- (A)     Gram Stain Result Moderate WBCs.  Rare Gram positive cocci.  Rare Gram negative rods.       Culture Result Wound Escherichia coli  Light growth  See previous culture for sensitivity report.   (A)      Enterococcus faecalis  Light growth  See previous culture for sensitivity report.   (A)    ANAEROBIC CULTURE [[id number]]  (Abnormal) Collected:  03/16/18 1535    Order Status:  Completed Specimen:  Wound Updated:  03/19/18 1029     Significant Indicator POS (POS)     Source WND     Site left knee amputation wound     Anaerobic Culture, Culture Res Light growth Mixed anaerobic hugo including (A)      Bacteroides fragilis Group (A)    GRAM STAIN [[id number]] Collected:  03/14/18 1500    Order Status:  Completed Specimen:  Wound Updated:  03/17/18 1352     Significant Indicator .     Source WND     Site LEFT LEG     Gram Stain Result Moderate WBCs.  Rare Gram positive cocci.      CULTURE WOUND W/ GRAM STAIN [[id number]]  (Abnormal)  (Susceptibility) Collected:  03/14/18 1500    " Order Status:  Completed Specimen:  Wound from Left Leg Updated:  03/17/18 4204     Significant Indicator POS (POS)     Source WND     Site LEFT LEG     Culture Result Wound -- (A)     Gram Stain Result Moderate WBCs.  Rare Gram positive cocci.       Culture Result Wound Escherichia coli  Light growth   (A)      Enterococcus faecalis  Light growth  Combination therapy with ampicillin, penicillin, or  vancomycin (for susceptible strains) plus an aminoglycoside  is usually indicated for serious enterococcal infections,  such as endocarditis unless high-level resistance to both  gentamicin and streptomycin is documented; such combinations  are predicted to result in synergistic killing of the  Enterococcus.  The susceptibility profile for this organism indicates that  Streptomycin would not be an effective component of  combination therapy.  The susceptibility profile for this organism indicates that  Gentamycin would not be an effective component of combination  therapy.   (A)    Culture & Susceptibility     ENTEROCOCCUS FAECALIS     Antibiotic Sensitivity Microscan Unit Status    Ampicillin Sensitive <=2 mcg/mL Final    Daptomycin Sensitive 1 mcg/mL Final    Gent Synergy Resistant >500 mcg/mL Final    Penicillin Sensitive 8 mcg/mL Final    Strep Synergy Resistant >1000 mcg/mL Final    Vancomycin Sensitive 2 mcg/mL Final              ESCHERICHIA COLI     Antibiotic Sensitivity Microscan Unit Status    Ampicillin Resistant >16 mcg/mL Final    Ampicillin/sulbactam Resistant >16/8 mcg/mL Final    Cefepime Sensitive <=8 mcg/mL Final    Cefotaxime Sensitive <=2 mcg/mL Final    Cefotetan Sensitive <=16 mcg/mL Final    Ceftazidime Sensitive <=1 mcg/mL Final    Ceftriaxone Sensitive <=8 mcg/mL Final    Cefuroxime Sensitive <=4 mcg/mL Final    Ciprofloxacin Resistant >2 mcg/mL Final    Ertapenem Sensitive <=1 mcg/mL Final    Gentamicin Sensitive <=4 mcg/mL Final    Pip/Tazobactam Sensitive <=16 mcg/mL Final    Tigecycline  Sensitive <=2 mcg/mL Final    Tobramycin Sensitive <=4 mcg/mL Final    Trimeth/Sulfa Sensitive <=2/38 mcg/mL Final                       CULTURE WOUND W/ GRAM STAIN [[id number]]  (Abnormal) Collected:  03/15/18 1059    Order Status:  Completed Specimen:  Wound from Left Leg Updated:  03/17/18 1148     Significant Indicator POS (POS)     Source WND     Site LEFT LEG     Culture Result Wound -- (A)     Gram Stain Result Moderate WBCs.  Few Gram positive cocci.  Rare Gram positive rods.       Culture Result Wound Enterococcus faecalis  Light growth  Combination therapy with ampicillin, penicillin, or  vancomycin (for susceptible strains) plus an aminoglycoside  is usually indicated for serious enterococcal infections,  such as endocarditis unless high-level resistance to both  gentamicin and streptomycin is documented; such combinations  are predicted to result in synergistic killing of the  Enterococcus.  See previous culture for sensitivity report.   (A)      Escherichia coli  Light growth  See previous culture for sensitivity report.   (A)    Narrative:       Collected By:[id number] BRITT ALY  Collected from wound on distal BKA stump at 0940    GRAM STAIN [[id number]] Collected:  03/16/18 1535    Order Status:  Completed Specimen:  Wound Updated:  03/17/18 0826     Significant Indicator .     Source WND     Site left knee amputation wound     Gram Stain Result Moderate WBCs.  Rare Gram positive cocci.  Rare Gram negative rods.      GRAM STAIN [[id number]] Collected:  03/15/18 1059    Order Status:  Completed Specimen:  Wound Updated:  03/15/18 1623     Significant Indicator .     Source WND     Site LEFT LEG     Gram Stain Result Moderate WBCs.  Few Gram positive cocci.  Rare Gram positive rods.      Narrative:       Collected By:[id number] BRITT ALY  Collected from wound on distal BKA stump at 0940          Assessment:  Active Hospital Problems    Diagnosis   • *Abscess of left lower extremity [L02.416]   •  Infection of amputation stump of left lower extremity (CMS-[facility]) [T87.44]   • Chronic hyponatremia [E87.1]   • Hypertension [I10]   • Type 2 diabetes mellitus (CMS-[facility]) [E11.9]   • Tobacco dependence [F17.200]   • PVD (peripheral vascular disease) (CMS-HCC) [I73.9]       Plan:  BKA infection  Afebrile  No leukocytosis  Status post I and D on 3/16/2018  Cultures are Enterococcus faecalis, B frag, and Escherichia coli  Continue Zosyn  Aim for at least 4 weeks of IV followed by PO Augmentin  PICC line done  Stop date IV 4/14/2018  If plan for home infusion-dapto/ertapenem ordered    Diabetes mellitus  Keep BS under 150 to help control current infection  Last glycohgb 11.2    PVD  Will impair wound healing

## 2018-03-21 NOTE — PROGRESS NOTES
[facility]    Date of Service: 3/21/2018    Chief Complaint  57 y.o. male with a PMH DM type 2 and B/L BKA admitted 3/14/2018 with left stump infection    Interval Problem Update  Feels fine; no new c/o;  Consultants/Specialty  LPS  ID  Ortho  Disposition  home        Review of Systems   Constitutional: Negative for chills, fever and malaise/fatigue.   All other systems reviewed and are negative.     Physical Exam  Laboratory/Imaging   Hemodynamics  Temp (24hrs), Av.8 °C (98.3 °F), Min:36.6 °C (97.9 °F), Max:37.1 °C (98.8 °F)   Temperature: 37.1 °C (98.8 °F)  Pulse  Av.9  Min: 71  Max: 102    Blood Pressure: 135/80      Respiratory      Respiration: 16, Pulse Oximetry: 95 %        RUL Breath Sounds: Clear, RML Breath Sounds: Clear, RLL Breath Sounds: Diminished, ANNA Breath Sounds: Clear, LLL Breath Sounds: Diminished    Fluids    Intake/Output Summary (Last 24 hours) at 18 1449  Last data filed at 18 1300   Gross per 24 hour   Intake             1080 ml   Output             2600 ml   Net            -1520 ml       Nutrition  Orders Placed This Encounter   Procedures   • DIET ORDER     Standing Status:   Standing     Number of Occurrences:   1     Order Specific Question:   Diet:     Answer:   Diabetic [3]     Physical Exam   Constitutional: He is oriented to person, place, and time. He appears well-developed and well-nourished. No distress.   HENT:   Head: Normocephalic and atraumatic.   Mouth/Throat: Oropharynx is clear and moist.   Eyes: Conjunctivae are normal.   Neck: Neck supple.   Cardiovascular: Normal rate, regular rhythm and normal heart sounds.    Pulmonary/Chest: Effort normal and breath sounds normal. No respiratory distress. He has no wheezes. He has no rales.   Abdominal: Soft. Bowel sounds are normal. He exhibits no distension. There is no tenderness.   Musculoskeletal:   Bilateral BKA, L BKA stump in dressing   Neurological: He is alert and oriented to  person, place, and time. No cranial nerve deficit. Coordination normal.   Skin: Skin is warm.   Psychiatric: He has a normal mood and affect. His behavior is normal.   Nursing note and vitals reviewed.      Recent Labs      03/19/18   0353  03/20/18   0636   WBC  9.1  9.7   RBC  4.81  4.65*   HEMOGLOBIN  12.7*  12.4*   HEMATOCRIT  39.5*  38.5*   MCV  82.1  82.8   MCH  26.4*  26.7*   MCHC  32.2*  32.2*   RDW  43.9  44.0   PLATELETCT  472*  406   MPV  9.3  9.1     Recent Labs      03/19/18   0353  03/20/18   0636   SODIUM  133*  132*   POTASSIUM  3.5*  4.2   CHLORIDE  102  102   CO2  23  25   GLUCOSE  82  183*   BUN  12  12   CREATININE  1.23  1.12   CALCIUM  9.2  8.8                      Assessment/Plan     * Abscess of left lower extremity- (present on admission)   Assessment & Plan    L BKA stump cellulitis and abscess s/p I+D; doing better  Continue abx per ID, Zosyn; patient has PICC and would like to go home soon and continue home IV abx.  Will arrange with HH.            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 Coli and B. fragilis  Switch to IV Zosyn, stop date 4/14/2018  PICC line placed  ID and Ortho onboard          Chronic hyponatremia- (present on admission)   Assessment & Plan    HCTZ stopped.  Stable level; monitor          Hypertension- (present on admission)   Assessment & Plan    Continue home antihypertensives.  Add ACE-I in light of DM.        Type 2 diabetes mellitus (CMS-[facility])- (present on admission)   Assessment & Plan    On lower dose of Lantus than at home due to hypoglycemia, now with sugars OOC.  Increase Lantus dose and monitor.  Patient claims compliance with diet.        Tobacco dependence- (present on admission)   Assessment & Plan    Tobacco cessation education provided. Nicotine replacement protocol will be provided  to the patient.              Quality-Core Measures   Reviewed items::  Labs reviewed, Medications reviewed and Radiology images reviewed  DVT prophylaxis pharmacological::  Enoxaparin (Lovenox)  Antibiotics:  Treating active infection/contamination beyond 24 hours perioperative coverage      Patient plan of care discussed at multidisplinary team rounds, with patient and R.N at [facility].

## 2018-03-21 NOTE — DISCHARGE PLANNING
Pt needs IV ABX infusion every 8 hours and wound care. DC plan we are trying to set up is home IV ABX infusion through [facility] agency and home health either through [facility] or [facility] as 2nd choice depending on which is contracted with pt’s insurance of [facility]. Choice forms faxed to [facility] to send.        Called [facility] and spoke to Sandra; she is 90% sure they take pt’s insurance. Requested I fax referral, facesheet, and front and back copy of insurance card, and prescription.   Paged Dr Jarvis for prescription; await a callback.

## 2018-03-21 NOTE — DISCHARGE PLANNING
Medical Social Work  Patient is aware he will need IV ABX until 4/14.  He is not happy that he can't go home, reluctantly agreed to a skilled. Due to his insurance, contracted with [facility] and [facility].    Faxed Choice to [facility].

## 2018-03-21 NOTE — CARE PLAN
Problem: Discharge Barriers/Planning  Goal: Patient's continuum of care needs will be met  Outcome: PROGRESSING AS EXPECTED  Patient pending d/c plan, needs outpatient antibiotics.     Problem: Pain Management  Goal: Pain level will decrease to patient's comfort goal  Outcome: PROGRESSING AS EXPECTED  Patient reports pain 8/10.  Medicated per MAR with PRN medications.

## 2018-03-22 LAB
ANION GAP SERPL CALC-SCNC: 7 MMOL/L (ref 0–11.9)
BASOPHILS # BLD AUTO: 0.3 % (ref 0–1.8)
BASOPHILS # BLD: 0.04 K/UL (ref 0–0.12)
BUN SERPL-MCNC: 16 MG/DL (ref 8–22)
CALCIUM SERPL-MCNC: 8.9 MG/DL (ref 8.5–10.5)
CHLORIDE SERPL-SCNC: 99 MMOL/L (ref 96–112)
CO2 SERPL-SCNC: 25 MMOL/L (ref 20–33)
CREAT SERPL-MCNC: 1.21 MG/DL (ref 0.5–1.4)
EOSINOPHIL # BLD AUTO: 0.28 K/UL (ref 0–0.51)
EOSINOPHIL NFR BLD: 2.4 % (ref 0–6.9)
ERYTHROCYTE [DISTWIDTH] IN BLOOD BY AUTOMATED COUNT: 42 FL (ref 35.9–50)
GLUCOSE BLD-MCNC: 139 MG/DL (ref 65–99)
GLUCOSE BLD-MCNC: 250 MG/DL (ref 65–99)
GLUCOSE BLD-MCNC: 261 MG/DL (ref 65–99)
GLUCOSE BLD-MCNC: 319 MG/DL (ref 65–99)
GLUCOSE SERPL-MCNC: 170 MG/DL (ref 65–99)
HCT VFR BLD AUTO: 36.6 % (ref 42–52)
HGB BLD-MCNC: 12 G/DL (ref 14–18)
IMM GRANULOCYTES # BLD AUTO: 0.04 K/UL (ref 0–0.11)
IMM GRANULOCYTES NFR BLD AUTO: 0.3 % (ref 0–0.9)
LYMPHOCYTES # BLD AUTO: 2.16 K/UL (ref 1–4.8)
LYMPHOCYTES NFR BLD: 18.8 % (ref 22–41)
MCH RBC QN AUTO: 26.8 PG (ref 27–33)
MCHC RBC AUTO-ENTMCNC: 32.8 G/DL (ref 33.7–35.3)
MCV RBC AUTO: 81.7 FL (ref 81.4–97.8)
MONOCYTES # BLD AUTO: 1.21 K/UL (ref 0–0.85)
MONOCYTES NFR BLD AUTO: 10.5 % (ref 0–13.4)
NEUTROPHILS # BLD AUTO: 7.78 K/UL (ref 1.82–7.42)
NEUTROPHILS NFR BLD: 67.7 % (ref 44–72)
NRBC # BLD AUTO: 0 K/UL
NRBC BLD-RTO: 0 /100 WBC
PLATELET # BLD AUTO: 430 K/UL (ref 164–446)
PMV BLD AUTO: 9.3 FL (ref 9–12.9)
POTASSIUM SERPL-SCNC: 3.9 MMOL/L (ref 3.6–5.5)
RBC # BLD AUTO: 4.48 M/UL (ref 4.7–6.1)
SODIUM SERPL-SCNC: 131 MMOL/L (ref 135–145)
WBC # BLD AUTO: 11.5 K/UL (ref 4.8–10.8)

## 2018-03-22 PROCEDURE — 700111 HCHG RX REV CODE 636 W/ 250 OVERRIDE (IP): Performed by: INTERNAL MEDICINE

## 2018-03-22 PROCEDURE — 99232 SBSQ HOSP IP/OBS MODERATE 35: CPT | Performed by: INTERNAL MEDICINE

## 2018-03-22 PROCEDURE — 700102 HCHG RX REV CODE 250 W/ 637 OVERRIDE(OP): Performed by: INTERNAL MEDICINE

## 2018-03-22 PROCEDURE — 770006 HCHG ROOM/CARE - MED/SURG/GYN SEMI*

## 2018-03-22 PROCEDURE — 82962 GLUCOSE BLOOD TEST: CPT

## 2018-03-22 PROCEDURE — A9270 NON-COVERED ITEM OR SERVICE: HCPCS | Performed by: INTERNAL MEDICINE

## 2018-03-22 PROCEDURE — 700105 HCHG RX REV CODE 258: Performed by: INTERNAL MEDICINE

## 2018-03-22 PROCEDURE — 85025 COMPLETE CBC W/AUTO DIFF WBC: CPT

## 2018-03-22 PROCEDURE — 80048 BASIC METABOLIC PNL TOTAL CA: CPT

## 2018-03-22 RX ADMIN — PIPERACILLIN SODIUM AND TAZOBACTAM SODIUM 3.38 G: 3; .375 INJECTION, POWDER, FOR SOLUTION INTRAVENOUS at 13:21

## 2018-03-22 RX ADMIN — PREGABALIN 75 MG: 75 CAPSULE ORAL at 13:14

## 2018-03-22 RX ADMIN — FINASTERIDE 5 MG: 5 TABLET, FILM COATED ORAL at 08:33

## 2018-03-22 RX ADMIN — BUDESONIDE AND FORMOTEROL FUMARATE DIHYDRATE 2 PUFF: 160; 4.5 AEROSOL RESPIRATORY (INHALATION) at 21:15

## 2018-03-22 RX ADMIN — PIPERACILLIN SODIUM AND TAZOBACTAM SODIUM 3.38 G: 3; .375 INJECTION, POWDER, FOR SOLUTION INTRAVENOUS at 05:35

## 2018-03-22 RX ADMIN — HYDRALAZINE HYDROCHLORIDE 50 MG: 50 TABLET, FILM COATED ORAL at 21:15

## 2018-03-22 RX ADMIN — NICOTINE 21 MG: 21 PATCH, EXTENDED RELEASE TRANSDERMAL at 05:35

## 2018-03-22 RX ADMIN — INSULIN HUMAN 5 UNITS: 100 INJECTION, SOLUTION PARENTERAL at 21:11

## 2018-03-22 RX ADMIN — PREGABALIN 75 MG: 75 CAPSULE ORAL at 21:15

## 2018-03-22 RX ADMIN — ENOXAPARIN SODIUM 40 MG: 100 INJECTION SUBCUTANEOUS at 08:33

## 2018-03-22 RX ADMIN — AMLODIPINE BESYLATE 10 MG: 10 TABLET ORAL at 08:32

## 2018-03-22 RX ADMIN — FAMOTIDINE 20 MG: 20 TABLET, FILM COATED ORAL at 21:15

## 2018-03-22 RX ADMIN — LACTOBACILLUS ACIDOPHILUS / LACTOBACILLUS BULGARICUS 1 PACKET: 100 MILLION CFU STRENGTH GRANULES at 16:46

## 2018-03-22 RX ADMIN — INSULIN GLARGINE 15 UNITS: 100 INJECTION, SOLUTION SUBCUTANEOUS at 21:12

## 2018-03-22 RX ADMIN — HYDRALAZINE HYDROCHLORIDE 50 MG: 50 TABLET, FILM COATED ORAL at 13:21

## 2018-03-22 RX ADMIN — TAMSULOSIN HYDROCHLORIDE 0.4 MG: 0.4 CAPSULE ORAL at 08:32

## 2018-03-22 RX ADMIN — LACTOBACILLUS ACIDOPHILUS / LACTOBACILLUS BULGARICUS 1 PACKET: 100 MILLION CFU STRENGTH GRANULES at 13:13

## 2018-03-22 RX ADMIN — INSULIN HUMAN 3 UNITS: 100 INJECTION, SOLUTION PARENTERAL at 18:30

## 2018-03-22 RX ADMIN — PIPERACILLIN SODIUM AND TAZOBACTAM SODIUM 3.38 G: 3; .375 INJECTION, POWDER, FOR SOLUTION INTRAVENOUS at 21:15

## 2018-03-22 RX ADMIN — MIRTAZAPINE 45 MG: 15 TABLET, FILM COATED ORAL at 21:15

## 2018-03-22 RX ADMIN — CITALOPRAM HYDROBROMIDE 20 MG: 20 TABLET ORAL at 08:33

## 2018-03-22 RX ADMIN — HYDROMORPHONE HYDROCHLORIDE 0.5 MG: 10 INJECTION, SOLUTION INTRAMUSCULAR; INTRAVENOUS; SUBCUTANEOUS at 21:14

## 2018-03-22 RX ADMIN — HYDROMORPHONE HYDROCHLORIDE 0.5 MG: 10 INJECTION, SOLUTION INTRAMUSCULAR; INTRAVENOUS; SUBCUTANEOUS at 10:18

## 2018-03-22 RX ADMIN — HYDROMORPHONE HYDROCHLORIDE 0.5 MG: 10 INJECTION, SOLUTION INTRAMUSCULAR; INTRAVENOUS; SUBCUTANEOUS at 13:21

## 2018-03-22 RX ADMIN — LISINOPRIL 5 MG: 5 TABLET ORAL at 08:33

## 2018-03-22 RX ADMIN — INSULIN GLARGINE 30 UNITS: 100 INJECTION, SOLUTION SUBCUTANEOUS at 05:36

## 2018-03-22 RX ADMIN — ZOLPIDEM TARTRATE 5 MG: 5 TABLET, FILM COATED ORAL at 21:02

## 2018-03-22 RX ADMIN — HYDROMORPHONE HYDROCHLORIDE 0.5 MG: 10 INJECTION, SOLUTION INTRAMUSCULAR; INTRAVENOUS; SUBCUTANEOUS at 06:20

## 2018-03-22 RX ADMIN — HYDRALAZINE HYDROCHLORIDE 50 MG: 50 TABLET, FILM COATED ORAL at 05:35

## 2018-03-22 RX ADMIN — ASPIRIN 81 MG: 81 TABLET, COATED ORAL at 08:33

## 2018-03-22 RX ADMIN — OXYCODONE HYDROCHLORIDE 10 MG: 10 TABLET ORAL at 08:33

## 2018-03-22 RX ADMIN — BUDESONIDE AND FORMOTEROL FUMARATE DIHYDRATE 2 PUFF: 160; 4.5 AEROSOL RESPIRATORY (INHALATION) at 09:00

## 2018-03-22 RX ADMIN — HYDROMORPHONE HYDROCHLORIDE 0.5 MG: 10 INJECTION, SOLUTION INTRAMUSCULAR; INTRAVENOUS; SUBCUTANEOUS at 16:46

## 2018-03-22 RX ADMIN — LACTOBACILLUS ACIDOPHILUS / LACTOBACILLUS BULGARICUS 1 PACKET: 100 MILLION CFU STRENGTH GRANULES at 08:32

## 2018-03-22 RX ADMIN — HYDROMORPHONE HYDROCHLORIDE 0.5 MG: 10 INJECTION, SOLUTION INTRAMUSCULAR; INTRAVENOUS; SUBCUTANEOUS at 01:48

## 2018-03-22 RX ADMIN — PREGABALIN 75 MG: 75 CAPSULE ORAL at 05:34

## 2018-03-22 RX ADMIN — INSULIN HUMAN 4 UNITS: 100 INJECTION, SOLUTION PARENTERAL at 13:14

## 2018-03-22 RX ADMIN — GLIPIZIDE 10 MG: 10 TABLET ORAL at 16:46

## 2018-03-22 RX ADMIN — THERA TABS 1 TABLET: TAB at 08:33

## 2018-03-22 RX ADMIN — GLIPIZIDE 10 MG: 10 TABLET ORAL at 07:30

## 2018-03-22 ASSESSMENT — PAIN SCALES - GENERAL
PAINLEVEL_OUTOF10: 9
PAINLEVEL_OUTOF10: 7
PAINLEVEL_OUTOF10: 8
PAINLEVEL_OUTOF10: 8
PAINLEVEL_OUTOF10: 0
PAINLEVEL_OUTOF10: 0
PAINLEVEL_OUTOF10: 8
PAINLEVEL_OUTOF10: 9
PAINLEVEL_OUTOF10: 9
PAINLEVEL_OUTOF10: 0
PAINLEVEL_OUTOF10: 8

## 2018-03-22 ASSESSMENT — ENCOUNTER SYMPTOMS
SHORTNESS OF BREATH: 0
MYALGIAS: 1
ABDOMINAL PAIN: 0
SPEECH CHANGE: 0
FEVER: 0
COUGH: 0
VOMITING: 0
NAUSEA: 0
CHILLS: 0

## 2018-03-22 NOTE — DISCHARGE PLANNING
Medical Social Work  Patient was receiving H/H through [facility] prior to admit and would like them again.  Faxed Choice to [facility].

## 2018-03-22 NOTE — PROGRESS NOTES
[facility]    Date of Service: 3/22/2018    Chief Complaint  57 y.o. male with a PMH DM type 2 and B/L BKA admitted 3/14/2018 with left stump infection    Interval Problem Update  No new problems  Consultants/Specialty  LPS  ID  Ortho  Disposition  home        Review of Systems   Constitutional: Negative for chills, fever and malaise/fatigue.   All other systems reviewed and are negative.     Physical Exam  Laboratory/Imaging   Hemodynamics  Temp (24hrs), Av.1 °C (98.7 °F), Min:36.8 °C (98.2 °F), Max:37.3 °C (99.2 °F)   Temperature: 37.3 °C (99.2 °F)  Pulse  Av.6  Min: 71  Max: 102    Blood Pressure: 133/70      Respiratory      Respiration: 16, Pulse Oximetry: 94 %        RUL Breath Sounds: Clear, RML Breath Sounds: Clear, RLL Breath Sounds: Diminished, ANNA Breath Sounds: Clear, LLL Breath Sounds: Diminished    Fluids    Intake/Output Summary (Last 24 hours) at 18 1449  Last data filed at 18 1400   Gross per 24 hour   Intake             1780 ml   Output             2800 ml   Net            -1020 ml       Nutrition  Orders Placed This Encounter   Procedures   • DIET ORDER     Standing Status:   Standing     Number of Occurrences:   1     Order Specific Question:   Diet:     Answer:   Diabetic [3]     Physical Exam   Constitutional: He is oriented to person, place, and time. He appears well-developed and well-nourished. No distress.   HENT:   Head: Normocephalic and atraumatic.   Mouth/Throat: Oropharynx is clear and moist.   Eyes: Conjunctivae are normal.   Neck: Neck supple.   Cardiovascular: Normal rate, regular rhythm and normal heart sounds.    Pulmonary/Chest: Effort normal and breath sounds normal. No respiratory distress. He has no wheezes. He has no rales.   Abdominal: Soft. Bowel sounds are normal. He exhibits no distension. There is no tenderness.   Musculoskeletal:   Bilateral BKA, L BKA stump in dressing   Neurological: He is alert and oriented to person, place,  and time. No cranial nerve deficit. Coordination normal.   Skin: Skin is warm.   Psychiatric: He has a normal mood and affect. His behavior is normal.   Nursing note and vitals reviewed.      Recent Labs      03/20/18   0636  03/22/18   0147   WBC  9.7  11.5*   RBC  4.65*  4.48*   HEMOGLOBIN  12.4*  12.0*   HEMATOCRIT  38.5*  36.6*   MCV  82.8  81.7   MCH  26.7*  26.8*   MCHC  32.2*  32.8*   RDW  44.0  42.0   PLATELETCT  406  430   MPV  9.1  9.3     Recent Labs      03/20/18   0636  03/22/18   0147   SODIUM  132*  131*   POTASSIUM  4.2  3.9   CHLORIDE  102  99   CO2  25  25   GLUCOSE  183*  170*   BUN  12  16   CREATININE  1.12  1.21   CALCIUM  8.8  8.9                      Assessment/Plan     * Abscess of left lower extremity- (present on admission)   Assessment & Plan    L BKA stump cellulitis and abscess s/p I+D; doing better  Continue abx per ID, Zosyn; patient has PICC and would like to go home soon and continue home IV abx.   arrange with [facility] infusion center.          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 Coli and B. fragilis  Switch to IV Zosyn, stop date 4/14/2018  PICC line placed  ID and Ortho onboard          Chronic hyponatremia- (present on admission)   Assessment & Plan    HCTZ stopped.  Stable level; monitor          Hypertension- (present on admission)   Assessment & Plan    Continue home antihypertensives.  Add ACE-I in light of DM. Controlled.        Type 2 diabetes mellitus (CMS-HCC)- (present on admission)   Assessment & Plan    Better controlled on higher dose of Lantus.  Cont to manage.        Tobacco dependence- (present on admission)   Assessment & Plan    Tobacco cessation education provided. Nicotine replacement protocol will be provided to the patient.              Quality-Core Measures   Reviewed items::  Labs  reviewed, Medications reviewed and Radiology images reviewed  DVT prophylaxis pharmacological::  Enoxaparin (Lovenox)  Antibiotics:  Treating active infection/contamination beyond 24 hours perioperative coverage

## 2018-03-22 NOTE — PROGRESS NOTES
Infectious Disease Progress Note    Author: Shaista Jarvis M.D. Date & Time of service: 3/22/2018  10:59 AM    Chief Complaint:  Left BKA stump infection    Interval History:  3/18/2018- Tmax98.5 WBC 7.5 troponin 0.94  3/19 AF WBC 9.1 denies SE abx  3/20 AF got PICC-tolerating abx well  3/21 AF WBC 9.7 tolerating abx well  3/22 AF WBC 11.5 plan for dc home  Labs Reviewed, Medications Reviewed, Radiology Reviewed and Wound Reviewed.    Review of Systems:  Review of Systems   Constitutional: Negative for fever.   HENT: Negative for hearing loss.    Respiratory: Negative for cough and shortness of breath.    Cardiovascular: Negative for chest pain.   Gastrointestinal: Negative for abdominal pain, nausea and vomiting.   Genitourinary: Negative for dysuria.   Musculoskeletal: Positive for myalgias.   Neurological: Negative for speech change.       Hemodynamics:  Temp (24hrs), Av.1 °C (98.7 °F), Min:36.8 °C (98.2 °F), Max:37.3 °C (99.2 °F)  Temperature: 37.3 °C (99.2 °F)  Pulse  Av.6  Min: 71  Max: 102   Blood Pressure: 133/70       Physical Exam:  Physical Exam   Constitutional: He is oriented to person, place, and time. He appears well-developed. No distress.   HENT:   Head: Normocephalic and atraumatic.   Mouth/Throat: No oropharyngeal exudate.   Eyes: EOM are normal. Pupils are equal, round, and reactive to light. No scleral icterus.   Neck: Neck supple.   Cardiovascular: Normal rate and regular rhythm.    No murmur heard.  Pulmonary/Chest: Effort normal. No respiratory distress.   Abdominal: Soft. He exhibits no distension.   Musculoskeletal: He exhibits no edema.   Right AKA  Left BKA sutures intact small dehiscence-no active drainage. No swelling  LUE PICC   Neurological: He is alert and oriented to person, place, and time.   Skin: No rash noted.   Nursing note and vitals reviewed.      Meds:    Current Facility-Administered Medications:   •  insulin glargine  •  insulin glargine  •  lisinopril  •   hydrALAZINE  •  PICC Line Insertion has been implemented **AND** May use Lidocaine 1% not to exceed 3 mls for local at insertion site **AND** NOTIFY MD **AND** Tip to dwell in the superior vena cava **AND** Further evaluation of the PICC placement can be retrieved from X-Ray and Imaging **AND** Blood draws through PICC line; draws by RN only **AND** FLUSHING GUIDELINES WHEN IN USE **AND** normal saline PF **AND** FLUSHING GUIDELINES WHEN NOT IN USE **AND** DRESSING MAINTENANCE **AND** Change needleless pressure ports and IV tubing every 72 hours per hospital policy **AND** TUBING **AND** If there is an MD order to remove the PICC line, any RN may remove the PICC line **AND** [] PATIENT EDUCATION MATERIALS **AND** NURSING COMMUNICATION  •  glipiZIDE  •  [COMPLETED] piperacillin-tazobactam **AND** piperacillin-tazobactam  •  hydrALAZINE  •  labetalol  •  albuterol  •  amLODIPine  •  aspirin EC  •  budesonide-formoterol  •  citalopram  •  finasteride  •  mirtazapine  •  multivitamin  •  pregabalin  •  tamsulosin  •  senna-docusate **AND** polyethylene glycol/lytes **AND** magnesium hydroxide **AND** bisacodyl  •  enoxaparin  •  INITIATE NICOTINE REPLACEMENT PROTOCOL  **AND** nicotine **AND** Protocol 205 PATIENT EDUCATION MATERIALS **AND** Protocol 205 Rotate nicotine patch application sites daily  **AND** nicotine polacrilex  •  acetaminophen  •  Notify provider if pain remains uncontrolled **AND** Use the numeric rating scale (NRS-11) on regular floors and Critical-Care Pain Observation Tool (CPOT) on ICUs/Trauma to assess pain **AND** Pulse Ox (Oximetry) **AND** Pharmacy Consult Request **AND** If patient difficult to arouse and/or has respiratory depression, stop any opiates that are currently infusing and call a Rapid Response. **AND** oxyCODONE immediate-release **AND** oxyCODONE immediate-release **AND** HYDROmorphone  •  insulin regular **AND** Accu-Chek ACHS **AND** NOTIFY MD and PharmD **AND** glucose  4 g **AND** dextrose 50%  •  Respiratory Care per Protocol  •  zolpidem  •  lactobacillus granules  •  famotidine    Labs:  Recent Labs      03/20/18   0636  03/22/18   0147   WBC  9.7  11.5*   RBC  4.65*  4.48*   HEMOGLOBIN  12.4*  12.0*   HEMATOCRIT  38.5*  36.6*   MCV  82.8  81.7   MCH  26.7*  26.8*   RDW  44.0  42.0   PLATELETCT  406  430   MPV  9.1  9.3   NEUTSPOLYS  60.20  67.70   LYMPHOCYTES  26.60  18.80*   MONOCYTES  9.70  10.50   EOSINOPHILS  2.80  2.40   BASOPHILS  0.40  0.30     Recent Labs      03/20/18   0636  03/22/18   0147   SODIUM  132*  131*   POTASSIUM  4.2  3.9   CHLORIDE  102  99   CO2  25  25   GLUCOSE  183*  170*   BUN  12  16     Recent Labs      03/20/18   0636  03/22/18   0147   CREATININE  1.12  1.21       Imaging:  Dx-chest-portable (1 View)    Result Date: 3/14/2018  3/14/2018 3:54 PM HISTORY/REASON FOR EXAM:  Sepsis. Shortness of breath. Open wound left leg. TECHNIQUE/EXAM DESCRIPTION AND NUMBER OF VIEWS: Single portable view of the chest. COMPARISON:  2/1/2018 FINDINGS: The cardiac silhouette is within normal limits. No infiltrates or consolidations are noted. No pleural effusion is noted.     No acute cardiac or pulmonary abnormality is noted.    Dx-femur-2+ Left    Result Date: 3/14/2018  3/14/2018 3:54 PM HISTORY/REASON FOR EXAM:  Open wound left amputation site with pain. TECHNIQUE/EXAM DESCRIPTION AND NUMBER OF VIEWS:  2 views of the LEFT femur. COMPARISON: 2/3/2018 FINDINGS: No femoral fracture or dislocation is present. There is no soft tissue mass identified. There is a left below-knee amputation with stable edematous appearance of the left lower extremity stump. No subcutaneous gas is identified. There is no plain film evidence of osteomyelitis.     1.  No acute left femoral fracture or dislocation. 2.  No evidence of left femoral or residual left tibia/fibula osteomyelitis.      Micro:  Results     Procedure Component Value Units Date/Time    BLOOD CULTURE [[id number]]  "Collected:  03/14/18 1507    Order Status:  Completed Specimen:  Blood from Peripheral Updated:  03/19/18 1700     Significant Indicator NEG     Source BLD     Site PERIPHERAL     Blood Culture No growth after 5 days of incubation.    Narrative:       Per Hospital Policy: Only change Specimen Src: to \"Line\" if  specified by physician order.    BLOOD CULTURE [[id number]] Collected:  03/14/18 1500    Order Status:  Completed Specimen:  Blood from Peripheral Updated:  03/19/18 1700     Significant Indicator NEG     Source BLD     Site PERIPHERAL     Blood Culture No growth after 5 days of incubation.    Narrative:       Per Hospital Policy: Only change Specimen Src: to \"Line\" if  specified by physician order.    CULTURE WOUND W/ GRAM STAIN [[id number]]  (Abnormal) Collected:  03/16/18 1535    Order Status:  Completed Specimen:  Wound Updated:  03/19/18 1029     Significant Indicator POS (POS)     Source WND     Site left knee amputation wound     Culture Result Wound -- (A)     Gram Stain Result Moderate WBCs.  Rare Gram positive cocci.  Rare Gram negative rods.       Culture Result Wound Escherichia coli  Light growth  See previous culture for sensitivity report.   (A)      Enterococcus faecalis  Light growth  See previous culture for sensitivity report.   (A)    ANAEROBIC CULTURE [[id number]]  (Abnormal) Collected:  03/16/18 1535    Order Status:  Completed Specimen:  Wound Updated:  03/19/18 1029     Significant Indicator POS (POS)     Source WND     Site left knee amputation wound     Anaerobic Culture, Culture Res Light growth Mixed anaerobic hugo including (A)      Bacteroides fragilis Group (A)    GRAM STAIN [[id number]] Collected:  03/14/18 1500    Order Status:  Completed Specimen:  Wound Updated:  03/17/18 1352     Significant Indicator .     Source WND     Site LEFT LEG     Gram Stain Result Moderate WBCs.  Rare Gram positive cocci.      CULTURE WOUND W/ GRAM STAIN [[id number]]  (Abnormal)  (Susceptibility) " Collected:  03/14/18 1500    Order Status:  Completed Specimen:  Wound from Left Leg Updated:  03/17/18 0450     Significant Indicator POS (POS)     Source WND     Site LEFT LEG     Culture Result Wound -- (A)     Gram Stain Result Moderate WBCs.  Rare Gram positive cocci.       Culture Result Wound Escherichia coli  Light growth   (A)      Enterococcus faecalis  Light growth  Combination therapy with ampicillin, penicillin, or  vancomycin (for susceptible strains) plus an aminoglycoside  is usually indicated for serious enterococcal infections,  such as endocarditis unless high-level resistance to both  gentamicin and streptomycin is documented; such combinations  are predicted to result in synergistic killing of the  Enterococcus.  The susceptibility profile for this organism indicates that  Streptomycin would not be an effective component of  combination therapy.  The susceptibility profile for this organism indicates that  Gentamycin would not be an effective component of combination  therapy.   (A)    Culture & Susceptibility     ENTEROCOCCUS FAECALIS     Antibiotic Sensitivity Microscan Unit Status    Ampicillin Sensitive <=2 mcg/mL Final    Daptomycin Sensitive 1 mcg/mL Final    Gent Synergy Resistant >500 mcg/mL Final    Penicillin Sensitive 8 mcg/mL Final    Strep Synergy Resistant >1000 mcg/mL Final    Vancomycin Sensitive 2 mcg/mL Final              ESCHERICHIA COLI     Antibiotic Sensitivity Microscan Unit Status    Ampicillin Resistant >16 mcg/mL Final    Ampicillin/sulbactam Resistant >16/8 mcg/mL Final    Cefepime Sensitive <=8 mcg/mL Final    Cefotaxime Sensitive <=2 mcg/mL Final    Cefotetan Sensitive <=16 mcg/mL Final    Ceftazidime Sensitive <=1 mcg/mL Final    Ceftriaxone Sensitive <=8 mcg/mL Final    Cefuroxime Sensitive <=4 mcg/mL Final    Ciprofloxacin Resistant >2 mcg/mL Final    Ertapenem Sensitive <=1 mcg/mL Final    Gentamicin Sensitive <=4 mcg/mL Final    Pip/Tazobactam Sensitive <=16  mcg/mL Final    Tigecycline Sensitive <=2 mcg/mL Final    Tobramycin Sensitive <=4 mcg/mL Final    Trimeth/Sulfa Sensitive <=2/38 mcg/mL Final                       CULTURE WOUND W/ GRAM STAIN [[id number]]  (Abnormal) Collected:  03/15/18 1059    Order Status:  Completed Specimen:  Wound from Left Leg Updated:  03/17/18 1148     Significant Indicator POS (POS)     Source WND     Site LEFT LEG     Culture Result Wound -- (A)     Gram Stain Result Moderate WBCs.  Few Gram positive cocci.  Rare Gram positive rods.       Culture Result Wound Enterococcus faecalis  Light growth  Combination therapy with ampicillin, penicillin, or  vancomycin (for susceptible strains) plus an aminoglycoside  is usually indicated for serious enterococcal infections,  such as endocarditis unless high-level resistance to both  gentamicin and streptomycin is documented; such combinations  are predicted to result in synergistic killing of the  Enterococcus.  See previous culture for sensitivity report.   (A)      Escherichia coli  Light growth  See previous culture for sensitivity report.   (A)    Narrative:       Collected By:[id number] BRITT ALY  Collected from wound on distal BKA stump at 0940    GRAM STAIN [[id number]] Collected:  03/16/18 1535    Order Status:  Completed Specimen:  Wound Updated:  03/17/18 0826     Significant Indicator .     Source WND     Site left knee amputation wound     Gram Stain Result Moderate WBCs.  Rare Gram positive cocci.  Rare Gram negative rods.      GRAM STAIN [[id number]] Collected:  03/15/18 1059    Order Status:  Completed Specimen:  Wound Updated:  03/15/18 1623     Significant Indicator .     Source WND     Site LEFT LEG     Gram Stain Result Moderate WBCs.  Few Gram positive cocci.  Rare Gram positive rods.      Narrative:       Collected By:[id number] BRITT ALY  Collected from wound on distal BKA stump at 0940          Assessment:  Active Hospital Problems    Diagnosis   • *Abscess of left  lower extremity [L02.416]   • Infection of amputation stump of left lower extremity (CMS-[facility]) [T87.44]   • Chronic hyponatremia [E87.1]   • Hypertension [I10]   • Type 2 diabetes mellitus (CMS-HCC) [E11.9]   • Tobacco dependence [F17.200]   • PVD (peripheral vascular disease) (CMS-HCC) [I73.9]       Plan:  BKA infection  Afebrile  No leukocytosis  Status post I and D on 3/16/2018  Cultures are Enterococcus faecalis, B frag, and Escherichia coli  Continue Zosyn while in hosp  Aim for at least 4 weeks of IV followed by PO Augmentin  PICC line done  Stop date IV 4/14/2018  If plan for home infusion-dapto/ertapenem done 3/21. Give dose of each prior to discharge    Diabetes mellitus  Keep BS under 150 to help control current infection  Last glycohgb 11.2    PVD  Will impair wound healing    DW IM

## 2018-03-22 NOTE — CARE PLAN
Problem: Infection  Goal: Will remain free from infection  Outcome: PROGRESSING AS EXPECTED    Intervention: Assess signs and symptoms of infection  Nurse administered abx per order. Nurse assessed ss of infection.   Intervention: Implement standard precautions and perform hand washing before and after patient contact  Nurse discussed about hand hygiene and patient voiced understanding.       Problem: Pain Management  Goal: Pain level will decrease to patient's comfort goal    Intervention: Follow pain managment plan developed in collaboration with patient and Interdisciplinary Team  Patient understands about his pain treatment plan, able to use call light when needing assistance.   Intervention: Educate and implement non-pharmacologic comfort measures. Examples: relaxation, distration, play therapy, activity therapy, massage, etc.  Nurse encouraged reposition, relaxation technique.

## 2018-03-22 NOTE — PROGRESS NOTES
Report taken from Oliver Lino. Patient is resting comfortably. No concerns or issues at this time. All precautions are in place. Cont to monitor.

## 2018-03-22 NOTE — CARE PLAN
Problem: Infection  Goal: Will remain free from infection    Intervention: Implement standard precautions and perform hand washing before and after patient contact  Standard precautions are in place. Hand washing is performed before and after patient contact.       Problem: Pain Management  Goal: Pain level will decrease to patient's comfort goal  Outcome: PROGRESSING AS EXPECTED  Patient's pain level was a level 8 on left lower leg, pt was medicated per MAR and declines any pain at this time. Pt's comfort goal is comfort at rest.

## 2018-03-23 LAB
ANION GAP SERPL CALC-SCNC: 6 MMOL/L (ref 0–11.9)
BASOPHILS # BLD AUTO: 0.6 % (ref 0–1.8)
BASOPHILS # BLD: 0.05 K/UL (ref 0–0.12)
BUN SERPL-MCNC: 14 MG/DL (ref 8–22)
CALCIUM SERPL-MCNC: 7.8 MG/DL (ref 8.5–10.5)
CHLORIDE SERPL-SCNC: 105 MMOL/L (ref 96–112)
CO2 SERPL-SCNC: 23 MMOL/L (ref 20–33)
CREAT SERPL-MCNC: 1.04 MG/DL (ref 0.5–1.4)
EOSINOPHIL # BLD AUTO: 0.29 K/UL (ref 0–0.51)
EOSINOPHIL NFR BLD: 3.5 % (ref 0–6.9)
ERYTHROCYTE [DISTWIDTH] IN BLOOD BY AUTOMATED COUNT: 42.2 FL (ref 35.9–50)
GLUCOSE BLD-MCNC: 110 MG/DL (ref 65–99)
GLUCOSE BLD-MCNC: 137 MG/DL (ref 65–99)
GLUCOSE BLD-MCNC: 196 MG/DL (ref 65–99)
GLUCOSE BLD-MCNC: 208 MG/DL (ref 65–99)
GLUCOSE BLD-MCNC: 355 MG/DL (ref 65–99)
GLUCOSE SERPL-MCNC: 200 MG/DL (ref 65–99)
HCT VFR BLD AUTO: 34.5 % (ref 42–52)
HGB BLD-MCNC: 11.1 G/DL (ref 14–18)
IMM GRANULOCYTES # BLD AUTO: 0.02 K/UL (ref 0–0.11)
IMM GRANULOCYTES NFR BLD AUTO: 0.2 % (ref 0–0.9)
LYMPHOCYTES # BLD AUTO: 2.64 K/UL (ref 1–4.8)
LYMPHOCYTES NFR BLD: 31.7 % (ref 22–41)
MCH RBC QN AUTO: 26.3 PG (ref 27–33)
MCHC RBC AUTO-ENTMCNC: 32.2 G/DL (ref 33.7–35.3)
MCV RBC AUTO: 81.8 FL (ref 81.4–97.8)
MONOCYTES # BLD AUTO: 0.92 K/UL (ref 0–0.85)
MONOCYTES NFR BLD AUTO: 11 % (ref 0–13.4)
NEUTROPHILS # BLD AUTO: 4.41 K/UL (ref 1.82–7.42)
NEUTROPHILS NFR BLD: 53 % (ref 44–72)
NRBC # BLD AUTO: 0 K/UL
NRBC BLD-RTO: 0 /100 WBC
PLATELET # BLD AUTO: 400 K/UL (ref 164–446)
PMV BLD AUTO: 9.2 FL (ref 9–12.9)
POTASSIUM SERPL-SCNC: 3.6 MMOL/L (ref 3.6–5.5)
RBC # BLD AUTO: 4.22 M/UL (ref 4.7–6.1)
SODIUM SERPL-SCNC: 134 MMOL/L (ref 135–145)
WBC # BLD AUTO: 8.3 K/UL (ref 4.8–10.8)

## 2018-03-23 PROCEDURE — 700105 HCHG RX REV CODE 258: Performed by: INTERNAL MEDICINE

## 2018-03-23 PROCEDURE — A9270 NON-COVERED ITEM OR SERVICE: HCPCS | Performed by: INTERNAL MEDICINE

## 2018-03-23 PROCEDURE — 700111 HCHG RX REV CODE 636 W/ 250 OVERRIDE (IP): Performed by: INTERNAL MEDICINE

## 2018-03-23 PROCEDURE — 99232 SBSQ HOSP IP/OBS MODERATE 35: CPT | Performed by: INTERNAL MEDICINE

## 2018-03-23 PROCEDURE — 700102 HCHG RX REV CODE 250 W/ 637 OVERRIDE(OP): Performed by: INTERNAL MEDICINE

## 2018-03-23 PROCEDURE — 80048 BASIC METABOLIC PNL TOTAL CA: CPT

## 2018-03-23 PROCEDURE — 770006 HCHG ROOM/CARE - MED/SURG/GYN SEMI*

## 2018-03-23 PROCEDURE — 82962 GLUCOSE BLOOD TEST: CPT | Mod: 91

## 2018-03-23 PROCEDURE — 85025 COMPLETE CBC W/AUTO DIFF WBC: CPT

## 2018-03-23 RX ORDER — L. ACIDOPHILUS/L.BULGARICUS 100MM CELL
1 GRANULES IN PACKET (EA) ORAL
Start: 2018-03-23 | End: 2018-03-26

## 2018-03-23 RX ORDER — INSULIN GLARGINE 100 [IU]/ML
36 INJECTION, SOLUTION SUBCUTANEOUS EVERY MORNING
Qty: 10 ML
Start: 2018-03-24 | End: 2018-03-26

## 2018-03-23 RX ORDER — AMOXICILLIN 250 MG
2 CAPSULE ORAL 2 TIMES DAILY
Qty: 30 TAB | Refills: 0
Start: 2018-03-23 | End: 2018-03-26

## 2018-03-23 RX ORDER — ACETAMINOPHEN 325 MG/1
650 TABLET ORAL EVERY 6 HOURS PRN
Qty: 30 TAB | Refills: 0
Start: 2018-03-23 | End: 2018-05-26

## 2018-03-23 RX ORDER — OXYCODONE HYDROCHLORIDE 10 MG/1
10 TABLET ORAL
Qty: 28 TAB
Start: 2018-03-23 | End: 2018-03-26

## 2018-03-23 RX ORDER — LISINOPRIL 5 MG/1
5 TABLET ORAL DAILY
Qty: 30 TAB
Start: 2018-03-24 | End: 2018-03-26

## 2018-03-23 RX ORDER — HYDRALAZINE HYDROCHLORIDE 50 MG/1
50 TABLET, FILM COATED ORAL EVERY 8 HOURS
Qty: 90 TAB
Start: 2018-03-23 | End: 2018-03-26

## 2018-03-23 RX ADMIN — HYDROMORPHONE HYDROCHLORIDE 0.5 MG: 10 INJECTION, SOLUTION INTRAMUSCULAR; INTRAVENOUS; SUBCUTANEOUS at 12:38

## 2018-03-23 RX ADMIN — LACTOBACILLUS ACIDOPHILUS / LACTOBACILLUS BULGARICUS 1 PACKET: 100 MILLION CFU STRENGTH GRANULES at 09:12

## 2018-03-23 RX ADMIN — CITALOPRAM HYDROBROMIDE 20 MG: 20 TABLET ORAL at 09:13

## 2018-03-23 RX ADMIN — INSULIN HUMAN 1 UNITS: 100 INJECTION, SOLUTION PARENTERAL at 13:50

## 2018-03-23 RX ADMIN — FINASTERIDE 5 MG: 5 TABLET, FILM COATED ORAL at 09:12

## 2018-03-23 RX ADMIN — PREGABALIN 75 MG: 75 CAPSULE ORAL at 05:55

## 2018-03-23 RX ADMIN — ASPIRIN 81 MG: 81 TABLET, COATED ORAL at 09:14

## 2018-03-23 RX ADMIN — FAMOTIDINE 20 MG: 20 TABLET, FILM COATED ORAL at 21:17

## 2018-03-23 RX ADMIN — HYDRALAZINE HYDROCHLORIDE 50 MG: 50 TABLET, FILM COATED ORAL at 21:17

## 2018-03-23 RX ADMIN — PIPERACILLIN SODIUM AND TAZOBACTAM SODIUM 3.38 G: 3; .375 INJECTION, POWDER, FOR SOLUTION INTRAVENOUS at 05:52

## 2018-03-23 RX ADMIN — AMLODIPINE BESYLATE 10 MG: 10 TABLET ORAL at 09:14

## 2018-03-23 RX ADMIN — HYDROMORPHONE HYDROCHLORIDE 0.5 MG: 10 INJECTION, SOLUTION INTRAMUSCULAR; INTRAVENOUS; SUBCUTANEOUS at 16:53

## 2018-03-23 RX ADMIN — LACTOBACILLUS ACIDOPHILUS / LACTOBACILLUS BULGARICUS 1 PACKET: 100 MILLION CFU STRENGTH GRANULES at 16:53

## 2018-03-23 RX ADMIN — DAPTOMYCIN 530 MG: 500 INJECTION, POWDER, LYOPHILIZED, FOR SOLUTION INTRAVENOUS at 15:21

## 2018-03-23 RX ADMIN — HYDROMORPHONE HYDROCHLORIDE 0.5 MG: 10 INJECTION, SOLUTION INTRAMUSCULAR; INTRAVENOUS; SUBCUTANEOUS at 02:00

## 2018-03-23 RX ADMIN — THERA TABS 1 TABLET: TAB at 09:13

## 2018-03-23 RX ADMIN — PREGABALIN 75 MG: 75 CAPSULE ORAL at 13:50

## 2018-03-23 RX ADMIN — HYDROMORPHONE HYDROCHLORIDE 0.5 MG: 10 INJECTION, SOLUTION INTRAMUSCULAR; INTRAVENOUS; SUBCUTANEOUS at 21:16

## 2018-03-23 RX ADMIN — BUDESONIDE AND FORMOTEROL FUMARATE DIHYDRATE 2 PUFF: 160; 4.5 AEROSOL RESPIRATORY (INHALATION) at 09:14

## 2018-03-23 RX ADMIN — HYDRALAZINE HYDROCHLORIDE 50 MG: 50 TABLET, FILM COATED ORAL at 05:55

## 2018-03-23 RX ADMIN — LISINOPRIL 5 MG: 5 TABLET ORAL at 09:13

## 2018-03-23 RX ADMIN — NICOTINE 21 MG: 21 PATCH, EXTENDED RELEASE TRANSDERMAL at 05:55

## 2018-03-23 RX ADMIN — MIRTAZAPINE 45 MG: 15 TABLET, FILM COATED ORAL at 21:16

## 2018-03-23 RX ADMIN — INSULIN GLARGINE 15 UNITS: 100 INJECTION, SOLUTION SUBCUTANEOUS at 21:37

## 2018-03-23 RX ADMIN — GLIPIZIDE 10 MG: 10 TABLET ORAL at 16:52

## 2018-03-23 RX ADMIN — SODIUM CHLORIDE 1000 MG: 900 INJECTION INTRAVENOUS at 13:50

## 2018-03-23 RX ADMIN — INSULIN HUMAN 2 UNITS: 100 INJECTION, SOLUTION PARENTERAL at 21:36

## 2018-03-23 RX ADMIN — ZOLPIDEM TARTRATE 5 MG: 5 TABLET, FILM COATED ORAL at 21:44

## 2018-03-23 RX ADMIN — GLIPIZIDE 10 MG: 10 TABLET ORAL at 09:14

## 2018-03-23 RX ADMIN — BUDESONIDE AND FORMOTEROL FUMARATE DIHYDRATE 2 PUFF: 160; 4.5 AEROSOL RESPIRATORY (INHALATION) at 21:16

## 2018-03-23 RX ADMIN — PREGABALIN 75 MG: 75 CAPSULE ORAL at 21:17

## 2018-03-23 RX ADMIN — LACTOBACILLUS ACIDOPHILUS / LACTOBACILLUS BULGARICUS 1 PACKET: 100 MILLION CFU STRENGTH GRANULES at 12:37

## 2018-03-23 RX ADMIN — INSULIN GLARGINE 30 UNITS: 100 INJECTION, SOLUTION SUBCUTANEOUS at 05:52

## 2018-03-23 RX ADMIN — HYDRALAZINE HYDROCHLORIDE 50 MG: 50 TABLET, FILM COATED ORAL at 13:49

## 2018-03-23 RX ADMIN — HYDROMORPHONE HYDROCHLORIDE 0.5 MG: 10 INJECTION, SOLUTION INTRAMUSCULAR; INTRAVENOUS; SUBCUTANEOUS at 09:14

## 2018-03-23 RX ADMIN — TAMSULOSIN HYDROCHLORIDE 0.4 MG: 0.4 CAPSULE ORAL at 09:13

## 2018-03-23 RX ADMIN — ENOXAPARIN SODIUM 40 MG: 100 INJECTION SUBCUTANEOUS at 09:13

## 2018-03-23 ASSESSMENT — PAIN SCALES - GENERAL
PAINLEVEL_OUTOF10: 8
PAINLEVEL_OUTOF10: 0
PAINLEVEL_OUTOF10: 8
PAINLEVEL_OUTOF10: 8
PAINLEVEL_OUTOF10: 7
PAINLEVEL_OUTOF10: 6
PAINLEVEL_OUTOF10: 8
PAINLEVEL_OUTOF10: 8
PAINLEVEL_OUTOF10: 7

## 2018-03-23 NOTE — DISCHARGE PLANNING
Medical Social Work  PC to [facility], they have the referral but they can't accommodate the patient, long waiting list.  Requested I follow up with other providers in town and get back to them.    PC to [facility], talked to Sandra who stated that they denied the request due to his insurance only paying for the medication not for nursing or supplies.  I asked her if the Rx they received was for 1 time a day, told no.  Sandra requested I re fax the new order and they will review it and get back to me.

## 2018-03-23 NOTE — DISCHARGE PLANNING
Medical Social Work  PC from Sandra at [facility]: they can provide for the medication's but patient would be financially responsible for the nursing and supplies which would be $60.00 per day.    PC to [facility]: gave them the above information. Told they will run his insurance and see if it is the same for them.  Will call me back.

## 2018-03-23 NOTE — DISCHARGE PLANNING
ATTN: Case Management  RE: Referral for Home Health    Reason for referral denial:  We do not have the capacity to see this patient                  We would like to take this opportunity to thank you for submitting a referral for your patient to continue the journey to recovery with [facility]. We hope to facilitate continued referrals from your facility as our goal is to provide quality, skilled care to as many patients as possible.            Unfortunately, we are not able to accept your patient into our service for the reason listed above. If further clarity is needed, our Intake Coordinators are available to discuss any barriers to service.            If you have any questions or concerns regarding this or future patients’ transition to Home Health, please do not hesitate to contact us. We are open for referrals 7 days a week from 8AM to 5PM at [phone].      We look forward to collaborating with you in the future,  [facility] Team

## 2018-03-23 NOTE — DISCHARGE PLANNING
Medical Social Work  PC to Dr. Deluca informed him due to patient's insurance can not find a H/H provider for wound care.  Patient will be here till Monday.    PC to [facility]: requested they hold order until Monday.    Informed patient on the above, patient frustrated and upset, not wanting to stay another two to three days.

## 2018-03-23 NOTE — DISCHARGE SUMMARY
CHIEF COMPLAINT ON ADMISSION  Chief Complaint   Patient presents with   • Wound Infection     Left lef       CODE STATUS  Full Code    HPI & HOSPITAL COURSE  This is a 57 y.o. male who presented 3/14/2018 with Wound Infection (Left leg).  He lives at home, is a bilateral LE amputee due to diabetes, has C and follows [facility]. He is seen bu Wound care nurses and early Feb, blisters noted on his left BKA stump. This became swollen and had purulent discharge today. He has subjective fevers and chills for the past 2 days. He has no other symptoms or complaints. He has diabetes and admits they aren't well controlled. He also smokes. He navigates at home with wheelchair.  Patient was found to have abscess of L BKA stump and underwent I+D on 3/16/18 with Dr. Dyson.  ID, Dr. Jarvis, consulted on the case and helped with abx's selection.  Wound culture grew Enterococcus faecalis, B frag, and Escherichia coli.  Patient did well after I+D and continued with Zosyn IV, stop date 4/14/18.  He needs to see Ortho again in 2 weeks for suture removal.  He will transfer to snf for continued IV abx and wound care.  Patient can transfer independently to wheelchair at baseline.  He has labile DM for which Lantus dosage was adjusted and needs close monitoring.    The patient met 2-midnight criteria for an inpatient stay at the time of discharge.    Therefore, he is discharged in good and stable condition with close outpatient follow-up.    SPECIFIC OUTPATIENT FOLLOW-UP  PCP/ID/ortho    DISCHARGE PROBLEM LIST  Principal Problem:    Abscess of left lower extremity POA: Yes  Active Problems:    Infection of amputation stump of left lower extremity (CMS-[facility]) POA: Yes    Type 2 diabetes mellitus (CMS-[facility]) POA: Yes    Hypertension POA: Yes    Chronic hyponatremia POA: Yes    PVD (peripheral vascular disease) (CMS-[facility]) POA: Yes    Tobacco dependence POA: Yes  Resolved Problems:    * No resolved hospital problems. *      FOLLOW  UP  Future Appointments  Date Time Provider Department Center   3/28/2018 8:00 AM [address]     No follow-up provider specified.    MEDICATIONS ON DISCHARGE   Jv Angelo   Home Medication Instructions AGUSTO:[id number]    Printed on:03/23/18 1145   Medication Information                      acetaminophen (TYLENOL) 325 MG Tab  Take 2 Tabs by mouth every 6 hours as needed (Mild Pain; (Pain scale 1-3); Temp greater than 100.5 F).             albuterol 108 (90 Base) MCG/ACT Aero Soln inhalation aerosol  Inhale 2 Puffs by mouth every four hours as needed for Shortness of Breath.             amlodipine (NORVASC) 10 MG Tab  Take 1 Tab by mouth every day.             aspirin 81 MG tablet  Take 81 mg by mouth every day.             budesonide-formoterol (SYMBICORT) 160-4.5 MCG/ACT Aerosol  Inhale 2 Puffs by mouth 2 Times a Day.             citalopram (CELEXA) 20 MG Tab  Take 1 Tab by mouth every day.             enoxaparin (LOVENOX) 40 MG/0.4ML Solution inj  Inject 40 mg as instructed every day.             finasteride (PROSCAR) 5 MG Tab  Take 1 Tab by mouth every day.             glipiZIDE (GLUCOTROL) 10 MG Tab  Take 1 Tab by mouth 2 times a day.             hydrALAZINE (APRESOLINE) 50 MG Tab  Take 1 Tab by mouth every 8 hours.             insulin glargine (LANTUS) 100 UNIT/ML Solution  Inject 36 Units as instructed every morning.             insulin regular (HUMULIN R) 100 Unit/mL Solution  Inject 1-6 Units as instructed 4 Times a Day,Before Meals and at Bedtime.             lactobacillus granules (LACTINEX/FLORANEX) Pack  Take 1 Packet by mouth 3 times a day, with meals.             lisinopril (PRINIVIL) 5 MG Tab  Take 1 Tab by mouth every day.             mirtazapine (REMERON) 45 MG tablet  Take 1 Tab by mouth every bedtime.             multivitamin (THERAGRAN) Tab  Take 1 Tab by mouth every day.             nicotine (NICODERM) 21 MG/24HR PATCH 24 HR  Apply 1 Patch to skin as directed every 24  hours.             NS SOLN 100 mL with piperacillin-tazobactam 3.375 (3-0.375) g SOLR 3.375 g  3.375 g by Intravenous route every 8 hours.             oxyCODONE immediate release (ROXICODONE) 10 MG immediate release tablet  Take 1 Tab by mouth every 3 hours as needed for up to 7 days.             pregabalin (LYRICA) 75 MG Cap  Take 75 mg by mouth 3 times a day.             raNITidine (ZANTAC) 150 MG Tab  Take 150 mg by mouth every evening.             senna-docusate (PERICOLACE OR SENOKOT S) 8.6-50 MG Tab  Take 2 Tabs by mouth 2 Times a Day.             tamsulosin (FLOMAX) 0.4 MG capsule  Take 1 Cap by mouth ONE-HALF HOUR AFTER BREAKFAST.                 DIET  Orders Placed This Encounter   Procedures   • DIET ORDER     Standing Status:   Standing     Number of Occurrences:   1     Order Specific Question:   Diet:     Answer:   Diabetic [3]       ACTIVITY  As tolerated.  Non-weight bearing LEFT leg      CONSULTATIONS  Ortho/ID    PROCEDURES  See above    LABORATORY  Lab Results   Component Value Date/Time    SODIUM 134 (L) 03/23/2018 01:53 AM    POTASSIUM 3.6 03/23/2018 01:53 AM    CHLORIDE 105 03/23/2018 01:53 AM    CO2 23 03/23/2018 01:53 AM    GLUCOSE 200 (H) 03/23/2018 01:53 AM    BUN 14 03/23/2018 01:53 AM    CREATININE 1.04 03/23/2018 01:53 AM        Lab Results   Component Value Date/Time    WBC 8.3 03/23/2018 01:53 AM    HEMOGLOBIN 11.1 (L) 03/23/2018 01:53 AM    HEMATOCRIT 34.5 (L) 03/23/2018 01:53 AM    PLATELETCT 400 03/23/2018 01:53 AM        Total time of the discharge process exceeds 40 minutes

## 2018-03-23 NOTE — CARE PLAN
Problem: Pain Management  Goal: Pain level will decrease to patient's comfort goal    Intervention: Follow pain managment plan developed in collaboration with patient and Interdisciplinary Team  Patient understands about pain management plan. Patient is able to use call light for assistance.  Intervention: Educate and implement non-pharmacologic comfort measures. Examples: relaxation, distration, play therapy, activity therapy, massage, etc.  Reposition and relaxation techiques were encouraged by nurse.       Problem: Psychosocial Needs:  Goal: Level of anxiety will decrease  Outcome: PROGRESSING AS EXPECTED    Intervention: Identify and develop with patient strategies to cope with anxiety triggers  Nurse encouraged patient to voice feelings.

## 2018-03-23 NOTE — DISCHARGE PLANNING
Medical Social Work  PC to [facility]: they are able to take him, working on Nursing availability.  Nurse will come out and educate the patient. Will call me before 4:30.

## 2018-03-23 NOTE — CARE PLAN
Problem: Infection  Goal: Will remain free from infection    Intervention: Implement standard precautions and perform hand washing before and after patient contact  Hand washing is performed before and after patient contact. Standard precautions in place.      Problem: Pain Management  Goal: Pain level will decrease to patient's comfort goal  Outcome: PROGRESSING AS EXPECTED  Pain level will decrease. Progressing as expected. Pt was medicated with IV push Dilaudid per MAR. Pain level 8 is decreasing to comfort level.

## 2018-03-23 NOTE — DISCHARGE PLANNING
Medical Social Work  PC from [facility] they can take patient tomorrow, nurse will call patient tomorrow and set it up. Should be in the afternoon.    Informed by [facility] that [facility]  does not take his insurance. Informed [facility] declined the patient as they don't have the capacity to accept him.   PC to [facility]; they don't accept this insurance.  PC to [facility]; they don't accept this insurance.  PC to [facility], not taking medicaid patients at this time.  Suggested I call back on Monday to see if they have an openings.     PC to Danyelle to up date her on the above, I asked her if there company requires H/H, told me no.  As some patients will do it themselves.  There nurse will come weekly.  Danyelle stated she will call Sandra and get back to me.

## 2018-03-23 NOTE — DISCHARGE PLANNING
The patient called out to me as I was walking past his room this morning and told me that he has decided that he doesn't want to go to [facility] or any other SNF.  He said that he wants to go to [facility] as an outpatient.  I told him that [facility] doesn't take his insurance.  He said he will figure out something but he doesn't want to go to a SNF.  I advised him of the importance of getting the IV antibiotic and that he could get a bad infection if he doesn't get the IV antibiotic.  He said he is aware of that but still just wants to go home.  I let Andrea, , know this because he is actively working on this patient's case.  After today, the patient would still need 22 more days on the IV ertapenem.    12:17 PM: Per Andrea's note, [facility] had told him that the patient's insurance would pay for the medication but the nursing and supplies would not be covered and the patient would have to pay $60.00 a day for this, for a total of $1,320.  I went back and spoke with the patient and he said that he is willing to pay the above amount and would like to discharge and go to [facility] as an outpatient.  I let the patient's bedside nurse know this.  I will talk with Andrea, , about it.  I spoke with Dr. Deluca to let him know and he said that he will order the ertapenem (and possibly another drug) to be given today before the patient discharges.  We still need to make arrangements with [facility] before letting the patient discharge.

## 2018-03-24 LAB
GLUCOSE BLD-MCNC: 104 MG/DL (ref 65–99)
GLUCOSE BLD-MCNC: 189 MG/DL (ref 65–99)
GLUCOSE BLD-MCNC: 272 MG/DL (ref 65–99)
GLUCOSE BLD-MCNC: 339 MG/DL (ref 65–99)
GLUCOSE BLD-MCNC: 58 MG/DL (ref 65–99)
GLUCOSE BLD-MCNC: 72 MG/DL (ref 65–99)

## 2018-03-24 PROCEDURE — 700102 HCHG RX REV CODE 250 W/ 637 OVERRIDE(OP): Performed by: INTERNAL MEDICINE

## 2018-03-24 PROCEDURE — 82962 GLUCOSE BLOOD TEST: CPT | Mod: 91

## 2018-03-24 PROCEDURE — A9270 NON-COVERED ITEM OR SERVICE: HCPCS | Performed by: INTERNAL MEDICINE

## 2018-03-24 PROCEDURE — 700111 HCHG RX REV CODE 636 W/ 250 OVERRIDE (IP): Performed by: INTERNAL MEDICINE

## 2018-03-24 PROCEDURE — 99232 SBSQ HOSP IP/OBS MODERATE 35: CPT | Performed by: INTERNAL MEDICINE

## 2018-03-24 PROCEDURE — 770006 HCHG ROOM/CARE - MED/SURG/GYN SEMI*

## 2018-03-24 PROCEDURE — 700105 HCHG RX REV CODE 258: Performed by: INTERNAL MEDICINE

## 2018-03-24 RX ADMIN — LACTOBACILLUS ACIDOPHILUS / LACTOBACILLUS BULGARICUS 1 PACKET: 100 MILLION CFU STRENGTH GRANULES at 08:10

## 2018-03-24 RX ADMIN — FAMOTIDINE 20 MG: 20 TABLET, FILM COATED ORAL at 21:15

## 2018-03-24 RX ADMIN — THERA TABS 1 TABLET: TAB at 08:11

## 2018-03-24 RX ADMIN — GLIPIZIDE 10 MG: 10 TABLET ORAL at 18:32

## 2018-03-24 RX ADMIN — HYDROMORPHONE HYDROCHLORIDE 0.5 MG: 10 INJECTION, SOLUTION INTRAMUSCULAR; INTRAVENOUS; SUBCUTANEOUS at 17:41

## 2018-03-24 RX ADMIN — PREGABALIN 75 MG: 75 CAPSULE ORAL at 21:15

## 2018-03-24 RX ADMIN — PREGABALIN 75 MG: 75 CAPSULE ORAL at 14:32

## 2018-03-24 RX ADMIN — PIPERACILLIN SODIUM AND TAZOBACTAM SODIUM 3.38 G: 3; .375 INJECTION, POWDER, FOR SOLUTION INTRAVENOUS at 21:15

## 2018-03-24 RX ADMIN — HYDROMORPHONE HYDROCHLORIDE 0.5 MG: 10 INJECTION, SOLUTION INTRAMUSCULAR; INTRAVENOUS; SUBCUTANEOUS at 00:16

## 2018-03-24 RX ADMIN — FINASTERIDE 5 MG: 5 TABLET, FILM COATED ORAL at 08:10

## 2018-03-24 RX ADMIN — LACTOBACILLUS ACIDOPHILUS / LACTOBACILLUS BULGARICUS 1 PACKET: 100 MILLION CFU STRENGTH GRANULES at 12:43

## 2018-03-24 RX ADMIN — INSULIN HUMAN 4 UNITS: 100 INJECTION, SOLUTION PARENTERAL at 12:51

## 2018-03-24 RX ADMIN — HYDRALAZINE HYDROCHLORIDE 50 MG: 50 TABLET, FILM COATED ORAL at 21:16

## 2018-03-24 RX ADMIN — ASPIRIN 81 MG: 81 TABLET, COATED ORAL at 08:11

## 2018-03-24 RX ADMIN — INSULIN GLARGINE 15 UNITS: 100 INJECTION, SOLUTION SUBCUTANEOUS at 21:17

## 2018-03-24 RX ADMIN — Medication 16 G: at 17:39

## 2018-03-24 RX ADMIN — ZOLPIDEM TARTRATE 5 MG: 5 TABLET, FILM COATED ORAL at 21:16

## 2018-03-24 RX ADMIN — BUDESONIDE AND FORMOTEROL FUMARATE DIHYDRATE 2 PUFF: 160; 4.5 AEROSOL RESPIRATORY (INHALATION) at 21:15

## 2018-03-24 RX ADMIN — HYDROMORPHONE HYDROCHLORIDE 0.5 MG: 10 INJECTION, SOLUTION INTRAMUSCULAR; INTRAVENOUS; SUBCUTANEOUS at 03:23

## 2018-03-24 RX ADMIN — GLIPIZIDE 10 MG: 10 TABLET ORAL at 08:11

## 2018-03-24 RX ADMIN — LACTOBACILLUS ACIDOPHILUS / LACTOBACILLUS BULGARICUS 1 PACKET: 100 MILLION CFU STRENGTH GRANULES at 18:32

## 2018-03-24 RX ADMIN — PIPERACILLIN SODIUM AND TAZOBACTAM SODIUM 3.38 G: 3; .375 INJECTION, POWDER, FOR SOLUTION INTRAVENOUS at 08:12

## 2018-03-24 RX ADMIN — PREGABALIN 75 MG: 75 CAPSULE ORAL at 06:00

## 2018-03-24 RX ADMIN — HYDROMORPHONE HYDROCHLORIDE 0.5 MG: 10 INJECTION, SOLUTION INTRAMUSCULAR; INTRAVENOUS; SUBCUTANEOUS at 08:27

## 2018-03-24 RX ADMIN — MIRTAZAPINE 45 MG: 15 TABLET, FILM COATED ORAL at 21:15

## 2018-03-24 RX ADMIN — PIPERACILLIN SODIUM AND TAZOBACTAM SODIUM 3.38 G: 3; .375 INJECTION, POWDER, FOR SOLUTION INTRAVENOUS at 12:50

## 2018-03-24 RX ADMIN — INSULIN GLARGINE 30 UNITS: 100 INJECTION, SOLUTION SUBCUTANEOUS at 06:01

## 2018-03-24 RX ADMIN — ENOXAPARIN SODIUM 40 MG: 100 INJECTION SUBCUTANEOUS at 08:11

## 2018-03-24 RX ADMIN — CITALOPRAM HYDROBROMIDE 20 MG: 20 TABLET ORAL at 08:11

## 2018-03-24 RX ADMIN — HYDRALAZINE HYDROCHLORIDE 50 MG: 50 TABLET, FILM COATED ORAL at 14:33

## 2018-03-24 RX ADMIN — HYDROMORPHONE HYDROCHLORIDE 0.5 MG: 10 INJECTION, SOLUTION INTRAMUSCULAR; INTRAVENOUS; SUBCUTANEOUS at 21:25

## 2018-03-24 RX ADMIN — NICOTINE 21 MG: 21 PATCH, EXTENDED RELEASE TRANSDERMAL at 06:00

## 2018-03-24 RX ADMIN — INSULIN HUMAN 3 UNITS: 100 INJECTION, SOLUTION PARENTERAL at 21:17

## 2018-03-24 RX ADMIN — AMLODIPINE BESYLATE 10 MG: 10 TABLET ORAL at 08:11

## 2018-03-24 RX ADMIN — HYDROMORPHONE HYDROCHLORIDE 0.5 MG: 10 INJECTION, SOLUTION INTRAMUSCULAR; INTRAVENOUS; SUBCUTANEOUS at 12:44

## 2018-03-24 RX ADMIN — BUDESONIDE AND FORMOTEROL FUMARATE DIHYDRATE 2 PUFF: 160; 4.5 AEROSOL RESPIRATORY (INHALATION) at 08:12

## 2018-03-24 RX ADMIN — LISINOPRIL 5 MG: 5 TABLET ORAL at 08:11

## 2018-03-24 RX ADMIN — TAMSULOSIN HYDROCHLORIDE 0.4 MG: 0.4 CAPSULE ORAL at 08:10

## 2018-03-24 RX ADMIN — HYDRALAZINE HYDROCHLORIDE 50 MG: 50 TABLET, FILM COATED ORAL at 06:00

## 2018-03-24 RX ADMIN — INSULIN HUMAN 1 UNITS: 100 INJECTION, SOLUTION PARENTERAL at 06:01

## 2018-03-24 ASSESSMENT — ENCOUNTER SYMPTOMS
SHORTNESS OF BREATH: 0
NAUSEA: 0
CHILLS: 0
SPEECH CHANGE: 0
VOMITING: 0
MYALGIAS: 1
COUGH: 0
ABDOMINAL PAIN: 0
FEVER: 0

## 2018-03-24 ASSESSMENT — PAIN SCALES - GENERAL
PAINLEVEL_OUTOF10: 8
PAINLEVEL_OUTOF10: 8
PAINLEVEL_OUTOF10: ASSUMED PAIN PRESENT
PAINLEVEL_OUTOF10: 7
PAINLEVEL_OUTOF10: 7
PAINLEVEL_OUTOF10: 6
PAINLEVEL_OUTOF10: ASSUMED PAIN PRESENT
PAINLEVEL_OUTOF10: 6

## 2018-03-24 NOTE — CARE PLAN
Problem: Pain Management  Goal: Pain level will decrease to patient's comfort goal  Outcome: PROGRESSING AS EXPECTED  PRN pain meds provided as need this evening.     Problem: Psychosocial Needs:  Goal: Level of anxiety will decrease  Outcome: PROGRESSING AS EXPECTED

## 2018-03-24 NOTE — PROGRESS NOTES
Received report and assumed patient care at 1900. Patient is A&Ox4 this evening. NS running TKO. Dilaudid provided for pain. Blood glucose was 208 tonight. Patients bed alarm is on, bed is locked and in lowest position, call light and bedside table are within reach, wheelchair outside of pt room, and hourly rounding in place.

## 2018-03-24 NOTE — PROGRESS NOTES
Infectious Disease Progress Note    Author: Radha Hickman M.D. Date & Time of service: 3/24/2018  9:02 AM    Chief Complaint:  Left BKA stump infection    Interval History:  3/18/2018- Tmax98.5 WBC 7.5 troponin 0.94  3/19 AF WBC 9.1 denies SE abx  3/20 AF got PICC-tolerating abx well  3/21 AF WBC 9.7 tolerating abx well  3/22 AF WBC 11.5 plan for dc home  3/24 AF, no CBC, no new issues, discharge postponed, tolerated test dose of ertapenem and dapto  Labs Reviewed, Medications Reviewed, Radiology Reviewed and Wound Reviewed.    Review of Systems:  Review of Systems   Constitutional: Negative for fever.   HENT: Negative for hearing loss.    Respiratory: Negative for cough and shortness of breath.    Cardiovascular: Negative for chest pain.   Gastrointestinal: Negative for abdominal pain, nausea and vomiting.   Genitourinary: Negative for dysuria.   Musculoskeletal: Positive for myalgias.   Neurological: Negative for speech change.       Hemodynamics:  Temp (24hrs), Av.8 °C (98.3 °F), Min:36.7 °C (98 °F), Max:37.1 °C (98.7 °F)  Temperature: 37.1 °C (98.7 °F)  Pulse  Av.9  Min: 71  Max: 102   Blood Pressure: 130/72       Physical Exam:  Physical Exam   Constitutional: He is oriented to person, place, and time. He appears well-developed. No distress.   HENT:   Head: Normocephalic and atraumatic.   Mouth/Throat: No oropharyngeal exudate.   Eyes: EOM are normal. Pupils are equal, round, and reactive to light. No scleral icterus.   Neck: Neck supple.   Cardiovascular: Normal rate and regular rhythm.    No murmur heard.  Pulmonary/Chest: Effort normal. No respiratory distress.   Abdominal: Soft. He exhibits no distension.   Musculoskeletal: He exhibits no edema.   Right AKA  Left BKA sutures intact small dehiscence-no active drainage. No swelling    LUE PICC   Neurological: He is alert and oriented to person, place, and time.   Skin: No rash noted.   Nursing note and vitals reviewed.      Meds:    Current  Facility-Administered Medications:   •  [COMPLETED] piperacillin-tazobactam **AND** piperacillin-tazobactam  •  insulin glargine  •  insulin glargine  •  lisinopril  •  hydrALAZINE  •  PICC Line Insertion has been implemented **AND** May use Lidocaine 1% not to exceed 3 mls for local at insertion site **AND** NOTIFY MD **AND** Tip to dwell in the superior vena cava **AND** Further evaluation of the PICC placement can be retrieved from X-Ray and Imaging **AND** Blood draws through PICC line; draws by RN only **AND** FLUSHING GUIDELINES WHEN IN USE **AND** normal saline PF **AND** FLUSHING GUIDELINES WHEN NOT IN USE **AND** DRESSING MAINTENANCE **AND** Change needleless pressure ports and IV tubing every 72 hours per hospital policy **AND** TUBING **AND** If there is an MD order to remove the PICC line, any RN may remove the PICC line **AND** [] PATIENT EDUCATION MATERIALS **AND** NURSING COMMUNICATION  •  glipiZIDE  •  hydrALAZINE  •  labetalol  •  albuterol  •  amLODIPine  •  aspirin EC  •  budesonide-formoterol  •  citalopram  •  finasteride  •  mirtazapine  •  multivitamin  •  pregabalin  •  tamsulosin  •  senna-docusate **AND** polyethylene glycol/lytes **AND** magnesium hydroxide **AND** bisacodyl  •  enoxaparin  •  INITIATE NICOTINE REPLACEMENT PROTOCOL  **AND** nicotine **AND** Protocol 205 PATIENT EDUCATION MATERIALS **AND** Protocol 205 Rotate nicotine patch application sites daily  **AND** nicotine polacrilex  •  acetaminophen  •  Notify provider if pain remains uncontrolled **AND** Use the numeric rating scale (NRS-11) on regular floors and Critical-Care Pain Observation Tool (CPOT) on ICUs/Trauma to assess pain **AND** Pulse Ox (Oximetry) **AND** Pharmacy Consult Request **AND** If patient difficult to arouse and/or has respiratory depression, stop any opiates that are currently infusing and call a Rapid Response. **AND** oxyCODONE immediate-release **AND** oxyCODONE immediate-release **AND**  HYDROmorphone  •  insulin regular **AND** Accu-Chek ACHS **AND** NOTIFY MD and PharmD **AND** glucose 4 g **AND** dextrose 50%  •  Respiratory Care per Protocol  •  zolpidem  •  lactobacillus granules  •  famotidine    Labs:  Recent Labs      03/22/18 0147 03/23/18   0153   WBC  11.5*  8.3   RBC  4.48*  4.22*   HEMOGLOBIN  12.0*  11.1*   HEMATOCRIT  36.6*  34.5*   MCV  81.7  81.8   MCH  26.8*  26.3*   RDW  42.0  42.2   PLATELETCT  430  400   MPV  9.3  9.2   NEUTSPOLYS  67.70  53.00   LYMPHOCYTES  18.80*  31.70   MONOCYTES  10.50  11.00   EOSINOPHILS  2.40  3.50   BASOPHILS  0.30  0.60     Recent Labs      03/22/18 0147 03/23/18   0153   SODIUM  131*  134*   POTASSIUM  3.9  3.6   CHLORIDE  99  105   CO2  25  23   GLUCOSE  170*  200*   BUN  16  14     Recent Labs      03/22/18 0147 03/23/18   0153   CREATININE  1.21  1.04       Imaging:  Dx-chest-portable (1 View)    Result Date: 3/14/2018  3/14/2018 3:54 PM HISTORY/REASON FOR EXAM:  Sepsis. Shortness of breath. Open wound left leg. TECHNIQUE/EXAM DESCRIPTION AND NUMBER OF VIEWS: Single portable view of the chest. COMPARISON:  2/1/2018 FINDINGS: The cardiac silhouette is within normal limits. No infiltrates or consolidations are noted. No pleural effusion is noted.     No acute cardiac or pulmonary abnormality is noted.    Dx-femur-2+ Left    Result Date: 3/14/2018  3/14/2018 3:54 PM HISTORY/REASON FOR EXAM:  Open wound left amputation site with pain. TECHNIQUE/EXAM DESCRIPTION AND NUMBER OF VIEWS:  2 views of the LEFT femur. COMPARISON: 2/3/2018 FINDINGS: No femoral fracture or dislocation is present. There is no soft tissue mass identified. There is a left below-knee amputation with stable edematous appearance of the left lower extremity stump. No subcutaneous gas is identified. There is no plain film evidence of osteomyelitis.     1.  No acute left femoral fracture or dislocation. 2.  No evidence of left femoral or residual left tibia/fibula  "osteomyelitis.      Micro:  Results     Procedure Component Value Units Date/Time    BLOOD CULTURE [[id number]] Collected:  03/14/18 1507    Order Status:  Completed Specimen:  Blood from Peripheral Updated:  03/19/18 1700     Significant Indicator NEG     Source BLD     Site PERIPHERAL     Blood Culture No growth after 5 days of incubation.    Narrative:       Per Hospital Policy: Only change Specimen Src: to \"Line\" if  specified by physician order.    BLOOD CULTURE [[id number]] Collected:  03/14/18 1500    Order Status:  Completed Specimen:  Blood from Peripheral Updated:  03/19/18 1700     Significant Indicator NEG     Source BLD     Site PERIPHERAL     Blood Culture No growth after 5 days of incubation.    Narrative:       Per Hospital Policy: Only change Specimen Src: to \"Line\" if  specified by physician order.    CULTURE WOUND W/ GRAM STAIN [[id number]]  (Abnormal) Collected:  03/16/18 1535    Order Status:  Completed Specimen:  Wound Updated:  03/19/18 1029     Significant Indicator POS (POS)     Source WND     Site left knee amputation wound     Culture Result Wound -- (A)     Gram Stain Result Moderate WBCs.  Rare Gram positive cocci.  Rare Gram negative rods.       Culture Result Wound Escherichia coli  Light growth  See previous culture for sensitivity report.   (A)      Enterococcus faecalis  Light growth  See previous culture for sensitivity report.   (A)    ANAEROBIC CULTURE [[id number]]  (Abnormal) Collected:  03/16/18 1535    Order Status:  Completed Specimen:  Wound Updated:  03/19/18 1029     Significant Indicator POS (POS)     Source WND     Site left knee amputation wound     Anaerobic Culture, Culture Res Light growth Mixed anaerobic hugo including (A)      Bacteroides fragilis Group (A)    GRAM STAIN [[id number]] Collected:  03/14/18 1500    Order Status:  Completed Specimen:  Wound Updated:  03/17/18 1352     Significant Indicator .     Source WND     Site LEFT LEG     Gram Stain Result Moderate " WBCs.  Rare Gram positive cocci.      CULTURE WOUND W/ GRAM STAIN [[id number]]  (Abnormal)  (Susceptibility) Collected:  03/14/18 1500    Order Status:  Completed Specimen:  Wound from Left Leg Updated:  03/17/18 1352     Significant Indicator POS (POS)     Source WND     Site LEFT LEG     Culture Result Wound -- (A)     Gram Stain Result Moderate WBCs.  Rare Gram positive cocci.       Culture Result Wound Escherichia coli  Light growth   (A)      Enterococcus faecalis  Light growth  Combination therapy with ampicillin, penicillin, or  vancomycin (for susceptible strains) plus an aminoglycoside  is usually indicated for serious enterococcal infections,  such as endocarditis unless high-level resistance to both  gentamicin and streptomycin is documented; such combinations  are predicted to result in synergistic killing of the  Enterococcus.  The susceptibility profile for this organism indicates that  Streptomycin would not be an effective component of  combination therapy.  The susceptibility profile for this organism indicates that  Gentamycin would not be an effective component of combination  therapy.   (A)    Culture & Susceptibility     ENTEROCOCCUS FAECALIS     Antibiotic Sensitivity Microscan Unit Status    Ampicillin Sensitive <=2 mcg/mL Final    Daptomycin Sensitive 1 mcg/mL Final    Gent Synergy Resistant >500 mcg/mL Final    Penicillin Sensitive 8 mcg/mL Final    Strep Synergy Resistant >1000 mcg/mL Final    Vancomycin Sensitive 2 mcg/mL Final              ESCHERICHIA COLI     Antibiotic Sensitivity Microscan Unit Status    Ampicillin Resistant >16 mcg/mL Final    Ampicillin/sulbactam Resistant >16/8 mcg/mL Final    Cefepime Sensitive <=8 mcg/mL Final    Cefotaxime Sensitive <=2 mcg/mL Final    Cefotetan Sensitive <=16 mcg/mL Final    Ceftazidime Sensitive <=1 mcg/mL Final    Ceftriaxone Sensitive <=8 mcg/mL Final    Cefuroxime Sensitive <=4 mcg/mL Final    Ciprofloxacin Resistant >2 mcg/mL Final     Ertapenem Sensitive <=1 mcg/mL Final    Gentamicin Sensitive <=4 mcg/mL Final    Pip/Tazobactam Sensitive <=16 mcg/mL Final    Tigecycline Sensitive <=2 mcg/mL Final    Tobramycin Sensitive <=4 mcg/mL Final    Trimeth/Sulfa Sensitive <=2/38 mcg/mL Final                       CULTURE WOUND W/ GRAM STAIN [[id number]]  (Abnormal) Collected:  03/15/18 1059    Order Status:  Completed Specimen:  Wound from Left Leg Updated:  03/17/18 1148     Significant Indicator POS (POS)     Source WND     Site LEFT LEG     Culture Result Wound -- (A)     Gram Stain Result Moderate WBCs.  Few Gram positive cocci.  Rare Gram positive rods.       Culture Result Wound Enterococcus faecalis  Light growth  Combination therapy with ampicillin, penicillin, or  vancomycin (for susceptible strains) plus an aminoglycoside  is usually indicated for serious enterococcal infections,  such as endocarditis unless high-level resistance to both  gentamicin and streptomycin is documented; such combinations  are predicted to result in synergistic killing of the  Enterococcus.  See previous culture for sensitivity report.   (A)      Escherichia coli  Light growth  See previous culture for sensitivity report.   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 Give dose of each prior to discharge - pt tolerated    Diabetes mellitus  Keep BS under 150 to help control current infection  Last glycohgb 11.2    PVD  Will impair wound healing    OK to DC pt once abx are arranged    FU ID clinic    AUGUSTO Deluca

## 2018-03-24 NOTE — PROGRESS NOTES
[facility] Hospitalist Progress Note    Date of Service: 3/24/2018    Chief Complaint  57 y.o. male with a PMH DM type 2 and B/L BKA admitted 3/14/2018 with left stump infection    Interval Problem Update  Initially agreed to SNF transfer yesterday but changed his mind and requested home IV abx instead.  SW working on getting insurance approval.  Doing fine otherwise without new c/o.  Consultants/Specialty  LPS  ID  Ortho  Disposition  home        Review of Systems   Constitutional: Negative for chills, fever and malaise/fatigue.   All other systems reviewed and are negative.     Physical Exam  Laboratory/Imaging   Hemodynamics  Temp (24hrs), Av.8 °C (98.3 °F), Min:36.7 °C (98 °F), Max:37.1 °C (98.7 °F)   Temperature: 36.8 °C (98.2 °F)  Pulse  Av.8  Min: 71  Max: 102    Blood Pressure: 126/75      Respiratory      Respiration: 20, Pulse Oximetry: 94 %        RUL Breath Sounds: Clear, RML Breath Sounds: Clear, RLL Breath Sounds: Diminished, ANNA Breath Sounds: Clear, LLL Breath Sounds: Diminished    Fluids    Intake/Output Summary (Last 24 hours) at 18 1039  Last data filed at 18 0314   Gross per 24 hour   Intake                0 ml   Output             1950 ml   Net            -1950 ml       Nutrition  Orders Placed This Encounter   Procedures   • DIET ORDER     Standing Status:   Standing     Number of Occurrences:   1     Order Specific Question:   Diet:     Answer:   Diabetic [3]     Physical Exam   Constitutional: He is oriented to person, place, and time. He appears well-developed and well-nourished. No distress.   HENT:   Head: Normocephalic and atraumatic.   Mouth/Throat: Oropharynx is clear and moist.   Eyes: Conjunctivae are normal.   Neck: Neck supple.   Cardiovascular: Normal rate, regular rhythm and normal heart sounds.    Pulmonary/Chest: Effort normal and breath sounds normal. No respiratory distress. He has no wheezes. He has no rales.   Abdominal: Soft. Bowel sounds are normal. He  exhibits no distension. There is no tenderness.   Musculoskeletal:   Bilateral BKA, L BKA stump in dressing   Neurological: He is alert and oriented to person, place, and time. No cranial nerve deficit. Coordination normal.   Skin: Skin is warm.   Psychiatric: He has a normal mood and affect. His behavior is normal.   Nursing note and vitals reviewed.      Recent Labs      03/22/18 0147 03/23/18   0153   WBC  11.5*  8.3   RBC  4.48*  4.22*   HEMOGLOBIN  12.0*  11.1*   HEMATOCRIT  36.6*  34.5*   MCV  81.7  81.8   MCH  26.8*  26.3*   MCHC  32.8*  32.2*   RDW  42.0  42.2   PLATELETCT  430  400   MPV  9.3  9.2     Recent Labs      03/22/18 0147 03/23/18   0153   SODIUM  131*  134*   POTASSIUM  3.9  3.6   CHLORIDE  99  105   CO2  25  23   GLUCOSE  170*  200*   BUN  16  14   CREATININE  1.21  1.04   CALCIUM  8.9  7.8*                      Assessment/Plan     * Abscess of left lower extremity- (present on admission)   Assessment & Plan    L BKA stump cellulitis and abscess s/p I+D; doing better  Continue abx per ID, Zosyn; patient has PICC and would like to go home soon and continue home IV abx.  Unable to get insurance coverage for home IV abx so far, per .  Cont inpatient care pending this.          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 Coli and B. fragilis  Switch to IV Zosyn, stop date 4/14/2018  PICC line placed  ID and Ortho onboard          Chronic hyponatremia- (present on admission)   Assessment & Plan    HCTZ stopped.  Stable level; monitor          Hypertension- (present on admission)   Assessment & Plan    Continue home antihypertensives.  Add ACE-I in light of DM. Controlled.        Type 2 diabetes mellitus (CMS-HCC)- (present on admission)   Assessment & Plan    Better controlled on higher dose of Lantus.  Cont to manage.         Tobacco dependence- (present on admission)   Assessment & Plan    Tobacco cessation education provided. Nicotine replacement protocol will be provided to the patient.              Quality-Core Measures   Reviewed items::  Labs reviewed, Medications reviewed and Radiology images reviewed  DVT prophylaxis pharmacological::  Enoxaparin (Lovenox)  Antibiotics:  Treating active infection/contamination beyond 24 hours perioperative coverage

## 2018-03-24 NOTE — PROGRESS NOTES
Pt stated that he felt like his insulin levels were low. Blood glucose checked and patient was at 72. A snack was provided and glucose rechecked at 0515 and it went up to 183. Morning coverage provided at 0615.

## 2018-03-25 LAB
ANION GAP SERPL CALC-SCNC: 7 MMOL/L (ref 0–11.9)
BASOPHILS # BLD AUTO: 0.6 % (ref 0–1.8)
BASOPHILS # BLD: 0.05 K/UL (ref 0–0.12)
BUN SERPL-MCNC: 16 MG/DL (ref 8–22)
CALCIUM SERPL-MCNC: 8.8 MG/DL (ref 8.5–10.5)
CHLORIDE SERPL-SCNC: 104 MMOL/L (ref 96–112)
CO2 SERPL-SCNC: 23 MMOL/L (ref 20–33)
CREAT SERPL-MCNC: 1.16 MG/DL (ref 0.5–1.4)
EOSINOPHIL # BLD AUTO: 0.25 K/UL (ref 0–0.51)
EOSINOPHIL NFR BLD: 2.9 % (ref 0–6.9)
ERYTHROCYTE [DISTWIDTH] IN BLOOD BY AUTOMATED COUNT: 42 FL (ref 35.9–50)
GLUCOSE BLD-MCNC: 154 MG/DL (ref 65–99)
GLUCOSE BLD-MCNC: 170 MG/DL (ref 65–99)
GLUCOSE BLD-MCNC: 188 MG/DL (ref 65–99)
GLUCOSE BLD-MCNC: 280 MG/DL (ref 65–99)
GLUCOSE SERPL-MCNC: 152 MG/DL (ref 65–99)
HCT VFR BLD AUTO: 38.3 % (ref 42–52)
HGB BLD-MCNC: 12.5 G/DL (ref 14–18)
IMM GRANULOCYTES # BLD AUTO: 0.03 K/UL (ref 0–0.11)
IMM GRANULOCYTES NFR BLD AUTO: 0.3 % (ref 0–0.9)
LYMPHOCYTES # BLD AUTO: 2.19 K/UL (ref 1–4.8)
LYMPHOCYTES NFR BLD: 25.4 % (ref 22–41)
MCH RBC QN AUTO: 26.7 PG (ref 27–33)
MCHC RBC AUTO-ENTMCNC: 32.6 G/DL (ref 33.7–35.3)
MCV RBC AUTO: 81.7 FL (ref 81.4–97.8)
MONOCYTES # BLD AUTO: 0.84 K/UL (ref 0–0.85)
MONOCYTES NFR BLD AUTO: 9.8 % (ref 0–13.4)
NEUTROPHILS # BLD AUTO: 5.25 K/UL (ref 1.82–7.42)
NEUTROPHILS NFR BLD: 61 % (ref 44–72)
NRBC # BLD AUTO: 0 K/UL
NRBC BLD-RTO: 0 /100 WBC
PLATELET # BLD AUTO: 478 K/UL (ref 164–446)
PMV BLD AUTO: 9 FL (ref 9–12.9)
POTASSIUM SERPL-SCNC: 4.2 MMOL/L (ref 3.6–5.5)
RBC # BLD AUTO: 4.69 M/UL (ref 4.7–6.1)
SODIUM SERPL-SCNC: 134 MMOL/L (ref 135–145)
WBC # BLD AUTO: 8.6 K/UL (ref 4.8–10.8)

## 2018-03-25 PROCEDURE — 99232 SBSQ HOSP IP/OBS MODERATE 35: CPT | Performed by: INTERNAL MEDICINE

## 2018-03-25 PROCEDURE — 700102 HCHG RX REV CODE 250 W/ 637 OVERRIDE(OP): Performed by: INTERNAL MEDICINE

## 2018-03-25 PROCEDURE — 700105 HCHG RX REV CODE 258: Performed by: INTERNAL MEDICINE

## 2018-03-25 PROCEDURE — 770006 HCHG ROOM/CARE - MED/SURG/GYN SEMI*

## 2018-03-25 PROCEDURE — 700111 HCHG RX REV CODE 636 W/ 250 OVERRIDE (IP): Performed by: INTERNAL MEDICINE

## 2018-03-25 PROCEDURE — A9270 NON-COVERED ITEM OR SERVICE: HCPCS | Performed by: INTERNAL MEDICINE

## 2018-03-25 PROCEDURE — 85025 COMPLETE CBC W/AUTO DIFF WBC: CPT

## 2018-03-25 PROCEDURE — 80048 BASIC METABOLIC PNL TOTAL CA: CPT

## 2018-03-25 PROCEDURE — 82962 GLUCOSE BLOOD TEST: CPT | Mod: 91

## 2018-03-25 RX ADMIN — GLIPIZIDE 10 MG: 10 TABLET ORAL at 16:26

## 2018-03-25 RX ADMIN — INSULIN HUMAN 3 UNITS: 100 INJECTION, SOLUTION PARENTERAL at 11:57

## 2018-03-25 RX ADMIN — OXYCODONE HYDROCHLORIDE 10 MG: 10 TABLET ORAL at 09:12

## 2018-03-25 RX ADMIN — NICOTINE 21 MG: 21 PATCH, EXTENDED RELEASE TRANSDERMAL at 06:09

## 2018-03-25 RX ADMIN — ZOLPIDEM TARTRATE 5 MG: 5 TABLET, FILM COATED ORAL at 20:55

## 2018-03-25 RX ADMIN — FINASTERIDE 5 MG: 5 TABLET, FILM COATED ORAL at 09:13

## 2018-03-25 RX ADMIN — INSULIN GLARGINE 15 UNITS: 100 INJECTION, SOLUTION SUBCUTANEOUS at 20:53

## 2018-03-25 RX ADMIN — PIPERACILLIN SODIUM AND TAZOBACTAM SODIUM 3.38 G: 3; .375 INJECTION, POWDER, FOR SOLUTION INTRAVENOUS at 13:57

## 2018-03-25 RX ADMIN — HYDROMORPHONE HYDROCHLORIDE 0.5 MG: 10 INJECTION, SOLUTION INTRAMUSCULAR; INTRAVENOUS; SUBCUTANEOUS at 16:26

## 2018-03-25 RX ADMIN — HYDROMORPHONE HYDROCHLORIDE 0.5 MG: 10 INJECTION, SOLUTION INTRAMUSCULAR; INTRAVENOUS; SUBCUTANEOUS at 06:09

## 2018-03-25 RX ADMIN — LACTOBACILLUS ACIDOPHILUS / LACTOBACILLUS BULGARICUS 1 PACKET: 100 MILLION CFU STRENGTH GRANULES at 16:26

## 2018-03-25 RX ADMIN — PREGABALIN 75 MG: 75 CAPSULE ORAL at 20:56

## 2018-03-25 RX ADMIN — PIPERACILLIN SODIUM AND TAZOBACTAM SODIUM 3.38 G: 3; .375 INJECTION, POWDER, FOR SOLUTION INTRAVENOUS at 06:15

## 2018-03-25 RX ADMIN — GLIPIZIDE 10 MG: 10 TABLET ORAL at 09:13

## 2018-03-25 RX ADMIN — MIRTAZAPINE 45 MG: 15 TABLET, FILM COATED ORAL at 20:55

## 2018-03-25 RX ADMIN — CITALOPRAM HYDROBROMIDE 20 MG: 20 TABLET ORAL at 09:12

## 2018-03-25 RX ADMIN — PREGABALIN 75 MG: 75 CAPSULE ORAL at 13:57

## 2018-03-25 RX ADMIN — AMLODIPINE BESYLATE 10 MG: 10 TABLET ORAL at 09:14

## 2018-03-25 RX ADMIN — ENOXAPARIN SODIUM 40 MG: 100 INJECTION SUBCUTANEOUS at 09:14

## 2018-03-25 RX ADMIN — LACTOBACILLUS ACIDOPHILUS / LACTOBACILLUS BULGARICUS 1 PACKET: 100 MILLION CFU STRENGTH GRANULES at 09:13

## 2018-03-25 RX ADMIN — HYDRALAZINE HYDROCHLORIDE 50 MG: 50 TABLET, FILM COATED ORAL at 20:55

## 2018-03-25 RX ADMIN — LISINOPRIL 5 MG: 5 TABLET ORAL at 09:13

## 2018-03-25 RX ADMIN — ASPIRIN 81 MG: 81 TABLET, COATED ORAL at 09:13

## 2018-03-25 RX ADMIN — TAMSULOSIN HYDROCHLORIDE 0.4 MG: 0.4 CAPSULE ORAL at 09:12

## 2018-03-25 RX ADMIN — OXYCODONE HYDROCHLORIDE 10 MG: 10 TABLET ORAL at 12:23

## 2018-03-25 RX ADMIN — LACTOBACILLUS ACIDOPHILUS / LACTOBACILLUS BULGARICUS 1 PACKET: 100 MILLION CFU STRENGTH GRANULES at 11:52

## 2018-03-25 RX ADMIN — BUDESONIDE AND FORMOTEROL FUMARATE DIHYDRATE 2 PUFF: 160; 4.5 AEROSOL RESPIRATORY (INHALATION) at 20:54

## 2018-03-25 RX ADMIN — PREGABALIN 75 MG: 75 CAPSULE ORAL at 06:09

## 2018-03-25 RX ADMIN — INSULIN GLARGINE 30 UNITS: 100 INJECTION, SOLUTION SUBCUTANEOUS at 06:10

## 2018-03-25 RX ADMIN — HYDRALAZINE HYDROCHLORIDE 50 MG: 50 TABLET, FILM COATED ORAL at 06:09

## 2018-03-25 RX ADMIN — PIPERACILLIN SODIUM AND TAZOBACTAM SODIUM 3.38 G: 3; .375 INJECTION, POWDER, FOR SOLUTION INTRAVENOUS at 21:00

## 2018-03-25 RX ADMIN — FAMOTIDINE 20 MG: 20 TABLET, FILM COATED ORAL at 20:55

## 2018-03-25 RX ADMIN — BUDESONIDE AND FORMOTEROL FUMARATE DIHYDRATE 2 PUFF: 160; 4.5 AEROSOL RESPIRATORY (INHALATION) at 09:12

## 2018-03-25 RX ADMIN — HYDROMORPHONE HYDROCHLORIDE 0.5 MG: 10 INJECTION, SOLUTION INTRAMUSCULAR; INTRAVENOUS; SUBCUTANEOUS at 20:54

## 2018-03-25 RX ADMIN — HYDRALAZINE HYDROCHLORIDE 50 MG: 50 TABLET, FILM COATED ORAL at 13:57

## 2018-03-25 RX ADMIN — THERA TABS 1 TABLET: TAB at 09:13

## 2018-03-25 ASSESSMENT — ENCOUNTER SYMPTOMS
MYALGIAS: 1
DIARRHEA: 0
NAUSEA: 0
VOMITING: 0
COUGH: 0
FEVER: 0
SHORTNESS OF BREATH: 0
CHILLS: 0
ABDOMINAL PAIN: 0
SPEECH CHANGE: 0

## 2018-03-25 ASSESSMENT — PAIN SCALES - GENERAL
PAINLEVEL_OUTOF10: 9
PAINLEVEL_OUTOF10: 8
PAINLEVEL_OUTOF10: 6

## 2018-03-25 NOTE — PROGRESS NOTES
Assume pt care. Pt a/o x3, VSS, No acute issues overnight. Pt rounds Q1-2hr with assessment of 4p's. IV assessment and care given. Pt education provided base on admission, care plan, current medications, and PRN. Pt LLE dressing CDI, changed. Left PICC dressing CDI, changed. IVF @ [facility]. Pt is DM FS ac/hs.     Disposition  Home    ID - Aim for at least 4 weeks of IV ab followed by PO Augmentin  Stop date IV 4/14/2018  If plan for home infusion-dapto/ertapenem done 3/21. Give dose of each prior to discharge - pt tolerated

## 2018-03-25 NOTE — PROGRESS NOTES
[facility] Hospitalist Progress Note    Date of Service: 3/25/2018    Chief Complaint  57 y.o. male with a PMH DM type 2 and B/L BKA admitted 3/14/2018 with left stump infection    Interval Problem Update  Doing fine, no new c/o.  Consultants/Specialty  LPS  ID  Ortho  Disposition  home        Review of Systems   Constitutional: Negative for chills, fever and malaise/fatigue.   All other systems reviewed and are negative.     Physical Exam  Laboratory/Imaging   Hemodynamics  Temp (24hrs), Av.5 °C (97.7 °F), Min:36 °C (96.8 °F), Max:36.8 °C (98.2 °F)   Temperature: 36.7 °C (98.1 °F)  Pulse  Av.4  Min: 71  Max: 102    Blood Pressure: 148/80      Respiratory      Respiration: 18, Pulse Oximetry: 96 %        RUL Breath Sounds: Clear, RML Breath Sounds: Clear, RLL Breath Sounds: Diminished, ANNA Breath Sounds: Clear, LLL Breath Sounds: Diminished    Fluids    Intake/Output Summary (Last 24 hours) at 18 1019  Last data filed at 18 0500   Gross per 24 hour   Intake              680 ml   Output             2750 ml   Net            -2070 ml       Nutrition  Orders Placed This Encounter   Procedures   • DIET ORDER     Standing Status:   Standing     Number of Occurrences:   1     Order Specific Question:   Diet:     Answer:   Diabetic [3]     Physical Exam   Constitutional: He is oriented to person, place, and time. He appears well-developed and well-nourished. No distress.   HENT:   Head: Normocephalic and atraumatic.   Mouth/Throat: Oropharynx is clear and moist.   Eyes: Conjunctivae are normal.   Neck: Neck supple.   Cardiovascular: Normal rate, regular rhythm and normal heart sounds.    Pulmonary/Chest: Effort normal and breath sounds normal. No respiratory distress. He has no wheezes. He has no rales.   Abdominal: Soft. Bowel sounds are normal. He exhibits no distension. There is no tenderness.   Musculoskeletal:   Bilateral BKA, L BKA stump in dressing   Neurological: He is alert and oriented to person,  place, and time. No cranial nerve deficit. Coordination normal.   Skin: Skin is warm.   Psychiatric: He has a normal mood and affect. His behavior is normal.   Nursing note and vitals reviewed.      Recent Labs      03/23/18   0153  03/25/18   0620   WBC  8.3  8.6   RBC  4.22*  4.69*   HEMOGLOBIN  11.1*  12.5*   HEMATOCRIT  34.5*  38.3*   MCV  81.8  81.7   MCH  26.3*  26.7*   MCHC  32.2*  32.6*   RDW  42.2  42.0   PLATELETCT  400  478*   MPV  9.2  9.0     Recent Labs      03/23/18   0153  03/25/18   0620   SODIUM  134*  134*   POTASSIUM  3.6  4.2   CHLORIDE  105  104   CO2  23  23   GLUCOSE  200*  152*   BUN  14  16   CREATININE  1.04  1.16   CALCIUM  7.8*  8.8                      Assessment/Plan     * Abscess of left lower extremity- (present on admission)   Assessment & Plan    L BKA stump cellulitis and abscess s/p I+D; doing better  Continue abx per ID, Zosyn; patient has PICC and would like to go home soon and continue home IV abx.  Unable to get insurance coverage for home IV abx so far, per .  Cont inpatient care pending this.          Infection of amputation stump of left lower extremity (CMS-[facility])- (present on admission)   Assessment & Plan    With purulent cellulitis and concern for underlying osteomyelitis  Cultures revealed E faecalis and E. Coli and B. fragilis  Switch to IV Zosyn, stop date 4/14/2018  PICC line placed  ID and Ortho onboard          Chronic hyponatremia- (present on admission)   Assessment & Plan    HCTZ stopped.  Stable level; monitor          Hypertension- (present on admission)   Assessment & Plan    Continue home antihypertensives.  Add ACE-I in light of DM. Controlled.        Type 2 diabetes mellitus (CMS-[facility])- (present on admission)   Assessment & Plan    Lalbile, cont current regimen of Lantus and adjust as indicated.        Tobacco dependence- (present on admission)   Assessment & Plan    Tobacco cessation education provided. Nicotine replacement protocol will be provided to  the patient.              Quality-Core Measures   Reviewed items::  Labs reviewed, Medications reviewed and Radiology images reviewed  DVT prophylaxis pharmacological::  Enoxaparin (Lovenox)  Antibiotics:  Treating active infection/contamination beyond 24 hours perioperative coverage    [facility] Hospitalist Progress Note    Date of Service: 3/25/2018    Chief Complaint  57 y.o. male with a PMH DM type 2 and B/L BKA admitted 3/14/2018 with left stump infection    Interval Problem Update  Initially agreed to SNF transfer yesterday but changed his mind and requested home IV abx instead.  SW working on getting insurance approval.  Doing fine otherwise without new c/o.  Consultants/Specialty  LPS  ID  Ortho  Disposition  home        Review of Systems   Constitutional: Negative for chills, fever and malaise/fatigue.   All other systems reviewed and are negative.     Physical Exam  Laboratory/Imaging   Hemodynamics  Temp (24hrs), Av.5 °C (97.7 °F), Min:36 °C (96.8 °F), Max:36.8 °C (98.2 °F)   Temperature: 36.7 °C (98.1 °F)  Pulse  Av.4  Min: 71  Max: 102    Blood Pressure: 148/80      Respiratory      Respiration: 18, Pulse Oximetry: 96 %        RUL Breath Sounds: Clear, RML Breath Sounds: Clear, RLL Breath Sounds: Diminished, ANNA Breath Sounds: Clear, LLL Breath Sounds: Diminished    Fluids    Intake/Output Summary (Last 24 hours) at 18 1020  Last data filed at 18 0500   Gross per 24 hour   Intake              680 ml   Output             2750 ml   Net            -2070 ml       Nutrition  Orders Placed This Encounter   Procedures   • DIET ORDER     Standing Status:   Standing     Number of Occurrences:   1     Order Specific Question:   Diet:     Answer:   Diabetic [3]     Physical Exam   Constitutional: He is oriented to person, place, and time. He appears well-developed and well-nourished. No distress.   HENT:   Head: Normocephalic and atraumatic.   Mouth/Throat: Oropharynx is clear and moist.   Eyes:  Conjunctivae are normal.   Neck: Neck supple.   Cardiovascular: Normal rate, regular rhythm and normal heart sounds.    Pulmonary/Chest: Effort normal and breath sounds normal. No respiratory distress. He has no wheezes. He has no rales.   Abdominal: Soft. Bowel sounds are normal. He exhibits no distension. There is no tenderness.   Musculoskeletal:   Bilateral BKA, L BKA stump in dressing   Neurological: He is alert and oriented to person, place, and time. No cranial nerve deficit. Coordination normal.   Skin: Skin is warm.   Psychiatric: He has a normal mood and affect. His behavior is normal.   Nursing note and vitals reviewed.      Recent Labs      03/23/18   0153  03/25/18   0620   WBC  8.3  8.6   RBC  4.22*  4.69*   HEMOGLOBIN  11.1*  12.5*   HEMATOCRIT  34.5*  38.3*   MCV  81.8  81.7   MCH  26.3*  26.7*   MCHC  32.2*  32.6*   RDW  42.2  42.0   PLATELETCT  400  478*   MPV  9.2  9.0     Recent Labs      03/23/18   0153  03/25/18   0620   SODIUM  134*  134*   POTASSIUM  3.6  4.2   CHLORIDE  105  104   CO2  23  23   GLUCOSE  200*  152*   BUN  14  16   CREATININE  1.04  1.16   CALCIUM  7.8*  8.8                      Assessment/Plan     * Abscess of left lower extremity- (present on admission)   Assessment & Plan    L BKA stump cellulitis and abscess s/p I+D; doing better  Continue abx per ID, Zosyn; patient has PICC and would like to go home soon and continue home IV abx.  Unable to get insurance coverage for home IV abx so far, per .  [facility] inpatient care pending this.          Infection of amputation stump of left lower extremity (CMS-[facility])- (present on admission)   Assessment & Plan    With purulent cellulitis and concern for underlying osteomyelitis  Cultures revealed E faecalis and E. Coli and B. fragilis  Switch to IV Zosyn, stop date 4/14/2018  PICC line placed  ID and Ortho onboard          Chronic hyponatremia- (present on admission)   Assessment & Plan    HCTZ stopped.  Stable level; monitor           Hypertension- (present on admission)   Assessment & Plan    Continue home antihypertensives.  Add ACE-I in light of DM. Controlled.        Type 2 diabetes mellitus (CMS-[facility])- (present on admission)   Assessment & Plan    Lalbile, cont current regimen of Lantus and adjust as indicated.        Tobacco dependence- (present on admission)   Assessment & Plan    Tobacco cessation education provided. Nicotine replacement protocol will be provided to the patient.              Quality-Core Measures   Reviewed items::  Labs reviewed, Medications reviewed and Radiology images reviewed  DVT prophylaxis pharmacological::  Enoxaparin (Lovenox)  Antibiotics:  Treating active infection/contamination beyond 24 hours perioperative coverage

## 2018-03-25 NOTE — CARE PLAN
Problem: Discharge Barriers/Planning  Goal: Patient's continuum of care needs will be met  Outcome: PROGRESSING AS EXPECTED  Discharge likely to happen on Monday.    Problem: Pain Management  Goal: Pain level will decrease to patient's comfort goal  Outcome: PROGRESSING AS EXPECTED  PRN medications provided as needed.

## 2018-03-25 NOTE — PROGRESS NOTES
Infectious Disease Progress Note    Author: Radha Hickman M.D. Date & Time of service: 3/25/2018  9:09 AM    Chief Complaint:  Left BKA stump infection    Interval History:  3/18/2018- Tmax98.5 WBC 7.5 troponin 0.94  3/19 AF WBC 9.1 denies SE abx  3/20 AF got PICC-tolerating abx well  3/21 AF WBC 9.7 tolerating abx well  3/22 AF WBC 11.5 plan for dc home  3/24 AF, no CBC, no new issues, discharge postponed, tolerated test dose of ertapenem and dapto  3/25 AF, resting comfortably and reading, no abd pain or diarrhea, waiting for post discharge abx to be arranged  Labs Reviewed, Medications Reviewed, Radiology Reviewed and Wound Reviewed.    Review of Systems:  Review of Systems   Constitutional: Negative for chills and fever.   HENT: Negative for hearing loss.    Respiratory: Negative for cough and shortness of breath.    Cardiovascular: Negative for chest pain.   Gastrointestinal: Negative for abdominal pain, diarrhea, nausea and vomiting.   Genitourinary: Negative for dysuria.   Musculoskeletal: Positive for myalgias.   Neurological: Negative for speech change.       Hemodynamics:  Temp (24hrs), Av.5 °C (97.7 °F), Min:36 °C (96.8 °F), Max:36.8 °C (98.2 °F)  Temperature: 36.7 °C (98.1 °F)  Pulse  Av.4  Min: 71  Max: 102   Blood Pressure: 148/80       Physical Exam:  Physical Exam   Constitutional: He is oriented to person, place, and time. He appears well-developed. No distress.   HENT:   Head: Normocephalic and atraumatic.   Mouth/Throat: No oropharyngeal exudate.   Eyes: EOM are normal. Pupils are equal, round, and reactive to light. No scleral icterus.   Neck: Neck supple.   Cardiovascular: Normal rate and regular rhythm.    No murmur heard.  Pulmonary/Chest: Effort normal. No respiratory distress.   Abdominal: Soft. He exhibits no distension.   Musculoskeletal: He exhibits no edema.   Right AKA  Left BKA sutures intact small dehiscence-no active drainage. No swelling    LUE PICC   Neurological: He  is alert and oriented to person, place, and time.   Skin: No rash noted.   Nursing note and vitals reviewed.      Meds:    Current Facility-Administered Medications:   •  [COMPLETED] piperacillin-tazobactam **AND** piperacillin-tazobactam  •  insulin glargine  •  insulin glargine  •  lisinopril  •  hydrALAZINE  •  PICC Line Insertion has been implemented **AND** May use Lidocaine 1% not to exceed 3 mls for local at insertion site **AND** NOTIFY MD **AND** Tip to dwell in the superior vena cava **AND** Further evaluation of the PICC placement can be retrieved from X-Ray and Imaging **AND** Blood draws through PICC line; draws by RN only **AND** FLUSHING GUIDELINES WHEN IN USE **AND** normal saline PF **AND** FLUSHING GUIDELINES WHEN NOT IN USE **AND** DRESSING MAINTENANCE **AND** Change needleless pressure ports and IV tubing every 72 hours per hospital policy **AND** TUBING **AND** If there is an MD order to remove the PICC line, any RN may remove the PICC line **AND** [] PATIENT EDUCATION MATERIALS **AND** NURSING COMMUNICATION  •  glipiZIDE  •  hydrALAZINE  •  labetalol  •  albuterol  •  amLODIPine  •  aspirin EC  •  budesonide-formoterol  •  citalopram  •  finasteride  •  mirtazapine  •  multivitamin  •  pregabalin  •  tamsulosin  •  senna-docusate **AND** polyethylene glycol/lytes **AND** magnesium hydroxide **AND** bisacodyl  •  enoxaparin  •  INITIATE NICOTINE REPLACEMENT PROTOCOL  **AND** nicotine **AND** Protocol 205 PATIENT EDUCATION MATERIALS **AND** Protocol 205 Rotate nicotine patch application sites daily  **AND** nicotine polacrilex  •  acetaminophen  •  Notify provider if pain remains uncontrolled **AND** Use the numeric rating scale (NRS-11) on regular floors and Critical-Care Pain Observation Tool (CPOT) on ICUs/Trauma to assess pain **AND** Pulse Ox (Oximetry) **AND** Pharmacy Consult Request **AND** If patient difficult to arouse and/or has respiratory depression, stop any opiates that are  currently infusing and call a Rapid Response. **AND** oxyCODONE immediate-release **AND** oxyCODONE immediate-release **AND** HYDROmorphone  •  insulin regular **AND** Accu-Chek ACHS **AND** NOTIFY MD and PharmD **AND** glucose 4 g **AND** dextrose 50%  •  Respiratory Care per Protocol  •  zolpidem  •  lactobacillus granules  •  famotidine    Labs:  Recent Labs      03/23/18   0153  03/25/18   0620   WBC  8.3  8.6   RBC  4.22*  4.69*   HEMOGLOBIN  11.1*  12.5*   HEMATOCRIT  34.5*  38.3*   MCV  81.8  81.7   MCH  26.3*  26.7*   RDW  42.2  42.0   PLATELETCT  400  478*   MPV  9.2  9.0   NEUTSPOLYS  53.00  61.00   LYMPHOCYTES  31.70  25.40   MONOCYTES  11.00  9.80   EOSINOPHILS  3.50  2.90   BASOPHILS  0.60  0.60     Recent Labs      03/23/18   0153  03/25/18   0620   SODIUM  134*  134*   POTASSIUM  3.6  4.2   CHLORIDE  105  104   CO2  23  23   GLUCOSE  200*  152*   BUN  14  16     Recent Labs      03/23/18   0153  03/25/18   0620   CREATININE  1.04  1.16       Imaging:  Dx-chest-portable (1 View)    Result Date: 3/14/2018  3/14/2018 3:54 PM HISTORY/REASON FOR EXAM:  Sepsis. Shortness of breath. Open wound left leg. TECHNIQUE/EXAM DESCRIPTION AND NUMBER OF VIEWS: Single portable view of the chest. COMPARISON:  2/1/2018 FINDINGS: The cardiac silhouette is within normal limits. No infiltrates or consolidations are noted. No pleural effusion is noted.     No acute cardiac or pulmonary abnormality is noted.    Dx-femur-2+ Left    Result Date: 3/14/2018  3/14/2018 3:54 PM HISTORY/REASON FOR EXAM:  Open wound left amputation site with pain. TECHNIQUE/EXAM DESCRIPTION AND NUMBER OF VIEWS:  2 views of the LEFT femur. COMPARISON: 2/3/2018 FINDINGS: No femoral fracture or dislocation is present. There is no soft tissue mass identified. There is a left below-knee amputation with stable edematous appearance of the left lower extremity stump. No subcutaneous gas is identified. There is no plain film evidence of osteomyelitis.     1.   "No acute left femoral fracture or dislocation. 2.  No evidence of left femoral or residual left tibia/fibula osteomyelitis.      Micro:  Results     Procedure Component Value Units Date/Time    BLOOD CULTURE [[id number]] Collected:  03/14/18 1507    Order Status:  Completed Specimen:  Blood from Peripheral Updated:  03/19/18 1700     Significant Indicator NEG     Source BLD     Site PERIPHERAL     Blood Culture No growth after 5 days of incubation.    Narrative:       Per Hospital Policy: Only change Specimen Src: to \"Line\" if  specified by physician order.    BLOOD CULTURE [[id number]] Collected:  03/14/18 1500    Order Status:  Completed Specimen:  Blood from Peripheral Updated:  03/19/18 1700     Significant Indicator NEG     Source BLD     Site PERIPHERAL     Blood Culture No growth after 5 days of incubation.    Narrative:       Per Hospital Policy: Only change Specimen Src: to \"Line\" if  specified by physician order.    CULTURE WOUND W/ GRAM STAIN [[id number]]  (Abnormal) Collected:  03/16/18 1535    Order Status:  Completed Specimen:  Wound Updated:  03/19/18 1029     Significant Indicator POS (POS)     Source WND     Site left knee amputation wound     Culture Result Wound -- (A)     Gram Stain Result Moderate WBCs.  Rare Gram positive cocci.  Rare Gram negative rods.       Culture Result Wound Escherichia coli  Light growth  See previous culture for sensitivity report.   (A)      Enterococcus faecalis  Light growth  See previous culture for sensitivity report.   (A)    ANAEROBIC CULTURE [[id number]]  (Abnormal) Collected:  03/16/18 1535    Order Status:  Completed Specimen:  Wound Updated:  03/19/18 1029     Significant Indicator POS (POS)     Source WND     Site left knee amputation wound     Anaerobic Culture, Culture Res Light growth Mixed anaerobic hugo including (A)      Bacteroides fragilis Group (A)          Assessment:  Active Hospital Problems    Diagnosis   • *Abscess of left lower extremity " [L02.416]   • Infection of amputation stump of left lower extremity (CMS-HCC) [T87.44]   • Chronic hyponatremia [E87.1]   • Hypertension [I10]   • Type 2 diabetes mellitus (CMS-HCC) [E11.9]   • Tobacco dependence [F17.200]   • PVD (peripheral vascular disease) (CMS-[facility]) [I73.9]       Plan:  BKA infection  Afebrile  No leukocytosis  Status post I and D on 3/16/2018  Cultures are Enterococcus faecalis, B frag, and Escherichia coli  Continue Zosyn while in hosp  Aim for at least 4 weeks of IV ab followed by PO Augmentin  Stop date IV 4/14/2018  If plan for home infusion-dapto/ertapenem done 3/21. Give dose of each prior to discharge - pt tolerated    Diabetes mellitus  Keep BS under 150 to help control current infection  Last glycohgb 11.2    PVD  Will impair wound healing    OK to DC pt once abx are arranged    [facility] ID clinic    AUGUSTO Deluca

## 2018-03-26 VITALS
HEIGHT: 72 IN | TEMPERATURE: 98.2 F | RESPIRATION RATE: 16 BRPM | HEART RATE: 82 BPM | WEIGHT: 203.26 LBS | BODY MASS INDEX: 27.53 KG/M2 | OXYGEN SATURATION: 98 % | DIASTOLIC BLOOD PRESSURE: 75 MMHG | SYSTOLIC BLOOD PRESSURE: 140 MMHG

## 2018-03-26 LAB
ANION GAP SERPL CALC-SCNC: 4 MMOL/L (ref 0–11.9)
BASOPHILS # BLD AUTO: 0.7 % (ref 0–1.8)
BASOPHILS # BLD: 0.05 K/UL (ref 0–0.12)
BUN SERPL-MCNC: 13 MG/DL (ref 8–22)
CALCIUM SERPL-MCNC: 8.8 MG/DL (ref 8.5–10.5)
CHLORIDE SERPL-SCNC: 103 MMOL/L (ref 96–112)
CO2 SERPL-SCNC: 25 MMOL/L (ref 20–33)
CREAT SERPL-MCNC: 1.18 MG/DL (ref 0.5–1.4)
EOSINOPHIL # BLD AUTO: 0.28 K/UL (ref 0–0.51)
EOSINOPHIL NFR BLD: 3.8 % (ref 0–6.9)
ERYTHROCYTE [DISTWIDTH] IN BLOOD BY AUTOMATED COUNT: 42.4 FL (ref 35.9–50)
GLUCOSE BLD-MCNC: 192 MG/DL (ref 65–99)
GLUCOSE BLD-MCNC: 236 MG/DL (ref 65–99)
GLUCOSE SERPL-MCNC: 248 MG/DL (ref 65–99)
HCT VFR BLD AUTO: 38.2 % (ref 42–52)
HGB BLD-MCNC: 12.8 G/DL (ref 14–18)
IMM GRANULOCYTES # BLD AUTO: 0.02 K/UL (ref 0–0.11)
IMM GRANULOCYTES NFR BLD AUTO: 0.3 % (ref 0–0.9)
LYMPHOCYTES # BLD AUTO: 2.7 K/UL (ref 1–4.8)
LYMPHOCYTES NFR BLD: 36.6 % (ref 22–41)
MCH RBC QN AUTO: 27.5 PG (ref 27–33)
MCHC RBC AUTO-ENTMCNC: 33.5 G/DL (ref 33.7–35.3)
MCV RBC AUTO: 82.2 FL (ref 81.4–97.8)
MONOCYTES # BLD AUTO: 0.58 K/UL (ref 0–0.85)
MONOCYTES NFR BLD AUTO: 7.9 % (ref 0–13.4)
NEUTROPHILS # BLD AUTO: 3.75 K/UL (ref 1.82–7.42)
NEUTROPHILS NFR BLD: 50.7 % (ref 44–72)
NRBC # BLD AUTO: 0 K/UL
NRBC BLD-RTO: 0 /100 WBC
PLATELET # BLD AUTO: 460 K/UL (ref 164–446)
PMV BLD AUTO: 9 FL (ref 9–12.9)
POTASSIUM SERPL-SCNC: 4.4 MMOL/L (ref 3.6–5.5)
RBC # BLD AUTO: 4.65 M/UL (ref 4.7–6.1)
SODIUM SERPL-SCNC: 132 MMOL/L (ref 135–145)
WBC # BLD AUTO: 7.4 K/UL (ref 4.8–10.8)

## 2018-03-26 PROCEDURE — 85025 COMPLETE CBC W/AUTO DIFF WBC: CPT

## 2018-03-26 PROCEDURE — 82962 GLUCOSE BLOOD TEST: CPT | Mod: 91

## 2018-03-26 PROCEDURE — 700102 HCHG RX REV CODE 250 W/ 637 OVERRIDE(OP): Performed by: INTERNAL MEDICINE

## 2018-03-26 PROCEDURE — 700105 HCHG RX REV CODE 258: Performed by: INTERNAL MEDICINE

## 2018-03-26 PROCEDURE — 99239 HOSP IP/OBS DSCHRG MGMT >30: CPT | Performed by: INTERNAL MEDICINE

## 2018-03-26 PROCEDURE — 700111 HCHG RX REV CODE 636 W/ 250 OVERRIDE (IP): Performed by: INTERNAL MEDICINE

## 2018-03-26 PROCEDURE — 80048 BASIC METABOLIC PNL TOTAL CA: CPT

## 2018-03-26 PROCEDURE — A9270 NON-COVERED ITEM OR SERVICE: HCPCS | Performed by: INTERNAL MEDICINE

## 2018-03-26 RX ORDER — LISINOPRIL 5 MG/1
5 TABLET ORAL DAILY
Qty: 30 TAB | Refills: 0 | Status: SHIPPED | OUTPATIENT
Start: 2018-03-26 | End: 2018-05-26

## 2018-03-26 RX ORDER — INSULIN GLARGINE 100 [IU]/ML
30 INJECTION, SOLUTION SUBCUTANEOUS EVERY MORNING
Qty: 10 ML | Refills: 0 | Status: SHIPPED | OUTPATIENT
Start: 2018-03-26 | End: 2018-05-26

## 2018-03-26 RX ORDER — HYDRALAZINE HYDROCHLORIDE 50 MG/1
50 TABLET, FILM COATED ORAL 3 TIMES DAILY
Qty: 90 TAB | Refills: 0 | Status: SHIPPED | OUTPATIENT
Start: 2018-03-26 | End: 2018-05-26

## 2018-03-26 RX ORDER — OXYCODONE HYDROCHLORIDE 10 MG/1
10 TABLET ORAL EVERY 6 HOURS PRN
Qty: 30 TAB | Refills: 0 | Status: SHIPPED | OUTPATIENT
Start: 2018-03-26 | End: 2018-04-02

## 2018-03-26 RX ORDER — L. ACIDOPHILUS/L.BULGARICUS 100MM CELL
1 GRANULES IN PACKET (EA) ORAL
Qty: 60 PACKET | Refills: 0 | Status: SHIPPED | OUTPATIENT
Start: 2018-03-26 | End: 2018-05-26

## 2018-03-26 RX ADMIN — TAMSULOSIN HYDROCHLORIDE 0.4 MG: 0.4 CAPSULE ORAL at 08:20

## 2018-03-26 RX ADMIN — PREGABALIN 75 MG: 75 CAPSULE ORAL at 05:43

## 2018-03-26 RX ADMIN — ASPIRIN 81 MG: 81 TABLET, COATED ORAL at 08:20

## 2018-03-26 RX ADMIN — INSULIN HUMAN 2 UNITS: 100 INJECTION, SOLUTION PARENTERAL at 05:44

## 2018-03-26 RX ADMIN — GLIPIZIDE 10 MG: 10 TABLET ORAL at 08:20

## 2018-03-26 RX ADMIN — BUDESONIDE AND FORMOTEROL FUMARATE DIHYDRATE 2 PUFF: 160; 4.5 AEROSOL RESPIRATORY (INHALATION) at 08:19

## 2018-03-26 RX ADMIN — HYDRALAZINE HYDROCHLORIDE 50 MG: 50 TABLET, FILM COATED ORAL at 13:10

## 2018-03-26 RX ADMIN — LACTOBACILLUS ACIDOPHILUS / LACTOBACILLUS BULGARICUS 1 PACKET: 100 MILLION CFU STRENGTH GRANULES at 11:59

## 2018-03-26 RX ADMIN — OXYCODONE HYDROCHLORIDE 10 MG: 10 TABLET ORAL at 08:19

## 2018-03-26 RX ADMIN — ENOXAPARIN SODIUM 40 MG: 100 INJECTION SUBCUTANEOUS at 08:20

## 2018-03-26 RX ADMIN — LACTOBACILLUS ACIDOPHILUS / LACTOBACILLUS BULGARICUS 1 PACKET: 100 MILLION CFU STRENGTH GRANULES at 08:20

## 2018-03-26 RX ADMIN — NICOTINE 21 MG: 21 PATCH, EXTENDED RELEASE TRANSDERMAL at 05:43

## 2018-03-26 RX ADMIN — HYDROMORPHONE HYDROCHLORIDE 0.5 MG: 10 INJECTION, SOLUTION INTRAMUSCULAR; INTRAVENOUS; SUBCUTANEOUS at 05:44

## 2018-03-26 RX ADMIN — OXYCODONE HYDROCHLORIDE 10 MG: 10 TABLET ORAL at 11:59

## 2018-03-26 RX ADMIN — AMLODIPINE BESYLATE 10 MG: 10 TABLET ORAL at 08:20

## 2018-03-26 RX ADMIN — PIPERACILLIN SODIUM AND TAZOBACTAM SODIUM 3.38 G: 3; .375 INJECTION, POWDER, FOR SOLUTION INTRAVENOUS at 05:43

## 2018-03-26 RX ADMIN — PREGABALIN 75 MG: 75 CAPSULE ORAL at 13:10

## 2018-03-26 RX ADMIN — CITALOPRAM HYDROBROMIDE 20 MG: 20 TABLET ORAL at 08:20

## 2018-03-26 RX ADMIN — INSULIN HUMAN 1 UNITS: 100 INJECTION, SOLUTION PARENTERAL at 12:03

## 2018-03-26 RX ADMIN — HYDRALAZINE HYDROCHLORIDE 50 MG: 50 TABLET, FILM COATED ORAL at 05:43

## 2018-03-26 RX ADMIN — PIPERACILLIN SODIUM AND TAZOBACTAM SODIUM 3.38 G: 3; .375 INJECTION, POWDER, FOR SOLUTION INTRAVENOUS at 12:28

## 2018-03-26 RX ADMIN — THERA TABS 1 TABLET: TAB at 08:20

## 2018-03-26 RX ADMIN — HYDROMORPHONE HYDROCHLORIDE 0.5 MG: 10 INJECTION, SOLUTION INTRAMUSCULAR; INTRAVENOUS; SUBCUTANEOUS at 13:10

## 2018-03-26 RX ADMIN — LISINOPRIL 5 MG: 5 TABLET ORAL at 08:20

## 2018-03-26 RX ADMIN — INSULIN GLARGINE 30 UNITS: 100 INJECTION, SOLUTION SUBCUTANEOUS at 05:43

## 2018-03-26 RX ADMIN — FINASTERIDE 5 MG: 5 TABLET, FILM COATED ORAL at 08:19

## 2018-03-26 ASSESSMENT — PAIN SCALES - GENERAL
PAINLEVEL_OUTOF10: 8
PAINLEVEL_OUTOF10: 9
PAINLEVEL_OUTOF10: 8
PAINLEVEL_OUTOF10: 8

## 2018-03-26 ASSESSMENT — ENCOUNTER SYMPTOMS
MYALGIAS: 1
FEVER: 0
SPEECH CHANGE: 0
ABDOMINAL PAIN: 0
SHORTNESS OF BREATH: 0
COUGH: 0
CHILLS: 0
NAUSEA: 0
DIARRHEA: 0
VOMITING: 0

## 2018-03-26 ASSESSMENT — LIFESTYLE VARIABLES: EVER_SMOKED: YES

## 2018-03-26 NOTE — PROGRESS NOTES
Pt finishing antibiotic and per SW pt set up with infusion center and ready for discharge. Home d/c medications reviewed with patient. Pt to d/c with PICC per Dr. Deluca for outpt IV antibiotics.

## 2018-03-26 NOTE — DISCHARGE INSTRUCTIONS
Discharge Instructions    Discharged to home by taxi with self. Discharged via wheelchair, hospital escort: Yes.  Special equipment needed: Wheelchair    Be sure to schedule a follow-up appointment with your primary care doctor or any specialists as instructed.     Discharge Plan:   Diet Plan: Discussed  Activity Level: Discussed  Smoking Cessation Offered: Patient Counseled  Confirmed Follow up Appointment: Appointment Scheduled  Confirmed Symptoms Management: Discussed  Medication Reconciliation Updated: Yes  Influenza Vaccine Indication: Not indicated: Previously immunized this influenza season and > 8 years of age    I understand that a diet low in cholesterol, fat, and sodium is recommended for good health. Unless I have been given specific instructions below for another diet, I accept this instruction as my diet prescription.   Other diet: Diabetic    Special Instructions: Discharge instructions for the Orthopedic Patient    Follow up with Primary Care Physician within 2 weeks of discharge to home, regarding:  Review of medications and diagnostic testing.  Surveillance for medical complications.  Workup and treatment of osteoporosis, if appropriate.     -Is this a Joint Replacement patient? No    -Is this patient being discharged with medication to prevent blood clots?  Yes, Aspirin Aspirin, ASA oral tablets  What is this medicine?  ASPIRIN (AS pir in) is a pain reliever. It is used to treat mild pain and fever. This medicine is also used as directed by a doctor to prevent and to treat heart attacks, to prevent strokes, and to treat arthritis or inflammation.  This medicine may be used for other purposes; ask your health care provider or pharmacist if you have questions.  COMMON BRAND NAME(S): Aspir-Low, Aspir-Erin, Aspirtab, Elba Advanced Aspirin, Elba Aspirin, Elba Aspirin Extra Strength, Elba Aspirin Plus, Elba Extra Strength, Elba Extra Strength Plus, Elba Genuine Aspirin, Elba Womens Aspirin,  Bufferin, Bufferin Extra Strength, Bufferin Low Dose  What should I tell my health care provider before I take this medicine?  They need to know if you have any of these conditions:  -anemia  -asthma  -bleeding problems  -child with chickenpox, the flu, or other viral infection  -diabetes  -gout  -if you frequently drink alcohol containing drinks  -kidney disease  -liver disease  -low level of vitamin K  -lupus  -smoke tobacco  -stomach ulcers or other problems  -an unusual or allergic reaction to aspirin, tartrazine dye, other medicines, dyes, or preservatives  -pregnant or trying to get pregnant  -breast-feeding  How should I use this medicine?  Take this medicine by mouth with a glass of water. Follow the directions on the package or prescription label. You can take this medicine with or without food. If it upsets your stomach, take it with food. Do not take your medicine more often than directed.  Talk to your pediatrician regarding the use of this medicine in children. While this drug may be prescribed for children as young as 12 years of age for selected conditions, precautions do apply. Children and teenagers should not use this medicine to treat chicken pox or flu symptoms unless directed by a doctor.  Patients over 65 years old may have a stronger reaction and need a smaller dose.  Overdosage: If you think you have taken too much of this medicine contact a poison control center or emergency room at once.  NOTE: This medicine is only for you. Do not share this medicine with others.  What if I miss a dose?  If you are taking this medicine on a regular schedule and miss a dose, take it as soon as you can. If it is almost time for your next dose, take only that dose. Do not take double or extra doses.  What may interact with this medicine?  Do not take this medicine with any of the following medications:  -cidofovir  -ketorolac  -probenecid  This medicine may also interact with the following  medications:  -alcohol  -alendronate  -bismuth subsalicylate  -flavocoxid  -herbal supplements like feverfew, garlic, amber, ginkgo biloba, horse chestnut  -medicines for diabetes or glaucoma like acetazolamide, methazolamide  -medicines for gout  -medicines that treat or prevent blood clots like enoxaparin, heparin, ticlopidine, warfarin  -other aspirin and aspirin-like medicines  -NSAIDs, medicines for pain and inflammation, like ibuprofen or naproxen  -pemetrexed  -sulfinpyrazone  -varicella live vaccine  This list may not describe all possible interactions. Give your health care provider a list of all the medicines, herbs, non-prescription drugs, or dietary supplements you use. Also tell them if you smoke, drink alcohol, or use illegal drugs. Some items may interact with your medicine.  What should I watch for while using this medicine?  If you are treating yourself for pain, tell your doctor or health care professional if the pain lasts more than 10 days, if it gets worse, or if there is a new or different kind of pain. Tell your doctor if you see redness or swelling. Also, check with your doctor if you have a fever that lasts for more than 3 days. Only take this medicine to prevent heart attacks or blood clotting if prescribed by your doctor or health care professional.  Do not take aspirin or aspirin-like medicines with this medicine. Too much aspirin can be dangerous. Always read the labels carefully.  This medicine can irritate your stomach or cause bleeding problems. Do not smoke cigarettes or drink alcohol while taking this medicine. Do not lie down for 30 minutes after taking this medicine to prevent irritation to your throat.  If you are scheduled for any medical or dental procedure, tell your healthcare provider that you are taking this medicine. You may need to stop taking this medicine before the procedure.  This medicine may be used to treat migraines. If you take migraine medicines for 10 or more  days a month, your migraines may get worse. Keep a diary of headache days and medicine use. Contact your healthcare professional if your migraine attacks occur more frequently.  What side effects may I notice from receiving this medicine?  Side effects that you should report to your doctor or health care professional as soon as possible:  -allergic reactions like skin rash, itching or hives, swelling of the face, lips, or tongue  -breathing problems  -changes in hearing, ringing in the ears  -confusion  -general ill feeling or flu-like symptoms  -pain on swallowing  -redness, blistering, peeling or loosening of the skin, including inside the mouth or nose  -signs and symptoms of bleeding such as bloody or black, tarry stools; red or dark-brown urine; spitting up blood or brown material that looks like coffee grounds; red spots on the skin; unusual bruising or bleeding from the eye, gums, or nose  -trouble passing urine or change in the amount of urine  -unusually weak or tired  -yellowing of the eyes or skin  Side effects that usually do not require medical attention (report to your doctor or health care professional if they continue or are bothersome):  -diarrhea or constipation  -headache  -nausea, vomiting  -stomach gas, heartburn  This list may not describe all possible side effects. Call your doctor for medical advice about side effects. You may report side effects to FDA at 0-883-FDA-0695.  Where should I keep my medicine?  Keep out of the reach of children.  Store at room temperature between 15 and 30 degrees C (59 and 86 degrees F). Protect from heat and moisture. Do not use this medicine if it has a strong vinegar smell. Throw away any unused medicine after the expiration date.  NOTE: This sheet is a summary. It may not cover all possible information. If you have questions about this medicine, talk to your doctor, pharmacist, or health care provider.  © 2018 Elsevier/Gold Standard (2014-08-19  "11:30:31)      · Is patient discharged on Warfarin / Coumadin?   No       PICC Home Guide  A peripherally inserted central catheter (PICC) is a long, thin, flexible tube that is inserted into a vein in the upper arm. It is a form of intravenous (IV) access. It is considered to be a \"central\" line because the tip of the PICC ends in a large vein in your chest. This large vein is called the superior vena cava (SVC). The PICC tip ends in the SVC because there is a lot of blood flow in the SVC. This allows medicines and IV fluids to be quickly distributed throughout the body. The PICC is inserted using a sterile technique by a specially trained nurse or physician. After the PICC is inserted, a chest X-ray exam is done to be sure it is in the correct place.   A PICC may be placed for different reasons, such as:  · To give medicines and liquid nutrition that can only be given through a central line. Examples are:  ¨ Certain antibiotic treatments.  ¨ Chemotherapy.  ¨ Total parenteral nutrition (TPN).  · To take frequent blood samples.  · To give IV fluids and blood products.  · If there is difficulty placing a peripheral intravenous (PIV) catheter.  If taken care of properly, a PICC can remain in place for several months. A PICC can also allow a person to go home from the hospital early. Medicine and PICC care can be managed at home by a family member or home health care team.  WHAT PROBLEMS CAN HAPPEN WHEN I HAVE A PICC?  Problems with a PICC can occasionally occur. These may include the following:  · A blood clot (thrombus) forming in or at the tip of the PICC. This can cause the PICC to become clogged. A clot-dissolving medicine called tissue plasminogen activator (tPA) can be given through the PICC to help break up the clot.  · Inflammation of the vein (phlebitis) in which the PICC is placed. Signs of inflammation may include redness, pain at the insertion site, red streaks, or being able to feel a \"cord\" in the vein " where the PICC is located.  · Infection in the PICC or at the insertion site. Signs of infection may include fever, chills, redness, swelling, or pus drainage from the PICC insertion site.  · PICC movement (malposition). The PICC tip may move from its original position due to excessive physical activity, forceful coughing, sneezing, or vomiting.  · A break or cut in the PICC. It is important to not use scissors near the PICC.  · Nerve or tendon irritation or injury during PICC insertion.  WHAT SHOULD I KEEP IN MIND ABOUT ACTIVITIES WHEN I HAVE A PICC?  · You may bend your arm and move it freely. If your PICC is near or at the bend of your elbow, avoid activity with repeated motion at the elbow.  · Rest at home for the remainder of the day following PICC line insertion.  · Avoid lifting heavy objects as instructed by your health care provider.  · Avoid using a crutch with the arm on the same side as your PICC. You may need to use a walker.  WHAT SHOULD I KNOW ABOUT MY PICC DRESSING?  · Keep your PICC bandage (dressing) clean and dry to prevent infection.  ¨ Ask your health care provider when you may shower. Ask your health care provider to teach you how to wrap the PICC when you do take a shower.  · Change the PICC dressing as instructed by your health care provider.  · Change your PICC dressing if it becomes loose or wet.  WHAT SHOULD I KNOW ABOUT PICC CARE?  · Check the PICC insertion site daily for leakage, redness, swelling, or pain.  · Do not take a bath, swim, or use hot tubs when you have a PICC. Cover PICC line with clear plastic wrap and tape to keep it dry while showering.  · Flush the PICC as directed by your health care provider. Let your health care provider know right away if the PICC is difficult to flush or does not flush. Do not use force to flush the PICC.  · Do not use a syringe that is less than 10 mL to flush the PICC.  · Never pull or tug on the PICC.  · Avoid blood pressure checks on the arm  "with the PICC.  · Keep your PICC identification card with you at all times.  · Do not take the PICC out yourself. Only a trained clinical professional should remove the PICC.  SEEK IMMEDIATE MEDICAL CARE IF:  · Your PICC is accidentally pulled all the way out. If this happens, cover the insertion site with a bandage or gauze dressing. Do not throw the PICC away. Your health care provider will need to inspect it.  · Your PICC was tugged or pulled and has partially come out. Do not  push the PICC back in.  · There is any type of drainage, redness, or swelling where the PICC enters the skin.  · You cannot flush the PICC, it is difficult to flush, or the PICC leaks around the insertion site when it is flushed.  · You hear a \"flushing\" sound when the PICC is flushed.  · You have pain, discomfort, or numbness in your arm, shoulder, or jaw on the same side as the PICC.  · You feel your heart \"racing\" or skipping beats.  · You notice a hole or tear in the PICC.  · You develop chills or a fever.  MAKE SURE YOU:   · Understand these instructions.  · Will watch your condition.  · Will get help right away if you are not doing well or get worse.     This information is not intended to replace advice given to you by your health care provider. Make sure you discuss any questions you have with your health care provider.     Document Released: 06/23/2004 Document Revised: 01/08/2016 Document Reviewed: 08/25/2014  Elsevier Interactive Patient Education ©2016 [employer]       Depression / Suicide Risk    As you are discharged from this Critical access hospital facility, it is important to learn how to keep safe from harming yourself.    Recognize the warning signs:  · Abrupt changes in personality, positive or negative- including increase in energy   · Giving away possessions  · Change in eating patterns- significant weight changes-  positive or negative  · Change in sleeping patterns- unable to sleep or sleeping all the " time   · Unwillingness or inability to communicate  · Depression  · Unusual sadness, discouragement and loneliness  · Talk of wanting to die  · Neglect of personal appearance   · Rebelliousness- reckless behavior  · Withdrawal from people/activities they love  · Confusion- inability to concentrate     If you or a loved one observes any of these behaviors or has concerns about self-harm, here's what you can do:  · Talk about it- your feelings and reasons for harming yourself  · Remove any means that you might use to hurt yourself (examples: pills, rope, extension cords, firearm)  · Get professional help from the community (Mental Health, Substance Abuse, psychological counseling)  · Do not be alone:Call your Safe Contact- someone whom you trust who will be there for you.  · Call your local CRISIS HOTLINE 420-7550 or 785-179-0381  · Call your local Children's Mobile Crisis Response Team [place] (218) 606-1712 or www.[employer]on  · Call the toll free National Suicide Prevention Hotlines   · National Suicide Prevention Lifeline 619-476-LVDV (1117)  · National Hope Line Network 800-SUICIDE (116-2711)

## 2018-03-26 NOTE — PROGRESS NOTES
[facility]ist Progress Note    Date of Service: 3/26/2018    Chief Complaint  57 y.o. male with a PMH DM type 2 and B/L BKA admitted 3/14/2018 with left stump infection    Interval Problem Update  Feels ok; no c/o  Consultants/Specialty  LPS  ID  Ortho  Disposition  home        Review of Systems   Constitutional: Negative for chills, fever and malaise/fatigue.   All other systems reviewed and are negative.     Physical Exam  Laboratory/Imaging   Hemodynamics  Temp (24hrs), Av.4 °C (97.5 °F), Min:36.1 °C (97 °F), Max:36.8 °C (98.2 °F)   Temperature: 36.8 °C (98.2 °F)  Pulse  Av.8  Min: 71  Max: 102    Blood Pressure: 140/75      Respiratory      Respiration: 16, Pulse Oximetry: 98 %        RUL Breath Sounds: Clear, RML Breath Sounds: Clear, RLL Breath Sounds: Diminished, ANNA Breath Sounds: Clear, LLL Breath Sounds: Diminished    Fluids    Intake/Output Summary (Last 24 hours) at 18 1013  Last data filed at 18 0915   Gross per 24 hour   Intake             1676 ml   Output             4900 ml   Net            -3224 ml       Nutrition  Orders Placed This Encounter   Procedures   • DIET ORDER     Standing Status:   Standing     Number of Occurrences:   1     Order Specific Question:   Diet:     Answer:   Diabetic [3]     Physical Exam   Constitutional: He is oriented to person, place, and time. He appears well-developed and well-nourished. No distress.   HENT:   Head: Normocephalic and atraumatic.   Mouth/Throat: Oropharynx is clear and moist.   Eyes: Conjunctivae are normal.   Neck: Neck supple.   Cardiovascular: Normal rate, regular rhythm and normal heart sounds.    Pulmonary/Chest: Effort normal and breath sounds normal. No respiratory distress. He has no wheezes. He has no rales.   Abdominal: Soft. Bowel sounds are normal. He exhibits no distension. There is no tenderness.   Musculoskeletal:   Bilateral BKA, L BKA stump in dressing   Neurological: He is alert and oriented to person, place,  and time. No cranial nerve deficit. Coordination normal.   Skin: Skin is warm.   Psychiatric: He has a normal mood and affect. His behavior is normal.   Nursing note and vitals reviewed.      Recent Labs      03/25/18 0620 03/26/18 0315   WBC  8.6  7.4   RBC  4.69*  4.65*   HEMOGLOBIN  12.5*  12.8*   HEMATOCRIT  38.3*  38.2*   MCV  81.7  82.2   MCH  26.7*  27.5   MCHC  32.6*  33.5*   RDW  42.0  42.4   PLATELETCT  478*  460*   MPV  9.0  9.0     Recent Labs      03/25/18 0620 03/26/18 0315   SODIUM  134*  132*   POTASSIUM  4.2  4.4   CHLORIDE  104  103   CO2  23  25   GLUCOSE  152*  248*   BUN  16  13   CREATININE  1.16  1.18   CALCIUM  8.8  8.8                      Assessment/Plan     * Abscess of left lower extremity- (present on admission)   Assessment & Plan    L BKA stump cellulitis and abscess s/p I+D; doing better  Continue abx per ID, Zosyn; patient has PICC and would like to go home soon and continue home IV abx.  Unable to get insurance coverage for home IV abx so far, per SW.  Cont inpatient care pending this.          Infection of amputation stump of left lower extremity (CMS-[facility])- (present on admission)   Assessment & Plan    With purulent cellulitis and concern for underlying osteomyelitis  Cultures revealed E faecalis and E. Coli and B. fragilis  Switch to IV Zosyn, stop date 4/14/2018  PICC line placed; change to Dapto/Ertapenem when go home per ID  ID and Ortho onboard          Chronic hyponatremia- (present on admission)   Assessment & Plan    HCTZ stopped.  Stable level; monitor          Hypertension- (present on admission)   Assessment & Plan    Continue home antihypertensives.  Add ACE-I in light of DM. Controlled.        Type 2 diabetes mellitus (CMS-[facility])- (present on admission)   Assessment & Plan    Lalbile, cont current regimen of Lantus and adjust as indicated.        Tobacco dependence- (present on admission)   Assessment & Plan    Tobacco cessation education provided. Nicotine  replacement protocol will be provided to the patient.              Quality-Core Measures   Reviewed items::  Labs reviewed, Medications reviewed and Radiology images reviewed  DVT prophylaxis pharmacological::  Enoxaparin (Lovenox)  Antibiotics:  Treating active infection/contamination beyond 24 hours perioperative coverage    [facility] Hospitalist Progress Note    Date of Service: 3/26/2018    Chief Complaint  57 y.o. male with a PMH DM type 2 and B/L BKA admitted 3/14/2018 with left stump infection    Interval Problem Update  Initially agreed to SNF transfer yesterday but changed his mind and requested home IV abx instead.  SW working on getting insurance approval.  Doing fine otherwise without new c/o.  Consultants/Specialty  LPS  ID  Ortho  Disposition  home        Review of Systems   Constitutional: Negative for chills, fever and malaise/fatigue.   All other systems reviewed and are negative.     Physical Exam  Laboratory/Imaging   Hemodynamics  Temp (24hrs), Av.4 °C (97.5 °F), Min:36.1 °C (97 °F), Max:36.8 °C (98.2 °F)   Temperature: 36.8 °C (98.2 °F)  Pulse  Av.8  Min: 71  Max: 102    Blood Pressure: 140/75      Respiratory      Respiration: 16, Pulse Oximetry: 98 %        RUL Breath Sounds: Clear, RML Breath Sounds: Clear, RLL Breath Sounds: Diminished, ANNA Breath Sounds: Clear, LLL Breath Sounds: Diminished    Fluids    Intake/Output Summary (Last 24 hours) at 18 1013  Last data filed at 18 0915   Gross per 24 hour   Intake             1676 ml   Output             4900 ml   Net            -3224 ml       Nutrition  Orders Placed This Encounter   Procedures   • DIET ORDER     Standing Status:   Standing     Number of Occurrences:   1     Order Specific Question:   Diet:     Answer:   Diabetic [3]     Physical Exam   Constitutional: He is oriented to person, place, and time. He appears well-developed and well-nourished. No distress.   HENT:   Head: Normocephalic and atraumatic.    Mouth/Throat: Oropharynx is clear and moist.   Eyes: Conjunctivae are normal.   Neck: Neck supple.   Cardiovascular: Normal rate, regular rhythm and normal heart sounds.    Pulmonary/Chest: Effort normal and breath sounds normal. No respiratory distress. He has no wheezes. He has no rales.   Abdominal: Soft. Bowel sounds are normal. He exhibits no distension. There is no tenderness.   Musculoskeletal:   Bilateral BKA, L BKA stump in dressing   Neurological: He is alert and oriented to person, place, and time. No cranial nerve deficit. Coordination normal.   Skin: Skin is warm.   Psychiatric: He has a normal mood and affect. His behavior is normal.   Nursing note and vitals reviewed.      Recent Labs      03/25/18   0620  03/26/18 0315   WBC  8.6  7.4   RBC  4.69*  4.65*   HEMOGLOBIN  12.5*  12.8*   HEMATOCRIT  38.3*  38.2*   MCV  81.7  82.2   MCH  26.7*  27.5   MCHC  32.6*  33.5*   RDW  42.0  42.4   PLATELETCT  478*  460*   MPV  9.0  9.0     Recent Labs      03/25/18   0620  03/26/18   0315   SODIUM  134*  132*   POTASSIUM  4.2  4.4   CHLORIDE  104  103   CO2  23  25   GLUCOSE  152*  248*   BUN  16  13   CREATININE  1.16  1.18   CALCIUM  8.8  8.8                      Assessment/Plan     * Abscess of left lower extremity- (present on admission)   Assessment & Plan    L BKA stump cellulitis and abscess s/p I+D; doing better  Continue abx per ID, Zosyn; patient has PICC and would like to go home soon and continue home IV abx.  Unable to get insurance coverage for home IV abx so far, per .  [facility] inpatient care pending this.          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 Coli and B. fragilis  Switch to IV Zosyn, stop date 4/14/2018  PICC line placed; change to Dapto/Ertapenem when go home per ID  ID and Ortho onboard          Chronic  hyponatremia- (present on admission)   Assessment & Plan    HCTZ stopped.  Stable level; monitor          Hypertension- (present on admission)   Assessment & Plan    Continue home antihypertensives.  Add ACE-I in light of DM. Controlled.        Type 2 diabetes mellitus (CMS-HCC)- (present on admission)   Assessment & Plan    Lalbile, cont current regimen of Lantus and adjust as indicated.        Tobacco dependence- (present on admission)   Assessment & Plan    Tobacco cessation education provided. Nicotine replacement protocol will be provided to the patient.              Quality-Core Measures   Reviewed items::  Labs reviewed, Medications reviewed and Radiology images reviewed  DVT prophylaxis pharmacological::  Enoxaparin (Lovenox)  Antibiotics:  Treating active infection/contamination beyond 24 hours perioperative coverage

## 2018-03-26 NOTE — PROGRESS NOTES
Infectious Disease Progress Note    Author: Radha Hickman M.D. Date & Time of service: 3/26/2018  11:41 AM    Chief Complaint:  Left BKA stump infection    Interval History:  3/18/2018- Tmax98.5 WBC 7.5 troponin 0.94  3/19 AF WBC 9.1 denies SE abx  3/20 AF got PICC-tolerating abx well  3/21 AF WBC 9.7 tolerating abx well  3/22 AF WBC 11.5 plan for dc home  3/24 AF, no CBC, no new issues, discharge postponed, tolerated test dose of ertapenem and dapto  3/25 AF, resting comfortably and reading, no abd pain or diarrhea, waiting for post discharge abx to be arranged  3/26 AF, no new issues, [facility] appt tmrw, plan for DC home after afternoon IV abx dose  Labs Reviewed, Medications Reviewed, Radiology Reviewed and Wound Reviewed.    Review of Systems:  Review of Systems   Constitutional: Negative for chills and fever.   HENT: Negative for hearing loss.    Respiratory: Negative for cough and shortness of breath.    Cardiovascular: Negative for chest pain.   Gastrointestinal: Negative for abdominal pain, diarrhea, nausea and vomiting.   Genitourinary: Negative for dysuria.   Musculoskeletal: Positive for myalgias.   Neurological: Negative for speech change.       Hemodynamics:  Temp (24hrs), Av.4 °C (97.5 °F), Min:36.1 °C (97 °F), Max:36.8 °C (98.2 °F)  Temperature: 36.8 °C (98.2 °F)  Pulse  Av.8  Min: 71  Max: 102   Blood Pressure: 140/75       Physical Exam:  Physical Exam   Constitutional: He is oriented to person, place, and time. He appears well-developed. No distress.   HENT:   Head: Normocephalic and atraumatic.   Mouth/Throat: No oropharyngeal exudate.   Eyes: EOM are normal. Pupils are equal, round, and reactive to light. No scleral icterus.   Neck: Neck supple.   Cardiovascular: Normal rate and regular rhythm.    No murmur heard.  Pulmonary/Chest: Effort normal. No respiratory distress.   Abdominal: Soft. He exhibits no distension.   Musculoskeletal: He exhibits no edema.   Right AKA  Left BKA sutures  intact small dehiscence-no active drainage. No swelling    LUE PICC   Neurological: He is alert and oriented to person, place, and time.   Skin: No rash noted.   Nursing note and vitals reviewed.      Meds:    Current Facility-Administered Medications:   •  [COMPLETED] piperacillin-tazobactam **AND** piperacillin-tazobactam  •  insulin glargine  •  insulin glargine  •  lisinopril  •  hydrALAZINE  •  PICC Line Insertion has been implemented **AND** May use Lidocaine 1% not to exceed 3 mls for local at insertion site **AND** NOTIFY MD **AND** Tip to dwell in the superior vena cava **AND** Further evaluation of the PICC placement can be retrieved from X-Ray and Imaging **AND** Blood draws through PICC line; draws by RN only **AND** FLUSHING GUIDELINES WHEN IN USE **AND** normal saline PF **AND** FLUSHING GUIDELINES WHEN NOT IN USE **AND** DRESSING MAINTENANCE **AND** Change needleless pressure ports and IV tubing every 72 hours per hospital policy **AND** TUBING **AND** If there is an MD order to remove the PICC line, any RN may remove the PICC line **AND** [] PATIENT EDUCATION MATERIALS **AND** NURSING COMMUNICATION  •  glipiZIDE  •  hydrALAZINE  •  labetalol  •  albuterol  •  amLODIPine  •  aspirin EC  •  budesonide-formoterol  •  citalopram  •  finasteride  •  mirtazapine  •  multivitamin  •  pregabalin  •  tamsulosin  •  senna-docusate **AND** polyethylene glycol/lytes **AND** magnesium hydroxide **AND** bisacodyl  •  enoxaparin  •  INITIATE NICOTINE REPLACEMENT PROTOCOL  **AND** nicotine **AND** Protocol 205 PATIENT EDUCATION MATERIALS **AND** Protocol 205 Rotate nicotine patch application sites daily  **AND** nicotine polacrilex  •  acetaminophen  •  Notify provider if pain remains uncontrolled **AND** Use the numeric rating scale (NRS-11) on regular floors and Critical-Care Pain Observation Tool (CPOT) on ICUs/Trauma to assess pain **AND** Pulse Ox (Oximetry) **AND** Pharmacy Consult Request **AND** If  patient difficult to arouse and/or has respiratory depression, stop any opiates that are currently infusing and call a Rapid Response. **AND** oxyCODONE immediate-release **AND** oxyCODONE immediate-release **AND** HYDROmorphone  •  insulin regular **AND** Accu-Chek ACHS **AND** NOTIFY MD and PharmD **AND** glucose 4 g **AND** dextrose 50%  •  Respiratory Care per Protocol  •  zolpidem  •  lactobacillus granules  •  famotidine    Labs:  Recent Labs      03/25/18   0620  03/26/18   0315   WBC  8.6  7.4   RBC  4.69*  4.65*   HEMOGLOBIN  12.5*  12.8*   HEMATOCRIT  38.3*  38.2*   MCV  81.7  82.2   MCH  26.7*  27.5   RDW  42.0  42.4   PLATELETCT  478*  460*   MPV  9.0  9.0   NEUTSPOLYS  61.00  50.70   LYMPHOCYTES  25.40  36.60   MONOCYTES  9.80  7.90   EOSINOPHILS  2.90  3.80   BASOPHILS  0.60  0.70     Recent Labs      03/25/18   0620  03/26/18   0315   SODIUM  134*  132*   POTASSIUM  4.2  4.4   CHLORIDE  104  103   CO2  23  25   GLUCOSE  152*  248*   BUN  16  13     Recent Labs      03/25/18   0620  03/26/18   0315   CREATININE  1.16  1.18       Imaging:  Dx-chest-portable (1 View)    Result Date: 3/14/2018  3/14/2018 3:54 PM HISTORY/REASON FOR EXAM:  Sepsis. Shortness of breath. Open wound left leg. TECHNIQUE/EXAM DESCRIPTION AND NUMBER OF VIEWS: Single portable view of the chest. COMPARISON:  2/1/2018 FINDINGS: The cardiac silhouette is within normal limits. No infiltrates or consolidations are noted. No pleural effusion is noted.     No acute cardiac or pulmonary abnormality is noted.    Dx-femur-2+ Left    Result Date: 3/14/2018  3/14/2018 3:54 PM HISTORY/REASON FOR EXAM:  Open wound left amputation site with pain. TECHNIQUE/EXAM DESCRIPTION AND NUMBER OF VIEWS:  2 views of the LEFT femur. COMPARISON: 2/3/2018 FINDINGS: No femoral fracture or dislocation is present. There is no soft tissue mass identified. There is a left below-knee amputation with stable edematous appearance of the left lower extremity stump. No  "subcutaneous gas is identified. There is no plain film evidence of osteomyelitis.     1.  No acute left femoral fracture or dislocation. 2.  No evidence of left femoral or residual left tibia/fibula osteomyelitis.      Micro:  Results     Procedure Component Value Units Date/Time    BLOOD CULTURE [[id number]] Collected:  03/14/18 1507    Order Status:  Completed Specimen:  Blood from Peripheral Updated:  03/19/18 1700     Significant Indicator NEG     Source BLD     Site PERIPHERAL     Blood Culture No growth after 5 days of incubation.    Narrative:       Per Hospital Policy: Only change Specimen Src: to \"Line\" if  specified by physician order.    BLOOD CULTURE [[id number]] Collected:  03/14/18 1500    Order Status:  Completed Specimen:  Blood from Peripheral Updated:  03/19/18 1700     Significant Indicator NEG     Source BLD     Site PERIPHERAL     Blood Culture No growth after 5 days of incubation.    Narrative:       Per Hospital Policy: Only change Specimen Src: to \"Line\" if  specified by physician order.          Assessment:  Active Hospital Problems    Diagnosis   • *Abscess of left lower extremity [L02.416]   • Infection of amputation stump of left lower extremity (CMS-HCC) [T87.44]   • Chronic hyponatremia [E87.1]   • Hypertension [I10]   • Type 2 diabetes mellitus (CMS-HCC) [E11.9]   • Tobacco dependence [F17.200]   • PVD (peripheral vascular disease) (CMS-HCC) [I73.9]       Plan:  Left BKA infection  Afebrile  No leukocytosis  Status post I and D on 3/16/2018  Cultures are Enterococcus faecalis, B frag, and Escherichia coli  Continue Zosyn while in hosp  Aim for at least 4 weeks of IV ab followed by PO Augmentin  Stop date IV 4/14/2018  If plan for home infusion-dapto/ertapenem done 3/21. Give dose of each prior to discharge - pt tolerated    Diabetes mellitus  Keep BS under 150 to help control current infection  Last glycohgb 11.2    PVD  Will impair wound healing    [facility] appt 3/27    [facility] ID clinic    DW " Dr. Deluca, RN and MSW. ID signing off.

## 2018-03-26 NOTE — PROGRESS NOTES
Blood pressure 140/75, pulse 82, temperature 36.8 °C (98.2 °F), resp. rate 16, height 1.829 m (6'), weight 92.2 kg (203 lb 4.2 oz), SpO2 98 %.    Recent Labs      03/25/18   0620  03/26/18   0315   WBC  8.6  7.4   RBC  4.69*  4.65*   HEMOGLOBIN  12.5*  12.8*   HEMATOCRIT  38.3*  38.2*   MCV  81.7  82.2   MCH  26.7*  27.5   MCHC  32.6*  33.5*   RDW  42.0  42.4   PLATELETCT  478*  460*   MPV  9.0  9.0       POD# 10 I&D left residual limb abscess    Plan:  DVT Prophylaxis- TEDS/SCDs, LMWH  Weight Bearing Status- NWB LLE  PT/OT  Antibiotics: per ID  Case Coordination

## 2018-03-26 NOTE — DISCHARGE SUMMARY
CHIEF COMPLAINT ON ADMISSION  Chief Complaint   Patient presents with   • Wound Infection     Left lef       CODE STATUS  Full Code    HPI & HOSPITAL COURSE   This is a 57 y.o. male who presented 3/14/2018 with Wound Infection (Left leg).He lives at home, is a bilateral LE amputee due to diabetes, has C and follows [place] [facility]. He is seen bu Wound care nurses and early Feb, blisters noted on his left BKA stump. This became swollen and had purulent discharge today. He has subjective fevers and chills for the past 2 days. He has no other symptoms or complaints. He has diabetes and admits they aren't well controlled. He also smokes. He navigates at home with wheelchair.  Patient was found to have abscess of L BKA stump and underwent I+D on 3/16/18 with Dr. Dyson.  ID, Dr. Jarvis, consulted on the case and helped with abx's selection.  Wound culture grew Enterococcus faecalis, B frag, and Escherichia coli.  Patient did well after I+D and has been arranged for outpatient IV abx's with Ertapenem/Daptomycin at infusion clinic, per ID recommendation..  He needs to see Ortho again in 2 weeks for suture removal. He will go home instead of SNF as previously planned, per his request.  Patient can transfer independently to wheelchair at baseline.  He has labile DM for which Lantus dosage was adjusted and needs close monitoring.     The patient met 2-midnight criteria for an inpatient stay at the time of discharge.    Therefore, he is discharged in good and stable condition with close outpatient follow-up.    SPECIFIC OUTPATIENT FOLLOW-UP  ID/Ortho in 2 weeks; pcp in 1 week    DISCHARGE PROBLEM LIST  Principal Problem:    Abscess of left lower extremity POA: Yes  Active Problems:    Infection of amputation stump of left lower extremity (CMS-[facility]) POA: Yes    Type 2 diabetes mellitus (CMS-HCC) POA: Yes    Hypertension POA: Yes    Chronic hyponatremia POA: Yes    PVD (peripheral vascular disease) (CMS-[facility]) POA: Yes    Tobacco  dependence POA: Yes  Resolved Problems:    * No resolved hospital problems. *      FOLLOW UP  Future Appointments  Date Time Provider Department Center   3/27/2018 11:30 AM RN 6 [facility]   3/28/2018 8:00 AM [address]   3/28/2018 10:30 AM RN 1 [facility]   3/29/2018 7:00 AM RN 2 [facility]   3/30/2018 7:00 AM RN 2 [facility]   3/31/2018 7:00 AM RN 2 [facility]   4/1/2018 7:00 AM RN 2 [facility]   4/2/2018 7:00 AM RN 1 [facility]   4/3/2018 7:00 AM RN 2 [facility]   4/4/2018 7:00 AM RN 1 [facility]   4/5/2018 7:00 AM RN 1 [facility]   4/6/2018 7:00 AM RN 2 [facility]   4/7/2018 7:00 AM RN 2 [facility]   4/8/2018 7:00 AM RN 1 [facility]   4/9/2018 7:00 AM RN 2 [facility]   4/10/2018 7:00 AM RN 1 [facility]   4/11/2018 7:00 AM RN 1 [facility]   4/12/2018 7:00 AM RN 1 [facility]   4/13/2018 7:30 AM RN 2 [facility]   4/14/2018 7:00 AM RN 2 [facility]     No follow-up provider specified.    MEDICATIONS ON DISCHARGE   Jv Angelo   Home Medication Instructions AGUSTO:[id number]    Printed on:03/26/18 1526   Medication Information                      acetaminophen (TYLENOL) 325 MG Tab  Take 2 Tabs by mouth every 6 hours as needed (Mild Pain; (Pain scale 1-3); Temp greater than 100.5 F).             albuterol 108 (90 Base) MCG/ACT Aero Soln inhalation aerosol  Inhale 2 Puffs by mouth every four hours as needed for Shortness of Breath.             amlodipine (NORVASC) 10 MG Tab  Take 1 Tab by mouth every day.             aspirin 81 MG tablet  Take 81 mg by mouth every day.             budesonide-formoterol (SYMBICORT) 160-4.5 MCG/ACT Aerosol  Inhale 2 Puffs by mouth 2 Times a Day.             citalopram (CELEXA) 20 MG Tab  Take 1 Tab by mouth every day.             finasteride (PROSCAR) 5 MG Tab  Take 1 Tab by mouth every day.             glipiZIDE (GLUCOTROL) 10 MG Tab  Take 1 Tab by mouth 2 times a day.              hydrALAZINE (APRESOLINE) 50 MG Tab  Take 1 Tab by mouth 3 times a day.             insulin glargine (LANTUS) 100 UNIT/ML Solution  Inject 30 Units as instructed every morning.             insulin regular (HUMULIN R) 100 Unit/mL Solution  Inject 1-6 Units as instructed 4 Times a Day,Before Meals and at Bedtime.             lactobacillus granules (LACTINEX/FLORANEX) Pack  Take 1 Packet by mouth 3 times a day, with meals.             lisinopril (PRINIVIL) 5 MG Tab  Take 1 Tab by mouth every day.             mirtazapine (REMERON) 45 MG tablet  Take 1 Tab by mouth every bedtime.             multivitamin (THERAGRAN) Tab  Take 1 Tab by mouth every day.             nicotine (NICODERM) 21 MG/24HR PATCH 24 HR  Apply 1 Patch to skin as directed every 24 hours.             oxyCODONE immediate release (ROXICODONE) 10 MG immediate release tablet  Take 1 Tab by mouth every 6 hours as needed for Moderate Pain or Severe Pain for up to 7 days.             pregabalin (LYRICA) 75 MG Cap  Take 75 mg by mouth 3 times a day.             raNITidine (ZANTAC) 150 MG Tab  Take 150 mg by mouth every evening.             tamsulosin (FLOMAX) 0.4 MG capsule  Take 1 Cap by mouth ONE-HALF HOUR AFTER BREAKFAST.                 DIET  Orders Placed This Encounter   Procedures   • DIET ORDER     Standing Status:   Standing     Number of Occurrences:   1     Order Specific Question:   Diet:     Answer:   Diabetic [3]       ACTIVITY  As tolerated.  Non-weight bearing LEFT leg      CONSULTATIONS  Ortho/ID    PROCEDURES  See above    LABORATORY  Lab Results   Component Value Date/Time    SODIUM 132 (L) 03/26/2018 03:15 AM    POTASSIUM 4.4 03/26/2018 03:15 AM    CHLORIDE 103 03/26/2018 03:15 AM    CO2 25 03/26/2018 03:15 AM    GLUCOSE 248 (H) 03/26/2018 03:15 AM    BUN 13 03/26/2018 03:15 AM    CREATININE 1.18 03/26/2018 03:15 AM        Lab Results   Component Value Date/Time    WBC 7.4 03/26/2018 03:15 AM    HEMOGLOBIN 12.8 (L) 03/26/2018 03:15 AM     HEMATOCRIT 38.2 (L) 03/26/2018 03:15 AM    PLATELETCT 460 (H) 03/26/2018 03:15 AM        Total time of the discharge process exceeds 40 minutes

## 2018-03-26 NOTE — PROGRESS NOTES
Received report and assumed patient care at 1900. Patient is A&Ox4 this evening. NS running TKO. Dilaudid provided for pain. Blood glucose was 188 tonight, and pt refused 1 unit of coverage. Patients bed is locked and in lowest position, call light and bedside table are within reach, wheelchair outside of pt room, and hourly rounding in place.

## 2018-03-26 NOTE — DISCHARGE PLANNING
Medical Social Work  Informed patient he was accepted by [facility] out patient infusion.  Told him his initial date and time and location.  Patient stated he will take the bus and does not feel it will be an issue getting there or back home.

## 2018-03-26 NOTE — CARE PLAN
Problem: Pain Management  Goal: Pain level will decrease to patient's comfort goal  Outcome: PROGRESSING AS EXPECTED  PRN pain meds provided.     Problem: Skin Integrity  Goal: Risk for impaired skin integrity will decrease  Outcome: PROGRESSING AS EXPECTED

## 2018-03-26 NOTE — PROGRESS NOTES
Pt provided PICC home care education. Given PICC card and paperwork from insertion. Pt reports understanding info provided.

## 2018-03-26 NOTE — PROGRESS NOTES
Pt left with escort via personal wheelchair. All belongings left with patient and pt leaving via cab.

## 2018-03-27 ENCOUNTER — APPOINTMENT (OUTPATIENT)
Dept: RADIOLOGY | Facility: MEDICAL CENTER | Age: 58
End: 2018-03-27
Attending: EMERGENCY MEDICINE
Payer: COMMERCIAL

## 2018-03-27 ENCOUNTER — APPOINTMENT (OUTPATIENT)
Dept: RADIOLOGY | Facility: MEDICAL CENTER | Age: 58
End: 2018-03-27
Payer: COMMERCIAL

## 2018-03-27 ENCOUNTER — PATIENT OUTREACH (OUTPATIENT)
Dept: HEALTH INFORMATION MANAGEMENT | Facility: OTHER | Age: 58
End: 2018-03-27

## 2018-03-27 ENCOUNTER — APPOINTMENT (OUTPATIENT)
Dept: ONCOLOGY | Facility: MEDICAL CENTER | Age: 58
End: 2018-03-27
Attending: INTERNAL MEDICINE
Payer: COMMERCIAL

## 2018-03-27 ENCOUNTER — HOSPITAL ENCOUNTER (EMERGENCY)
Facility: MEDICAL CENTER | Age: 58
End: 2018-03-27
Attending: EMERGENCY MEDICINE
Payer: COMMERCIAL

## 2018-03-27 VITALS
SYSTOLIC BLOOD PRESSURE: 175 MMHG | WEIGHT: 202.82 LBS | OXYGEN SATURATION: 95 % | HEART RATE: 96 BPM | BODY MASS INDEX: 27.51 KG/M2 | RESPIRATION RATE: 16 BRPM | DIASTOLIC BLOOD PRESSURE: 102 MMHG

## 2018-03-27 DIAGNOSIS — E16.1 HYPOGLYCEMIC REACTION: ICD-10-CM

## 2018-03-27 LAB
ALBUMIN SERPL BCP-MCNC: 4.2 G/DL (ref 3.2–4.9)
ALBUMIN/GLOB SERPL: 1.1 G/DL
ALP SERPL-CCNC: 63 U/L (ref 30–99)
ALT SERPL-CCNC: 10 U/L (ref 2–50)
ANION GAP SERPL CALC-SCNC: 13 MMOL/L (ref 0–11.9)
AST SERPL-CCNC: 34 U/L (ref 12–45)
BASOPHILS # BLD AUTO: 0.1 % (ref 0–1.8)
BASOPHILS # BLD: 0.02 K/UL (ref 0–0.12)
BILIRUB SERPL-MCNC: 0.4 MG/DL (ref 0.1–1.5)
BUN SERPL-MCNC: 19 MG/DL (ref 8–22)
CALCIUM SERPL-MCNC: 10.2 MG/DL (ref 8.5–10.5)
CHLORIDE SERPL-SCNC: 100 MMOL/L (ref 96–112)
CO2 SERPL-SCNC: 20 MMOL/L (ref 20–33)
CREAT SERPL-MCNC: 0.91 MG/DL (ref 0.5–1.4)
EKG IMPRESSION: NORMAL
EKG IMPRESSION: NORMAL
EOSINOPHIL # BLD AUTO: 0 K/UL (ref 0–0.51)
EOSINOPHIL NFR BLD: 0 % (ref 0–6.9)
ERYTHROCYTE [DISTWIDTH] IN BLOOD BY AUTOMATED COUNT: 41.1 FL (ref 35.9–50)
GLOBULIN SER CALC-MCNC: 3.7 G/DL (ref 1.9–3.5)
GLUCOSE BLD-MCNC: 138 MG/DL (ref 65–99)
GLUCOSE BLD-MCNC: 59 MG/DL (ref 65–99)
GLUCOSE SERPL-MCNC: 55 MG/DL (ref 65–99)
HCT VFR BLD AUTO: 45.2 % (ref 42–52)
HGB BLD-MCNC: 14.9 G/DL (ref 14–18)
IMM GRANULOCYTES # BLD AUTO: 0.07 K/UL (ref 0–0.11)
IMM GRANULOCYTES NFR BLD AUTO: 0.5 % (ref 0–0.9)
LYMPHOCYTES # BLD AUTO: 0.76 K/UL (ref 1–4.8)
LYMPHOCYTES NFR BLD: 5.3 % (ref 22–41)
MCH RBC QN AUTO: 26.9 PG (ref 27–33)
MCHC RBC AUTO-ENTMCNC: 33 G/DL (ref 33.7–35.3)
MCV RBC AUTO: 81.6 FL (ref 81.4–97.8)
MONOCYTES # BLD AUTO: 0.68 K/UL (ref 0–0.85)
MONOCYTES NFR BLD AUTO: 4.7 % (ref 0–13.4)
NEUTROPHILS # BLD AUTO: 12.93 K/UL (ref 1.82–7.42)
NEUTROPHILS NFR BLD: 89.4 % (ref 44–72)
NRBC # BLD AUTO: 0 K/UL
NRBC BLD-RTO: 0 /100 WBC
PLATELET # BLD AUTO: 588 K/UL (ref 164–446)
PMV BLD AUTO: 9.4 FL (ref 9–12.9)
POTASSIUM SERPL-SCNC: 3.9 MMOL/L (ref 3.6–5.5)
PROT SERPL-MCNC: 7.9 G/DL (ref 6–8.2)
RBC # BLD AUTO: 5.54 M/UL (ref 4.7–6.1)
SODIUM SERPL-SCNC: 133 MMOL/L (ref 135–145)
WBC # BLD AUTO: 14.5 K/UL (ref 4.8–10.8)

## 2018-03-27 PROCEDURE — 36584 COMPL RPLCMT PICC RS&I: CPT

## 2018-03-27 PROCEDURE — A9270 NON-COVERED ITEM OR SERVICE: HCPCS | Performed by: EMERGENCY MEDICINE

## 2018-03-27 PROCEDURE — 93005 ELECTROCARDIOGRAM TRACING: CPT | Performed by: EMERGENCY MEDICINE

## 2018-03-27 PROCEDURE — 71045 X-RAY EXAM CHEST 1 VIEW: CPT | Mod: XU

## 2018-03-27 PROCEDURE — 80053 COMPREHEN METABOLIC PANEL: CPT

## 2018-03-27 PROCEDURE — 82962 GLUCOSE BLOOD TEST: CPT

## 2018-03-27 PROCEDURE — 93005 ELECTROCARDIOGRAM TRACING: CPT

## 2018-03-27 PROCEDURE — 99284 EMERGENCY DEPT VISIT MOD MDM: CPT

## 2018-03-27 PROCEDURE — 85025 COMPLETE CBC W/AUTO DIFF WBC: CPT

## 2018-03-27 PROCEDURE — 700102 HCHG RX REV CODE 250 W/ 637 OVERRIDE(OP): Performed by: EMERGENCY MEDICINE

## 2018-03-27 RX ORDER — OXYCODONE HYDROCHLORIDE 10 MG/1
10 TABLET ORAL ONCE
Status: COMPLETED | OUTPATIENT
Start: 2018-03-27 | End: 2018-03-27

## 2018-03-27 RX ADMIN — OXYCODONE HYDROCHLORIDE 10 MG: 10 TABLET ORAL at 12:14

## 2018-03-27 NOTE — DISCHARGE PLANNING
Medical Social Work  SW asked to assist Pt in getting transport home.    SW contacted [facility] and arranged transport.  [facility] cab will be here 30-60 minutes,    RN nursing station notified.

## 2018-03-27 NOTE — PROCEDURES
PICC Insertion Procedure Note    Consents confirmed, vessel patency confirmed with ultrasound, real time ultrasound image printed and placed in patient chart. Risks and benefits of procedure explained to patient/family and education regarding central line associated bloodstream infections provided. Questions answered.     PICC exchanged in LUE per MD order with ultrasound guidance. 4 Fr, single lumen PICC placed in basilic vein after 1 attempt(s). 3 cc's of 1% lidocaine injected intradermally, 21 gauge microintroducer needle and modified Seldinger technique used. 42 cm total catheter length, 1 cm external measurement inserted with good blood return. Each lumen flushed without resistance with 10 mL 0.9% normal saline. PICC line secured with Biopatch and Tegaderm.    PICC tip location in the SVC confirmed by ECG technology. Pt tolerated procedure well.  Patient condition relayed to unit RN or ordering physician via this post procedure note in the EMR.     [employer] PICC ref # [id number], Lot # [id number]

## 2018-03-27 NOTE — PROGRESS NOTES
Chart reviewed.  Pt was discharged from [facility] 3/26/18 and readmitted to [facility] ER today 3/27/18.  Pt does not qualify for discharge outreach phone call at this time d/t readmission.

## 2018-03-27 NOTE — ED TRIAGE NOTES
Pt presents to ED from home via EMS with ALOC; per EMS, pt was found naked on floor of home, A&Ox1; EMS reported BS found to be 49, was given oral glucose, which then increased to 70 PTA; EMS stated pt was d/c'd from [facility] last NOC and was due for a wound appt this Am; RUE Picc in place on arrival-picc doesn't flush or draw

## 2018-03-28 ENCOUNTER — TELEPHONE (OUTPATIENT)
Dept: INFECTIOUS DISEASES | Facility: MEDICAL CENTER | Age: 58
End: 2018-03-28

## 2018-03-28 ENCOUNTER — PATIENT OUTREACH (OUTPATIENT)
Dept: HEALTH INFORMATION MANAGEMENT | Facility: OTHER | Age: 58
End: 2018-03-28

## 2018-03-28 ENCOUNTER — OUTPATIENT INFUSION SERVICES (OUTPATIENT)
Dept: ONCOLOGY | Facility: MEDICAL CENTER | Age: 58
End: 2018-03-28
Attending: INTERNAL MEDICINE
Payer: COMMERCIAL

## 2018-03-28 VITALS
HEIGHT: 72 IN | SYSTOLIC BLOOD PRESSURE: 139 MMHG | DIASTOLIC BLOOD PRESSURE: 90 MMHG | HEART RATE: 95 BPM | TEMPERATURE: 97.9 F | RESPIRATION RATE: 18 BRPM | WEIGHT: 204 LBS | BODY MASS INDEX: 27.63 KG/M2 | OXYGEN SATURATION: 96 %

## 2018-03-28 DIAGNOSIS — M86.9 OSTEOMYELITIS OF LEFT LOWER EXTREMITY (HCC): ICD-10-CM

## 2018-03-28 LAB — CK SERPL-CCNC: 2254 U/L (ref 0–154)

## 2018-03-28 PROCEDURE — 700111 HCHG RX REV CODE 636 W/ 250 OVERRIDE (IP): Performed by: INTERNAL MEDICINE

## 2018-03-28 PROCEDURE — 96368 THER/DIAG CONCURRENT INF: CPT

## 2018-03-28 PROCEDURE — 96365 THER/PROPH/DIAG IV INF INIT: CPT

## 2018-03-28 PROCEDURE — 36592 COLLECT BLOOD FROM PICC: CPT

## 2018-03-28 PROCEDURE — 700105 HCHG RX REV CODE 258: Performed by: INTERNAL MEDICINE

## 2018-03-28 PROCEDURE — 82550 ASSAY OF CK (CPK): CPT

## 2018-03-28 RX ADMIN — DAPTOMYCIN 550 MG: 500 INJECTION, POWDER, LYOPHILIZED, FOR SOLUTION INTRAVENOUS at 11:45

## 2018-03-28 RX ADMIN — SODIUM CHLORIDE 1000 MG: 900 INJECTION INTRAVENOUS at 11:45

## 2018-03-28 ASSESSMENT — PAIN SCALES - GENERAL: PAINLEVEL: 5=MODERATE PAIN

## 2018-03-28 NOTE — ED PROVIDER NOTES
ED Provider Note    CHIEF COMPLAINT  Chief Complaint   Patient presents with   • ALOC       HPI  Jv Angelo is a 57 y.o. male who presents to the emergency room today with complaint of altered level consciousness. Patient was found by his neighbor altered and when paramedics arrived they found his blood sugar to be markedly low. He is given IV glucose and symptoms resolved. He is recently discharged from the hospital last 24 hours. Been treated for sepsis infection to his left lower leg where the amputation site was. Currently receives IV antibiotics he has a PICC line which was disrupted while he was hypoglycemic. He denies chest pain or shortness of breath no fever chills no changes to bowel or bladder or other complaints at this time. He states he has not eaten a meal since yesterday and but has been taking his insulin as directed most likely causing his hypoglycemic reaction.    REVIEW OF SYSTEMS  See HPI for further details. All other systems are negative.     PAST MEDICAL HISTORY  Past Medical History:   Diagnosis Date   • BPH (benign prostatic hyperplasia) 12/20/2017   • Diabetes (CMS-HCC)    • Has received first dose of hepatitis B vaccine    • Hypertension    • Renal disorder    • Renal failure        FAMILY HISTORY  [unfilled]    SOCIAL HISTORY  Social History     Social History   • Marital status: Single     Spouse name: N/A   • Number of children: N/A   • Years of education: N/A     Social History Main Topics   • Smoking status: Current Every Day Smoker     Packs/day: 1.00     Years: 20.00     Types: Cigarettes     Last attempt to quit: 12/26/2005   • Smokeless tobacco: Never Used   • Alcohol use No   • Drug use: No   • Sexual activity: Not on file     Other Topics Concern   • Not on file     Social History Narrative   • No narrative on file       SURGICAL HISTORY  Past Surgical History:   Procedure Laterality Date   • IRRIGATION & DEBRIDEMENT ORTHO Left 3/16/2018    Procedure: IRRIGATION & DEBRIDEMENT  ORTHO-LEFT BKA wound;  Surgeon: Jose Dyson M.D.;  Location: SURGERY [facility];  Service: Orthopedics   • IRRIGATION & DEBRIDEMENT ORTHO Left 12/26/2015    Procedure: IRRIGATION & DEBRIDEMENT ORTHO and wound closure of BKA;  Surgeon: William Welsh M.D.;  Location: SURGERY [facility];  Service:    • OTHER ORTHOPEDIC SURGERY Left 2015    bka   • OTHER ORTHOPEDIC SURGERY Right 2013    bka   • OTHER ORTHOPEDIC SURGERY      R forearm deep lac repair       CURRENT MEDICATIONS  Home Medications    **Home medications have not yet been reviewed for this encounter**         ALLERGIES  Allergies   Allergen Reactions   • Fish Anaphylaxis and Shortness of Breath       PHYSICAL EXAM  VITAL SIGNS: BP (!) 175/102   Pulse 96   Resp 16   Wt 92 kg (202 lb 13.2 oz)   SpO2 95%   BMI 27.51 kg/m²  Room air O2: 100    Constitutional: Well developed, Well nourished,  acute distress, Non-toxic appearance.   HENT: Normocephalic, Atraumatic, Bilateral external ears normal, Oropharynx moist, No oral exudates, Nose normal.   Eyes: PERRLA, EOMI, Conjunctiva normal, No discharge.   Neck: Normal range of motion, No tenderness, Supple, No stridor.   Lymphatic: No lymphadenopathy noted.   Cardiovascular: Normal heart rate, Normal rhythm, No murmurs, No rubs, No gallops.   Thorax & Lungs: Normal breath sounds, No respiratory distress, No wheezing, No chest tenderness.   Abdomen: Bowel sounds normal, Soft, No tenderness, No masses, No pulsatile masses.   Skin: Warm, Dry, No erythema, No rash.   Back: No tenderness, No CVA tenderness.   Extremities: Intact distal pulses, No edema, No tenderness, No cyanosis, No clubbing.   Musculoskeletal: Below the knee amputation on the left there is some scabbing noted over the inferior aspect but no other signs of infection noted.  Neurologic: Alert & oriented x 3, Normal motor function, Normal sensory function, No focal deficits noted.   Psychiatric: Affect normal, Judgment normal, Mood  normal.     EKG  Normal sinus rhythm at 90 beats per minute, no ST elevation or depression, no widening of the QRS complex, good R wave progression, WV intervals are normal, no diagnostic Q waves noted. This is a 12-lead EKG there is no previous EKG available at this time for comparison.      RADIOLOGY/PROCEDURES  DX-CHEST-LIMITED (1 VIEW)   Final Result      Left PICC line placement with projecting over the proximal right atrium.      IR-PICC EXCHANGE, COMPLETE    (Results Pending)         COURSE & MEDICAL DECISION MAKING  Pertinent Labs & Imaging studies reviewed. (See chart for details)  Patient had replacement of his PICC line, blood sugars have maintained in the normal range she is awake alert and symptoms have resolved. Discussed frequent meals and monitoring his blood sugars more closely continue his medications. On reexamination he feels much improved discharged in stable improved condition as above to home for follow-up return if further problems.    FINAL IMPRESSION  1. Acute hypoglycemic reaction  2. History of recent sepsis, currently undergoing antibiotic treatment  3. Diabetes mellitus by history         Electronically signed by: Michi Bruner, 3/27/2018 8:14 PM

## 2018-03-28 NOTE — PROGRESS NOTES
Placed discharge outreach phone call to patient s/p ER discharge 3/27/18.  Left voicemail providing my contact information and instructions to call with any questions or concerns.

## 2018-03-28 NOTE — PROGRESS NOTES
"Pt arrived to IS via wheelchair, for new Dapto/Invanz infusion. Pt 38 minutes late for appt, stating \"they told me when I left the hospital that my appointment was at 1130.\" Pt counseled on tardiness policy. PICC line flushed per policy, positive blood return noted. Baseline CPK drawn per order. Pt educated about dapto and invanz infusion and potential side effects. Dapto and invanz infused with no s/sx of adverse reaction. PICC line flushed per policy. Pt left IS with no s/sx of distress. Follow up appointment confirmed.     1400: Lola from Lab called with critical result of CPK 2,254 at 1415. Critical lab result read back to Lola.   Cee Munoz and Dr. Hayden notified of critical lab result at 1430.  Critical lab result read back by Cee Munoz. Per Cee, pt is to be sent to the ER due to potential rhabdo as pt was found down at home on 3/27. Call placed to pt, pt's phone was off. Message left regarding need for immediate evaluation in the ER. Call received again from Cee Munoz, informed her that his phone was off. MD office to attempt follow up and will send non-emergency police for a well-check visit if no response.   "

## 2018-03-28 NOTE — TELEPHONE ENCOUNTER
Infusion RN called regarding critical CPK level of 2254. This is patients first infusion. He was discharged from [facility] 3/26/18 due to Left BKA stump infection/osteo. Cultures are Enterococcus faecalis, B frag, and Escherichia coli. Discharged on IV Ertapenem and Daptomycin. He was not seen at the [facility] yesterday due to being in the ED. He was brought in to the ED after being found down at home, altered, A+O x 1. Found to be hypoglycemic in the field. His PICC was replaced in the ER as it was dislodged.  Baseline labs done today at [facility]. CPK 2254. Concern of rhabdo. Pt referred to the ED. He will need placement/case coordination assistance regarding antibiotic infusion. Due to elevated CPK may need to be changed to IV Zosyn which can not be done on a daily basis at the [facility]. Infusion RN attempted to contact patient without success, stating that his phone went directly to voicemail.  MA attempted to call patient without success. Police/[employer] contacted for a well check.     Case discussed with Dr Jarvis.

## 2018-03-29 ENCOUNTER — HOSPITAL ENCOUNTER (EMERGENCY)
Facility: MEDICAL CENTER | Age: 58
End: 2018-03-29
Attending: EMERGENCY MEDICINE
Payer: COMMERCIAL

## 2018-03-29 ENCOUNTER — OUTPATIENT INFUSION SERVICES (OUTPATIENT)
Dept: ONCOLOGY | Facility: MEDICAL CENTER | Age: 58
End: 2018-03-29
Attending: INTERNAL MEDICINE
Payer: COMMERCIAL

## 2018-03-29 VITALS
WEIGHT: 203 LBS | HEIGHT: 72 IN | DIASTOLIC BLOOD PRESSURE: 79 MMHG | RESPIRATION RATE: 18 BRPM | BODY MASS INDEX: 27.5 KG/M2 | HEART RATE: 95 BPM | OXYGEN SATURATION: 96 % | TEMPERATURE: 98.3 F | SYSTOLIC BLOOD PRESSURE: 151 MMHG

## 2018-03-29 VITALS — WEIGHT: 203.93 LBS | BODY MASS INDEX: 27.66 KG/M2

## 2018-03-29 DIAGNOSIS — L03.116 CELLULITIS OF LEFT LOWER EXTREMITY: ICD-10-CM

## 2018-03-29 LAB
ALBUMIN SERPL BCP-MCNC: 3.6 G/DL (ref 3.2–4.9)
ALBUMIN/GLOB SERPL: 1.1 G/DL
ALP SERPL-CCNC: 59 U/L (ref 30–99)
ALT SERPL-CCNC: 16 U/L (ref 2–50)
ANION GAP SERPL CALC-SCNC: 8 MMOL/L (ref 0–11.9)
AST SERPL-CCNC: 30 U/L (ref 12–45)
BASOPHILS # BLD AUTO: 0.5 % (ref 0–1.8)
BASOPHILS # BLD: 0.05 K/UL (ref 0–0.12)
BILIRUB SERPL-MCNC: 0.3 MG/DL (ref 0.1–1.5)
BUN SERPL-MCNC: 20 MG/DL (ref 8–22)
CALCIUM SERPL-MCNC: 9.2 MG/DL (ref 8.5–10.5)
CHLORIDE SERPL-SCNC: 102 MMOL/L (ref 96–112)
CK SERPL-CCNC: 995 U/L (ref 0–154)
CO2 SERPL-SCNC: 23 MMOL/L (ref 20–33)
CREAT SERPL-MCNC: 1.05 MG/DL (ref 0.5–1.4)
EOSINOPHIL # BLD AUTO: 0.26 K/UL (ref 0–0.51)
EOSINOPHIL NFR BLD: 2.4 % (ref 0–6.9)
ERYTHROCYTE [DISTWIDTH] IN BLOOD BY AUTOMATED COUNT: 43.8 FL (ref 35.9–50)
GLOBULIN SER CALC-MCNC: 3.2 G/DL (ref 1.9–3.5)
GLUCOSE SERPL-MCNC: 224 MG/DL (ref 65–99)
HCT VFR BLD AUTO: 39.8 % (ref 42–52)
HGB BLD-MCNC: 12.7 G/DL (ref 14–18)
IMM GRANULOCYTES # BLD AUTO: 0.03 K/UL (ref 0–0.11)
IMM GRANULOCYTES NFR BLD AUTO: 0.3 % (ref 0–0.9)
LYMPHOCYTES # BLD AUTO: 2.17 K/UL (ref 1–4.8)
LYMPHOCYTES NFR BLD: 19.7 % (ref 22–41)
MCH RBC QN AUTO: 26.3 PG (ref 27–33)
MCHC RBC AUTO-ENTMCNC: 31.9 G/DL (ref 33.7–35.3)
MCV RBC AUTO: 82.6 FL (ref 81.4–97.8)
MONOCYTES # BLD AUTO: 1.18 K/UL (ref 0–0.85)
MONOCYTES NFR BLD AUTO: 10.7 % (ref 0–13.4)
NEUTROPHILS # BLD AUTO: 7.34 K/UL (ref 1.82–7.42)
NEUTROPHILS NFR BLD: 66.4 % (ref 44–72)
NRBC # BLD AUTO: 0 K/UL
NRBC BLD-RTO: 0 /100 WBC
PLATELET # BLD AUTO: 566 K/UL (ref 164–446)
PMV BLD AUTO: 9.1 FL (ref 9–12.9)
POTASSIUM SERPL-SCNC: 4.2 MMOL/L (ref 3.6–5.5)
PROT SERPL-MCNC: 6.8 G/DL (ref 6–8.2)
RBC # BLD AUTO: 4.82 M/UL (ref 4.7–6.1)
SODIUM SERPL-SCNC: 133 MMOL/L (ref 135–145)
WBC # BLD AUTO: 11 K/UL (ref 4.8–10.8)

## 2018-03-29 PROCEDURE — 99284 EMERGENCY DEPT VISIT MOD MDM: CPT

## 2018-03-29 PROCEDURE — 306780 HCHG STAT FOR TRANSFUSION PER CASE

## 2018-03-29 PROCEDURE — 85025 COMPLETE CBC W/AUTO DIFF WBC: CPT

## 2018-03-29 PROCEDURE — 80053 COMPREHEN METABOLIC PANEL: CPT

## 2018-03-29 PROCEDURE — 36415 COLL VENOUS BLD VENIPUNCTURE: CPT

## 2018-03-29 PROCEDURE — 82550 ASSAY OF CK (CPK): CPT

## 2018-03-29 ASSESSMENT — PAIN SCALES - GENERAL: PAINLEVEL_OUTOF10: 0

## 2018-03-29 NOTE — DISCHARGE INSTRUCTIONS
Cellulitis, Adult  Cellulitis is a skin infection. The infected area is usually red and tender. This condition occurs most often in the arms and lower legs. The infection can travel to the muscles, blood, and underlying tissue and become serious. It is very important to get treated for this condition.  What are the causes?  Cellulitis is caused by bacteria. The bacteria enter through a break in the skin, such as a cut, burn, insect bite, open sore, or crack.  What increases the risk?  This condition is more likely to occur in people who:  · Have a weak defense system (immune system).  · Have open wounds on the skin such as cuts, burns, bites, and scrapes. Bacteria can enter the body through these open wounds.  · Are older.  · Have diabetes.  · Have a type of long-lasting (chronic) liver disease (cirrhosis) or kidney disease.  · Use IV drugs.  What are the signs or symptoms?  Symptoms of this condition include:  · Redness, streaking, or spotting on the skin.  · Swollen area of the skin.  · Tenderness or pain when an area of the skin is touched.  · Warm skin.  · Fever.  · Chills.  · Blisters.  How is this diagnosed?  This condition is diagnosed based on a medical history and physical exam. You may also have tests, including:  · Blood tests.  · Lab tests.  · Imaging tests.  How is this treated?  Treatment for this condition may include:  · Medicines, such as antibiotic medicines or antihistamines.  · Supportive care, such as rest and application of cold or warm cloths (cold or warm compresses) to the skin.  · Hospital care, if the condition is severe.  The infection usually gets better within 1-2 days of treatment.  Follow these instructions at home:  · Take over-the-counter and prescription medicines only as told by your health care provider.  · If you were prescribed an antibiotic medicine, take it as told by your health care provider. Do not stop taking the antibiotic even if you start to feel better.  · Drink  enough fluid to keep your urine clear or pale yellow.  · Do not touch or rub the infected area.  · Raise (elevate) the infected area above the level of your heart while you are sitting or lying down.  · Apply warm or cold compresses to the affected area as told by your health care provider.  · Keep all follow-up visits as told by your health care provider. This is important. These visits let your health care provider make sure a more serious infection is not developing.  Contact a health care provider if:  · You have a fever.  · Your symptoms do not improve within 1-2 days of starting treatment.  · Your bone or joint underneath the infected area becomes painful after the skin has healed.  · Your infection returns in the same area or another area.  · You notice a swollen bump in the infected area.  · You develop new symptoms.  · You have a general ill feeling (malaise) with muscle aches and pains.  Get help right away if:  · Your symptoms get worse.  · You feel very sleepy.  · You develop vomiting or diarrhea that persists.  · You notice red streaks coming from the infected area.  · Your red area gets larger or turns dark in color.  This information is not intended to replace advice given to you by your health care provider. Make sure you discuss any questions you have with your health care provider.  Document Released: 09/27/2006 Document Revised: 04/27/2017 Document Reviewed: 10/26/2016  [employer] Patient Education © 2017 [employer]  Return if fever, vomiting or if no better in 12 hours.

## 2018-03-29 NOTE — ED TRIAGE NOTES
Chief Complaint   Patient presents with   • Abnormal Labs     sent for CPK 2254     Sent from infusion center. Pt reports feeling well today.  Blood pressure 151/79, pulse 97, temperature 36.8 °C (98.3 °F), resp. rate 18, height 1.829 m (6'), weight 92.1 kg (203 lb), SpO2 100 %.    Pt informed of wait times. Educated on triage process.  Asked to return to triage RN for any new or worsening of symptoms. Thanked for patience.

## 2018-03-29 NOTE — PROGRESS NOTES
"Patient presents ambulatory via wheelchair for IV antibiotics.  Patient states \"they sent a  to my house last night and told me to come back here but I knew you'd be closed\".  Explained to patient they meant for him to go to ER, explained to patient the concern regarding his CPK level and need to be evaluated regarding that prior to any further antibiotics per MD.  Patient agreeable to go to ER.  Report called to ER charge RN and escorted to ER by CNA.  "

## 2018-03-29 NOTE — ED TRIAGE NOTES
To 20 with same report as triage note.  Pt denies CP.  Reports currently going to infusion center for IV abx due to infection to LLE.  Denies fever.

## 2018-03-29 NOTE — ED NOTES
Pt was seen in this ED on 3/27 for hypoglycemic event.  Found on ground with ALOC.  Pt reports possible seizure

## 2018-03-29 NOTE — ED NOTES
D/C home with written and verbal instructions re: Rx, activity, f/u.  Verbalizes understanding.  Out via own w/c

## 2018-03-30 ENCOUNTER — OUTPATIENT INFUSION SERVICES (OUTPATIENT)
Dept: ONCOLOGY | Facility: MEDICAL CENTER | Age: 58
End: 2018-03-30
Attending: INTERNAL MEDICINE
Payer: COMMERCIAL

## 2018-03-30 VITALS
HEART RATE: 91 BPM | RESPIRATION RATE: 18 BRPM | DIASTOLIC BLOOD PRESSURE: 84 MMHG | BODY MASS INDEX: 27.54 KG/M2 | SYSTOLIC BLOOD PRESSURE: 170 MMHG | OXYGEN SATURATION: 100 % | TEMPERATURE: 98 F | WEIGHT: 203.04 LBS

## 2018-03-30 DIAGNOSIS — M86.9 OSTEOMYELITIS OF LEFT LOWER EXTREMITY (HCC): ICD-10-CM

## 2018-03-30 LAB — CK SERPL-CCNC: 517 U/L (ref 0–154)

## 2018-03-30 PROCEDURE — 96368 THER/DIAG CONCURRENT INF: CPT

## 2018-03-30 PROCEDURE — 96365 THER/PROPH/DIAG IV INF INIT: CPT

## 2018-03-30 PROCEDURE — 700111 HCHG RX REV CODE 636 W/ 250 OVERRIDE (IP): Performed by: INTERNAL MEDICINE

## 2018-03-30 PROCEDURE — 700105 HCHG RX REV CODE 258: Performed by: INTERNAL MEDICINE

## 2018-03-30 PROCEDURE — 82550 ASSAY OF CK (CPK): CPT

## 2018-03-30 PROCEDURE — 36592 COLLECT BLOOD FROM PICC: CPT

## 2018-03-30 RX ADMIN — SODIUM CHLORIDE 1000 MG: 900 INJECTION INTRAVENOUS at 07:08

## 2018-03-30 RX ADMIN — DAPTOMYCIN 550 MG: 500 INJECTION, POWDER, LYOPHILIZED, FOR SOLUTION INTRAVENOUS at 07:07

## 2018-03-30 ASSESSMENT — PAIN SCALES - GENERAL: PAINLEVEL: NO PAIN

## 2018-03-30 NOTE — PROGRESS NOTES
Pt arrives ambulatory via wheelchair for antibiotic infusion.  PICC line flushed with saline, blood return noted, and labs collected per MD order.  Medication infused per orders with no complications or adverse reactions.  Pt to return tomorrow for next appt, confirmed with patient.  PICC line flushed per protocol, blood return noted, clave changed, gauze and arm wrap applied to site.  Patient left via wheelchair ambulatory in no distress.

## 2018-03-31 ENCOUNTER — OUTPATIENT INFUSION SERVICES (OUTPATIENT)
Dept: ONCOLOGY | Facility: MEDICAL CENTER | Age: 58
End: 2018-03-31
Attending: INTERNAL MEDICINE
Payer: COMMERCIAL

## 2018-03-31 VITALS
TEMPERATURE: 98.6 F | OXYGEN SATURATION: 99 % | RESPIRATION RATE: 18 BRPM | HEART RATE: 101 BPM | DIASTOLIC BLOOD PRESSURE: 94 MMHG | SYSTOLIC BLOOD PRESSURE: 176 MMHG

## 2018-03-31 PROCEDURE — 700111 HCHG RX REV CODE 636 W/ 250 OVERRIDE (IP): Performed by: INTERNAL MEDICINE

## 2018-03-31 PROCEDURE — 96368 THER/DIAG CONCURRENT INF: CPT

## 2018-03-31 PROCEDURE — 700105 HCHG RX REV CODE 258: Performed by: INTERNAL MEDICINE

## 2018-03-31 PROCEDURE — 96365 THER/PROPH/DIAG IV INF INIT: CPT

## 2018-03-31 RX ADMIN — DAPTOMYCIN 550 MG: 500 INJECTION, POWDER, LYOPHILIZED, FOR SOLUTION INTRAVENOUS at 06:53

## 2018-03-31 RX ADMIN — SODIUM CHLORIDE 1000 MG: 900 INJECTION INTRAVENOUS at 06:53

## 2018-03-31 ASSESSMENT — PAIN SCALES - GENERAL: PAINLEVEL: NO PAIN

## 2018-03-31 NOTE — PROGRESS NOTES
Pt returns to infusion center via power chair for IV antibiotic infusion.  PICC line in place, brisk blood return noted.  Pt tolerated infusions without incident.  PICC line flushed with saline per policy, gauze and net placed.  Pt left infusion center ambulatory and in good condition.  Returns daily.

## 2018-04-01 ENCOUNTER — OUTPATIENT INFUSION SERVICES (OUTPATIENT)
Dept: ONCOLOGY | Facility: MEDICAL CENTER | Age: 58
End: 2018-04-01
Attending: INTERNAL MEDICINE
Payer: COMMERCIAL

## 2018-04-01 VITALS
TEMPERATURE: 98.8 F | DIASTOLIC BLOOD PRESSURE: 91 MMHG | SYSTOLIC BLOOD PRESSURE: 166 MMHG | OXYGEN SATURATION: 100 % | RESPIRATION RATE: 18 BRPM | HEART RATE: 100 BPM

## 2018-04-01 PROCEDURE — 96368 THER/DIAG CONCURRENT INF: CPT

## 2018-04-01 PROCEDURE — 96365 THER/PROPH/DIAG IV INF INIT: CPT

## 2018-04-01 PROCEDURE — 700105 HCHG RX REV CODE 258: Performed by: INTERNAL MEDICINE

## 2018-04-01 PROCEDURE — 700111 HCHG RX REV CODE 636 W/ 250 OVERRIDE (IP): Performed by: INTERNAL MEDICINE

## 2018-04-01 RX ADMIN — SODIUM CHLORIDE 1000 MG: 900 INJECTION INTRAVENOUS at 07:04

## 2018-04-01 RX ADMIN — DAPTOMYCIN 550 MG: 500 INJECTION, POWDER, LYOPHILIZED, FOR SOLUTION INTRAVENOUS at 07:00

## 2018-04-01 ASSESSMENT — PAIN SCALES - GENERAL: PAINLEVEL: 10=SEVERE PAIN

## 2018-04-01 NOTE — PROGRESS NOTES
Pt presents via wheelchair for daily antibiotics.  Pt reports feeling well today, denies changes since yesterday.  LUE PICC line in place, blood return confirmed.  Dapto infused concurrently with Invanz, infusion completed without incident.  PICC flushed, line protected with gauze and netting.  Next appointment confirmed.  Pt discharged from IS in NAD under self care.

## 2018-04-02 ENCOUNTER — OUTPATIENT INFUSION SERVICES (OUTPATIENT)
Dept: ONCOLOGY | Facility: MEDICAL CENTER | Age: 58
End: 2018-04-02
Attending: INTERNAL MEDICINE
Payer: COMMERCIAL

## 2018-04-02 VITALS
BODY MASS INDEX: 27.5 KG/M2 | WEIGHT: 203.04 LBS | DIASTOLIC BLOOD PRESSURE: 95 MMHG | HEIGHT: 72 IN | HEART RATE: 102 BPM | SYSTOLIC BLOOD PRESSURE: 179 MMHG | RESPIRATION RATE: 18 BRPM | OXYGEN SATURATION: 100 % | TEMPERATURE: 98.8 F

## 2018-04-02 DIAGNOSIS — M86.9 OSTEOMYELITIS OF LEFT LOWER EXTREMITY (HCC): ICD-10-CM

## 2018-04-02 LAB
ALBUMIN SERPL BCP-MCNC: 3.7 G/DL (ref 3.2–4.9)
ALP SERPL-CCNC: 78 U/L (ref 30–99)
ALT SERPL-CCNC: 14 U/L (ref 2–50)
AST SERPL-CCNC: 18 U/L (ref 12–45)
BASOPHILS # BLD AUTO: 0.7 % (ref 0–1.8)
BASOPHILS # BLD: 0.06 K/UL (ref 0–0.12)
BILIRUB CONJ SERPL-MCNC: <0.1 MG/DL (ref 0.1–0.5)
BILIRUB INDIRECT SERPL-MCNC: NORMAL MG/DL (ref 0–1)
BILIRUB SERPL-MCNC: 0.2 MG/DL (ref 0.1–1.5)
BUN SERPL-MCNC: 17 MG/DL (ref 8–22)
CALCIUM SERPL-MCNC: 9.3 MG/DL (ref 8.5–10.5)
CHLORIDE SERPL-SCNC: 97 MMOL/L (ref 96–112)
CO2 SERPL-SCNC: 26 MMOL/L (ref 20–33)
CREAT SERPL-MCNC: 1.15 MG/DL (ref 0.5–1.4)
EOSINOPHIL # BLD AUTO: 0.29 K/UL (ref 0–0.51)
EOSINOPHIL NFR BLD: 3.3 % (ref 0–6.9)
ERYTHROCYTE [DISTWIDTH] IN BLOOD BY AUTOMATED COUNT: 42.7 FL (ref 35.9–50)
GLUCOSE SERPL-MCNC: 223 MG/DL (ref 65–99)
HCT VFR BLD AUTO: 41.1 % (ref 42–52)
HGB BLD-MCNC: 13.4 G/DL (ref 14–18)
IMM GRANULOCYTES # BLD AUTO: 0.03 K/UL (ref 0–0.11)
IMM GRANULOCYTES NFR BLD AUTO: 0.3 % (ref 0–0.9)
LYMPHOCYTES # BLD AUTO: 2.4 K/UL (ref 1–4.8)
LYMPHOCYTES NFR BLD: 27.5 % (ref 22–41)
MCH RBC QN AUTO: 26.7 PG (ref 27–33)
MCHC RBC AUTO-ENTMCNC: 32.6 G/DL (ref 33.7–35.3)
MCV RBC AUTO: 82 FL (ref 81.4–97.8)
MONOCYTES # BLD AUTO: 0.83 K/UL (ref 0–0.85)
MONOCYTES NFR BLD AUTO: 9.5 % (ref 0–13.4)
NEUTROPHILS # BLD AUTO: 5.12 K/UL (ref 1.82–7.42)
NEUTROPHILS NFR BLD: 58.7 % (ref 44–72)
NRBC # BLD AUTO: 0 K/UL
NRBC BLD-RTO: 0 /100 WBC
PHOSPHATE SERPL-MCNC: 3.2 MG/DL (ref 2.5–4.5)
PLATELET # BLD AUTO: 548 K/UL (ref 164–446)
PMV BLD AUTO: 9.2 FL (ref 9–12.9)
POTASSIUM SERPL-SCNC: 4.6 MMOL/L (ref 3.6–5.5)
PROT SERPL-MCNC: 7.3 G/DL (ref 6–8.2)
RBC # BLD AUTO: 5.01 M/UL (ref 4.7–6.1)
SODIUM SERPL-SCNC: 129 MMOL/L (ref 135–145)
WBC # BLD AUTO: 8.7 K/UL (ref 4.8–10.8)

## 2018-04-02 PROCEDURE — 80076 HEPATIC FUNCTION PANEL: CPT

## 2018-04-02 PROCEDURE — 700105 HCHG RX REV CODE 258: Performed by: INTERNAL MEDICINE

## 2018-04-02 PROCEDURE — 84155 ASSAY OF PROTEIN SERUM: CPT

## 2018-04-02 PROCEDURE — 700105 HCHG RX REV CODE 258

## 2018-04-02 PROCEDURE — 36592 COLLECT BLOOD FROM PICC: CPT

## 2018-04-02 PROCEDURE — 84075 ASSAY ALKALINE PHOSPHATASE: CPT

## 2018-04-02 PROCEDURE — 85025 COMPLETE CBC W/AUTO DIFF WBC: CPT

## 2018-04-02 PROCEDURE — 700111 HCHG RX REV CODE 636 W/ 250 OVERRIDE (IP): Performed by: INTERNAL MEDICINE

## 2018-04-02 PROCEDURE — 82247 BILIRUBIN TOTAL: CPT

## 2018-04-02 PROCEDURE — 84450 TRANSFERASE (AST) (SGOT): CPT

## 2018-04-02 PROCEDURE — 80069 RENAL FUNCTION PANEL: CPT

## 2018-04-02 PROCEDURE — 84460 ALANINE AMINO (ALT) (SGPT): CPT

## 2018-04-02 PROCEDURE — 96368 THER/DIAG CONCURRENT INF: CPT

## 2018-04-02 PROCEDURE — 700111 HCHG RX REV CODE 636 W/ 250 OVERRIDE (IP)

## 2018-04-02 PROCEDURE — 82248 BILIRUBIN DIRECT: CPT

## 2018-04-02 PROCEDURE — 96365 THER/PROPH/DIAG IV INF INIT: CPT

## 2018-04-02 RX ADMIN — SODIUM CHLORIDE 1000 MG: 900 INJECTION INTRAVENOUS at 07:30

## 2018-04-02 RX ADMIN — SODIUM CHLORIDE 550 MG: 9 INJECTION, SOLUTION INTRAVENOUS at 07:30

## 2018-04-02 ASSESSMENT — PAIN SCALES - GENERAL: PAINLEVEL: 8=MODERATE-SEVERE PAIN

## 2018-04-02 NOTE — PROGRESS NOTES
Pt arrived to IS via wheelchair, for Dapto/Invanz infusion. Pt voices no complaints. PICC line flushed per policy, positive blood return noted. Labs drawn per order. Dapto and invanz infused with no s/sx of adverse reaction. PICC line flushed per policy. Pt left IS with no s/sx of distress. Follow up appointment confirmed.

## 2018-04-03 ENCOUNTER — OUTPATIENT INFUSION SERVICES (OUTPATIENT)
Dept: ONCOLOGY | Facility: MEDICAL CENTER | Age: 58
End: 2018-04-03
Attending: INTERNAL MEDICINE
Payer: COMMERCIAL

## 2018-04-03 VITALS
HEART RATE: 104 BPM | DIASTOLIC BLOOD PRESSURE: 96 MMHG | TEMPERATURE: 98.7 F | SYSTOLIC BLOOD PRESSURE: 181 MMHG | OXYGEN SATURATION: 100 % | BODY MASS INDEX: 27.53 KG/M2 | WEIGHT: 203.04 LBS | RESPIRATION RATE: 18 BRPM

## 2018-04-03 DIAGNOSIS — M86.9 OSTEOMYELITIS OF LEFT LOWER EXTREMITY (HCC): ICD-10-CM

## 2018-04-03 LAB — CK SERPL-CCNC: 444 U/L (ref 0–154)

## 2018-04-03 PROCEDURE — 82550 ASSAY OF CK (CPK): CPT

## 2018-04-03 PROCEDURE — 700111 HCHG RX REV CODE 636 W/ 250 OVERRIDE (IP): Performed by: INTERNAL MEDICINE

## 2018-04-03 PROCEDURE — 96365 THER/PROPH/DIAG IV INF INIT: CPT

## 2018-04-03 PROCEDURE — 700105 HCHG RX REV CODE 258: Performed by: INTERNAL MEDICINE

## 2018-04-03 PROCEDURE — 96368 THER/DIAG CONCURRENT INF: CPT

## 2018-04-03 RX ADMIN — SODIUM CHLORIDE 1000 MG: 900 INJECTION INTRAVENOUS at 07:06

## 2018-04-03 RX ADMIN — DAPTOMYCIN 550 MG: 500 INJECTION, POWDER, LYOPHILIZED, FOR SOLUTION INTRAVENOUS at 07:06

## 2018-04-03 ASSESSMENT — PAIN SCALES - GENERAL: PAINLEVEL: 5=MODERATE PAIN

## 2018-04-03 NOTE — PROGRESS NOTES
Patient presents for daily IV antibiotics. PICC dressing changed using sterile technique, PICC insertion site and new sterile dressing clean, dry and intact.  PICC flushes well with brisk blood return. Invanz and Daptomycin infused as ordered, line flushed clear. PICC flushed per protocol and site secured. Patient returns tomorrow and released in no acute distress.

## 2018-04-04 ENCOUNTER — OUTPATIENT INFUSION SERVICES (OUTPATIENT)
Dept: ONCOLOGY | Facility: MEDICAL CENTER | Age: 58
End: 2018-04-04
Attending: INTERNAL MEDICINE
Payer: COMMERCIAL

## 2018-04-04 VITALS
OXYGEN SATURATION: 98 % | DIASTOLIC BLOOD PRESSURE: 78 MMHG | TEMPERATURE: 98 F | SYSTOLIC BLOOD PRESSURE: 160 MMHG | RESPIRATION RATE: 18 BRPM | HEART RATE: 113 BPM

## 2018-04-04 PROCEDURE — 96368 THER/DIAG CONCURRENT INF: CPT

## 2018-04-04 PROCEDURE — 700105 HCHG RX REV CODE 258: Performed by: INTERNAL MEDICINE

## 2018-04-04 PROCEDURE — 700111 HCHG RX REV CODE 636 W/ 250 OVERRIDE (IP): Performed by: INTERNAL MEDICINE

## 2018-04-04 PROCEDURE — 96365 THER/PROPH/DIAG IV INF INIT: CPT

## 2018-04-04 RX ADMIN — SODIUM CHLORIDE 1000 MG: 900 INJECTION INTRAVENOUS at 07:08

## 2018-04-04 RX ADMIN — DAPTOMYCIN 550 MG: 500 INJECTION, POWDER, LYOPHILIZED, FOR SOLUTION INTRAVENOUS at 07:08

## 2018-04-04 ASSESSMENT — PAIN SCALES - GENERAL: PAINLEVEL: 4=SLIGHT-MODERATE PAIN

## 2018-04-05 ENCOUNTER — OUTPATIENT INFUSION SERVICES (OUTPATIENT)
Dept: ONCOLOGY | Facility: MEDICAL CENTER | Age: 58
End: 2018-04-05
Attending: INTERNAL MEDICINE
Payer: COMMERCIAL

## 2018-04-05 VITALS
SYSTOLIC BLOOD PRESSURE: 177 MMHG | DIASTOLIC BLOOD PRESSURE: 99 MMHG | HEART RATE: 115 BPM | OXYGEN SATURATION: 100 % | TEMPERATURE: 99 F | RESPIRATION RATE: 18 BRPM

## 2018-04-05 PROCEDURE — 700105 HCHG RX REV CODE 258: Performed by: INTERNAL MEDICINE

## 2018-04-05 PROCEDURE — 96368 THER/DIAG CONCURRENT INF: CPT

## 2018-04-05 PROCEDURE — 700111 HCHG RX REV CODE 636 W/ 250 OVERRIDE (IP): Performed by: INTERNAL MEDICINE

## 2018-04-05 PROCEDURE — 96365 THER/PROPH/DIAG IV INF INIT: CPT

## 2018-04-05 RX ADMIN — SODIUM CHLORIDE 1000 MG: 900 INJECTION INTRAVENOUS at 07:08

## 2018-04-05 RX ADMIN — DAPTOMYCIN 550 MG: 500 INJECTION, POWDER, LYOPHILIZED, FOR SOLUTION INTRAVENOUS at 07:08

## 2018-04-05 ASSESSMENT — PAIN SCALES - GENERAL: PAINLEVEL: NO PAIN

## 2018-04-05 NOTE — PROGRESS NOTES
Pt to [facility] via wc for daily daptomycin and ertapenem infusion for OM.  Pt PICC line flushed per protocol w/ brisk blood return noted.  Daptomycin and ertapenem infused through PICC with no adverse reactions.  Pt PICC again flushed per protocol w/ brisk blood return noted, wrapped in gauze and protective sleeve placed.  Pt left via wc in NAD, next appt in place

## 2018-04-06 ENCOUNTER — OUTPATIENT INFUSION SERVICES (OUTPATIENT)
Dept: ONCOLOGY | Facility: MEDICAL CENTER | Age: 58
End: 2018-04-06
Attending: INTERNAL MEDICINE
Payer: COMMERCIAL

## 2018-04-06 ENCOUNTER — OFFICE VISIT (OUTPATIENT)
Dept: WOUND CARE | Facility: MEDICAL CENTER | Age: 58
End: 2018-04-06
Attending: NURSE PRACTITIONER
Payer: COMMERCIAL

## 2018-04-06 VITALS
HEART RATE: 105 BPM | BODY MASS INDEX: 27.53 KG/M2 | RESPIRATION RATE: 18 BRPM | DIASTOLIC BLOOD PRESSURE: 92 MMHG | WEIGHT: 203.04 LBS | OXYGEN SATURATION: 98 % | SYSTOLIC BLOOD PRESSURE: 166 MMHG | TEMPERATURE: 98.7 F

## 2018-04-06 DIAGNOSIS — A49.8 B. FRAGILIS INFECTION: ICD-10-CM

## 2018-04-06 DIAGNOSIS — A49.8 E COLI INFECTION: ICD-10-CM

## 2018-04-06 DIAGNOSIS — T87.43 RIGHT BKA INFECTION (HCC): ICD-10-CM

## 2018-04-06 DIAGNOSIS — A49.8 ENTEROCOCCUS FAECALIS INFECTION: ICD-10-CM

## 2018-04-06 PROCEDURE — 700111 HCHG RX REV CODE 636 W/ 250 OVERRIDE (IP): Performed by: INTERNAL MEDICINE

## 2018-04-06 PROCEDURE — 96368 THER/DIAG CONCURRENT INF: CPT

## 2018-04-06 PROCEDURE — 700105 HCHG RX REV CODE 258: Performed by: INTERNAL MEDICINE

## 2018-04-06 PROCEDURE — 99213 OFFICE O/P EST LOW 20 MIN: CPT

## 2018-04-06 PROCEDURE — 96365 THER/PROPH/DIAG IV INF INIT: CPT

## 2018-04-06 RX ORDER — SULFAMETHOXAZOLE AND TRIMETHOPRIM 800; 160 MG/1; MG/1
1 TABLET ORAL 2 TIMES DAILY
Qty: 28 TAB | Refills: 0 | Status: SHIPPED | OUTPATIENT
Start: 2018-04-15 | End: 2018-04-29

## 2018-04-06 RX ORDER — AMOXICILLIN AND CLAVULANATE POTASSIUM 875; 125 MG/1; MG/1
1 TABLET, FILM COATED ORAL 2 TIMES DAILY
Qty: 28 TAB | Refills: 0 | Status: SHIPPED | OUTPATIENT
Start: 2018-04-15 | End: 2018-04-29

## 2018-04-06 RX ADMIN — SODIUM CHLORIDE 1000 MG: 900 INJECTION INTRAVENOUS at 07:11

## 2018-04-06 RX ADMIN — DAPTOMYCIN 550 MG: 500 INJECTION, POWDER, LYOPHILIZED, FOR SOLUTION INTRAVENOUS at 07:11

## 2018-04-06 ASSESSMENT — PAIN SCALES - GENERAL: PAINLEVEL: NO PAIN

## 2018-04-06 NOTE — PROGRESS NOTES
Pt returns to infusion center via power chair for IV antibiotic infusion.  PICC line in place, brisk blood return noted.  Pt tolerated infusions without incident.  PICC line flushed with saline per policy, gauze and net placed.  Pt left infusion center ambulatory and in good condition.  Sees MD at wound care today.  Returns daily.

## 2018-04-06 NOTE — PROGRESS NOTES
LIMB PRESERVATION SERVICE ROUNDS    Patient seen in collaboration with interdisciplinary team during LPS rounds in wound clinic for   S/P S/P Irrigation and debridement of left below-knee amputation abscess. 3/16/18 Dr Dyson.    Patient states blood sugars are averaging low 100s  OBJECTIVE FINDINGS:     Labs/diagnostic reviewed:     Wound Characteristics                                                    Location: Left BKA - Medial I and D Site   Initial Evaluation  Date:4/6/2018   Tissue Type and %: 100% Pink   Periwound: Normal for Skin Tone   Drainage: None   Exposed structures None   Wound Edges:   Attached   Odor: None   S&S of Infection:   None   Edema: None   Sensation: Intact                   PROCEDURE Suture Removal completed by UMA Santos RN  The wound bed was cleansed with normal saline. Scissors and forceps were used to remove the sutures.The patient tolerated the procedure well with little to no discomfort or pain with only scant bleeding from the wound bed. The area was once again cleansed with normal saline revealing 100% pink tissue in the wound bed.     PLAN:   Wound Care: Keep area clean and dry, wound care prn  Imaging: NA  Offloading: prosthetics Rx provided to [facility]  Antibiotics: NA  Surgery: NA  Referral: NA    LPS Follow up: prn    No wound care or LPS needed: prn

## 2018-04-06 NOTE — PROGRESS NOTES
Seen at LPS rounds this morning, L BKA site appears well approximated with sutures in place (being removed), no erythema, non tender, no drainage.  Muscle aches improving.  OK to finish IV Dapto and Ertapenem on 4/14 as planned.  D/C PICC after last infusion dose.  Pt to start PO Augmentin and PO Bactrim, on 4/15/18, x 14 days.  Rx sent to [facility].  FU with ID on 4/17 @ 3675.

## 2018-04-07 ENCOUNTER — OUTPATIENT INFUSION SERVICES (OUTPATIENT)
Dept: ONCOLOGY | Facility: MEDICAL CENTER | Age: 58
End: 2018-04-07
Attending: INTERNAL MEDICINE
Payer: COMMERCIAL

## 2018-04-07 VITALS
HEART RATE: 105 BPM | TEMPERATURE: 97 F | DIASTOLIC BLOOD PRESSURE: 82 MMHG | RESPIRATION RATE: 18 BRPM | WEIGHT: 203.04 LBS | SYSTOLIC BLOOD PRESSURE: 150 MMHG | BODY MASS INDEX: 27.53 KG/M2 | OXYGEN SATURATION: 98 %

## 2018-04-07 DIAGNOSIS — M86.9 OSTEOMYELITIS OF LEFT LOWER EXTREMITY (HCC): ICD-10-CM

## 2018-04-07 LAB — CK SERPL-CCNC: 1573 U/L (ref 0–154)

## 2018-04-07 PROCEDURE — 700111 HCHG RX REV CODE 636 W/ 250 OVERRIDE (IP): Performed by: INTERNAL MEDICINE

## 2018-04-07 PROCEDURE — 82550 ASSAY OF CK (CPK): CPT

## 2018-04-07 PROCEDURE — 96365 THER/PROPH/DIAG IV INF INIT: CPT

## 2018-04-07 PROCEDURE — 96368 THER/DIAG CONCURRENT INF: CPT

## 2018-04-07 PROCEDURE — 700105 HCHG RX REV CODE 258: Performed by: INTERNAL MEDICINE

## 2018-04-07 RX ADMIN — DAPTOMYCIN 550 MG: 500 INJECTION, POWDER, LYOPHILIZED, FOR SOLUTION INTRAVENOUS at 07:00

## 2018-04-07 RX ADMIN — SODIUM CHLORIDE 1000 MG: 900 INJECTION INTRAVENOUS at 06:59

## 2018-04-07 ASSESSMENT — PAIN SCALES - GENERAL: PAINLEVEL: 8=MODERATE-SEVERE PAIN

## 2018-04-07 NOTE — PROGRESS NOTES
Patient arrived via electric wheelchair to the [facility] for Ertapenem and Daptomycin. Reviewed vital signs, labs, and physician order. Pt reports mild cramping to bilateral legs last night, relieved by warm shower. Pt arrives with  PICC to [facility], visualized brisk blood return, CPK level drawn per MD order in chart. Ertapenem and Daptomycin administered, no adverse reaction observed. PICC line flushed per protocol, 4X4 gauze and mesh sleeve placed for protection. Confirmed upcoming appointment date and time with patient. Patient left the [facility] via wheelchair in no sign of distress. Received phone call from lab, CPK level 1573, Dr Ortega notified of this level. Per Dr Ortega, on 4/8/18 re-draw CPK and call results prior to Daptomycin, telephone order read back, treatment plan updated, pharmacy notified.

## 2018-04-08 ENCOUNTER — OUTPATIENT INFUSION SERVICES (OUTPATIENT)
Dept: ONCOLOGY | Facility: MEDICAL CENTER | Age: 58
End: 2018-04-08
Attending: INTERNAL MEDICINE
Payer: COMMERCIAL

## 2018-04-08 ENCOUNTER — TELEPHONE (OUTPATIENT)
Dept: INFECTIOUS DISEASES | Facility: MEDICAL CENTER | Age: 58
End: 2018-04-08

## 2018-04-08 VITALS
OXYGEN SATURATION: 97 % | TEMPERATURE: 98 F | DIASTOLIC BLOOD PRESSURE: 88 MMHG | BODY MASS INDEX: 27.53 KG/M2 | WEIGHT: 203.04 LBS | SYSTOLIC BLOOD PRESSURE: 168 MMHG | RESPIRATION RATE: 18 BRPM | HEART RATE: 101 BPM

## 2018-04-08 DIAGNOSIS — M86.9 OSTEOMYELITIS OF LEFT LOWER EXTREMITY (HCC): ICD-10-CM

## 2018-04-08 LAB — CK SERPL-CCNC: 1005 U/L (ref 0–154)

## 2018-04-08 PROCEDURE — 700111 HCHG RX REV CODE 636 W/ 250 OVERRIDE (IP): Performed by: INTERNAL MEDICINE

## 2018-04-08 PROCEDURE — 82550 ASSAY OF CK (CPK): CPT

## 2018-04-08 PROCEDURE — 700105 HCHG RX REV CODE 258: Performed by: INTERNAL MEDICINE

## 2018-04-08 PROCEDURE — 96365 THER/PROPH/DIAG IV INF INIT: CPT

## 2018-04-08 RX ADMIN — SODIUM CHLORIDE 1000 MG: 900 INJECTION INTRAVENOUS at 07:01

## 2018-04-08 ASSESSMENT — PAIN SCALES - GENERAL: PAINLEVEL: 7=MODERATE-SEVERE PAIN

## 2018-04-08 NOTE — PROGRESS NOTES
Patient arrived via motorized wheelchair to the [facility] for Ertapenem and Daptomycin. Reviewed vital signs, labs, and physician order. Patient reports decreased cramping to bilateral lower extremities from previous 24 hours. Pt arrives with  PICC to [facility], visualized brisk blood return, CPK level drawn per MD order. CPK level 1005, contact made with Dr Ortega, per MD hold Daptomycin dose today, re-check CPK on 4/9/18 and call results prior to administration of Daptomycin, TORB. Treatment plan reviewed with pt, and pharmacy, order updated. Ertapenem administered, no adverse reaction observed. PICC flushed per protocol, 4X4 gauze and mesh sleeve placed for protection.  Confirmed upcoming appointment date and time with patient. Patient left the [facility] via motorized wheelchair in no sign of distress.

## 2018-04-08 NOTE — TELEPHONE ENCOUNTER
Received call from infusion center regarding elevated CPK yesterday, repeat CPK still elevated today but decreased. RN instructed to hold daptomycin and repeat CPK tmrw. If remains elevated may need to DC dapto

## 2018-04-10 ENCOUNTER — OUTPATIENT INFUSION SERVICES (OUTPATIENT)
Dept: ONCOLOGY | Facility: MEDICAL CENTER | Age: 58
End: 2018-04-10
Attending: INTERNAL MEDICINE
Payer: COMMERCIAL

## 2018-04-10 VITALS
SYSTOLIC BLOOD PRESSURE: 154 MMHG | OXYGEN SATURATION: 100 % | RESPIRATION RATE: 18 BRPM | WEIGHT: 203.04 LBS | DIASTOLIC BLOOD PRESSURE: 94 MMHG | HEART RATE: 94 BPM | TEMPERATURE: 98 F | BODY MASS INDEX: 27.53 KG/M2

## 2018-04-10 DIAGNOSIS — L02.416 ABSCESS OF LEFT LOWER EXTREMITY: ICD-10-CM

## 2018-04-10 DIAGNOSIS — T87.44 INFECTION OF AMPUTATION STUMP OF LEFT LOWER EXTREMITY (HCC): ICD-10-CM

## 2018-04-10 DIAGNOSIS — M86.9 OSTEOMYELITIS OF LEFT LOWER EXTREMITY (HCC): ICD-10-CM

## 2018-04-10 LAB
ALBUMIN SERPL BCP-MCNC: 3.5 G/DL (ref 3.2–4.9)
ALP SERPL-CCNC: 91 U/L (ref 30–99)
ALT SERPL-CCNC: 25 U/L (ref 2–50)
AST SERPL-CCNC: 25 U/L (ref 12–45)
BASOPHILS # BLD AUTO: 0.4 % (ref 0–1.8)
BASOPHILS # BLD: 0.03 K/UL (ref 0–0.12)
BILIRUB CONJ SERPL-MCNC: 0.1 MG/DL (ref 0.1–0.5)
BILIRUB INDIRECT SERPL-MCNC: 0.1 MG/DL (ref 0–1)
BILIRUB SERPL-MCNC: 0.2 MG/DL (ref 0.1–1.5)
BUN SERPL-MCNC: 21 MG/DL (ref 8–22)
CALCIUM SERPL-MCNC: 9.2 MG/DL (ref 8.5–10.5)
CHLORIDE SERPL-SCNC: 97 MMOL/L (ref 96–112)
CK SERPL-CCNC: 301 U/L (ref 0–154)
CO2 SERPL-SCNC: 26 MMOL/L (ref 20–33)
CREAT SERPL-MCNC: 1.19 MG/DL (ref 0.5–1.4)
EOSINOPHIL # BLD AUTO: 0.31 K/UL (ref 0–0.51)
EOSINOPHIL NFR BLD: 3.7 % (ref 0–6.9)
ERYTHROCYTE [DISTWIDTH] IN BLOOD BY AUTOMATED COUNT: 43 FL (ref 35.9–50)
GLUCOSE SERPL-MCNC: 456 MG/DL (ref 65–99)
HCT VFR BLD AUTO: 41.6 % (ref 42–52)
HGB BLD-MCNC: 13.6 G/DL (ref 14–18)
IMM GRANULOCYTES # BLD AUTO: 0.01 K/UL (ref 0–0.11)
IMM GRANULOCYTES NFR BLD AUTO: 0.1 % (ref 0–0.9)
LYMPHOCYTES # BLD AUTO: 2.15 K/UL (ref 1–4.8)
LYMPHOCYTES NFR BLD: 25.7 % (ref 22–41)
MCH RBC QN AUTO: 27 PG (ref 27–33)
MCHC RBC AUTO-ENTMCNC: 32.7 G/DL (ref 33.7–35.3)
MCV RBC AUTO: 82.7 FL (ref 81.4–97.8)
MONOCYTES # BLD AUTO: 0.58 K/UL (ref 0–0.85)
MONOCYTES NFR BLD AUTO: 6.9 % (ref 0–13.4)
NEUTROPHILS # BLD AUTO: 5.3 K/UL (ref 1.82–7.42)
NEUTROPHILS NFR BLD: 63.2 % (ref 44–72)
NRBC # BLD AUTO: 0 K/UL
NRBC BLD-RTO: 0 /100 WBC
PHOSPHATE SERPL-MCNC: 3.1 MG/DL (ref 2.5–4.5)
PLATELET # BLD AUTO: 456 K/UL (ref 164–446)
PMV BLD AUTO: 9.7 FL (ref 9–12.9)
POTASSIUM SERPL-SCNC: 4.7 MMOL/L (ref 3.6–5.5)
PROT SERPL-MCNC: 6.8 G/DL (ref 6–8.2)
RBC # BLD AUTO: 5.03 M/UL (ref 4.7–6.1)
SODIUM SERPL-SCNC: 129 MMOL/L (ref 135–145)
WBC # BLD AUTO: 8.4 K/UL (ref 4.8–10.8)

## 2018-04-10 PROCEDURE — 80076 HEPATIC FUNCTION PANEL: CPT

## 2018-04-10 PROCEDURE — 84155 ASSAY OF PROTEIN SERUM: CPT

## 2018-04-10 PROCEDURE — 96365 THER/PROPH/DIAG IV INF INIT: CPT

## 2018-04-10 PROCEDURE — 84075 ASSAY ALKALINE PHOSPHATASE: CPT

## 2018-04-10 PROCEDURE — 700105 HCHG RX REV CODE 258: Performed by: INTERNAL MEDICINE

## 2018-04-10 PROCEDURE — 80069 RENAL FUNCTION PANEL: CPT

## 2018-04-10 PROCEDURE — 84450 TRANSFERASE (AST) (SGOT): CPT

## 2018-04-10 PROCEDURE — 82248 BILIRUBIN DIRECT: CPT

## 2018-04-10 PROCEDURE — 85025 COMPLETE CBC W/AUTO DIFF WBC: CPT

## 2018-04-10 PROCEDURE — 82247 BILIRUBIN TOTAL: CPT

## 2018-04-10 PROCEDURE — 84460 ALANINE AMINO (ALT) (SGPT): CPT

## 2018-04-10 PROCEDURE — 96367 TX/PROPH/DG ADDL SEQ IV INF: CPT

## 2018-04-10 PROCEDURE — 36592 COLLECT BLOOD FROM PICC: CPT

## 2018-04-10 PROCEDURE — 700111 HCHG RX REV CODE 636 W/ 250 OVERRIDE (IP): Performed by: INTERNAL MEDICINE

## 2018-04-10 PROCEDURE — 82550 ASSAY OF CK (CPK): CPT

## 2018-04-10 RX ADMIN — SODIUM CHLORIDE 1000 MG: 900 INJECTION INTRAVENOUS at 07:02

## 2018-04-10 RX ADMIN — DAPTOMYCIN 550 MG: 500 INJECTION, POWDER, LYOPHILIZED, FOR SOLUTION INTRAVENOUS at 08:52

## 2018-04-10 ASSESSMENT — PAIN SCALES - GENERAL: PAINLEVEL: 7=MODERATE-SEVERE PAIN

## 2018-04-10 NOTE — PROGRESS NOTES
Patient presents for IV antibiotics. Labs drawn as ordered. Invanz infused with no new patient complaints. Call to nga Forman to give Daptomycin today, re-check CPK on Thursday, call with results. Infusion completed with no new patient complaints, line flushed clear. Patient returns tomorrow and released in no acute distress

## 2018-04-11 ENCOUNTER — OUTPATIENT INFUSION SERVICES (OUTPATIENT)
Dept: ONCOLOGY | Facility: MEDICAL CENTER | Age: 58
End: 2018-04-11
Attending: INTERNAL MEDICINE
Payer: COMMERCIAL

## 2018-04-11 VITALS
WEIGHT: 203.04 LBS | RESPIRATION RATE: 18 BRPM | DIASTOLIC BLOOD PRESSURE: 103 MMHG | OXYGEN SATURATION: 97 % | TEMPERATURE: 98.4 F | SYSTOLIC BLOOD PRESSURE: 180 MMHG | BODY MASS INDEX: 27.53 KG/M2 | HEART RATE: 129 BPM

## 2018-04-11 PROCEDURE — 96365 THER/PROPH/DIAG IV INF INIT: CPT

## 2018-04-11 PROCEDURE — 700105 HCHG RX REV CODE 258: Performed by: INTERNAL MEDICINE

## 2018-04-11 PROCEDURE — 700111 HCHG RX REV CODE 636 W/ 250 OVERRIDE (IP): Performed by: INTERNAL MEDICINE

## 2018-04-11 PROCEDURE — 96368 THER/DIAG CONCURRENT INF: CPT

## 2018-04-11 RX ADMIN — SODIUM CHLORIDE 1000 MG: 900 INJECTION INTRAVENOUS at 07:01

## 2018-04-11 RX ADMIN — DAPTOMYCIN 550 MG: 500 INJECTION, POWDER, LYOPHILIZED, FOR SOLUTION INTRAVENOUS at 07:01

## 2018-04-11 ASSESSMENT — PAIN SCALES - GENERAL: PAINLEVEL: 4=SLIGHT-MODERATE PAIN

## 2018-04-11 NOTE — PROGRESS NOTES
Patient presents for IV antibiotics. Patient's BP high this AM, he states that he hasn't taken his HTN medication today. Patient educated on sign/symptoms of stroke and verbalizes understanding and that he will take his medication. Invanz and Daptomycin infused as ordered, line flushed clear. Patient returns tomorrow and released in no acute distress.

## 2018-04-12 ENCOUNTER — OUTPATIENT INFUSION SERVICES (OUTPATIENT)
Dept: ONCOLOGY | Facility: MEDICAL CENTER | Age: 58
End: 2018-04-12
Attending: INTERNAL MEDICINE
Payer: COMMERCIAL

## 2018-04-12 VITALS
TEMPERATURE: 97.2 F | OXYGEN SATURATION: 98 % | WEIGHT: 203.04 LBS | BODY MASS INDEX: 27.53 KG/M2 | HEART RATE: 100 BPM | DIASTOLIC BLOOD PRESSURE: 89 MMHG | SYSTOLIC BLOOD PRESSURE: 139 MMHG | RESPIRATION RATE: 18 BRPM

## 2018-04-12 DIAGNOSIS — M86.9 OSTEOMYELITIS OF LEFT LOWER EXTREMITY (HCC): ICD-10-CM

## 2018-04-12 LAB — CK SERPL-CCNC: 669 U/L (ref 0–154)

## 2018-04-12 PROCEDURE — 700111 HCHG RX REV CODE 636 W/ 250 OVERRIDE (IP): Performed by: INTERNAL MEDICINE

## 2018-04-12 PROCEDURE — 82550 ASSAY OF CK (CPK): CPT

## 2018-04-12 PROCEDURE — 96365 THER/PROPH/DIAG IV INF INIT: CPT

## 2018-04-12 PROCEDURE — 700105 HCHG RX REV CODE 258: Performed by: INTERNAL MEDICINE

## 2018-04-12 PROCEDURE — 306780 HCHG STAT FOR TRANSFUSION PER CASE

## 2018-04-12 RX ADMIN — SODIUM CHLORIDE 1000 MG: 900 INJECTION INTRAVENOUS at 08:23

## 2018-04-12 ASSESSMENT — PAIN SCALES - GENERAL: PAINLEVEL: 3=SLIGHT PAIN

## 2018-04-12 NOTE — PROGRESS NOTES
Patient presents for IV antibiotics. CPK drawn as ordered, results called to Yaritza STEVENS. Yaritza states Invanz only today, no Daptomycin. Yaritza states to pull the PICC line after infusion today. Patient to  oral antibiotics and follow up with Yaritza 4/17/2048. Patient instructed on plan of care and verbalizes understanding. PICC line removed after infusion, tip intact. Patient observed for thirty minutes with no bleeding. Patient released in no acute distress.

## 2018-04-12 NOTE — PROGRESS NOTES
Telephone order given to AURA Magaña at [facility].  CPK= 029.  Stop IV Daptomycin, pull PICC.  Have pt start PO Augmentin and Bactrim today.  FU with ID on 4/17.

## 2018-04-13 ENCOUNTER — APPOINTMENT (OUTPATIENT)
Dept: ONCOLOGY | Facility: MEDICAL CENTER | Age: 58
End: 2018-04-13
Attending: INTERNAL MEDICINE
Payer: COMMERCIAL

## 2018-04-14 ENCOUNTER — APPOINTMENT (OUTPATIENT)
Dept: ONCOLOGY | Facility: MEDICAL CENTER | Age: 58
End: 2018-04-14
Attending: INTERNAL MEDICINE
Payer: COMMERCIAL

## 2018-04-17 ENCOUNTER — DOCUMENTATION (OUTPATIENT)
Dept: INFECTIOUS DISEASES | Facility: MEDICAL CENTER | Age: 58
End: 2018-04-17

## 2018-04-17 ENCOUNTER — TELEPHONE (OUTPATIENT)
Dept: INFECTIOUS DISEASES | Facility: MEDICAL CENTER | Age: 58
End: 2018-04-17

## 2018-04-17 NOTE — TELEPHONE ENCOUNTER
Called and unable to LM for pt to return my call to re-schedule a hospital follow up appointment. Pt has no VM and did not answer the phone. Pt has a frequent NO Show history. REZA

## 2018-04-18 ENCOUNTER — OFFICE VISIT (OUTPATIENT)
Dept: INFECTIOUS DISEASES | Facility: MEDICAL CENTER | Age: 58
End: 2018-04-18
Payer: COMMERCIAL

## 2018-04-18 VITALS
HEIGHT: 72 IN | DIASTOLIC BLOOD PRESSURE: 76 MMHG | OXYGEN SATURATION: 98 % | SYSTOLIC BLOOD PRESSURE: 134 MMHG | HEART RATE: 116 BPM | TEMPERATURE: 98.1 F

## 2018-04-18 DIAGNOSIS — Z79.4 TYPE 2 DIABETES MELLITUS WITH OTHER SKIN COMPLICATION, WITH LONG-TERM CURRENT USE OF INSULIN (HCC): ICD-10-CM

## 2018-04-18 DIAGNOSIS — A49.8 ENTEROCOCCUS FAECALIS INFECTION: ICD-10-CM

## 2018-04-18 DIAGNOSIS — A49.8 B. FRAGILIS INFECTION: ICD-10-CM

## 2018-04-18 DIAGNOSIS — E11.628 TYPE 2 DIABETES MELLITUS WITH OTHER SKIN COMPLICATION, WITH LONG-TERM CURRENT USE OF INSULIN (HCC): ICD-10-CM

## 2018-04-18 DIAGNOSIS — I73.9 PVD (PERIPHERAL VASCULAR DISEASE) (HCC): ICD-10-CM

## 2018-04-18 DIAGNOSIS — F17.200 TOBACCO DEPENDENCE: ICD-10-CM

## 2018-04-18 DIAGNOSIS — T87.44 INFECTION OF AMPUTATION STUMP OF LEFT LOWER EXTREMITY (HCC): ICD-10-CM

## 2018-04-18 DIAGNOSIS — A49.8 E COLI INFECTION: ICD-10-CM

## 2018-04-18 DIAGNOSIS — R53.81 DEBILITY: ICD-10-CM

## 2018-04-18 PROCEDURE — 99214 OFFICE O/P EST MOD 30 MIN: CPT | Performed by: NURSE PRACTITIONER

## 2018-04-18 NOTE — PROGRESS NOTES
"Infectious Disease Clinic    Subjective:     Chief Complaint   Patient presents with   • Hospital Follow-up     BKA stump infection     This is my first time meeting Mr. Angelo.      Interval History: 57 y.o. male with long-standing history of diabetes mellitus and bilateral below-knee amputation. In 2015 he had dehiscence of his left knee rotation site and on 12/26/2015 he underwent debridement with wound closure. He has been admitted multiple times since then. Last he was admitted on 2/1/2018 for left BKA site infection. Patient was treated with IV antibiotics while in the hospital then switched to oral antibiotics. He was discharged on 2/8/2018. He came back into the emergency room on 3/14/2018 with another wound infection to the left BKA site.  Hospitalized from 3/14/2018 - 3/26/2018.  He was taken to the OR on 3/16, underwent I&D of an abscess, OR cx +E faecalis, B frag, and E coli.  Discharged home on IV Daptomycin and Ertapenem through 4/14/18, to be followed with PO abx.     4/12/18:  CPK= 669.  Stopped IV Daptomycin, PICC pulled.  Started on PO Augmentin and Bactrim.     Hospital records reviewed    Today, 4/18/2018: Patient reports feeling better, the leg cramping that he was having on IV Dapto has dramatically improved.  Aside from the leg cramping, he reports having tolerated the IV Dapto and Ertapenem without adverse effect.  PICC was removed and he started taking the PO Augmentin and Bactrim, which he is tolerating without issue.  Denies feeling generally ill, fevers/chills, general malaise, headache, n/v/d, abdominal pain, chest pain or shortness of breath.  Pt stating that the wound is healing well, there is trace drainage, no odor, minimal redness and trace swelling.  Pain to his L BKA site is about an intermittent 5/10, says he is use to the pain and \"just tolerates it.\"  Still smokes about 1 ppd/  BS in the 50-400s, on average in the 200s.    ROS  As documented above in my HPI.    Past Medical " "History:   Diagnosis Date   • BPH (benign prostatic hyperplasia) 12/20/2017   • Diabetes (CMS-HCC)    • Has received first dose of hepatitis B vaccine    • Hypertension    • Renal disorder    • Renal failure        Social History   Substance Use Topics   • Smoking status: Current Every Day Smoker     Packs/day: 1.00     Years: 20.00     Types: Cigarettes   • Smokeless tobacco: Never Used   • Alcohol use No       Allergies: Fish    Pt's medication and problem list reviewed.     Objective:     PE:  /76   Pulse (!) 116   Temp 36.7 °C (98.1 °F)   Ht 1.829 m (6' 0.01\")   SpO2 98%     Vital signs reviewed    Constitutional: Appears well-developed and well-nourished. No acute distress.  Speech fluent. Obese.  Smells like cigarettes.   Eyes: Conjunctivae normal and EOM are normal. Pupils are equal, round, and reactive to light.   Neck: Trachea midline. Normal range of motion.  No JVD.  Cardiovascular: Tachycardic, regular rhythm, normal heart sounds. No murmur, gallop, or friction rub. No edema.  Respiratory: No respiratory distress, unlabored respiratory effort.  Lungs clear to auscultation bilaterally. No wheezes or rales.   Abdomen: Soft, non tender, non-distended. BS + x 4. No masses.   Musculoskeletal: Electric wheelchair.  Normal range of motion.    L BKA- Eraser tip size wound, wound bed pink with minimal maceraction, minimal surrounding mike, trace serous drainage, no odor, trace generalized swelling.   R AKA- well healed.  Skin: Warm and dry. No visible rashes or lesions.  Neurological: No cranial nerve deficit. Coordination normal.    Psychiatric: Alert and oriented to person, place, and time. Normal mood, calm affect.      Labs:  WBC   Date/Time Value Ref Range Status   04/10/2018 06:59 AM 8.4 4.8 - 10.8 K/uL Final     RBC   Date/Time Value Ref Range Status   04/10/2018 06:59 AM 5.03 4.70 - 6.10 M/uL Final     Hemoglobin   Date/Time Value Ref Range Status   04/10/2018 06:59 AM 13.6 (L) 14.0 - 18.0 g/dL " Final     Hematocrit   Date/Time Value Ref Range Status   04/10/2018 06:59 AM 41.6 (L) 42.0 - 52.0 % Final     MCV   Date/Time Value Ref Range Status   04/10/2018 06:59 AM 82.7 81.4 - 97.8 fL Final     MCH   Date/Time Value Ref Range Status   04/10/2018 06:59 AM 27.0 27.0 - 33.0 pg Final     MCHC   Date/Time Value Ref Range Status   04/10/2018 06:59 AM 32.7 (L) 33.7 - 35.3 g/dL Final     MPV   Date/Time Value Ref Range Status   04/10/2018 06:59 AM 9.7 9.0 - 12.9 fL Final        Sodium   Date/Time Value Ref Range Status   04/10/2018 06:59  (L) 135 - 145 mmol/L Final     Potassium   Date/Time Value Ref Range Status   04/10/2018 06:59 AM 4.7 3.6 - 5.5 mmol/L Final     Chloride   Date/Time Value Ref Range Status   04/10/2018 06:59 AM 97 96 - 112 mmol/L Final     Co2   Date/Time Value Ref Range Status   04/10/2018 06:59 AM 26 20 - 33 mmol/L Final     Glucose   Date/Time Value Ref Range Status   04/10/2018 06:59  (H) 65 - 99 mg/dL Final     Bun   Date/Time Value Ref Range Status   04/10/2018 06:59 AM 21 8 - 22 mg/dL Final     Creatinine   Date/Time Value Ref Range Status   04/10/2018 06:59 AM 1.19 0.50 - 1.40 mg/dL Final       Alkaline Phosphatase   Date/Time Value Ref Range Status   04/10/2018 06:59 AM 91 30 - 99 U/L Final     AST(SGOT)   Date/Time Value Ref Range Status   04/10/2018 06:59 AM 25 12 - 45 U/L Final     ALT(SGPT)   Date/Time Value Ref Range Status   04/10/2018 06:59 AM 25 2 - 50 U/L Final     Total Bilirubin   Date/Time Value Ref Range Status   04/10/2018 06:59 AM 0.2 0.1 - 1.5 mg/dL Final        CPK Total   Date/Time Value Ref Range Status   04/12/2018 08:20  (H) 0 - 154 U/L Final        Assessment and Plan:   The following treatment plan was discussed with patient at length:    1. Infection of amputation stump of left lower extremity (CMS-HCC)      -Finish PO Augmentin and Bactrim.  If wound remains open and there is concern for residual infection, pt to call ID for more abx and  additional ID appt.  If healed, then no additional abx needed.  -Monitor for s/sx of worsening: increased redness, pain, swelling, drainage, breakdown of surgical site, fevers, chills, general malaise, etc.  Notify ID or go to ER should these s/sx occur.   2. Enterococcus faecalis infection      Augmentin as above.   3. B. fragilis infection      Augmentin as above.   4. E coli infection      Bactrim as above.   5. PVD (peripheral vascular disease) (CMS-HCC)      Can hinder wound healing and resolution of infection.   6. Type 2 diabetes mellitus with other skin complication, with long-term current use of insulin (CMS-HCC)      HgA1C 11.2% on 2/4/18.  Continue to monitor blood sugars closely as DM will impair wound healing and can worsen infection.   7. Tobacco dependence      Tobacco cessation encouraged. Can hinder wound healing and delay resolution of infection.   8. Debility       Follow up: PRN, RTC sooner if needed. FU with PCP for ongoing chronic medical conditions.     OSITO Gonzalez.

## 2018-05-21 ENCOUNTER — HOSPITAL ENCOUNTER (EMERGENCY)
Facility: MEDICAL CENTER | Age: 58
End: 2018-05-21
Attending: EMERGENCY MEDICINE
Payer: COMMERCIAL

## 2018-05-21 VITALS
RESPIRATION RATE: 16 BRPM | OXYGEN SATURATION: 100 % | TEMPERATURE: 98.3 F | SYSTOLIC BLOOD PRESSURE: 159 MMHG | HEIGHT: 72 IN | BODY MASS INDEX: 29.8 KG/M2 | DIASTOLIC BLOOD PRESSURE: 96 MMHG | WEIGHT: 220 LBS | HEART RATE: 95 BPM
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
## 2018-05-21 DIAGNOSIS — M79.604 PAIN OF AMPUTATION STUMP OF RIGHT LOWER EXTREMITY (HCC): ICD-10-CM

## 2018-05-21 DIAGNOSIS — T87.89 PAIN OF AMPUTATION STUMP OF RIGHT LOWER EXTREMITY (HCC): ICD-10-CM

## 2018-05-21 PROCEDURE — 99284 EMERGENCY DEPT VISIT MOD MDM: CPT

## 2018-05-21 ASSESSMENT — LIFESTYLE VARIABLES: DO YOU DRINK ALCOHOL: NO

## 2018-05-21 ASSESSMENT — PAIN SCALES - GENERAL: PAINLEVEL_OUTOF10: 10
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
## 2018-05-21 NOTE — ED NOTES
.Patient states understanding of discharge instructions. Ambulated with steady gait out of ED via [facility] . Personal belongings with patient.

## 2018-05-21 NOTE — DISCHARGE PLANNING
RN reported that the pt is wc bound and needs transportation back home, as he does not have his wc and is unable to self transfer. Per RN, the pts  address was [address] [place] [address], where his wc still resides. JAVAD called [facility] and confirmed  address. JAVAD met with pt to confirm that this was the address he was at, but pt grunted and was unresponsive otherwise. JAVAD informed pt that she would be attempting to arrange transport back to [facility] so pt could obtained his wc. Pt again grunted. JAVAD called [facility] and arranged transport. SW awaiting for a return call from [facility] with a transport time and agency name.    [facility] transport arranged for 1500. [facility] staff updated. SW to remain available.

## 2018-05-21 NOTE — ED NOTES
Assisted provider with physical exam of patient RLE. Patient was able to lift leg with no pain. Patient refuses to make eye contact. Patient fell asleep after RN/MD left bedside. No respiratory distress noted. Lights dimmed for comfort.

## 2018-05-26 ENCOUNTER — HOSPITAL ENCOUNTER (OUTPATIENT)
Facility: MEDICAL CENTER | Age: 58
End: 2018-05-27
Attending: EMERGENCY MEDICINE | Admitting: INTERNAL MEDICINE
Payer: COMMERCIAL

## 2018-05-26 ENCOUNTER — APPOINTMENT (OUTPATIENT)
Dept: RADIOLOGY | Facility: MEDICAL CENTER | Age: 58
End: 2018-05-26
Attending: EMERGENCY MEDICINE
Payer: COMMERCIAL

## 2018-05-26 DIAGNOSIS — R40.1 STUPOR: ICD-10-CM

## 2018-05-26 DIAGNOSIS — E16.2 HYPOGLYCEMIA: ICD-10-CM

## 2018-05-26 PROBLEM — G93.41 METABOLIC ENCEPHALOPATHY: Status: ACTIVE | Noted: 2018-05-26

## 2018-05-26 PROBLEM — F19.10 POLYSUBSTANCE ABUSE (HCC): Status: ACTIVE | Noted: 2018-05-26

## 2018-05-26 LAB
ALBUMIN SERPL BCP-MCNC: 3.4 G/DL (ref 3.2–4.9)
ALBUMIN/GLOB SERPL: 1.1 G/DL
ALP SERPL-CCNC: 130 U/L (ref 30–99)
ALT SERPL-CCNC: 14 U/L (ref 2–50)
AMPHET UR QL SCN: POSITIVE
ANION GAP SERPL CALC-SCNC: 9 MMOL/L (ref 0–11.9)
APPEARANCE UR: CLEAR
AST SERPL-CCNC: 26 U/L (ref 12–45)
BACTERIA #/AREA URNS HPF: NEGATIVE /HPF
BARBITURATES UR QL SCN: NEGATIVE
BASOPHILS # BLD AUTO: 0.3 % (ref 0–1.8)
BASOPHILS # BLD: 0.02 K/UL (ref 0–0.12)
BENZODIAZ UR QL SCN: NEGATIVE
BILIRUB SERPL-MCNC: 0.3 MG/DL (ref 0.1–1.5)
BILIRUB UR QL STRIP.AUTO: NEGATIVE
BNP SERPL-MCNC: 27 PG/ML (ref 0–100)
BUN SERPL-MCNC: 13 MG/DL (ref 8–22)
BZE UR QL SCN: NEGATIVE
CALCIUM SERPL-MCNC: 9.2 MG/DL (ref 8.5–10.5)
CANNABINOIDS UR QL SCN: POSITIVE
CHLORIDE SERPL-SCNC: 99 MMOL/L (ref 96–112)
CO2 SERPL-SCNC: 26 MMOL/L (ref 20–33)
COLOR UR: YELLOW
CREAT SERPL-MCNC: 0.91 MG/DL (ref 0.5–1.4)
EOSINOPHIL # BLD AUTO: 0.01 K/UL (ref 0–0.51)
EOSINOPHIL NFR BLD: 0.1 % (ref 0–6.9)
EPI CELLS #/AREA URNS HPF: NEGATIVE /HPF
ERYTHROCYTE [DISTWIDTH] IN BLOOD BY AUTOMATED COUNT: 44.3 FL (ref 35.9–50)
EST. AVERAGE GLUCOSE BLD GHB EST-MCNC: 329 MG/DL
ETHANOL BLD-MCNC: 0 G/DL
GLOBULIN SER CALC-MCNC: 3.2 G/DL (ref 1.9–3.5)
GLUCOSE BLD-MCNC: 349 MG/DL (ref 65–99)
GLUCOSE BLD-MCNC: 379 MG/DL (ref 65–99)
GLUCOSE BLD-MCNC: 82 MG/DL (ref 65–99)
GLUCOSE SERPL-MCNC: 128 MG/DL (ref 65–99)
GLUCOSE UR STRIP.AUTO-MCNC: >=1000 MG/DL
HBA1C MFR BLD: 13.1 % (ref 0–5.6)
HCT VFR BLD AUTO: 41 % (ref 42–52)
HGB BLD-MCNC: 13.6 G/DL (ref 14–18)
HYALINE CASTS #/AREA URNS LPF: ABNORMAL /LPF
IMM GRANULOCYTES # BLD AUTO: 0.02 K/UL (ref 0–0.11)
IMM GRANULOCYTES NFR BLD AUTO: 0.3 % (ref 0–0.9)
KETONES UR STRIP.AUTO-MCNC: NEGATIVE MG/DL
LACTATE BLD-SCNC: 1.5 MMOL/L (ref 0.5–2)
LACTATE BLD-SCNC: 1.9 MMOL/L (ref 0.5–2)
LACTATE BLD-SCNC: 2.1 MMOL/L (ref 0.5–2)
LEUKOCYTE ESTERASE UR QL STRIP.AUTO: NEGATIVE
LYMPHOCYTES # BLD AUTO: 1.12 K/UL (ref 1–4.8)
LYMPHOCYTES NFR BLD: 15 % (ref 22–41)
MCH RBC QN AUTO: 26.7 PG (ref 27–33)
MCHC RBC AUTO-ENTMCNC: 33.2 G/DL (ref 33.7–35.3)
MCV RBC AUTO: 80.6 FL (ref 81.4–97.8)
METHADONE UR QL SCN: NEGATIVE
MICRO URNS: ABNORMAL
MONOCYTES # BLD AUTO: 0.42 K/UL (ref 0–0.85)
MONOCYTES NFR BLD AUTO: 5.6 % (ref 0–13.4)
NEUTROPHILS # BLD AUTO: 5.89 K/UL (ref 1.82–7.42)
NEUTROPHILS NFR BLD: 78.7 % (ref 44–72)
NITRITE UR QL STRIP.AUTO: NEGATIVE
NRBC # BLD AUTO: 0 K/UL
NRBC BLD-RTO: 0 /100 WBC
OPIATES UR QL SCN: NEGATIVE
OXYCODONE UR QL SCN: NEGATIVE
PCP UR QL SCN: NEGATIVE
PH UR STRIP.AUTO: 7.5 [PH]
PLATELET # BLD AUTO: 385 K/UL (ref 164–446)
PMV BLD AUTO: 9.6 FL (ref 9–12.9)
POTASSIUM SERPL-SCNC: 4.3 MMOL/L (ref 3.6–5.5)
PROPOXYPH UR QL SCN: NEGATIVE
PROT SERPL-MCNC: 6.6 G/DL (ref 6–8.2)
PROT UR QL STRIP: 100 MG/DL
RBC # BLD AUTO: 5.09 M/UL (ref 4.7–6.1)
RBC # URNS HPF: ABNORMAL /HPF
RBC UR QL AUTO: NEGATIVE
SODIUM SERPL-SCNC: 134 MMOL/L (ref 135–145)
SP GR UR STRIP.AUTO: 1.01
TROPONIN I SERPL-MCNC: <0.01 NG/ML (ref 0–0.04)
UROBILINOGEN UR STRIP.AUTO-MCNC: 0.2 MG/DL
WBC # BLD AUTO: 7.5 K/UL (ref 4.8–10.8)
WBC #/AREA URNS HPF: ABNORMAL /HPF

## 2018-05-26 PROCEDURE — 700105 HCHG RX REV CODE 258: Performed by: INTERNAL MEDICINE

## 2018-05-26 PROCEDURE — 82962 GLUCOSE BLOOD TEST: CPT | Mod: 91

## 2018-05-26 PROCEDURE — 83036 HEMOGLOBIN GLYCOSYLATED A1C: CPT

## 2018-05-26 PROCEDURE — 87040 BLOOD CULTURE FOR BACTERIA: CPT

## 2018-05-26 PROCEDURE — 85025 COMPLETE CBC W/AUTO DIFF WBC: CPT

## 2018-05-26 PROCEDURE — 700102 HCHG RX REV CODE 250 W/ 637 OVERRIDE(OP): Performed by: INTERNAL MEDICINE

## 2018-05-26 PROCEDURE — 94760 N-INVAS EAR/PLS OXIMETRY 1: CPT

## 2018-05-26 PROCEDURE — 80307 DRUG TEST PRSMV CHEM ANLYZR: CPT

## 2018-05-26 PROCEDURE — 80053 COMPREHEN METABOLIC PANEL: CPT

## 2018-05-26 PROCEDURE — 96372 THER/PROPH/DIAG INJ SC/IM: CPT

## 2018-05-26 PROCEDURE — 36415 COLL VENOUS BLD VENIPUNCTURE: CPT

## 2018-05-26 PROCEDURE — 70450 CT HEAD/BRAIN W/O DYE: CPT

## 2018-05-26 PROCEDURE — 83605 ASSAY OF LACTIC ACID: CPT | Mod: 91

## 2018-05-26 PROCEDURE — 99285 EMERGENCY DEPT VISIT HI MDM: CPT

## 2018-05-26 PROCEDURE — A9270 NON-COVERED ITEM OR SERVICE: HCPCS | Performed by: INTERNAL MEDICINE

## 2018-05-26 PROCEDURE — 81001 URINALYSIS AUTO W/SCOPE: CPT | Mod: XU

## 2018-05-26 PROCEDURE — 84484 ASSAY OF TROPONIN QUANT: CPT

## 2018-05-26 PROCEDURE — 83880 ASSAY OF NATRIURETIC PEPTIDE: CPT

## 2018-05-26 PROCEDURE — G0378 HOSPITAL OBSERVATION PER HR: HCPCS

## 2018-05-26 PROCEDURE — 99407 BEHAV CHNG SMOKING > 10 MIN: CPT | Performed by: INTERNAL MEDICINE

## 2018-05-26 PROCEDURE — 99220 PR INITIAL OBSERVATION CARE,LEVL III: CPT | Performed by: INTERNAL MEDICINE

## 2018-05-26 PROCEDURE — 71045 X-RAY EXAM CHEST 1 VIEW: CPT

## 2018-05-26 RX ORDER — DEXTROSE MONOHYDRATE 25 G/50ML
25 INJECTION, SOLUTION INTRAVENOUS
Status: DISCONTINUED | OUTPATIENT
Start: 2018-05-26 | End: 2018-05-27 | Stop reason: HOSPADM

## 2018-05-26 RX ORDER — SODIUM CHLORIDE 9 MG/ML
INJECTION, SOLUTION INTRAVENOUS CONTINUOUS
Status: DISCONTINUED | OUTPATIENT
Start: 2018-05-26 | End: 2018-05-27 | Stop reason: HOSPADM

## 2018-05-26 RX ORDER — ONDANSETRON 2 MG/ML
4 INJECTION INTRAMUSCULAR; INTRAVENOUS EVERY 4 HOURS PRN
Status: DISCONTINUED | OUTPATIENT
Start: 2018-05-26 | End: 2018-05-27 | Stop reason: HOSPADM

## 2018-05-26 RX ORDER — LISINOPRIL 10 MG/1
10 TABLET ORAL
COMMUNITY

## 2018-05-26 RX ORDER — HYDRALAZINE HYDROCHLORIDE 25 MG/1
50 TABLET, FILM COATED ORAL EVERY 8 HOURS
Status: DISCONTINUED | OUTPATIENT
Start: 2018-05-26 | End: 2018-05-27 | Stop reason: HOSPADM

## 2018-05-26 RX ORDER — FINASTERIDE 5 MG/1
5 TABLET, FILM COATED ORAL
Status: DISCONTINUED | OUTPATIENT
Start: 2018-05-26 | End: 2018-05-26

## 2018-05-26 RX ORDER — HEPARIN SODIUM 5000 [USP'U]/ML
5000 INJECTION, SOLUTION INTRAVENOUS; SUBCUTANEOUS EVERY 8 HOURS
Status: DISCONTINUED | OUTPATIENT
Start: 2018-05-26 | End: 2018-05-27 | Stop reason: HOSPADM

## 2018-05-26 RX ORDER — HYDRALAZINE HYDROCHLORIDE 50 MG/1
50 TABLET, FILM COATED ORAL 3 TIMES DAILY PRN
COMMUNITY

## 2018-05-26 RX ORDER — HYDRALAZINE HYDROCHLORIDE 50 MG/1
50 TABLET, FILM COATED ORAL 3 TIMES DAILY PRN
Status: DISCONTINUED | OUTPATIENT
Start: 2018-05-26 | End: 2018-05-26

## 2018-05-26 RX ORDER — TAMSULOSIN HYDROCHLORIDE 0.4 MG/1
0.4 CAPSULE ORAL
Status: DISCONTINUED | OUTPATIENT
Start: 2018-05-26 | End: 2018-05-26

## 2018-05-26 RX ORDER — BUDESONIDE AND FORMOTEROL FUMARATE DIHYDRATE 160; 4.5 UG/1; UG/1
2 AEROSOL RESPIRATORY (INHALATION)
Status: DISCONTINUED | OUTPATIENT
Start: 2018-05-26 | End: 2018-05-27 | Stop reason: HOSPADM

## 2018-05-26 RX ORDER — LISINOPRIL 10 MG/1
10 TABLET ORAL
Status: DISCONTINUED | OUTPATIENT
Start: 2018-05-26 | End: 2018-05-27 | Stop reason: HOSPADM

## 2018-05-26 RX ORDER — FINASTERIDE 5 MG/1
5 TABLET, FILM COATED ORAL
COMMUNITY

## 2018-05-26 RX ORDER — MIRTAZAPINE 45 MG/1
45 TABLET, FILM COATED ORAL
COMMUNITY

## 2018-05-26 RX ORDER — FINASTERIDE 5 MG/1
5 TABLET, FILM COATED ORAL DAILY
Status: DISCONTINUED | OUTPATIENT
Start: 2018-05-26 | End: 2018-05-27 | Stop reason: HOSPADM

## 2018-05-26 RX ORDER — ONDANSETRON 4 MG/1
4 TABLET, ORALLY DISINTEGRATING ORAL EVERY 4 HOURS PRN
Status: DISCONTINUED | OUTPATIENT
Start: 2018-05-26 | End: 2018-05-27 | Stop reason: HOSPADM

## 2018-05-26 RX ORDER — AMLODIPINE BESYLATE 5 MG/1
10 TABLET ORAL
Status: DISCONTINUED | OUTPATIENT
Start: 2018-05-26 | End: 2018-05-26

## 2018-05-26 RX ORDER — INSULIN GLARGINE 100 [IU]/ML
45 INJECTION, SOLUTION SUBCUTANEOUS
COMMUNITY

## 2018-05-26 RX ORDER — LISINOPRIL 10 MG/1
10 TABLET ORAL
Status: DISCONTINUED | OUTPATIENT
Start: 2018-05-26 | End: 2018-05-26

## 2018-05-26 RX ORDER — MIRTAZAPINE 15 MG/1
45 TABLET, FILM COATED ORAL
Status: DISCONTINUED | OUTPATIENT
Start: 2018-05-26 | End: 2018-05-27 | Stop reason: HOSPADM

## 2018-05-26 RX ORDER — AMLODIPINE BESYLATE 10 MG/1
10 TABLET ORAL
COMMUNITY

## 2018-05-26 RX ORDER — ACETAMINOPHEN 325 MG/1
650 TABLET ORAL EVERY 6 HOURS PRN
Status: DISCONTINUED | OUTPATIENT
Start: 2018-05-26 | End: 2018-05-27 | Stop reason: HOSPADM

## 2018-05-26 RX ORDER — BUDESONIDE AND FORMOTEROL FUMARATE DIHYDRATE 160; 4.5 UG/1; UG/1
2 AEROSOL RESPIRATORY (INHALATION) 2 TIMES DAILY PRN
COMMUNITY

## 2018-05-26 RX ORDER — AMITRIPTYLINE HYDROCHLORIDE 50 MG/1
50 TABLET, FILM COATED ORAL
Status: DISCONTINUED | OUTPATIENT
Start: 2018-05-26 | End: 2018-05-27 | Stop reason: HOSPADM

## 2018-05-26 RX ORDER — AMOXICILLIN 250 MG
2 CAPSULE ORAL 2 TIMES DAILY
Status: DISCONTINUED | OUTPATIENT
Start: 2018-05-26 | End: 2018-05-27 | Stop reason: HOSPADM

## 2018-05-26 RX ORDER — AMITRIPTYLINE HYDROCHLORIDE 50 MG/1
50 TABLET, FILM COATED ORAL NIGHTLY PRN
COMMUNITY

## 2018-05-26 RX ORDER — POLYETHYLENE GLYCOL 3350 17 G/17G
1 POWDER, FOR SOLUTION ORAL
Status: DISCONTINUED | OUTPATIENT
Start: 2018-05-26 | End: 2018-05-27 | Stop reason: HOSPADM

## 2018-05-26 RX ORDER — BISACODYL 10 MG
10 SUPPOSITORY, RECTAL RECTAL
Status: DISCONTINUED | OUTPATIENT
Start: 2018-05-26 | End: 2018-05-27 | Stop reason: HOSPADM

## 2018-05-26 RX ORDER — BUDESONIDE AND FORMOTEROL FUMARATE DIHYDRATE 160; 4.5 UG/1; UG/1
2 AEROSOL RESPIRATORY (INHALATION) 2 TIMES DAILY PRN
Status: DISCONTINUED | OUTPATIENT
Start: 2018-05-26 | End: 2018-05-26

## 2018-05-26 RX ORDER — RANITIDINE 150 MG/1
150 TABLET ORAL
Status: DISCONTINUED | OUTPATIENT
Start: 2018-05-26 | End: 2018-05-26

## 2018-05-26 RX ORDER — TAMSULOSIN HYDROCHLORIDE 0.4 MG/1
0.4 CAPSULE ORAL
COMMUNITY

## 2018-05-26 RX ORDER — AMLODIPINE BESYLATE 10 MG/1
10 TABLET ORAL DAILY
Status: DISCONTINUED | OUTPATIENT
Start: 2018-05-26 | End: 2018-05-27 | Stop reason: HOSPADM

## 2018-05-26 RX ADMIN — AMLODIPINE BESYLATE 10 MG: 10 TABLET ORAL at 13:48

## 2018-05-26 RX ADMIN — SODIUM CHLORIDE: 9 INJECTION, SOLUTION INTRAVENOUS at 19:11

## 2018-05-26 RX ADMIN — INSULIN HUMAN 5 UNITS: 100 INJECTION, SOLUTION PARENTERAL at 18:48

## 2018-05-26 RX ADMIN — HYDRALAZINE HYDROCHLORIDE 50 MG: 50 TABLET, FILM COATED ORAL at 21:01

## 2018-05-26 RX ADMIN — ASPIRIN 81 MG: 81 TABLET, COATED ORAL at 13:48

## 2018-05-26 RX ADMIN — HYDRALAZINE HYDROCHLORIDE 50 MG: 50 TABLET, FILM COATED ORAL at 13:50

## 2018-05-26 RX ADMIN — BUDESONIDE AND FORMOTEROL FUMARATE DIHYDRATE 2 PUFF: 160; 4.5 AEROSOL RESPIRATORY (INHALATION) at 19:14

## 2018-05-26 RX ADMIN — BUDESONIDE AND FORMOTEROL FUMARATE DIHYDRATE 2 PUFF: 160; 4.5 AEROSOL RESPIRATORY (INHALATION) at 13:49

## 2018-05-26 RX ADMIN — LISINOPRIL 10 MG: 10 TABLET ORAL at 13:49

## 2018-05-26 RX ADMIN — INSULIN HUMAN 4 UNITS: 100 INJECTION, SOLUTION PARENTERAL at 20:56

## 2018-05-26 ASSESSMENT — ENCOUNTER SYMPTOMS
NECK PAIN: 0
TINGLING: 0
MYALGIAS: 0
LOSS OF CONSCIOUSNESS: 0
HEADACHES: 0
SHORTNESS OF BREATH: 0
MEMORY LOSS: 1
VOMITING: 0
DIZZINESS: 0
BRUISES/BLEEDS EASILY: 0
FOCAL WEAKNESS: 0
HEMOPTYSIS: 0
WEIGHT LOSS: 0
NAUSEA: 0
COUGH: 0
BLURRED VISION: 0
CHILLS: 0
PALPITATIONS: 0
ABDOMINAL PAIN: 0
DEPRESSION: 0
WEAKNESS: 1
FEVER: 0
SPUTUM PRODUCTION: 0

## 2018-05-26 ASSESSMENT — PAIN SCALES - GENERAL
PAINLEVEL_OUTOF10: 0

## 2018-05-26 ASSESSMENT — LIFESTYLE VARIABLES
DO YOU DRINK ALCOHOL: NO
EVER_SMOKED: YES
EVER_SMOKED: YES
ALCOHOL_USE: NO

## 2018-05-26 ASSESSMENT — COPD QUESTIONNAIRES
COPD SCREENING SCORE: 5
DURING THE PAST 4 WEEKS HOW MUCH DID YOU FEEL SHORT OF BREATH: SOME OF THE TIME
HAVE YOU SMOKED AT LEAST 100 CIGARETTES IN YOUR ENTIRE LIFE: YES
DO YOU EVER COUGH UP ANY MUCUS OR PHLEGM?: NO/ONLY WITH OCCASIONAL COLDS OR INFECTIONS

## 2018-05-26 NOTE — PROGRESS NOTES
PT ADMITTED TO UNIT ORIENTED TO ROOM DISCUSSED PLAN OF CARE BED IN LOW POSITION MEDS SENT TO PHARMACY SLIP IN CHART  CALL LIGHT WITHIN REACH NURSING HISTORY AND ASSESSMENT DONE CONDITION STABLE

## 2018-05-26 NOTE — ED NOTES
Pt resting comfortably. Pt arouses easily but is drowsy. States that he still feels cold. Bear warmer still in place. Oral temp is 97.3. Will continue to monitor.

## 2018-05-26 NOTE — ED PROVIDER NOTES
ED Provider Note    CHIEF COMPLAINT  Chief Complaint   Patient presents with   • Hypoglycemia       HPI  Jv Angelo is a 57 y.o. male who presents to the ED secondary to altered mental status.  The patient was found unconscious slumped against the wall, his blood sugar was taken and it was 29, the patient was given 0.5 of Narcan, was also given D10 with improvement of his blood sugar up into the 60s.  Patient is more awake and alert.  Apparently the patient was seen and evaluated in the ER 5 days ago after a fall, that time there was a bag of heroin found by the patient's wheelchair.  Patient states he does have a headache, states he has been taking his insulin like normal, has been eating normally, is on both insulin and glipizide.  He denies any drug use.  Denies any recent fevers, head injury, chest pain, shortness of breath, abdominal pains, nausea vomiting.  There is some dried vomit around his mouth.    REVIEW OF SYSTEMS  See HPI for further details.  Limited secondary to patient's altered mental status    PAST MEDICAL HISTORY  Past Medical History:   Diagnosis Date   • BPH (benign prostatic hyperplasia) 12/20/2017   • Diabetes (HCC)    • Has received first dose of hepatitis B vaccine    • Hypertension    • Renal disorder    • Renal failure        FAMILY HISTORY  No family history on file.    SOCIAL HISTORY  Social History     Social History   • Marital status: Single     Spouse name: N/A   • Number of children: N/A   • Years of education: N/A     Social History Main Topics   • Smoking status: Current Every Day Smoker     Packs/day: 1.00     Years: 20.00     Types: Cigarettes   • Smokeless tobacco: Never Used   • Alcohol use No   • Drug use: No   • Sexual activity: Not on file     Other Topics Concern   • Not on file     Social History Narrative   • No narrative on file       SURGICAL HISTORY  Past Surgical History:   Procedure Laterality Date   • IRRIGATION & DEBRIDEMENT ORTHO Left 3/16/2018    Procedure:  IRRIGATION & DEBRIDEMENT ORTHO-LEFT BKA wound;  Surgeon: Jose Dyson M.D.;  Location: SURGERY [facility];  Service: Orthopedics   • IRRIGATION & DEBRIDEMENT ORTHO Left 12/26/2015    Procedure: IRRIGATION & DEBRIDEMENT ORTHO and wound closure of BKA;  Surgeon: William Welsh M.D.;  Location: SURGERY [facility];  Service:    • OTHER ORTHOPEDIC SURGERY Left 2015    bka   • OTHER ORTHOPEDIC SURGERY Right 2013    bka   • OTHER ORTHOPEDIC SURGERY      R forearm deep lac repair       CURRENT MEDICATIONS  Home Medications    **Home medications have not yet been reviewed for this encounter**         ALLERGIES  Allergies   Allergen Reactions   • Fish Anaphylaxis and Shortness of Breath       PHYSICAL EXAM  VITAL SIGNS: Pulse 83   Temp 36.4 °C (97.5 °F)   Resp 16   Ht 1.829 m (6') Comment: pt has bilat LE amputations, height is based on height prior to amputations  Wt 80.7 kg (177 lb 14.6 oz)   SpO2 98%   BMI 24.13 kg/m²   Constitutional: Well developed, Well nourished, no distress, Non-toxic appearance.   HENT: Atraumatic, normocephalic, oropharynx with slightly dry mucous membranes and dried blood around the oropharynx  Eyes: Somewhat pinpoint pupils otherwise EOMI, PERRLA  Neck: Normal range of motion, No tenderness, Supple, No stridor.   Cardiovascular: Normal heart rate, Normal rhythm.   Thorax & Lungs: Normal breath sounds, No respiratory distress, No wheezing, No chest tenderness.   Abdomen: Bowel sounds normal, Soft, No tenderness, No masses, No pulsatile masses.   Skin: Warm, Dry, No erythema, No rash.   Back: No tenderness, No CVA tenderness.   Extremities: Left BKA, right AKA  Neurologic: Patient is awake alert, seems drowsy and sleepy and will fall asleep during my history.  Psychiatric: Unable to assess secondary to patient's altered mental status.    Results for orders placed or performed during the hospital encounter of 05/26/18   CBC WITH DIFFERENTIAL   Result Value Ref Range    WBC  7.5 4.8 - 10.8 K/uL    RBC 5.09 4.70 - 6.10 M/uL    Hemoglobin 13.6 (L) 14.0 - 18.0 g/dL    Hematocrit 41.0 (L) 42.0 - 52.0 %    MCV 80.6 (L) 81.4 - 97.8 fL    MCH 26.7 (L) 27.0 - 33.0 pg    MCHC 33.2 (L) 33.7 - 35.3 g/dL    RDW 44.3 35.9 - 50.0 fL    Platelet Count 385 164 - 446 K/uL    MPV 9.6 9.0 - 12.9 fL    Neutrophils-Polys 78.70 (H) 44.00 - 72.00 %    Lymphocytes 15.00 (L) 22.00 - 41.00 %    Monocytes 5.60 0.00 - 13.40 %    Eosinophils 0.10 0.00 - 6.90 %    Basophils 0.30 0.00 - 1.80 %    Immature Granulocytes 0.30 0.00 - 0.90 %    Nucleated RBC 0.00 /100 WBC    Neutrophils (Absolute) 5.89 1.82 - 7.42 K/uL    Lymphs (Absolute) 1.12 1.00 - 4.80 K/uL    Monos (Absolute) 0.42 0.00 - 0.85 K/uL    Eos (Absolute) 0.01 0.00 - 0.51 K/uL    Baso (Absolute) 0.02 0.00 - 0.12 K/uL    Immature Granulocytes (abs) 0.02 0.00 - 0.11 K/uL    NRBC (Absolute) 0.00 K/uL   COMP METABOLIC PANEL   Result Value Ref Range    Sodium 134 (L) 135 - 145 mmol/L    Potassium 4.3 3.6 - 5.5 mmol/L    Chloride 99 96 - 112 mmol/L    Co2 26 20 - 33 mmol/L    Anion Gap 9.0 0.0 - 11.9    Glucose 128 (H) 65 - 99 mg/dL    Bun 13 8 - 22 mg/dL    Creatinine 0.91 0.50 - 1.40 mg/dL    Calcium 9.2 8.5 - 10.5 mg/dL    AST(SGOT) 26 12 - 45 U/L    ALT(SGPT) 14 2 - 50 U/L    Alkaline Phosphatase 130 (H) 30 - 99 U/L    Total Bilirubin 0.3 0.1 - 1.5 mg/dL    Albumin 3.4 3.2 - 4.9 g/dL    Total Protein 6.6 6.0 - 8.2 g/dL    Globulin 3.2 1.9 - 3.5 g/dL    A-G Ratio 1.1 g/dL   LACTIC ACID   Result Value Ref Range    Lactic Acid 1.9 0.5 - 2.0 mmol/L   TROPONIN   Result Value Ref Range    Troponin I <0.01 0.00 - 0.04 ng/mL   BTYPE NATRIURETIC PEPTIDE   Result Value Ref Range    B Natriuretic Peptide 27 0 - 100 pg/mL   ESTIMATED GFR   Result Value Ref Range    GFR If African American >60 >60 mL/min/1.73 m 2    GFR If Non African American >60 >60 mL/min/1.73 m 2   ACCU-CHEK GLUCOSE   Result Value Ref Range    Glucose - Accu-Ck 82 65 - 99 mg/dL         RADIOLOGY/PROCEDURES  CT-HEAD W/O   Final Result      1.  No acute intracranial abnormality.   2.  Mild diffuse atrophy, greater than expected for age.   3.  Chronic bilateral medial orbital wall fractures.      DX-CHEST-PORTABLE (1 VIEW)   Final Result      Hypoinflation with minimal bibasilar infiltrate or atelectasis.            COURSE & MEDICAL DECISION MAKING  Pertinent Labs & Imaging studies reviewed. (See chart for details)  Patient is coming in hypothermic with hypoglycemia differentials include hypoglycemia, head injury, drug abuse, sepsis.  We will check basic laboratory tests including CT scan of the head, the patient is still somewhat altered, I am wondering if this is drug abuse.    Workup here was unremarkable, the patient is still somewhat sleepy, but due to the patient's glipizide I believe the patient should be admitted to the hospital for observation, discussed the case with the hospitalist for admission the hospital.    FINAL IMPRESSION  1. Stupor    2. Hypoglycemia            This dictation was created using voice recognition software. The accuracy of the dictation is limited to the abilities of the software. I expect there may be some errors of grammar and possibly content. The nursing notes were reviewed and certain aspects of this information were incorporated into this note.    Electronically signed by: Shaggy Espitia, 5/26/2018 8:10 AM

## 2018-05-26 NOTE — ASSESSMENT & PLAN NOTE
-hypoglycemic in the ER, suspect from drug use and possible inappropriate use of diabetic medications  -hold lantus  -ISS  -check A1c, consider diabetes education

## 2018-05-26 NOTE — ED NOTES
Pt resting comfortably in bed. Report called to Zeyad in CDU for room 215 at 1208. He will come down to get pt.

## 2018-05-26 NOTE — ED NOTES
Pt resting in bed comfortably. States that he is unable to urinate currently. Lactic acid and blood cultures sent to lab.

## 2018-05-26 NOTE — ASSESSMENT & PLAN NOTE
-likely related to profound hypoglycemia, which has resolved, monitor closely while in the hospital

## 2018-05-26 NOTE — H&P
Hospital Medicine History and Physical    Date of Service  5/26/2018    Chief Complaint  Chief Complaint   Patient presents with   • Hypoglycemia       History of Presenting Illness  57 y.o. male who presented 5/26/2018 with above issue. Patient was found altered this AM at his residence laying against a wall grunting and not making sense. EMS was called, patient was found to have a BS of 29 in the field and was given 250 mL of D10 with good improvement in symptoms. Patient was then brought to the ER for further evaluation and treatment. Currently, patient feels mostly back to his baseline, but does feel generalized weakness. He denies any recent drug use, though he was in the ER 5 days ago for similar symptoms and apparently the patient had a bag full of white substance that was thought to be heroin. He denies any secretions from his BKA and AKA stumps. Has no real recollection of last night's events and feels that he took his diabetic medications appropriately and ate dinner without issue last night.     Primary Care Physician  Pcp Pt States None    Code Status  fc/fc    Review of Systems  Review of Systems   Constitutional: Positive for malaise/fatigue. Negative for chills, fever and weight loss.   HENT: Negative for hearing loss.    Eyes: Negative for blurred vision.   Respiratory: Negative for cough, hemoptysis, sputum production and shortness of breath.    Cardiovascular: Negative for chest pain, palpitations and leg swelling.   Gastrointestinal: Negative for abdominal pain, nausea and vomiting.   Genitourinary: Negative for dysuria and urgency.   Musculoskeletal: Negative for myalgias and neck pain.   Skin: Negative for itching.   Neurological: Positive for weakness. Negative for dizziness, tingling, focal weakness, loss of consciousness and headaches.   Endo/Heme/Allergies: Does not bruise/bleed easily.   Psychiatric/Behavioral: Positive for memory loss. Negative for depression.   All other systems reviewed  and are negative.         Past Medical History  Past Medical History:   Diagnosis Date   • BPH (benign prostatic hyperplasia) 12/20/2017   • Diabetes (HCC)    • Has received first dose of hepatitis B vaccine    • Hypertension    • Renal disorder    • Renal failure        Surgical History  Past Surgical History:   Procedure Laterality Date   • IRRIGATION & DEBRIDEMENT ORTHO Left 3/16/2018    Procedure: IRRIGATION & DEBRIDEMENT ORTHO-LEFT BKA wound;  Surgeon: Jose Dyson M.D.;  Location: SURGERY [facility];  Service: Orthopedics   • IRRIGATION & DEBRIDEMENT ORTHO Left 12/26/2015    Procedure: IRRIGATION & DEBRIDEMENT ORTHO and wound closure of BKA;  Surgeon: William Welsh M.D.;  Location: SURGERY [facility];  Service:    • OTHER ORTHOPEDIC SURGERY Left 2015    bka   • OTHER ORTHOPEDIC SURGERY Right 2013    bka   • OTHER ORTHOPEDIC SURGERY      R forearm deep lac repair       Medications  No current facility-administered medications on file prior to encounter.      Current Outpatient Prescriptions on File Prior to Encounter   Medication Sig Dispense Refill   • insulin regular (HUMULIN R) 100 Unit/mL Solution Inject 1-6 Units as instructed 4 Times a Day,Before Meals and at Bedtime. 10 mL    • raNITidine (ZANTAC) 150 MG Tab Take 150 mg by mouth 1 time daily as needed.     • aspirin 81 MG tablet Take 81 mg by mouth 1 time daily as needed.         Family History  Denies any family history of medical issues    Social History  Social History   Substance Use Topics   • Smoking status: Current Every Day Smoker     Packs/day: 1.00     Years: 20.00     Types: Cigarettes   • Smokeless tobacco: Never Used   • Alcohol use No     Counseled the patient on the importance of tobacco cessation including the use of chantix, wellbutrin, and hypnosis. Patient is in understanding of this issue.  Greater than 10 minutes spent on this counseling.    BILL CODE 07265.  NO desire to quit at this time    Allergies  Allergies    Allergen Reactions   • Fish Anaphylaxis and Shortness of Breath        Physical Exam  Laboratory   Hemodynamics  Temp (24hrs), Av.4 °C (97.5 °F), Min:35.8 °C (96.4 °F), Max:37 °C (98.6 °F)   Temperature: 36.8 °C (98.3 °F)  Pulse  Av.8  Min: 77  Max: 96 Heart Rate (Monitored): 93  Blood Pressure: 145/78, NIBP: 156/80      Respiratory      Respiration: 15, Pulse Oximetry: 100 %             Physical Exam   Constitutional: He is oriented to person, place, and time. He appears well-developed and well-nourished. No distress.   Slightly slow in answering questions   HENT:   Head: Normocephalic and atraumatic.   Mouth/Throat: No oropharyngeal exudate.   Eyes: Pupils are equal, round, and reactive to light. No scleral icterus.   Neck: Normal range of motion. Neck supple.   Cardiovascular: Normal rate, regular rhythm, normal heart sounds and intact distal pulses.    No murmur heard.  Pulmonary/Chest: Effort normal and breath sounds normal. No stridor. No respiratory distress. He has no wheezes.   Abdominal: Soft. Bowel sounds are normal. There is no tenderness.   Musculoskeletal: Normal range of motion. He exhibits no edema or tenderness.   BKA and AKA stumps appear well healed, no track marks apparent on skin   Neurological: He is alert and oriented to person, place, and time. No cranial nerve deficit.   Skin: Skin is warm and dry. No rash noted.   Psychiatric: He has a normal mood and affect.   Nursing note and vitals reviewed.      Recent Labs      18   0726   WBC  7.5   RBC  5.09   HEMOGLOBIN  13.6*   HEMATOCRIT  41.0*   MCV  80.6*   MCH  26.7*   MCHC  33.2*   RDW  44.3   PLATELETCT  385   MPV  9.6     Recent Labs      18   0726   SODIUM  134*   POTASSIUM  4.3   CHLORIDE  99   CO2  26   GLUCOSE  128*   BUN  13   CREATININE  0.91   CALCIUM  9.2         Recent Labs      18   0726   BNPBTYPENAT  27           Imaging  CT-HEAD W/O   Final Result      1.  No acute intracranial abnormality.   2.   Mild diffuse atrophy, greater than expected for age.   3.  Chronic bilateral medial orbital wall fractures.      DX-CHEST-PORTABLE (1 VIEW)   Final Result      Hypoinflation with minimal bibasilar infiltrate or atelectasis.           Assessment/Plan     I anticipate this patient is appropriate for observation status at this time.    * Hypoglycemia- (present on admission)   Assessment & Plan    -resolved, suspect as outlined above        Hypertension- (present on admission)   Assessment & Plan    -continue outpatient bP medications        Type 2 diabetes mellitus (HCC)- (present on admission)   Assessment & Plan    -hypoglycemic in the ER, suspect from drug use and possible inappropriate use of diabetic medications  -hold lantus  -ISS  -check A1c, consider diabetes education        Polysubstance abuse- (present on admission)   Assessment & Plan    -meth and THC positive UDS, encourage cessation, monitor for withdrawal        Metabolic encephalopathy- (present on admission)   Assessment & Plan    -likely related to profound hypoglycemia, which has resolved, monitor closely while in the hospital        Tobacco dependence- (present on admission)   Assessment & Plan    -encourage cessation        Depression with suicidal ideation- (present on admission)   Assessment & Plan    -not well controlled, continue remeron and elavil PRN        BPH (benign prostatic hyperplasia)- (present on admission)   Assessment & Plan    -proscar        PVD (peripheral vascular disease) ([facility])- (present on admission)   Assessment & Plan    -at his baseline, continue outpatient medications            VTE prophylaxis heparin 5k units [id number].

## 2018-05-26 NOTE — ED TRIAGE NOTES
Pt found by friends sitting against wall grunting and not responding. Initial blood glucose by EMS on scene was 29. 68 after 250 mL of D10. Narcan 0.5mg also given. Pt was only in pants that were wet when he arrived and was cold to the touch. Bear warmer applied. Left EJ by EMS.

## 2018-05-26 NOTE — ED NOTES
Pt resting in bed comfortable. Bear warmer removed now that temperature is normal. Pt denies pain or needs at this time.

## 2018-05-26 NOTE — ED NOTES
The Medication Reconciliation process has been completed by interviewing the patient who reports to me that he is not compliant with his meds.  Most of his meds are full and were filled early April.  He states that he does inject his lantus every night.      Allergies have been reviewed  Antibiotic use in 30 days - none    Home Pharmacy:  [facility]

## 2018-05-26 NOTE — DISCHARGE PLANNING
Care Transition Team Assessment    Information Source  Orientation : Oriented x 4  Information Given By: Patient  Who is responsible for making decisions for patient? : Patient         Elopement Risk  Legal Hold: No  Ambulatory or Self Mobile in Wheelchair: No-Not an Elopement Risk    Interdisciplinary Discharge Planning  Primary Care Physician: [facility]  Lives with - Patient's Self Care Capacity: Alone and Able to Care For Self  Able to Return to Previous ADL's: Yes  Mobility Issues: Yes (w/c bound r/t  amputations  lower extremities/ind. transfers)  Prior Services: None  Patient Expects to be Discharged to:: home  Assistance Needed: No  Durable Medical Equipment: Other - Specify (w/c)              Finances  Financial Barriers to Discharge: No  Prescription Coverage: Yes                   Domestic Abuse  Have you ever been the victim of abuse or violence?: No         Discharge Risks or Barriers  Discharge risks or barriers?: Substance abuse

## 2018-05-27 VITALS
BODY MASS INDEX: 24.78 KG/M2 | WEIGHT: 182.98 LBS | SYSTOLIC BLOOD PRESSURE: 130 MMHG | DIASTOLIC BLOOD PRESSURE: 77 MMHG | RESPIRATION RATE: 18 BRPM | HEIGHT: 72 IN | HEART RATE: 93 BPM | OXYGEN SATURATION: 99 % | TEMPERATURE: 98.2 F

## 2018-05-27 LAB
ANION GAP SERPL CALC-SCNC: 10 MMOL/L (ref 0–11.9)
BASOPHILS # BLD AUTO: 0.4 % (ref 0–1.8)
BASOPHILS # BLD: 0.04 K/UL (ref 0–0.12)
BUN SERPL-MCNC: 13 MG/DL (ref 8–22)
CALCIUM SERPL-MCNC: 8.4 MG/DL (ref 8.5–10.5)
CHLORIDE SERPL-SCNC: 101 MMOL/L (ref 96–112)
CO2 SERPL-SCNC: 21 MMOL/L (ref 20–33)
CREAT SERPL-MCNC: 0.78 MG/DL (ref 0.5–1.4)
EOSINOPHIL # BLD AUTO: 0.12 K/UL (ref 0–0.51)
EOSINOPHIL NFR BLD: 1.1 % (ref 0–6.9)
ERYTHROCYTE [DISTWIDTH] IN BLOOD BY AUTOMATED COUNT: 47.5 FL (ref 35.9–50)
GLUCOSE BLD-MCNC: 157 MG/DL (ref 65–99)
GLUCOSE SERPL-MCNC: 173 MG/DL (ref 65–99)
HCT VFR BLD AUTO: 39.2 % (ref 42–52)
HGB BLD-MCNC: 12.6 G/DL (ref 14–18)
IMM GRANULOCYTES # BLD AUTO: 0.03 K/UL (ref 0–0.11)
IMM GRANULOCYTES NFR BLD AUTO: 0.3 % (ref 0–0.9)
LYMPHOCYTES # BLD AUTO: 2.8 K/UL (ref 1–4.8)
LYMPHOCYTES NFR BLD: 25 % (ref 22–41)
MCH RBC QN AUTO: 26.3 PG (ref 27–33)
MCHC RBC AUTO-ENTMCNC: 32.1 G/DL (ref 33.7–35.3)
MCV RBC AUTO: 81.8 FL (ref 81.4–97.8)
MONOCYTES # BLD AUTO: 0.85 K/UL (ref 0–0.85)
MONOCYTES NFR BLD AUTO: 7.6 % (ref 0–13.4)
NEUTROPHILS # BLD AUTO: 7.34 K/UL (ref 1.82–7.42)
NEUTROPHILS NFR BLD: 65.6 % (ref 44–72)
NRBC # BLD AUTO: 0 K/UL
NRBC BLD-RTO: 0 /100 WBC
PLATELET # BLD AUTO: 430 K/UL (ref 164–446)
PMV BLD AUTO: 9.3 FL (ref 9–12.9)
POTASSIUM SERPL-SCNC: 3.8 MMOL/L (ref 3.6–5.5)
RBC # BLD AUTO: 4.79 M/UL (ref 4.7–6.1)
SODIUM SERPL-SCNC: 132 MMOL/L (ref 135–145)
WBC # BLD AUTO: 11.2 K/UL (ref 4.8–10.8)

## 2018-05-27 PROCEDURE — A9270 NON-COVERED ITEM OR SERVICE: HCPCS | Performed by: INTERNAL MEDICINE

## 2018-05-27 PROCEDURE — 82962 GLUCOSE BLOOD TEST: CPT

## 2018-05-27 PROCEDURE — 85025 COMPLETE CBC W/AUTO DIFF WBC: CPT

## 2018-05-27 PROCEDURE — G0378 HOSPITAL OBSERVATION PER HR: HCPCS

## 2018-05-27 PROCEDURE — 80048 BASIC METABOLIC PNL TOTAL CA: CPT

## 2018-05-27 PROCEDURE — 700102 HCHG RX REV CODE 250 W/ 637 OVERRIDE(OP): Performed by: INTERNAL MEDICINE

## 2018-05-27 PROCEDURE — 96372 THER/PROPH/DIAG INJ SC/IM: CPT

## 2018-05-27 RX ADMIN — INSULIN HUMAN 1 UNITS: 100 INJECTION, SOLUTION PARENTERAL at 06:23

## 2018-05-27 RX ADMIN — HYDRALAZINE HYDROCHLORIDE 50 MG: 50 TABLET, FILM COATED ORAL at 06:22

## 2018-05-27 ASSESSMENT — PAIN SCALES - GENERAL: PAINLEVEL_OUTOF10: 0

## 2018-05-27 NOTE — PROGRESS NOTES
Friends visiting,pt asking for RN to do incident report regarding one of visitor who fell on the hallway somewhere, pt also being inappropriate with tech, security called to talk to pt.

## 2018-05-27 NOTE — PROGRESS NOTES
"Pt called,wants RN to removed his iv on the left EJ,said it is hurting, pt said \" removed this iv or I will! \" tried to explain importance of iv access and besides he is very hard stick, pt said I don't care, now pt taken off his leads, ask why he said he is getting ready to leave,but refusing to leave AMA. Attempted to educate but  Pt getting more agitated ans started raising his voice to this RN.  "

## 2018-05-27 NOTE — PROGRESS NOTES
Bed alarm went off,pt pulled out his EJ,  Already on his motorized wheelchair, stated leaving AMA, paper works signed, took off without waiting for his home meds from Pharmacy, Dr. Bowers notified,pharmacist Cam[facility] notified, claim stub kept with pt's Chart

## 2018-05-27 NOTE — PROGRESS NOTES
A/o,assessment completed,poc discussed,verbalized understanding, tolerating po well,denies pain,sob,call button within reach,will continue to monitor.

## 2018-05-28 NOTE — DISCHARGE SUMMARY
- patient eloped from the hospital before shift change, patient is out of the unit before I came in the morning.

## 2019-09-16 NOTE — PROGRESS NOTES
Pharmacy Kinetics 57 y.o. male on vancomycin day # 2 2018    Currently on Vancomycin 1500 mg iv q12hr (800)    Indication for Treatment: SSTI     Pertinent history per medical record: Admitted on 2018 for cellulitis. Pt bib EMS with c/o infected wound. 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 Pt failed outpt abx. Febrile on arrival.      Other antibiotics: None     Allergies: Fish      List concerns for renal function:s/p double amputation (SCr may not accurately est renal fx)    Pertinent cultures to date:   No labs @ this time     Recent Labs      18   1754  18   0238   WBC  9.0  8.8   NEUTSPOLYS  64.90   --      Recent Labs      18   17518   0238   BUN  13  12   CREATININE  1.07  0.84   ALBUMIN  3.4   --      No results for input(s): VANCOTROUGH, VANCOPEAK, VANCORANDOM in the last 72 hours.  Intake/Output Summary (Last 24 hours) at 18 1003  Last data filed at 18 0800   Gross per 24 hour   Intake                0 ml   Output             1400 ml   Net            -1400 ml      Blood pressure 145/78, pulse 78, temperature 36.8 °C (98.3 °F), resp. rate 16, weight 86.2 kg (190 lb), SpO2 98 %. Temp (24hrs), Av.8 °C (98.2 °F), Min:35.9 °C (96.6 °F), Max:38.2 °C (100.8 °F)      A/P   1. Vancomycin dose change: no change  2. Next vancomycin level: 18@0730  3. Goal trough: 12-16 mcg/mL   4. Comments:  No cx resulted at this time, wbc wnl. Febrile over interval. Continue current dose , level tomorrow.     Srinath PlummerD BCPS    Principal Discharge DX:	Sinusitis

## 2020-12-04 NOTE — ED PROVIDER NOTES
ED Provider Note    CHIEF COMPLAINT  Chief Complaint   Patient presents with   • Abnormal Labs     sent for CPK 2254       HPI  Jv Angelo is a 57 y.o. male who presents with history of cellulitis on stump of BKA amputation, for about 3 weeks. Evidently he was admitted here 3 weeks ago given IV antibiotics for cellulitis on his below the knee amputation stump. This amputation was done in 2015 that became infected about 3 weeks ago. He has been discharged from the hospital, now going to the infusion center receiving IV antibiotics however was sent here this morning because of abnormal lab. He has no complaints. No leg pain no nausea no vomiting no diarrhea no fever no chills no shortness of breath.    REVIEW OF SYSTEMS  See HPI for further details. History of enlarged prostate diabetes hypertension and renal failure All other systems are negative.     PAST MEDICAL HISTORY  Past Medical History:   Diagnosis Date   • BPH (benign prostatic hyperplasia) 12/20/2017   • Diabetes (CMS-HCC)    • Has received first dose of hepatitis B vaccine    • Hypertension    • Renal disorder    • Renal failure        FAMILY HISTORY  No family history on file.    SOCIAL HISTORY  Social History     Social History   • Marital status: Single     Spouse name: N/A   • Number of children: N/A   • Years of education: N/A     Social History Main Topics   • Smoking status: Current Every Day Smoker     Packs/day: 1.00     Years: 20.00     Types: Cigarettes     Last attempt to quit: 12/26/2005   • Smokeless tobacco: Never Used   • Alcohol use No   • Drug use: No   • Sexual activity: Not on file     Other Topics Concern   • Not on file     Social History Narrative   • No narrative on file       SURGICAL HISTORY  Past Surgical History:   Procedure Laterality Date   • IRRIGATION & DEBRIDEMENT ORTHO Left 3/16/2018    Procedure: IRRIGATION & DEBRIDEMENT ORTHO-LEFT BKA wound;  Surgeon: Jose Dyson M.D.;  Location: SURGERY [facility];   Service: Orthopedics   • IRRIGATION & DEBRIDEMENT ORTHO Left 12/26/2015    Procedure: IRRIGATION & DEBRIDEMENT ORTHO and wound closure of BKA;  Surgeon: William Welsh M.D.;  Location: SURGERY [facility];  Service:    • OTHER ORTHOPEDIC SURGERY Left 2015    bka   • OTHER ORTHOPEDIC SURGERY Right 2013    bka   • OTHER ORTHOPEDIC SURGERY      R forearm deep lac repair       CURRENT MEDICATIONS  Home Medications     Reviewed by Piedad Manuel R.N. (Registered Nurse) on 03/29/18 at 0737  Med List Status: Partial   Medication Last Dose Status   acetaminophen (TYLENOL) 325 MG Tab 3/29/2018 Active   albuterol 108 (90 Base) MCG/ACT Aero Soln inhalation aerosol 3/29/2018 Active   amlodipine (NORVASC) 10 MG Tab 3/29/2018 Active   aspirin 81 MG tablet 3/29/2018 Active   budesonide-formoterol (SYMBICORT) 160-4.5 MCG/ACT Aerosol 3/29/2018 Active   citalopram (CELEXA) 20 MG Tab 3/29/2018 Active   finasteride (PROSCAR) 5 MG Tab 3/29/2018 Active   glipiZIDE (GLUCOTROL) 10 MG Tab 3/29/2018 Active   hydrALAZINE (APRESOLINE) 50 MG Tab 3/29/2018 Active   insulin glargine (LANTUS) 100 UNIT/ML Solution 3/28/2018 Active   insulin regular (HUMULIN R) 100 Unit/mL Solution 3/28/2018 Active   lactobacillus granules (LACTINEX/FLORANEX) Pack 3/28/2018 Active   lisinopril (PRINIVIL) 5 MG Tab 3/29/2018 Active   mirtazapine (REMERON) 45 MG tablet 3/28/2018 Active   multivitamin (THERAGRAN) Tab 3/29/2018 Active   oxyCODONE immediate release (ROXICODONE) 10 MG immediate release tablet 3/29/2018 Active   pregabalin (LYRICA) 75 MG Cap 3/29/2018 Active   raNITidine (ZANTAC) 150 MG Tab 3/29/2018 Active   tamsulosin (FLOMAX) 0.4 MG capsule 3/29/2018 Active                ALLERGIES  Allergies   Allergen Reactions   • Fish Anaphylaxis and Shortness of Breath       PHYSICAL EXAM  VITAL SIGNS: /79   Pulse 97   Temp 36.8 °C (98.3 °F)   Resp 18   Ht 1.829 m (6') Comment: Before amputation of legs  Wt 92.1 kg (203 lb)   SpO2 100%   BMI  27.53 kg/m²   Constitutional: Well developed, Well nourished, No acute distress, Non-toxic appearance.   HENT: Normocephalic, Atraumatic, Bilateral external ears normal, Oropharynx moist, No oral exudates, Nose normal.   Eyes: PERRL, EOMI, Conjunctiva normal, No discharge.   Neck: Normal range of motion, No tenderness, Supple, No stridor.   Lymphatic: No lymphadenopathy noted.   Cardiovascular: Normal heart rate, Normal rhythm, No murmurs, No rubs, No gallops.   Thorax & Lungs: Normal breath sounds, No respiratory distress, No wheezing, No chest tenderness.   Abdomen:  No tenderness, no guarding no rigidity and the abdomen is soft.  No masses, No pulsatile masses.  Skin: Warm, Dry, No erythema, No rash.   Back: No tenderness, No CVA tenderness.   Extremities: Intact distal pulses above-the-knee amputation on the right BK amputation on the left with a dressing over the previous infection, No cyanosis, No clubbing.   Musculoskeletal: Good range of motion in all major joints. No tenderness to palpation or major deformities noted.   Neurologic: Alert & oriented x 3, Normal motor function, Normal sensory function, No focal deficits noted.   Psychiatric: Affect normal, Judgment normal, Mood normal.       RADIOLOGY/PROCEDURES      COURSE & MEDICAL DECISION MAKING  Pertinent Labs & Imaging studies reviewed. (See chart for details) lab done on 327, 2 days ago white count elevated 14.5 hematocrit normal platelet count 588, 90 polys. Electrolytes, unremarkable. Creatinine normal  CPK was 2254. I do not see other determination of his CPK in the past. I'm going to repeat his labs today.  Lab done today, white count normal hematocrit 40 platelet count 566. No shift. Electrolytes normal, renal function normal CPK has dropped from 2254, now today 995.    He was sent here because of elevated CPK at this was probably secondary to a seizure that was secondary to hypoglycemia. His renal function remains normal. The CPK has dropped down  reasonably.  . Daptomycin also known to cause elevated CPK and he has no complaints. Was only sent here to the emergency department because of elevated CPK    FINAL IMPRESSION  1.   1. Cellulitis of left lower extremity        2.   3.     Disposition  Discharge instructions are understood. This patient is to return if fever vomiting or no better in 12 hours. Follow up with the [facility] clinic or private physician. Information sheets on cellulitis  Electronically signed by: Grady Noonan, 3/29/2018 7:49 AM     headache

## 2021-08-30 NOTE — CARE PLAN
Problem: Communication  Goal: The ability to communicate needs accurately and effectively will improve    Intervention: Educate patient and significant other/support system about the plan of care, procedures, treatments, medications and allow for questions  Educated pt on plan of care, scheduled medications. Pt acknowledges understanding. All questions answered. Pt is able to communicate needs effectively.       Problem: Pain Management  Goal: Pain level will decrease to patient's comfort goal    Intervention: Follow pain managment plan developed in collaboration with patient and Interdisciplinary Team  Pt complained of 8/10 pain, medicated per MAR. Pt has PRN pain meds available for pain control.         25

## 2025-07-11 NOTE — PROGRESS NOTES
Late entry 0925 - Patient is AOx4, complains of 8/10 pain.  Medicated with PRN medications.  Tolerated all schedule medications, on room air.  Dressing noted to LLE - CDI.  PICC noted to left upper arm, running NS TKO.  No bed alarm, patient is up self.  Bed locked in lowest position, treaded socks in place, call light within reach, POC discussed, all needs met at this time.   Per ECR pt hung up before call could be transferred.     Unable to reach Patient  after two attempts.  Unable to leave message due to: mailbox full    Unable to route as pcp not in [facility].    Reason for Disposition   Second attempt to contact caller AND no contact made. Phone number verified.    Protocols used: No Contact or Duplicate Contact Call-A-OH

## (undated) DEVICE — GLOVE BIOGEL PI INDICATOR SZ 6.5 SURGICAL PF LF - (50/BX 4BX/CA)

## (undated) DEVICE — BLADE SURGICAL #15 - (50/BX 3BX/CA)

## (undated) DEVICE — KIT ROOM DECONTAMINATION

## (undated) DEVICE — SLEEVE, VASO, THIGH, MED

## (undated) DEVICE — SUTURE ETHILON 2-0 FSLX 30 (36PK/BX)"

## (undated) DEVICE — ELECTRODE DUAL RETURN W/ CORD - (50/PK)

## (undated) DEVICE — GLOVE SZ 6 BIOGEL PI MICRO - PF LF (50PR/BX 4BX/CA)

## (undated) DEVICE — ELECTRODE 850 FOAM ADHESIVE - HYDROGEL RADIOTRNSPRNT (50/PK)

## (undated) DEVICE — BANDAGE ELASTIC 6 HONEYCOMB - 6X5YD LF (20/CA)"

## (undated) DEVICE — HEAD HOLDER JUNIOR/ADULT

## (undated) DEVICE — SET EXTENSION WITH 2 PORTS (48EA/CA) ***PART #2C8610 IS A SUBSTITUTE*****

## (undated) DEVICE — SENSOR SPO2 NEO LNCS ADHESIVE (20/BX) SEE USER NOTES

## (undated) DEVICE — TOURNIQUET, STERILE 24 (YELLOW)

## (undated) DEVICE — HANDPIECE 10FT INTPLS SCT PLS IRRIGATION HAND CONTROL SET (6/PK)

## (undated) DEVICE — SUCTION INSTRUMENT YANKAUER BULBOUS TIP W/O VENT (50EA/CA)

## (undated) DEVICE — GLOVE BIOGEL INDICATOR SZ 8 SURGICAL PF LTX - (50/BX 4BX/CA)

## (undated) DEVICE — TUBE E-T HI-LO CUFF 8.0MM (10EA/PK)

## (undated) DEVICE — SUTURE GENERAL

## (undated) DEVICE — DRAPE LOWER EXTREMETY - (6/CA)

## (undated) DEVICE — CHLORAPREP 26 ML APPLICATOR - ORANGE TINT(25/CA)

## (undated) DEVICE — SET LEADWIRE 5 LEAD BEDSIDE DISPOSABLE ECG (1SET OF 5/EA)

## (undated) DEVICE — WATER IRRIG. STER. 1500 ML - (9/CA)

## (undated) DEVICE — GLOVE BIOGEL INDICATOR SZ 7.5 SURGICAL PF LTX - (50PR/BX 4BX/CA)

## (undated) DEVICE — TIP INTPLS HFLO ML ORFC BTRY - (12/CS)  FOR SURGILAV

## (undated) DEVICE — PROTECTOR ULNA NERVE - (36PR/CA)

## (undated) DEVICE — Device

## (undated) DEVICE — CANISTER SUCTION 3000ML MECHANICAL FILTER AUTO SHUTOFF MEDI-VAC NONSTERILE LF DISP  (40EA/CA)

## (undated) DEVICE — LACTATED RINGERS INJ 1000 ML - (14EA/CA 60CA/PF)

## (undated) DEVICE — GOWN WARMING STANDARD FLEX - (30/CA)

## (undated) DEVICE — TUBE, CULTURE AEROBIC

## (undated) DEVICE — KIT ANESTHESIA W/CIRCUIT & 3/LT BAG W/FILTER (20EA/CA)

## (undated) DEVICE — SODIUM CHL IRRIGATION 0.9% 1000ML (12EA/CA)

## (undated) DEVICE — TUBING CLEARLINK DUO-VENT - C-FLO (48EA/CA)

## (undated) DEVICE — PADDING CAST 4 IN STERILE - 4 X 4 YDS (24/CA)

## (undated) DEVICE — MASK ANESTHESIA ADULT  - (100/CA)

## (undated) DEVICE — GLOVE BIOGEL SZ 7.5 SURGICAL PF LTX - (50PR/BX 4BX/CA)

## (undated) DEVICE — PACK MAJOR ORTHO - (2EA/CA)